# Patient Record
Sex: FEMALE | Race: WHITE | NOT HISPANIC OR LATINO | URBAN - METROPOLITAN AREA
[De-identification: names, ages, dates, MRNs, and addresses within clinical notes are randomized per-mention and may not be internally consistent; named-entity substitution may affect disease eponyms.]

---

## 2017-02-01 ENCOUNTER — OUTPATIENT (OUTPATIENT)
Dept: OUTPATIENT SERVICES | Facility: HOSPITAL | Age: 37
LOS: 1 days | End: 2017-02-01

## 2017-02-15 DIAGNOSIS — Z01.20 ENCOUNTER FOR DENTAL EXAMINATION AND CLEANING WITHOUT ABNORMAL FINDINGS: ICD-10-CM

## 2017-04-25 ENCOUNTER — EMERGENCY (EMERGENCY)
Facility: HOSPITAL | Age: 37
LOS: 1 days | End: 2017-04-25
Attending: EMERGENCY MEDICINE | Admitting: INTERNAL MEDICINE
Payer: MEDICAID

## 2017-04-25 VITALS
TEMPERATURE: 99 F | OXYGEN SATURATION: 97 % | WEIGHT: 199.96 LBS | RESPIRATION RATE: 18 BRPM | HEIGHT: 62 IN | DIASTOLIC BLOOD PRESSURE: 61 MMHG | HEART RATE: 96 BPM | SYSTOLIC BLOOD PRESSURE: 161 MMHG

## 2017-04-25 VITALS
DIASTOLIC BLOOD PRESSURE: 74 MMHG | SYSTOLIC BLOOD PRESSURE: 110 MMHG | TEMPERATURE: 98 F | HEART RATE: 88 BPM | RESPIRATION RATE: 15 BRPM | OXYGEN SATURATION: 98 %

## 2017-04-25 LAB
ALBUMIN SERPL ELPH-MCNC: 4 G/DL — SIGNIFICANT CHANGE UP (ref 3.3–5)
ALP SERPL-CCNC: 49 U/L — SIGNIFICANT CHANGE UP (ref 30–120)
ALT FLD-CCNC: 20 U/L DA — SIGNIFICANT CHANGE UP (ref 10–60)
ANION GAP SERPL CALC-SCNC: 8 MMOL/L — SIGNIFICANT CHANGE UP (ref 5–17)
APAP SERPL-MCNC: <1 — SIGNIFICANT CHANGE UP (ref 10–30)
AST SERPL-CCNC: 11 U/L — SIGNIFICANT CHANGE UP (ref 10–40)
BASOPHILS # BLD AUTO: 0.2 K/UL — SIGNIFICANT CHANGE UP (ref 0–0.2)
BASOPHILS NFR BLD AUTO: 2.1 % — HIGH (ref 0–2)
BILIRUB SERPL-MCNC: 0.3 MG/DL — SIGNIFICANT CHANGE UP (ref 0.2–1.2)
BUN SERPL-MCNC: 12 MG/DL — SIGNIFICANT CHANGE UP (ref 7–23)
CALCIUM SERPL-MCNC: 9.5 MG/DL — SIGNIFICANT CHANGE UP (ref 8.4–10.5)
CHLORIDE SERPL-SCNC: 102 MMOL/L — SIGNIFICANT CHANGE UP (ref 96–108)
CO2 SERPL-SCNC: 30 MMOL/L — SIGNIFICANT CHANGE UP (ref 22–31)
CREAT SERPL-MCNC: 0.77 MG/DL — SIGNIFICANT CHANGE UP (ref 0.5–1.3)
EOSINOPHIL # BLD AUTO: 0.1 K/UL — SIGNIFICANT CHANGE UP (ref 0–0.5)
EOSINOPHIL NFR BLD AUTO: 1.5 % — SIGNIFICANT CHANGE UP (ref 0–6)
GLUCOSE SERPL-MCNC: 125 MG/DL — HIGH (ref 70–99)
HCT VFR BLD CALC: 45 % — SIGNIFICANT CHANGE UP (ref 34.5–45)
HGB BLD-MCNC: 14.7 G/DL — SIGNIFICANT CHANGE UP (ref 11.5–15.5)
LYMPHOCYTES # BLD AUTO: 4.3 K/UL — HIGH (ref 1–3.3)
LYMPHOCYTES # BLD AUTO: 44.5 % — HIGH (ref 13–44)
MCHC RBC-ENTMCNC: 28.7 PG — SIGNIFICANT CHANGE UP (ref 27–34)
MCHC RBC-ENTMCNC: 32.6 GM/DL — SIGNIFICANT CHANGE UP (ref 32–36)
MCV RBC AUTO: 88.1 FL — SIGNIFICANT CHANGE UP (ref 80–100)
MONOCYTES # BLD AUTO: 0.6 K/UL — SIGNIFICANT CHANGE UP (ref 0–0.9)
MONOCYTES NFR BLD AUTO: 5.9 % — SIGNIFICANT CHANGE UP (ref 2–14)
NEUTROPHILS # BLD AUTO: 4.4 K/UL — SIGNIFICANT CHANGE UP (ref 1.8–7.4)
NEUTROPHILS NFR BLD AUTO: 46 % — SIGNIFICANT CHANGE UP (ref 43–77)
PLATELET # BLD AUTO: 243 K/UL — SIGNIFICANT CHANGE UP (ref 150–400)
POTASSIUM SERPL-MCNC: 4 MMOL/L — SIGNIFICANT CHANGE UP (ref 3.5–5.3)
POTASSIUM SERPL-SCNC: 4 MMOL/L — SIGNIFICANT CHANGE UP (ref 3.5–5.3)
PROT SERPL-MCNC: 8.1 G/DL — SIGNIFICANT CHANGE UP (ref 6–8.3)
RBC # BLD: 5.1 M/UL — SIGNIFICANT CHANGE UP (ref 3.8–5.2)
RBC # FLD: 13.1 % — SIGNIFICANT CHANGE UP (ref 10.3–14.5)
SALICYLATES SERPL-MCNC: 0.6 MG/DL — LOW (ref 2.8–20)
SODIUM SERPL-SCNC: 140 MMOL/L — SIGNIFICANT CHANGE UP (ref 135–145)
WBC # BLD: 9.6 K/UL — SIGNIFICANT CHANGE UP (ref 3.8–10.5)
WBC # FLD AUTO: 9.6 K/UL — SIGNIFICANT CHANGE UP (ref 3.8–10.5)

## 2017-04-25 PROCEDURE — 99285 EMERGENCY DEPT VISIT HI MDM: CPT | Mod: 25

## 2017-04-25 PROCEDURE — 80053 COMPREHEN METABOLIC PANEL: CPT

## 2017-04-25 PROCEDURE — 85027 COMPLETE CBC AUTOMATED: CPT

## 2017-04-25 PROCEDURE — 96372 THER/PROPH/DIAG INJ SC/IM: CPT

## 2017-04-25 PROCEDURE — 99284 EMERGENCY DEPT VISIT MOD MDM: CPT

## 2017-04-25 PROCEDURE — 80307 DRUG TEST PRSMV CHEM ANLYZR: CPT

## 2017-04-25 RX ORDER — HALOPERIDOL DECANOATE 100 MG/ML
5 INJECTION INTRAMUSCULAR ONCE
Qty: 0 | Refills: 0 | Status: COMPLETED | OUTPATIENT
Start: 2017-04-25 | End: 2017-04-25

## 2017-04-25 RX ADMIN — HALOPERIDOL DECANOATE 5 MILLIGRAM(S): 100 INJECTION INTRAMUSCULAR at 17:14

## 2017-04-25 RX ADMIN — Medication 2 MILLIGRAM(S): at 17:07

## 2017-04-25 NOTE — ED PROVIDER NOTE - MEDICAL DECISION MAKING DETAILS
Patient very agitated requiring constant observation and occasional physical restraint will get Phychiatric eval for further management

## 2017-04-25 NOTE — ED PROVIDER NOTE - CARE PLAN
Principal Discharge DX:	Agitation requiring sedation protocol  Secondary Diagnosis:	Bipolar 1 disorder

## 2017-04-25 NOTE — ED PROVIDER NOTE - OBJECTIVE STATEMENT
36 y/o F pt hx bipolar, ventricular cardiac disease hypothyroidism, seizures presents to ED with aide c/o agitation. Denies any pain. No further complaints at this time. 38 y/o F pt hx bipolar, ventricular cardiac disease hypothyroidism, seizures presents to ED with aide c/o agitation. Denies any pain. No further complaints at this time. Patient screaming very restless running allover er. Given Ativan 2 mg IM and Haldol 5mg which seem to help a bit.

## 2017-04-25 NOTE — ED PROVIDER NOTE - NS ED MD SCRIBE ATTENDING SCRIBE SECTIONS
PAST MEDICAL/SURGICAL/SOCIAL HISTORY/DISPOSITION/HISTORY OF PRESENT ILLNESS/HIV/PHYSICAL EXAM/REVIEW OF SYSTEMS/VITAL SIGNS( Pullset)

## 2017-04-25 NOTE — ED PROVIDER NOTE - PROGRESS NOTE DETAILS
Patient is lying calmly in bed. Aide states she seems fine. Patient would not be a good candidate for telepsych, and emergent psych consult not necessary at this time.     Patient is safe for D/C, and can follow-up as an out patient, or return if symptoms recur

## 2017-05-09 ENCOUNTER — OUTPATIENT (OUTPATIENT)
Dept: OUTPATIENT SERVICES | Facility: HOSPITAL | Age: 37
LOS: 1 days | End: 2017-05-09

## 2017-05-12 DIAGNOSIS — Z01.20 ENCOUNTER FOR DENTAL EXAMINATION AND CLEANING WITHOUT ABNORMAL FINDINGS: ICD-10-CM

## 2017-08-21 ENCOUNTER — EMERGENCY (EMERGENCY)
Facility: HOSPITAL | Age: 37
LOS: 1 days | Discharge: HOME | End: 2017-08-21
Attending: EMERGENCY MEDICINE | Admitting: EMERGENCY MEDICINE
Payer: MEDICAID

## 2017-08-21 VITALS
WEIGHT: 196.21 LBS | SYSTOLIC BLOOD PRESSURE: 136 MMHG | HEIGHT: 59 IN | OXYGEN SATURATION: 98 % | HEART RATE: 95 BPM | DIASTOLIC BLOOD PRESSURE: 92 MMHG | TEMPERATURE: 98 F | RESPIRATION RATE: 20 BRPM

## 2017-08-21 PROCEDURE — 99282 EMERGENCY DEPT VISIT SF MDM: CPT | Mod: 25

## 2017-08-21 PROCEDURE — 99284 EMERGENCY DEPT VISIT MOD MDM: CPT

## 2017-08-21 RX ADMIN — Medication 30 MILLILITER(S): at 19:18

## 2017-08-21 NOTE — ED PROVIDER NOTE - MEDICAL DECISION MAKING DETAILS
37 y.o. F presents from group facility for abd pain - in the ED, no signs of abdominal pain - given maalox when asked for prune juice - tolerated well - to be discharged home

## 2017-08-21 NOTE — ED PROVIDER NOTE - OBJECTIVE STATEMENT
38 y/o F w/ bipolar disorder presents to the ED from assisted living facility for abdominal pain today. Pt's aide states that group home wanted her evaluated and medically cleared after she walked into someone else's home, hit her head against something and complained of abdominal pain. 38 y/o F w/ bipolar disorder presents to the ED from group home with aide for abdominal pain today. [place] of group homes. Pt had a BM at home. Then left the home, walked into someone else's home - they know the patient - pt banged head and complained of abd pain, so they called 911. Pt's aide states that group home wanted her evaluated and medically cleared. Aide believes pt appears at baseline. No known fever. No vomiting. Pt wants to eat. Pt stating she doesn't want to go home, does not elaborate.

## 2017-08-21 NOTE — ED ADULT NURSE REASSESSMENT NOTE - NS ED NURSE REASSESS COMMENT FT1
pt alert , anxious, exaggerated, manipulative behavior,  crying states 'belly pain, doesn't want to go home'. pt seen and evaluated by MD. pt D/C back to group home.

## 2017-10-17 ENCOUNTER — OUTPATIENT (OUTPATIENT)
Dept: OUTPATIENT SERVICES | Facility: HOSPITAL | Age: 37
LOS: 1 days | End: 2017-10-17

## 2017-10-17 DIAGNOSIS — Z01.20 ENCOUNTER FOR DENTAL EXAMINATION AND CLEANING WITHOUT ABNORMAL FINDINGS: ICD-10-CM

## 2017-12-26 ENCOUNTER — OUTPATIENT (OUTPATIENT)
Dept: OUTPATIENT SERVICES | Facility: HOSPITAL | Age: 37
LOS: 1 days | End: 2017-12-26

## 2017-12-27 DIAGNOSIS — Z01.20 ENCOUNTER FOR DENTAL EXAMINATION AND CLEANING WITHOUT ABNORMAL FINDINGS: ICD-10-CM

## 2018-06-04 ENCOUNTER — EMERGENCY (EMERGENCY)
Facility: HOSPITAL | Age: 38
LOS: 1 days | Discharge: HOME | End: 2018-06-04
Attending: EMERGENCY MEDICINE | Admitting: EMERGENCY MEDICINE
Payer: MEDICAID

## 2018-06-04 VITALS
WEIGHT: 195.11 LBS | TEMPERATURE: 99 F | SYSTOLIC BLOOD PRESSURE: 137 MMHG | HEART RATE: 102 BPM | DIASTOLIC BLOOD PRESSURE: 86 MMHG | RESPIRATION RATE: 16 BRPM | HEIGHT: 63 IN | OXYGEN SATURATION: 95 %

## 2018-06-04 VITALS
TEMPERATURE: 99 F | SYSTOLIC BLOOD PRESSURE: 131 MMHG | HEART RATE: 98 BPM | DIASTOLIC BLOOD PRESSURE: 76 MMHG | OXYGEN SATURATION: 96 % | RESPIRATION RATE: 16 BRPM

## 2018-06-04 LAB
ALBUMIN SERPL ELPH-MCNC: 4.1 G/DL — SIGNIFICANT CHANGE UP (ref 3.3–5)
ALP SERPL-CCNC: 38 U/L — SIGNIFICANT CHANGE UP (ref 30–120)
ALT FLD-CCNC: 21 U/L DA — SIGNIFICANT CHANGE UP (ref 10–60)
ANION GAP SERPL CALC-SCNC: 12 MMOL/L — SIGNIFICANT CHANGE UP (ref 5–17)
APPEARANCE UR: CLEAR — SIGNIFICANT CHANGE UP
AST SERPL-CCNC: 11 U/L — SIGNIFICANT CHANGE UP (ref 10–40)
BASOPHILS # BLD AUTO: 0.2 K/UL — SIGNIFICANT CHANGE UP (ref 0–0.2)
BASOPHILS NFR BLD AUTO: 1.9 % — SIGNIFICANT CHANGE UP (ref 0–2)
BILIRUB SERPL-MCNC: 0.3 MG/DL — SIGNIFICANT CHANGE UP (ref 0.2–1.2)
BILIRUB UR-MCNC: NEGATIVE — SIGNIFICANT CHANGE UP
BUN SERPL-MCNC: 10 MG/DL — SIGNIFICANT CHANGE UP (ref 7–23)
CALCIUM SERPL-MCNC: 9.8 MG/DL — SIGNIFICANT CHANGE UP (ref 8.4–10.5)
CHLORIDE SERPL-SCNC: 96 MMOL/L — SIGNIFICANT CHANGE UP (ref 96–108)
CO2 SERPL-SCNC: 27 MMOL/L — SIGNIFICANT CHANGE UP (ref 22–31)
COLOR SPEC: YELLOW — SIGNIFICANT CHANGE UP
CREAT SERPL-MCNC: 0.84 MG/DL — SIGNIFICANT CHANGE UP (ref 0.5–1.3)
DIFF PNL FLD: ABNORMAL
EOSINOPHIL # BLD AUTO: 0.1 K/UL — SIGNIFICANT CHANGE UP (ref 0–0.5)
EOSINOPHIL NFR BLD AUTO: 0.9 % — SIGNIFICANT CHANGE UP (ref 0–6)
GLUCOSE SERPL-MCNC: 112 MG/DL — HIGH (ref 70–99)
GLUCOSE UR QL: NEGATIVE MG/DL — SIGNIFICANT CHANGE UP
HCG SERPL-ACNC: <1 MIU/ML — SIGNIFICANT CHANGE UP
HCT VFR BLD CALC: 44.6 % — SIGNIFICANT CHANGE UP (ref 34.5–45)
HGB BLD-MCNC: 14.5 G/DL — SIGNIFICANT CHANGE UP (ref 11.5–15.5)
KETONES UR-MCNC: NEGATIVE — SIGNIFICANT CHANGE UP
LEUKOCYTE ESTERASE UR-ACNC: ABNORMAL
LYMPHOCYTES # BLD AUTO: 2.9 K/UL — SIGNIFICANT CHANGE UP (ref 1–3.3)
LYMPHOCYTES # BLD AUTO: 33 % — SIGNIFICANT CHANGE UP (ref 13–44)
MCHC RBC-ENTMCNC: 28 PG — SIGNIFICANT CHANGE UP (ref 27–34)
MCHC RBC-ENTMCNC: 32.5 GM/DL — SIGNIFICANT CHANGE UP (ref 32–36)
MCV RBC AUTO: 86 FL — SIGNIFICANT CHANGE UP (ref 80–100)
MONOCYTES # BLD AUTO: 0.6 K/UL — SIGNIFICANT CHANGE UP (ref 0–0.9)
MONOCYTES NFR BLD AUTO: 7.3 % — SIGNIFICANT CHANGE UP (ref 2–14)
NEUTROPHILS # BLD AUTO: 4.9 K/UL — SIGNIFICANT CHANGE UP (ref 1.8–7.4)
NEUTROPHILS NFR BLD AUTO: 56.9 % — SIGNIFICANT CHANGE UP (ref 43–77)
NITRITE UR-MCNC: NEGATIVE — SIGNIFICANT CHANGE UP
PH UR: 8 — SIGNIFICANT CHANGE UP (ref 5–8)
PLATELET # BLD AUTO: 301 K/UL — SIGNIFICANT CHANGE UP (ref 150–400)
POTASSIUM SERPL-MCNC: 3.9 MMOL/L — SIGNIFICANT CHANGE UP (ref 3.5–5.3)
POTASSIUM SERPL-SCNC: 3.9 MMOL/L — SIGNIFICANT CHANGE UP (ref 3.5–5.3)
PROT SERPL-MCNC: 8.3 G/DL — SIGNIFICANT CHANGE UP (ref 6–8.3)
PROT UR-MCNC: NEGATIVE MG/DL — SIGNIFICANT CHANGE UP
RBC # BLD: 5.18 M/UL — SIGNIFICANT CHANGE UP (ref 3.8–5.2)
RBC # FLD: 12.5 % — SIGNIFICANT CHANGE UP (ref 10.3–14.5)
SODIUM SERPL-SCNC: 135 MMOL/L — SIGNIFICANT CHANGE UP (ref 135–145)
SP GR SPEC: 1.01 — SIGNIFICANT CHANGE UP (ref 1.01–1.02)
UROBILINOGEN FLD QL: NEGATIVE MG/DL — SIGNIFICANT CHANGE UP
VALPROATE SERPL-MCNC: 97 UG/ML — SIGNIFICANT CHANGE UP (ref 50–100)
WBC # BLD: 8.7 K/UL — SIGNIFICANT CHANGE UP (ref 3.8–10.5)
WBC # FLD AUTO: 8.7 K/UL — SIGNIFICANT CHANGE UP (ref 3.8–10.5)

## 2018-06-04 PROCEDURE — 84702 CHORIONIC GONADOTROPIN TEST: CPT

## 2018-06-04 PROCEDURE — 99284 EMERGENCY DEPT VISIT MOD MDM: CPT | Mod: 25

## 2018-06-04 PROCEDURE — 85027 COMPLETE CBC AUTOMATED: CPT

## 2018-06-04 PROCEDURE — 72110 X-RAY EXAM L-2 SPINE 4/>VWS: CPT | Mod: 26

## 2018-06-04 PROCEDURE — 70450 CT HEAD/BRAIN W/O DYE: CPT | Mod: 26

## 2018-06-04 PROCEDURE — 80053 COMPREHEN METABOLIC PANEL: CPT

## 2018-06-04 PROCEDURE — 80164 ASSAY DIPROPYLACETIC ACD TOT: CPT

## 2018-06-04 PROCEDURE — 81001 URINALYSIS AUTO W/SCOPE: CPT

## 2018-06-04 PROCEDURE — 36415 COLL VENOUS BLD VENIPUNCTURE: CPT

## 2018-06-04 PROCEDURE — 70450 CT HEAD/BRAIN W/O DYE: CPT

## 2018-06-04 PROCEDURE — 99284 EMERGENCY DEPT VISIT MOD MDM: CPT

## 2018-06-04 PROCEDURE — 72110 X-RAY EXAM L-2 SPINE 4/>VWS: CPT

## 2018-06-04 RX ORDER — AMANTADINE HCL 100 MG
100 CAPSULE ORAL
Qty: 0 | Refills: 0 | COMMUNITY

## 2018-06-04 RX ORDER — SODIUM CHLORIDE 9 MG/ML
3 INJECTION INTRAMUSCULAR; INTRAVENOUS; SUBCUTANEOUS ONCE
Qty: 0 | Refills: 0 | Status: COMPLETED | OUTPATIENT
Start: 2018-06-04 | End: 2018-06-04

## 2018-06-04 RX ADMIN — SODIUM CHLORIDE 3 MILLILITER(S): 9 INJECTION INTRAMUSCULAR; INTRAVENOUS; SUBCUTANEOUS at 18:14

## 2018-06-04 NOTE — ED PROVIDER NOTE - OBJECTIVE STATEMENT
pt is a 39yo female with pmhx of seizures, MR, bipolar BIB group home for seizure today. group home aide reports pt had a seizure at program today, unsure what happened, unwitnessed. pt not c/o pain at this time. last seizure 2y ago, pmd: avolese pt is a 39yo female with pmhx of seizures, MR, bipolar BIB group home [facility] for seizure today. group home aide reports pt had a seizure at program today, unsure what happened, unwitnessed. pt not c/o pain at this time. last seizure 2y ago, pmd: iggy

## 2018-06-04 NOTE — ED PROVIDER NOTE - PROGRESS NOTE DETAILS
labs and imaging reviewed. pt advised to follow up with pmd. All questions answered and concerns addressed. pt verbalized understanding and agreement with plan and dx. pt advised to follow up with PMD. pt advised to return to ed for worsenng symptoms including fever, cp, sob. will dc.

## 2018-06-04 NOTE — ED ADULT NURSE NOTE - OBJECTIVE STATEMENT
Pt presents accompanied by aides from [facility] who state Pt had a witnessed seizure lasting one minute while at school prior to arrival. Alert oriented x3. Normal mentation. No seizure activity noted

## 2018-06-04 NOTE — ED PROVIDER NOTE - ATTENDING CONTRIBUTION TO CARE
Pt is a 37 yo female who presents to the ED with cc of seizure.  PMHx of seizures, MR, bipolar, hypothyroidism.  Pt is unable to provide history secondary to developmental delay.  Aid reports that pt had witnessed seizure at group today but was not there and cannot provide additional history.  On exam pt awake, alert, laughing and walking around the ED.  NCAT, PERRL, heart RRR, lungs CTA, abd soft NT/ND.  Will check cbc, cmp, pregnancy, UA, urine culture, Depakote level, CT head, x-ray lumbar spine.  Agree with above

## 2018-11-20 ENCOUNTER — OUTPATIENT (OUTPATIENT)
Dept: OUTPATIENT SERVICES | Facility: HOSPITAL | Age: 38
LOS: 1 days | End: 2018-11-20

## 2018-11-20 PROBLEM — E03.9 HYPOTHYROIDISM, UNSPECIFIED: Chronic | Status: ACTIVE | Noted: 2017-04-25

## 2018-11-27 ENCOUNTER — APPOINTMENT (OUTPATIENT)
Dept: OBGYN | Facility: CLINIC | Age: 38
End: 2018-11-27

## 2018-11-27 DIAGNOSIS — Z01.20 ENCOUNTER FOR DENTAL EXAMINATION AND CLEANING WITHOUT ABNORMAL FINDINGS: ICD-10-CM

## 2019-05-15 ENCOUNTER — OUTPATIENT (OUTPATIENT)
Dept: OUTPATIENT SERVICES | Facility: HOSPITAL | Age: 39
LOS: 1 days | End: 2019-05-15
Payer: MEDICAID

## 2019-05-15 VITALS
HEART RATE: 108 BPM | TEMPERATURE: 98 F | OXYGEN SATURATION: 98 % | RESPIRATION RATE: 20 BRPM | WEIGHT: 197.98 LBS | HEIGHT: 61 IN | DIASTOLIC BLOOD PRESSURE: 76 MMHG | SYSTOLIC BLOOD PRESSURE: 134 MMHG

## 2019-05-15 DIAGNOSIS — K02.62 DENTAL CARIES ON SMOOTH SURFACE PENETRATING INTO DENTIN: ICD-10-CM

## 2019-05-15 DIAGNOSIS — K02.9 DENTAL CARIES, UNSPECIFIED: ICD-10-CM

## 2019-05-15 DIAGNOSIS — K05.6 PERIODONTAL DISEASE, UNSPECIFIED: ICD-10-CM

## 2019-05-15 DIAGNOSIS — Z98.890 OTHER SPECIFIED POSTPROCEDURAL STATES: Chronic | ICD-10-CM

## 2019-05-15 DIAGNOSIS — Z86.79 PERSONAL HISTORY OF OTHER DISEASES OF THE CIRCULATORY SYSTEM: ICD-10-CM

## 2019-05-15 DIAGNOSIS — G40.909 EPILEPSY, UNSPECIFIED, NOT INTRACTABLE, WITHOUT STATUS EPILEPTICUS: ICD-10-CM

## 2019-05-15 DIAGNOSIS — F31.9 BIPOLAR DISORDER, UNSPECIFIED: ICD-10-CM

## 2019-05-15 LAB
ANION GAP SERPL CALC-SCNC: 15 MMOL/L — SIGNIFICANT CHANGE UP (ref 5–17)
BUN SERPL-MCNC: 12 MG/DL — SIGNIFICANT CHANGE UP (ref 7–23)
CALCIUM SERPL-MCNC: 10.1 MG/DL — SIGNIFICANT CHANGE UP (ref 8.4–10.5)
CHLORIDE SERPL-SCNC: 98 MMOL/L — SIGNIFICANT CHANGE UP (ref 96–108)
CO2 SERPL-SCNC: 23 MMOL/L — SIGNIFICANT CHANGE UP (ref 22–31)
CREAT SERPL-MCNC: 0.68 MG/DL — SIGNIFICANT CHANGE UP (ref 0.5–1.3)
GLUCOSE SERPL-MCNC: 95 MG/DL — SIGNIFICANT CHANGE UP (ref 70–99)
HCT VFR BLD CALC: 38 % — SIGNIFICANT CHANGE UP (ref 34.5–45)
HGB BLD-MCNC: 12.3 G/DL — SIGNIFICANT CHANGE UP (ref 11.5–15.5)
MCHC RBC-ENTMCNC: 28.1 PG — SIGNIFICANT CHANGE UP (ref 27–34)
MCHC RBC-ENTMCNC: 32.4 GM/DL — SIGNIFICANT CHANGE UP (ref 32–36)
MCV RBC AUTO: 86.8 FL — SIGNIFICANT CHANGE UP (ref 80–100)
PLATELET # BLD AUTO: 339 K/UL — SIGNIFICANT CHANGE UP (ref 150–400)
POTASSIUM SERPL-MCNC: 4.1 MMOL/L — SIGNIFICANT CHANGE UP (ref 3.5–5.3)
POTASSIUM SERPL-SCNC: 4.1 MMOL/L — SIGNIFICANT CHANGE UP (ref 3.5–5.3)
RBC # BLD: 4.38 M/UL — SIGNIFICANT CHANGE UP (ref 3.8–5.2)
RBC # FLD: 16.4 % — HIGH (ref 10.3–14.5)
SODIUM SERPL-SCNC: 136 MMOL/L — SIGNIFICANT CHANGE UP (ref 135–145)
WBC # BLD: 8.63 K/UL — SIGNIFICANT CHANGE UP (ref 3.8–10.5)
WBC # FLD AUTO: 8.63 K/UL — SIGNIFICANT CHANGE UP (ref 3.8–10.5)

## 2019-05-15 PROCEDURE — 85027 COMPLETE CBC AUTOMATED: CPT

## 2019-05-15 PROCEDURE — 71045 X-RAY EXAM CHEST 1 VIEW: CPT

## 2019-05-15 PROCEDURE — 71045 X-RAY EXAM CHEST 1 VIEW: CPT | Mod: 26

## 2019-05-15 PROCEDURE — 80048 BASIC METABOLIC PNL TOTAL CA: CPT

## 2019-05-15 PROCEDURE — G0463: CPT

## 2019-05-15 RX ORDER — LIDOCAINE HCL 20 MG/ML
0.2 VIAL (ML) INJECTION ONCE
Refills: 0 | Status: DISCONTINUED | OUTPATIENT
Start: 2019-05-22 | End: 2019-06-06

## 2019-05-15 RX ORDER — SODIUM CHLORIDE 9 MG/ML
3 INJECTION INTRAMUSCULAR; INTRAVENOUS; SUBCUTANEOUS EVERY 8 HOURS
Refills: 0 | Status: DISCONTINUED | OUTPATIENT
Start: 2019-05-22 | End: 2019-06-06

## 2019-05-15 NOTE — H&P PST ADULT - NSICDXPROBLEM_GEN_ALL_CORE_FT
PROBLEM DIAGNOSES  Problem: H/O sinus tachycardia  Assessment and Plan: recent ekg on chart     Problem: Dental caries  Assessment and Plan: scheduled for comprehensive dental treatment  preop instruction discussed with group home staffs, written instruction given.  urine given, ucg on admit     Problem: Bipolar disorder  Assessment and Plan: continue medication deshawn-op    Problem: Seizure disorder  Assessment and Plan: maintain seizure precautions  continue anti-seizure medication

## 2019-05-15 NOTE — H&P PST ADULT - HISTORY OF PRESENT ILLNESS
39 year old mentally challenged female with h/o bipolar disorders, seizure disorder (last seizure 6/4/2018), occasional tachycardia, osteoporosis, hyperlipidemia, is scheduled for comprehensive dental treatment on 5/22/2019. Patient is scheduled for evaluation with her pcp on 5/16/2019.

## 2019-05-15 NOTE — H&P PST ADULT - NSANTHOSAYNRD_GEN_A_CORE
No. SHAHEED screening performed.  STOP BANG Legend: 0-2 = LOW Risk; 3-4 = INTERMEDIATE Risk; 5-8 = HIGH Risk

## 2019-05-21 ENCOUNTER — TRANSCRIPTION ENCOUNTER (OUTPATIENT)
Age: 39
End: 2019-05-21

## 2019-05-21 RX ORDER — CELECOXIB 200 MG/1
200 CAPSULE ORAL ONCE
Refills: 0 | Status: DISCONTINUED | OUTPATIENT
Start: 2019-05-22 | End: 2019-06-06

## 2019-05-21 RX ORDER — SODIUM CHLORIDE 9 MG/ML
1000 INJECTION, SOLUTION INTRAVENOUS
Refills: 0 | Status: DISCONTINUED | OUTPATIENT
Start: 2019-05-22 | End: 2019-06-06

## 2019-05-21 RX ORDER — ONDANSETRON 8 MG/1
4 TABLET, FILM COATED ORAL ONCE
Refills: 0 | Status: DISCONTINUED | OUTPATIENT
Start: 2019-05-22 | End: 2019-06-06

## 2019-05-21 NOTE — ASU DISCHARGE PLAN (ADULT/PEDIATRIC) - CALL YOUR DOCTOR IF YOU HAVE ANY OF THE FOLLOWING:
Bleeding that does not stop/Pain not relieved by Medications/Swelling that gets worse/Fever greater than (need to indicate Fahrenheit or Celsius)/Wound/Surgical Site with redness, or foul smelling discharge or pus

## 2019-05-21 NOTE — ASU DISCHARGE PLAN (ADULT/PEDIATRIC) - CARE PROVIDER_API CALL
Jess De Los Santos (DDS; MD)  Dental Medicine  [address] [place] [address]  Phone: [phone]  Fax: [phone]  Follow Up Time:

## 2019-05-21 NOTE — ASU DISCHARGE PLAN (ADULT/PEDIATRIC) - NURSING INSTRUCTIONS
Next dose of Tylenol will be on or after _____4583pm______ ,today/tonight, If needed for pain/cramps. Your first dose of Tylenol was given at ____1053am_______. Do not exceed more than 4000mg of Tylenol in one 24 hour setting.

## 2019-05-22 ENCOUNTER — OUTPATIENT (OUTPATIENT)
Dept: OUTPATIENT SERVICES | Facility: HOSPITAL | Age: 39
LOS: 1 days | End: 2019-05-22
Payer: MEDICAID

## 2019-05-22 VITALS
OXYGEN SATURATION: 100 % | SYSTOLIC BLOOD PRESSURE: 120 MMHG | DIASTOLIC BLOOD PRESSURE: 57 MMHG | HEART RATE: 109 BPM | RESPIRATION RATE: 16 BRPM

## 2019-05-22 VITALS
RESPIRATION RATE: 24 BRPM | HEIGHT: 61 IN | HEART RATE: 104 BPM | OXYGEN SATURATION: 100 % | WEIGHT: 197.98 LBS | TEMPERATURE: 98 F

## 2019-05-22 DIAGNOSIS — Z98.890 OTHER SPECIFIED POSTPROCEDURAL STATES: Chronic | ICD-10-CM

## 2019-05-22 DIAGNOSIS — K02.62 DENTAL CARIES ON SMOOTH SURFACE PENETRATING INTO DENTIN: ICD-10-CM

## 2019-05-22 DIAGNOSIS — K05.6 PERIODONTAL DISEASE, UNSPECIFIED: ICD-10-CM

## 2019-05-22 PROCEDURE — D4210: CPT

## 2019-05-22 PROCEDURE — D2392: CPT

## 2019-05-22 PROCEDURE — D2394: CPT

## 2019-05-22 PROCEDURE — D2332: CPT

## 2019-05-22 PROCEDURE — D2391: CPT

## 2019-05-22 PROCEDURE — D4341: CPT

## 2019-06-02 PROBLEM — Z86.79 PERSONAL HISTORY OF OTHER DISEASES OF THE CIRCULATORY SYSTEM: Chronic | Status: ACTIVE | Noted: 2019-05-15

## 2019-06-02 PROBLEM — Z87.39 PERSONAL HISTORY OF OTHER DISEASES OF THE MUSCULOSKELETAL SYSTEM AND CONNECTIVE TISSUE: Chronic | Status: ACTIVE | Noted: 2019-05-15

## 2019-06-02 PROBLEM — F31.9 BIPOLAR DISORDER, UNSPECIFIED: Chronic | Status: ACTIVE | Noted: 2019-05-15

## 2019-06-02 PROBLEM — E03.9 HYPOTHYROIDISM, UNSPECIFIED: Chronic | Status: ACTIVE | Noted: 2019-05-15

## 2019-06-02 PROBLEM — E78.5 HYPERLIPIDEMIA, UNSPECIFIED: Chronic | Status: ACTIVE | Noted: 2019-05-15

## 2019-06-05 RX ORDER — SODIUM CHLORIDE 9 MG/ML
3 INJECTION INTRAMUSCULAR; INTRAVENOUS; SUBCUTANEOUS EVERY 8 HOURS
Refills: 0 | Status: DISCONTINUED | OUTPATIENT
Start: 2019-06-07 | End: 2019-06-22

## 2019-06-05 RX ORDER — LIDOCAINE HCL 20 MG/ML
0.2 VIAL (ML) INJECTION ONCE
Refills: 0 | Status: DISCONTINUED | OUTPATIENT
Start: 2019-06-07 | End: 2019-06-22

## 2019-06-06 ENCOUNTER — TRANSCRIPTION ENCOUNTER (OUTPATIENT)
Age: 39
End: 2019-06-06

## 2019-06-07 ENCOUNTER — OUTPATIENT (OUTPATIENT)
Dept: OUTPATIENT SERVICES | Facility: HOSPITAL | Age: 39
LOS: 1 days | End: 2019-06-07
Payer: MEDICAID

## 2019-06-07 VITALS
SYSTOLIC BLOOD PRESSURE: 123 MMHG | OXYGEN SATURATION: 98 % | HEIGHT: 61 IN | TEMPERATURE: 98 F | DIASTOLIC BLOOD PRESSURE: 71 MMHG | WEIGHT: 197.98 LBS | HEART RATE: 114 BPM

## 2019-06-07 VITALS — HEART RATE: 94 BPM | OXYGEN SATURATION: 94 % | SYSTOLIC BLOOD PRESSURE: 124 MMHG | DIASTOLIC BLOOD PRESSURE: 60 MMHG

## 2019-06-07 DIAGNOSIS — Z98.890 OTHER SPECIFIED POSTPROCEDURAL STATES: Chronic | ICD-10-CM

## 2019-06-07 DIAGNOSIS — K05.6 PERIODONTAL DISEASE, UNSPECIFIED: ICD-10-CM

## 2019-06-07 DIAGNOSIS — K02.62 DENTAL CARIES ON SMOOTH SURFACE PENETRATING INTO DENTIN: ICD-10-CM

## 2019-06-07 PROCEDURE — D2391: CPT

## 2019-06-07 PROCEDURE — D2335: CPT

## 2019-06-07 PROCEDURE — D2393: CPT

## 2019-06-07 PROCEDURE — D1110: CPT

## 2019-06-07 PROCEDURE — D2394: CPT

## 2019-06-07 RX ORDER — SODIUM CHLORIDE 9 MG/ML
1000 INJECTION, SOLUTION INTRAVENOUS
Refills: 0 | Status: DISCONTINUED | OUTPATIENT
Start: 2019-06-07 | End: 2019-06-22

## 2019-06-07 RX ORDER — ONDANSETRON 8 MG/1
4 TABLET, FILM COATED ORAL ONCE
Refills: 0 | Status: DISCONTINUED | OUTPATIENT
Start: 2019-06-07 | End: 2019-06-22

## 2019-06-07 NOTE — ASU PATIENT PROFILE, ADULT - PMH
Bipolar disorder    Bipolar illness    H/O osteoporosis    H/O sinus tachycardia    History of seizure disorder  last seizure 6/4/2018  Hyperlipidemia    Hypothyroidism    Hypothyroidism    Mentally challenged    Seizure

## 2019-10-03 NOTE — ED ADULT TRIAGE NOTE - ESI TRIAGE ACUITY LEVEL, MLM
· Currently denies any migrainous symptoms  · Multiple admissions previously for intractable migraine 3

## 2020-01-15 ENCOUNTER — OUTPATIENT (OUTPATIENT)
Dept: OUTPATIENT SERVICES | Facility: HOSPITAL | Age: 40
LOS: 1 days | End: 2020-01-15
Payer: MEDICAID

## 2020-01-15 VITALS
RESPIRATION RATE: 15 BRPM | DIASTOLIC BLOOD PRESSURE: 68 MMHG | SYSTOLIC BLOOD PRESSURE: 101 MMHG | TEMPERATURE: 98 F | HEIGHT: 62 IN | OXYGEN SATURATION: 97 % | WEIGHT: 186.07 LBS | HEART RATE: 94 BPM

## 2020-01-15 DIAGNOSIS — Z86.79 PERSONAL HISTORY OF OTHER DISEASES OF THE CIRCULATORY SYSTEM: ICD-10-CM

## 2020-01-15 DIAGNOSIS — K02.62 DENTAL CARIES ON SMOOTH SURFACE PENETRATING INTO DENTIN: ICD-10-CM

## 2020-01-15 DIAGNOSIS — K02.9 DENTAL CARIES, UNSPECIFIED: ICD-10-CM

## 2020-01-15 DIAGNOSIS — Z01.818 ENCOUNTER FOR OTHER PREPROCEDURAL EXAMINATION: ICD-10-CM

## 2020-01-15 DIAGNOSIS — Z92.89 PERSONAL HISTORY OF OTHER MEDICAL TREATMENT: Chronic | ICD-10-CM

## 2020-01-15 DIAGNOSIS — K05.6 PERIODONTAL DISEASE, UNSPECIFIED: ICD-10-CM

## 2020-01-15 DIAGNOSIS — Z98.890 OTHER SPECIFIED POSTPROCEDURAL STATES: Chronic | ICD-10-CM

## 2020-01-15 DIAGNOSIS — Z86.69 PERSONAL HISTORY OF OTHER DISEASES OF THE NERVOUS SYSTEM AND SENSE ORGANS: ICD-10-CM

## 2020-01-15 LAB
ANION GAP SERPL CALC-SCNC: 15 MMOL/L — SIGNIFICANT CHANGE UP (ref 5–17)
BUN SERPL-MCNC: 15 MG/DL — SIGNIFICANT CHANGE UP (ref 7–23)
CALCIUM SERPL-MCNC: 10.3 MG/DL — SIGNIFICANT CHANGE UP (ref 8.4–10.5)
CHLORIDE SERPL-SCNC: 98 MMOL/L — SIGNIFICANT CHANGE UP (ref 96–108)
CO2 SERPL-SCNC: 26 MMOL/L — SIGNIFICANT CHANGE UP (ref 22–31)
CREAT SERPL-MCNC: 0.68 MG/DL — SIGNIFICANT CHANGE UP (ref 0.5–1.3)
GLUCOSE SERPL-MCNC: 111 MG/DL — HIGH (ref 70–99)
HBA1C BLD-MCNC: 5.7 % — HIGH (ref 4–5.6)
HCG SERPL-ACNC: <2 MIU/ML — SIGNIFICANT CHANGE UP
HCT VFR BLD CALC: 45.1 % — HIGH (ref 34.5–45)
HGB BLD-MCNC: 14.3 G/DL — SIGNIFICANT CHANGE UP (ref 11.5–15.5)
MCHC RBC-ENTMCNC: 27.7 PG — SIGNIFICANT CHANGE UP (ref 27–34)
MCHC RBC-ENTMCNC: 31.7 GM/DL — LOW (ref 32–36)
MCV RBC AUTO: 87.2 FL — SIGNIFICANT CHANGE UP (ref 80–100)
PLATELET # BLD AUTO: 273 K/UL — SIGNIFICANT CHANGE UP (ref 150–400)
POTASSIUM SERPL-MCNC: 4.3 MMOL/L — SIGNIFICANT CHANGE UP (ref 3.5–5.3)
POTASSIUM SERPL-SCNC: 4.3 MMOL/L — SIGNIFICANT CHANGE UP (ref 3.5–5.3)
RBC # BLD: 5.17 M/UL — SIGNIFICANT CHANGE UP (ref 3.8–5.2)
RBC # FLD: 15.7 % — HIGH (ref 10.3–14.5)
SODIUM SERPL-SCNC: 139 MMOL/L — SIGNIFICANT CHANGE UP (ref 135–145)
WBC # BLD: 9.69 K/UL — SIGNIFICANT CHANGE UP (ref 3.8–10.5)
WBC # FLD AUTO: 9.69 K/UL — SIGNIFICANT CHANGE UP (ref 3.8–10.5)

## 2020-01-15 PROCEDURE — G0463: CPT

## 2020-01-15 PROCEDURE — 84702 CHORIONIC GONADOTROPIN TEST: CPT

## 2020-01-15 PROCEDURE — 83036 HEMOGLOBIN GLYCOSYLATED A1C: CPT

## 2020-01-15 PROCEDURE — 85027 COMPLETE CBC AUTOMATED: CPT

## 2020-01-15 PROCEDURE — 80048 BASIC METABOLIC PNL TOTAL CA: CPT

## 2020-01-15 RX ORDER — AMANTADINE HCL 100 MG
1 CAPSULE ORAL
Qty: 0 | Refills: 0 | DISCHARGE

## 2020-01-15 RX ORDER — DOCOSANOL 100 MG/G
0 CREAM TOPICAL
Qty: 0 | Refills: 0 | DISCHARGE

## 2020-01-15 RX ORDER — DIVALPROEX SODIUM 500 MG/1
1 TABLET, DELAYED RELEASE ORAL
Qty: 0 | Refills: 0 | DISCHARGE

## 2020-01-15 RX ORDER — ALENDRONATE SODIUM 70 MG/1
1 TABLET ORAL
Qty: 0 | Refills: 0 | DISCHARGE

## 2020-01-15 RX ORDER — QUETIAPINE FUMARATE 200 MG/1
1 TABLET, FILM COATED ORAL
Qty: 0 | Refills: 0 | DISCHARGE

## 2020-01-15 RX ORDER — LIDOCAINE HCL 20 MG/ML
0.2 VIAL (ML) INJECTION ONCE
Refills: 0 | Status: DISCONTINUED | OUTPATIENT
Start: 2020-01-29 | End: 2020-02-17

## 2020-01-15 RX ORDER — LACTIC ACID 10 %
0 CREAM (GRAM) TOPICAL
Qty: 0 | Refills: 0 | DISCHARGE

## 2020-01-15 RX ORDER — LEVOTHYROXINE SODIUM 125 MCG
1 TABLET ORAL
Qty: 0 | Refills: 0 | DISCHARGE

## 2020-01-15 RX ORDER — ATORVASTATIN CALCIUM 80 MG/1
1 TABLET, FILM COATED ORAL
Qty: 0 | Refills: 0 | DISCHARGE

## 2020-01-15 RX ORDER — SODIUM CHLORIDE 9 MG/ML
3 INJECTION INTRAMUSCULAR; INTRAVENOUS; SUBCUTANEOUS EVERY 8 HOURS
Refills: 0 | Status: DISCONTINUED | OUTPATIENT
Start: 2020-01-29 | End: 2020-02-17

## 2020-01-15 RX ORDER — NORETHINDRONE AND ETHINYL ESTRADIOL 0.4-0.035
1 KIT ORAL
Qty: 0 | Refills: 0 | DISCHARGE

## 2020-01-15 RX ORDER — POLYETHYLENE GLYCOL 3350 17 G/17G
0 POWDER, FOR SOLUTION ORAL
Qty: 0 | Refills: 0 | DISCHARGE

## 2020-01-15 RX ORDER — ERGOCALCIFEROL 1.25 MG/1
0 CAPSULE ORAL
Qty: 0 | Refills: 0 | DISCHARGE

## 2020-01-15 NOTE — H&P PST ADULT - ASSESSMENT
dental caries  scheduled for comprehensive dental treatment  preop instruction discussed with group home staffs, written instruction given.  urine given, ucg on admit

## 2020-01-15 NOTE — H&P PST ADULT - NSICDXPROBLEM_GEN_ALL_CORE_FT
PROBLEM DIAGNOSES  Problem: H/O sinus tachycardia  Assessment and Plan: latest EKG/ECHO/Cardiac eval in chart    Problem: Dental caries  Assessment and Plan: Scheduled for comprehensive dental treatment  Pending Anes consent by mother ( Info in chart)  preop instruction discussed with group home staffs, written instruction given.  HCG sent with lab/UCG DOS       Problem: History of seizure disorder  Assessment and Plan: Instructed to continue meds &  take with sips of water in AM the day of surgery

## 2020-01-15 NOTE — H&P PST ADULT - HISTORY OF PRESENT ILLNESS
39 year old mentally challenged female with h/o bipolar disorders, seizure disorder( more than a year ago), occasional tachycardia, RBBB, endometrial thickening, osteoporosis, hyperlipidemia, is scheduled for comprehensive dental treatment on 01/29/2020.

## 2020-01-15 NOTE — H&P PST ADULT - NSICDXPASTMEDICALHX_GEN_ALL_CORE_FT
PAST MEDICAL HISTORY:  Bipolar disorder     Bipolar illness     H/O osteoporosis     H/O sinus tachycardia     History of seizure disorder last seizure 6/4/2018    Hyperlipidemia     Hypothyroidism     Hypothyroidism     Mentally challenged     Seizure

## 2020-01-22 ENCOUNTER — APPOINTMENT (OUTPATIENT)
Dept: DERMATOLOGY | Facility: CLINIC | Age: 40
End: 2020-01-22

## 2020-01-28 ENCOUNTER — TRANSCRIPTION ENCOUNTER (OUTPATIENT)
Age: 40
End: 2020-01-28

## 2020-01-28 NOTE — ASU DISCHARGE PLAN (ADULT/PEDIATRIC) - ASU DC SPECIAL INSTRUCTIONSFT
comprehensive dental tx under general anesthesia performed    tylenol prn pain    see dr gaffney in one week [phone]    return to program tomorrow

## 2020-01-28 NOTE — ASU DISCHARGE PLAN (ADULT/PEDIATRIC) - CALL YOUR DOCTOR IF YOU HAVE ANY OF THE FOLLOWING:
Fever greater than (need to indicate Fahrenheit or Celsius)/Bleeding that does not stop/Pain not relieved by Medications/Swelling that gets worse

## 2020-01-29 ENCOUNTER — OUTPATIENT (OUTPATIENT)
Dept: OUTPATIENT SERVICES | Facility: HOSPITAL | Age: 40
LOS: 1 days | End: 2020-01-29
Payer: MEDICAID

## 2020-01-29 VITALS
OXYGEN SATURATION: 97 % | HEART RATE: 94 BPM | WEIGHT: 186.07 LBS | DIASTOLIC BLOOD PRESSURE: 68 MMHG | TEMPERATURE: 98 F | SYSTOLIC BLOOD PRESSURE: 101 MMHG | RESPIRATION RATE: 15 BRPM | HEIGHT: 62 IN

## 2020-01-29 VITALS
DIASTOLIC BLOOD PRESSURE: 60 MMHG | HEART RATE: 97 BPM | RESPIRATION RATE: 18 BRPM | OXYGEN SATURATION: 99 % | SYSTOLIC BLOOD PRESSURE: 122 MMHG

## 2020-01-29 DIAGNOSIS — Z98.890 OTHER SPECIFIED POSTPROCEDURAL STATES: Chronic | ICD-10-CM

## 2020-01-29 DIAGNOSIS — Z01.818 ENCOUNTER FOR OTHER PREPROCEDURAL EXAMINATION: ICD-10-CM

## 2020-01-29 DIAGNOSIS — K05.6 PERIODONTAL DISEASE, UNSPECIFIED: ICD-10-CM

## 2020-01-29 DIAGNOSIS — Z92.89 PERSONAL HISTORY OF OTHER MEDICAL TREATMENT: Chronic | ICD-10-CM

## 2020-01-29 DIAGNOSIS — K02.62 DENTAL CARIES ON SMOOTH SURFACE PENETRATING INTO DENTIN: ICD-10-CM

## 2020-01-29 PROCEDURE — D2335: CPT

## 2020-01-29 PROCEDURE — D2394: CPT

## 2020-01-29 RX ORDER — ONDANSETRON 8 MG/1
4 TABLET, FILM COATED ORAL ONCE
Refills: 0 | Status: DISCONTINUED | OUTPATIENT
Start: 2020-01-29 | End: 2020-02-17

## 2020-01-29 RX ORDER — ACETAMINOPHEN 500 MG
1000 TABLET ORAL ONCE
Refills: 0 | Status: DISCONTINUED | OUTPATIENT
Start: 2020-01-29 | End: 2020-02-17

## 2020-01-29 RX ORDER — SODIUM CHLORIDE 9 MG/ML
1000 INJECTION, SOLUTION INTRAVENOUS
Refills: 0 | Status: DISCONTINUED | OUTPATIENT
Start: 2020-01-29 | End: 2020-02-17

## 2020-01-29 NOTE — ASU PREOP CHECKLIST - TEMPERATURE IN FAHRENHEIT (DEGREES F)
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 Gls since April, wears well. No photosen, +nyctalopia noted ~ 3-4 yrs ago.      HPI    Informant(s):  parents   Symptoms:           Do you have eye pain now?:  No

## 2020-02-22 ENCOUNTER — EMERGENCY (EMERGENCY)
Facility: HOSPITAL | Age: 40
LOS: 1 days | Discharge: HOME | End: 2020-02-22
Attending: EMERGENCY MEDICINE | Admitting: EMERGENCY MEDICINE
Payer: MEDICAID

## 2020-02-22 VITALS
DIASTOLIC BLOOD PRESSURE: 75 MMHG | OXYGEN SATURATION: 95 % | HEART RATE: 92 BPM | RESPIRATION RATE: 14 BRPM | TEMPERATURE: 98 F | SYSTOLIC BLOOD PRESSURE: 127 MMHG | WEIGHT: 199.96 LBS | HEIGHT: 62 IN

## 2020-02-22 VITALS
RESPIRATION RATE: 14 BRPM | TEMPERATURE: 98 F | SYSTOLIC BLOOD PRESSURE: 122 MMHG | HEART RATE: 89 BPM | DIASTOLIC BLOOD PRESSURE: 70 MMHG | OXYGEN SATURATION: 97 %

## 2020-02-22 DIAGNOSIS — Z92.89 PERSONAL HISTORY OF OTHER MEDICAL TREATMENT: Chronic | ICD-10-CM

## 2020-02-22 DIAGNOSIS — Z98.890 OTHER SPECIFIED POSTPROCEDURAL STATES: Chronic | ICD-10-CM

## 2020-02-22 LAB
ANION GAP SERPL CALC-SCNC: 13 MMOL/L — SIGNIFICANT CHANGE UP (ref 5–17)
BASOPHILS # BLD AUTO: 0.09 K/UL — SIGNIFICANT CHANGE UP (ref 0–0.2)
BASOPHILS NFR BLD AUTO: 1 % — SIGNIFICANT CHANGE UP (ref 0–2)
BUN SERPL-MCNC: 14 MG/DL — SIGNIFICANT CHANGE UP (ref 7–23)
CALCIUM SERPL-MCNC: 10.7 MG/DL — HIGH (ref 8.4–10.5)
CHLORIDE SERPL-SCNC: 99 MMOL/L — SIGNIFICANT CHANGE UP (ref 96–108)
CO2 SERPL-SCNC: 25 MMOL/L — SIGNIFICANT CHANGE UP (ref 22–31)
CREAT SERPL-MCNC: 0.88 MG/DL — SIGNIFICANT CHANGE UP (ref 0.5–1.3)
EOSINOPHIL # BLD AUTO: 0.33 K/UL — SIGNIFICANT CHANGE UP (ref 0–0.5)
EOSINOPHIL NFR BLD AUTO: 3.7 % — SIGNIFICANT CHANGE UP (ref 0–6)
GLUCOSE SERPL-MCNC: 95 MG/DL — SIGNIFICANT CHANGE UP (ref 70–99)
HCT VFR BLD CALC: 46.1 % — HIGH (ref 34.5–45)
HGB BLD-MCNC: 15.2 G/DL — SIGNIFICANT CHANGE UP (ref 11.5–15.5)
IMM GRANULOCYTES NFR BLD AUTO: 0.9 % — SIGNIFICANT CHANGE UP (ref 0–1.5)
LYMPHOCYTES # BLD AUTO: 3.51 K/UL — HIGH (ref 1–3.3)
LYMPHOCYTES # BLD AUTO: 39.2 % — SIGNIFICANT CHANGE UP (ref 13–44)
MCHC RBC-ENTMCNC: 28.4 PG — SIGNIFICANT CHANGE UP (ref 27–34)
MCHC RBC-ENTMCNC: 33 GM/DL — SIGNIFICANT CHANGE UP (ref 32–36)
MCV RBC AUTO: 86 FL — SIGNIFICANT CHANGE UP (ref 80–100)
MONOCYTES # BLD AUTO: 0.82 K/UL — SIGNIFICANT CHANGE UP (ref 0–0.9)
MONOCYTES NFR BLD AUTO: 9.2 % — SIGNIFICANT CHANGE UP (ref 2–14)
NEUTROPHILS # BLD AUTO: 4.13 K/UL — SIGNIFICANT CHANGE UP (ref 1.8–7.4)
NEUTROPHILS NFR BLD AUTO: 46 % — SIGNIFICANT CHANGE UP (ref 43–77)
NRBC # BLD: 0 /100 WBCS — SIGNIFICANT CHANGE UP (ref 0–0)
PLATELET # BLD AUTO: 264 K/UL — SIGNIFICANT CHANGE UP (ref 150–400)
POTASSIUM SERPL-MCNC: 4.3 MMOL/L — SIGNIFICANT CHANGE UP (ref 3.5–5.3)
POTASSIUM SERPL-SCNC: 4.3 MMOL/L — SIGNIFICANT CHANGE UP (ref 3.5–5.3)
RBC # BLD: 5.36 M/UL — HIGH (ref 3.8–5.2)
RBC # FLD: 14.6 % — HIGH (ref 10.3–14.5)
SODIUM SERPL-SCNC: 137 MMOL/L — SIGNIFICANT CHANGE UP (ref 135–145)
VALPROATE SERPL-MCNC: 76 UG/ML — SIGNIFICANT CHANGE UP (ref 50–100)
WBC # BLD: 8.96 K/UL — SIGNIFICANT CHANGE UP (ref 3.8–10.5)
WBC # FLD AUTO: 8.96 K/UL — SIGNIFICANT CHANGE UP (ref 3.8–10.5)

## 2020-02-22 PROCEDURE — 85027 COMPLETE CBC AUTOMATED: CPT

## 2020-02-22 PROCEDURE — 99283 EMERGENCY DEPT VISIT LOW MDM: CPT

## 2020-02-22 PROCEDURE — 36415 COLL VENOUS BLD VENIPUNCTURE: CPT

## 2020-02-22 PROCEDURE — 80164 ASSAY DIPROPYLACETIC ACD TOT: CPT

## 2020-02-22 PROCEDURE — 80048 BASIC METABOLIC PNL TOTAL CA: CPT

## 2020-02-22 NOTE — ED PROVIDER NOTE - PATIENT PORTAL LINK FT
Renal condition is newly identified.  Continue current treatment regimen.  Weight loss. push fluids, us kidney consider renal consult  Renal condition will be reassessed in 3 months.   You can access the FollowMyHealth Patient Portal offered by [facility] by registering at the following website: [email or web address]. By joining [employer]’s FollowMyHealth portal, you will also be able to view your health information using other applications (apps) compatible with our system.

## 2020-02-22 NOTE — ED ADULT NURSE NOTE - NSIMPLEMENTINTERV_GEN_ALL_ED
Implemented All Fall with Harm Risk Interventions:  [place] to call system. Call bell, personal items and telephone within reach. Instruct patient to call for assistance. Room bathroom lighting operational. Non-slip footwear when patient is off stretcher. Physically safe environment: no spills, clutter or unnecessary equipment. Stretcher in lowest position, wheels locked, appropriate side rails in place. Provide visual cue, wrist band, yellow gown, etc. Monitor gait and stability. Monitor for mental status changes and reorient to person, place, and time. Review medications for side effects contributing to fall risk. Reinforce activity limits and safety measures with patient and family. Provide visual clues: red socks.

## 2020-02-22 NOTE — ED PROVIDER NOTE - ENMT, MLM
+Tongue bite noted to right side of tongue. +Tongue bite noted to right side of tongue, no active bleeding

## 2020-02-22 NOTE — ED PROVIDER NOTE - CLINICAL SUMMARY MEDICAL DECISION MAKING FREE TEXT BOX
38 y/o female presenting with single episode of seizure today. Plan: Check Depakote level and reassess.

## 2020-02-22 NOTE — ED PROVIDER NOTE - NEUROLOGICAL, MLM
Alert and oriented, no focal deficits, no motor or sensory deficits. Alert and oriented, no focal deficits, no motor or sensory deficits. Pt with noted developmental delay at baseline

## 2020-02-22 NOTE — ED PROVIDER NOTE - OBJECTIVE STATEMENT
40 y/o female with a PMHx of Bipolar disorder, Bipolar illness, H/O osteoporosis, H/O sinus tachycardia, History of seizure disorder, Hyperlipidemia, Hypothyroidim, Mentally challenged, Seizure, presents to the ED c/o seizure today. Aide states that she was shaking in her bed for 40 seconds. Patient recalls biting her tongue. Patient consenting to blood work at this time. Aide at bedside. No other complaints at this time. 40 y/o female with a PMHx of Bipolar disorder, H/O osteoporosis, H/O sinus tachycardia, History of seizure disorder, Hyperlipidemia, Hypothyroidism, Mentally challenged, Seizure disorder, presents to the ED c/o seizure today. Aide states that she had witnessed tonic clonic seizure activity while lying in her bed for approx 40 seconds and which resolved without intervention. She did not fall from the bed during this and did not strike her head.  Pt is back to baseline per staff reports.  Denies HA, N/V, CP, SOB, abd pain.  She is ambulating without difficulty. Patient consenting to blood work at this time. Aide at bedside. No other complaints at this time.

## 2020-02-22 NOTE — ED PROVIDER NOTE - PROGRESS NOTE DETAILS
pt with no further seizure activity, Depakote WNL, advised to call the neurologist today for possible medication adjustment

## 2020-02-22 NOTE — ED PROVIDER NOTE - NSFOLLOWUPINSTRUCTIONS_ED_ALL_ED_FT
Return to the ED for any new or worsening symptoms  Take your medication as prescribed  Advance activity as tolerated  Call your neurologist today to discuss possible medication adjustment   Advance activity as tolerated

## 2020-02-24 NOTE — PRE-ANESTHESIA EVALUATION ADULT - NSANTHTOBACCOSD_GEN_ALL_CORE
[facility]  Anesthesia Pre-Operative Evaluation         Patient Name: Gurjit Cuevas  YOB: 2019  MRN: [id number]    SUBJECTIVE:     Pre-operative evaluation for Procedure(s) (LRB):  TONSILLECTOMY AND ADENOIDECTOMY (Bilateral)     02/23/2020    Gurjit Cuevas is a 21 m.o. male w/ a significant PMHx of recurrent tonsillitis with associated apneic episodes.    Patient now presents for the above procedure(s).      LDA: None documented.    Prev airway: None documented.    Drips: None documented.      There is no problem list on file for this patient.      Review of patient's allergies indicates:   Allergen Reactions    Sulfa (sulfonamide antibiotics) Hives       Current Inpatient Medications:      No current facility-administered medications on file prior to encounter.      Current Outpatient Medications on File Prior to Encounter   Medication Sig Dispense Refill    amoxicillin-clavulanate (AUGMENTIN) 600-42.9 mg/5 mL SusR Take by mouth.       diphenhydrAMINE (BENADRYL) 12.5 mg/5 mL elixir Take 6.25 mg by mouth 4 (four) times daily as needed for Allergies.      CHILDREN'S CETIRIZINE 1 mg/mL syrup          Past Surgical History:   Procedure Laterality Date    TYMPANOSTOMY TUBE PLACEMENT         Social History     Socioeconomic History    Marital status: Single     Spouse name: Not on file    Number of children: Not on file    Years of education: Not on file    Highest education level: Not on file   Occupational History    Not on file   Social Needs    Financial resource strain: Not on file    Food insecurity:     Worry: Not on file     Inability: Not on file    Transportation needs:     Medical: Not on file     Non-medical: Not on file   Tobacco Use    Smoking status: Never Smoker    Smokeless tobacco: Never Used   Substance and Sexual Activity    Alcohol use: Not on file    Drug use: Not on file    Sexual activity: Not on file   Lifestyle    Physical activity:     Days  per week: Not on file     Minutes per session: Not on file    Stress: Not on file   Relationships    Social connections:     Talks on phone: Not on file     Gets together: Not on file     Attends Yazidism service: Not on file     Active member of club or organization: Not on file     Attends meetings of clubs or organizations: Not on file     Relationship status: Not on file   Other Topics Concern    Not on file   Social History Narrative    Not on file       OBJECTIVE:     Vital Signs Range (Last 24H):         Significant Labs:  No results found for: WBC, HGB, HCT, PLT, CHOL, TRIG, HDL, LDLDIRECT, ALT, AST, NA, K, CL, CREATININE, BUN, CO2, TSH, PSA, INR, GLUF, HGBA1C, MICROALBUR    Diagnostic Studies: No relevant studies.    EKG:   No results found for this or any previous visit.    2D ECHO:  TTE:  No results found for this or any previous visit.    GRAHAM:  No results found for this or any previous visit.    ASSESSMENT/PLAN:     02/23/2020  Gurjit Cuevas is a 21 m.o., male.    Anesthesia Evaluation    I have reviewed the Patient Summary Reports.    I have reviewed the Nursing Notes.   I have reviewed the Medications.     Review of Systems  Anesthesia Hx:  No problems with previous Anesthesia  History of prior surgery of interest to airway management or planning: Denies Family Hx of Anesthesia complications.    Social:  Non-Smoker    Hematology/Oncology:  Hematology Normal   Oncology Normal     Cardiovascular:  Cardiovascular Normal     Pulmonary:  Pulmonary Normal    Renal/:  Renal/ Normal     Hepatic/GI:  Hepatic/GI Normal    Neurological:  Neurology Normal    Endocrine:  Endocrine Normal    Psych:  Psychiatric Normal           Physical Exam  General:  Well nourished    Airway/Jaw/Neck:  Airway Findings: Mouth Opening: Normal Tongue: Normal  General Airway Assessment: Pediatric  Jaw/Neck Findings:  Neck ROM: Normal ROM      Dental:  Dental Findings: In tact   Chest/Lungs:  Chest/Lungs Findings: Clear to  auscultation, Normal Respiratory Rate     Heart/Vascular:  Heart Findings: Rate: Normal  Rhythm: Regular Rhythm  Sounds: Normal        Mental Status:  Mental Status Findings:  Cooperative, Alert and Oriented         Anesthesia Plan  Type of Anesthesia, risks & benefits discussed:  Anesthesia Type:  general  Patient's Preference:   Intra-op Monitoring Plan: standard ASA monitors  Intra-op Monitoring Plan Comments:   Post Op Pain Control Plan: multimodal analgesia, IV/PO Opioids PRN and per primary service following discharge from PACU  Post Op Pain Control Plan Comments:   Induction:   IV and Inhalation  Beta Blocker:  Patient is not currently on a Beta-Blocker (No further documentation required).       Informed Consent: Patient representative understands risks and agrees with Anesthesia plan.  Questions answered. Anesthesia consent signed with patient representative.  ASA Score: 2     Day of Surgery Review of History & Physical:    H&P update referred to the surgeon.         Ready For Surgery From Anesthesia Perspective.        No

## 2020-09-10 ENCOUNTER — APPOINTMENT (OUTPATIENT)
Dept: DERMATOLOGY | Facility: HOSPITAL | Age: 40
End: 2020-09-10

## 2020-11-16 ENCOUNTER — OUTPATIENT (OUTPATIENT)
Dept: OUTPATIENT SERVICES | Facility: HOSPITAL | Age: 40
LOS: 1 days | End: 2020-11-16
Payer: MEDICARE

## 2020-11-16 VITALS
OXYGEN SATURATION: 97 % | TEMPERATURE: 97 F | WEIGHT: 184.09 LBS | RESPIRATION RATE: 16 BRPM | HEART RATE: 94 BPM | DIASTOLIC BLOOD PRESSURE: 62 MMHG | HEIGHT: 61.5 IN | SYSTOLIC BLOOD PRESSURE: 112 MMHG

## 2020-11-16 DIAGNOSIS — K02.62 DENTAL CARIES ON SMOOTH SURFACE PENETRATING INTO DENTIN: ICD-10-CM

## 2020-11-16 DIAGNOSIS — Z98.890 OTHER SPECIFIED POSTPROCEDURAL STATES: Chronic | ICD-10-CM

## 2020-11-16 DIAGNOSIS — K05.6 PERIODONTAL DISEASE, UNSPECIFIED: ICD-10-CM

## 2020-11-16 DIAGNOSIS — G40.909 EPILEPSY, UNSPECIFIED, NOT INTRACTABLE, WITHOUT STATUS EPILEPTICUS: ICD-10-CM

## 2020-11-16 DIAGNOSIS — K02.9 DENTAL CARIES, UNSPECIFIED: ICD-10-CM

## 2020-11-16 DIAGNOSIS — Z92.89 PERSONAL HISTORY OF OTHER MEDICAL TREATMENT: Chronic | ICD-10-CM

## 2020-11-16 DIAGNOSIS — Z01.818 ENCOUNTER FOR OTHER PREPROCEDURAL EXAMINATION: ICD-10-CM

## 2020-11-16 LAB
ANION GAP SERPL CALC-SCNC: 14 MMOL/L — SIGNIFICANT CHANGE UP (ref 5–17)
BUN SERPL-MCNC: 13 MG/DL — SIGNIFICANT CHANGE UP (ref 7–23)
CALCIUM SERPL-MCNC: 10.4 MG/DL — SIGNIFICANT CHANGE UP (ref 8.4–10.5)
CHLORIDE SERPL-SCNC: 98 MMOL/L — SIGNIFICANT CHANGE UP (ref 96–108)
CO2 SERPL-SCNC: 27 MMOL/L — SIGNIFICANT CHANGE UP (ref 22–31)
CREAT SERPL-MCNC: 0.69 MG/DL — SIGNIFICANT CHANGE UP (ref 0.5–1.3)
GLUCOSE SERPL-MCNC: 83 MG/DL — SIGNIFICANT CHANGE UP (ref 70–99)
HCG SERPL-ACNC: <2 MIU/ML — SIGNIFICANT CHANGE UP
HCT VFR BLD CALC: 46.6 % — HIGH (ref 34.5–45)
HGB BLD-MCNC: 15.2 G/DL — SIGNIFICANT CHANGE UP (ref 11.5–15.5)
MCHC RBC-ENTMCNC: 29 PG — SIGNIFICANT CHANGE UP (ref 27–34)
MCHC RBC-ENTMCNC: 32.6 GM/DL — SIGNIFICANT CHANGE UP (ref 32–36)
MCV RBC AUTO: 88.8 FL — SIGNIFICANT CHANGE UP (ref 80–100)
NRBC # BLD: 0 /100 WBCS — SIGNIFICANT CHANGE UP (ref 0–0)
PLATELET # BLD AUTO: 252 K/UL — SIGNIFICANT CHANGE UP (ref 150–400)
POTASSIUM SERPL-MCNC: 4.2 MMOL/L — SIGNIFICANT CHANGE UP (ref 3.5–5.3)
POTASSIUM SERPL-SCNC: 4.2 MMOL/L — SIGNIFICANT CHANGE UP (ref 3.5–5.3)
RBC # BLD: 5.25 M/UL — HIGH (ref 3.8–5.2)
RBC # FLD: 13.3 % — SIGNIFICANT CHANGE UP (ref 10.3–14.5)
SODIUM SERPL-SCNC: 139 MMOL/L — SIGNIFICANT CHANGE UP (ref 135–145)
WBC # BLD: 7.03 K/UL — SIGNIFICANT CHANGE UP (ref 3.8–10.5)
WBC # FLD AUTO: 7.03 K/UL — SIGNIFICANT CHANGE UP (ref 3.8–10.5)

## 2020-11-16 PROCEDURE — 84702 CHORIONIC GONADOTROPIN TEST: CPT

## 2020-11-16 PROCEDURE — 80048 BASIC METABOLIC PNL TOTAL CA: CPT

## 2020-11-16 PROCEDURE — 85027 COMPLETE CBC AUTOMATED: CPT

## 2020-11-16 PROCEDURE — G0463: CPT

## 2020-11-16 RX ORDER — NORGESTIMATE AND ETHINYL ESTRADIOL 7DAYSX3 LO
1 KIT ORAL
Qty: 0 | Refills: 0 | DISCHARGE

## 2020-11-16 RX ORDER — SODIUM CHLORIDE 9 MG/ML
3 INJECTION INTRAMUSCULAR; INTRAVENOUS; SUBCUTANEOUS EVERY 8 HOURS
Refills: 0 | Status: DISCONTINUED | OUTPATIENT
Start: 2020-11-23 | End: 2020-12-07

## 2020-11-16 RX ORDER — LIDOCAINE HCL 20 MG/ML
0.2 VIAL (ML) INJECTION ONCE
Refills: 0 | Status: DISCONTINUED | OUTPATIENT
Start: 2020-11-23 | End: 2020-12-07

## 2020-11-16 NOTE — H&P PST ADULT - NSICDXPROBLEM_GEN_ALL_CORE_FT
PROBLEM DIAGNOSES  Problem: Dental caries  Assessment and Plan: scheduled for comprehensive dental treatment  preop instruction discussed with group home staffs, written instruction given.  cbc, bmp & HCG sent    ucg on DOS  pending preop covid test     Problem: Seizure disorder  Assessment and Plan: Instructed to continue meds &  take with sips of water in AM the day of surgery

## 2020-11-16 NOTE — H&P PST ADULT - HISTORY OF PRESENT ILLNESS
40 year old mentally challenged female with h/o moderate intellectual disability, bipolar disorders, seizure disorder(08/2020), anxiety with occasional tachycardia, RBBB, PCOS, osteoporosis &  hyperlipidemia, presents for scheduled for comprehensive dental treatment on 11/23/2020.  ***Pending preop covid testing. 40 year old mentally challenged female with h/o moderate intellectual disability, bipolar disorder, seizure disorder (08/2020), anxiety with occasional tachycardia, RBBB, PCOS, osteoporosis &  hyperlipidemia, presents for scheduled for comprehensive dental treatment on 11/23/2020.  ***Pending preop covid testing.

## 2020-11-16 NOTE — H&P PST ADULT - NSICDXPASTMEDICALHX_GEN_ALL_CORE_FT
PAST MEDICAL HISTORY:  Bipolar disorder     Bipolar illness     H/O osteoporosis     H/O sinus tachycardia     History of seizure disorder last seizure 6/4/2018    Hyperlipidemia     Hypothyroidism     Hypothyroidism     Mentally challenged Moderate intellectual disability    Seizure last seizure 08/2020

## 2020-11-16 NOTE — H&P PST ADULT - NSICDXPASTSURGICALHX_GEN_ALL_CORE_FT
PAST SURGICAL HISTORY:  History of dental surgery 2019, 01/2020    History of hysteroscopy s/p hysteroscopy for thickened endometrium & removal of uterine polyp

## 2020-11-16 NOTE — H&P PST ADULT - ASSESSMENT
dental caries  scheduled for comprehensive dental treatment  preop instruction discussed with group home staffs, written instruction given.  cbc, bmp & HCG sent    ucg on DOS  pending preop covid test

## 2020-11-16 NOTE — H&P PST ADULT - OTHER CARE PROVIDERS
Cardiologist Dr Minnei Aguillon [phone] 04/2020, Neurologist Dr Laura Schoenberg [phone] Cardiologist, Dr Minnie Aguillon [phone] 04/2020, Neurologist Dr Laura Schoenberg [phone]

## 2020-11-18 ENCOUNTER — APPOINTMENT (OUTPATIENT)
Dept: DERMATOLOGY | Facility: CLINIC | Age: 40
End: 2020-11-18
Payer: MEDICARE

## 2020-11-18 DIAGNOSIS — L30.4 ERYTHEMA INTERTRIGO: ICD-10-CM

## 2020-11-18 DIAGNOSIS — D22.5 MELANOCYTIC NEVI OF TRUNK: ICD-10-CM

## 2020-11-18 PROCEDURE — 99203 OFFICE O/P NEW LOW 30 MIN: CPT

## 2020-11-18 RX ORDER — FLUTICASONE PROPIONATE 0.05 MG/G
0.01 OINTMENT TOPICAL
Qty: 1 | Refills: 1 | Status: ACTIVE | COMMUNITY
Start: 2020-11-18 | End: 1900-01-01

## 2020-11-18 NOTE — PHYSICAL EXAM
[FreeTextEntry3] : Skin examination performed of the face, neck, chest, hands, lower legs;\par The patient is well, alert and oriented, pleasant and cooperative.\par Eyelids, conjunctivae, oral mucosa, digits and nails all normal.  \par No cervical adenopathy.\par \par \par Multiple benign nevi were noted. \par no suspicious lesions on face, neck, trunk, UEs; LEs; \par mild erythema;  inframammary; \par \par erythema, mild tenderness R great toenail; \par

## 2020-11-18 NOTE — ASSESSMENT
[FreeTextEntry1] : intertrigo:  Therapeutic options and their risks and benefits; along with multiple diagnostic possibilities were discussed at length;\par \par fluticasone ointment qd x 1 week, repeat prn;  continue powder maintenance; \par \par mild ingrown nail;  t/c podiatry referral if persists; \par

## 2020-11-18 NOTE — HISTORY OF PRESENT ILLNESS
[de-identified] : Pt. c/o rash under breasts x few weeks;  some improvement with powder;  also;  problem with toenail

## 2020-11-19 DIAGNOSIS — Z01.818 ENCOUNTER FOR OTHER PREPROCEDURAL EXAMINATION: ICD-10-CM

## 2020-11-20 ENCOUNTER — APPOINTMENT (OUTPATIENT)
Dept: DISASTER EMERGENCY | Facility: CLINIC | Age: 40
End: 2020-11-20

## 2020-11-21 LAB — SARS-COV-2 N GENE NPH QL NAA+PROBE: NOT DETECTED

## 2020-11-22 ENCOUNTER — TRANSCRIPTION ENCOUNTER (OUTPATIENT)
Age: 40
End: 2020-11-22

## 2020-11-22 NOTE — ASU DISCHARGE PLAN (ADULT/PEDIATRIC) - ASU DC SPECIAL INSTRUCTIONSFT
comprehensive dental tx under general anesthesia     see DR De Los Santos in one week [phone]    tylenol prn pain    return to routine activities tomorrow

## 2020-11-22 NOTE — ASU DISCHARGE PLAN (ADULT/PEDIATRIC) - CALL YOUR DOCTOR IF YOU HAVE ANY OF THE FOLLOWING:
Bleeding that does not stop/Pain not relieved by Medications/Fever greater than (need to indicate Fahrenheit or Celsius)/Swelling that gets worse/Inability to tolerate liquids or foods/Increased irritability or sluggishness/Nausea and vomiting that does not stop

## 2020-11-22 NOTE — ASU DISCHARGE PLAN (ADULT/PEDIATRIC) - NURSING INSTRUCTIONS
******************************************************************************************  Next dose of TYLENOL may be taken at or after _____________ PM if needed. DO NOT take any additional products containing TYLENOL or ACETAMINOPHEN, such as VICODIN, PERCOCET, NORCO, EXCEDRIN, and any over-the-counter cold medications until this time. DO NOT CONSUME MORE THAN 0299-1979 MG OF TYLENOL (acetaminophen) in a 24-hour period.

## 2020-11-23 ENCOUNTER — OUTPATIENT (OUTPATIENT)
Dept: OUTPATIENT SERVICES | Facility: HOSPITAL | Age: 40
LOS: 1 days | End: 2020-11-23
Payer: MEDICARE

## 2020-11-23 VITALS
OXYGEN SATURATION: 96 % | SYSTOLIC BLOOD PRESSURE: 118 MMHG | HEART RATE: 92 BPM | RESPIRATION RATE: 18 BRPM | DIASTOLIC BLOOD PRESSURE: 76 MMHG | TEMPERATURE: 97 F

## 2020-11-23 VITALS
DIASTOLIC BLOOD PRESSURE: 84 MMHG | RESPIRATION RATE: 18 BRPM | SYSTOLIC BLOOD PRESSURE: 135 MMHG | HEART RATE: 115 BPM | TEMPERATURE: 97 F | HEIGHT: 61.5 IN | WEIGHT: 184.09 LBS

## 2020-11-23 DIAGNOSIS — Z98.890 OTHER SPECIFIED POSTPROCEDURAL STATES: Chronic | ICD-10-CM

## 2020-11-23 DIAGNOSIS — K02.62 DENTAL CARIES ON SMOOTH SURFACE PENETRATING INTO DENTIN: ICD-10-CM

## 2020-11-23 DIAGNOSIS — Z92.89 PERSONAL HISTORY OF OTHER MEDICAL TREATMENT: Chronic | ICD-10-CM

## 2020-11-23 DIAGNOSIS — K05.6 PERIODONTAL DISEASE, UNSPECIFIED: ICD-10-CM

## 2020-11-23 PROCEDURE — D2332: CPT

## 2020-11-23 PROCEDURE — 41820 EXCISION GUM EACH QUADRANT: CPT

## 2020-11-23 PROCEDURE — D4341: CPT

## 2020-11-23 PROCEDURE — D2335: CPT

## 2020-11-23 PROCEDURE — D2331: CPT

## 2020-11-23 PROCEDURE — D2394: CPT

## 2020-11-23 PROCEDURE — D2391: CPT

## 2020-11-23 RX ORDER — ONDANSETRON 8 MG/1
4 TABLET, FILM COATED ORAL ONCE
Refills: 0 | Status: DISCONTINUED | OUTPATIENT
Start: 2020-11-23 | End: 2020-12-07

## 2020-11-23 RX ORDER — SODIUM CHLORIDE 9 MG/ML
1000 INJECTION, SOLUTION INTRAVENOUS
Refills: 0 | Status: DISCONTINUED | OUTPATIENT
Start: 2020-11-23 | End: 2020-12-07

## 2020-11-23 NOTE — ASU PATIENT PROFILE, ADULT - PMH
Bipolar disorder    Bipolar illness    H/O osteoporosis    H/O sinus tachycardia    History of seizure disorder  last seizure 6/4/2018  Hyperlipidemia    Hypothyroidism    Hypothyroidism    Mentally challenged  Moderate intellectual disability  Seizure  last seizure 08/2020

## 2020-11-23 NOTE — ASU PATIENT PROFILE, ADULT - PSH
History of dental surgery  2019, 01/2020  History of hysteroscopy  s/p hysteroscopy for thickened endometrium & removal of uterine polyp

## 2021-04-16 NOTE — H&P PST ADULT - PAIN SCALE PREFERRED, PROFILE
Post-Care Instructions: I reviewed with the patient in detail post-care instructions. Patient is to wear sunprotection, and avoid picking at any of the treated lesions. Pt may apply Vaseline to crusted or scabbing areas. Number Of Freeze-Thaw Cycles: 3 freeze-thaw cycles Render Post-Care Instructions In Note?: yes Consent: The patient's consent was obtained including but not limited to risks of crusting, scabbing, blistering, scarring, darker or lighter pigmentary change, recurrence, incomplete removal and infection. Aperture Size (Optional): C Detail Level: Simple Duration Of Freeze Thaw-Cycle (Seconds): 3 Render Note In Bullet Format When Appropriate: No numerical 0-10

## 2021-05-12 ENCOUNTER — APPOINTMENT (OUTPATIENT)
Dept: OBGYN | Facility: CLINIC | Age: 41
End: 2021-05-12
Payer: MEDICARE

## 2021-05-12 ENCOUNTER — LABORATORY RESULT (OUTPATIENT)
Age: 41
End: 2021-05-12

## 2021-05-12 ENCOUNTER — EMERGENCY (EMERGENCY)
Facility: HOSPITAL | Age: 41
LOS: 1 days | Discharge: HOME | End: 2021-05-12
Attending: EMERGENCY MEDICINE | Admitting: EMERGENCY MEDICINE
Payer: MEDICARE

## 2021-05-12 VITALS
TEMPERATURE: 99 F | OXYGEN SATURATION: 95 % | RESPIRATION RATE: 32 BRPM | WEIGHT: 199.96 LBS | HEART RATE: 124 BPM | SYSTOLIC BLOOD PRESSURE: 116 MMHG | HEIGHT: 61.5 IN | DIASTOLIC BLOOD PRESSURE: 56 MMHG

## 2021-05-12 VITALS
TEMPERATURE: 98 F | RESPIRATION RATE: 20 BRPM | DIASTOLIC BLOOD PRESSURE: 64 MMHG | SYSTOLIC BLOOD PRESSURE: 120 MMHG | HEART RATE: 99 BPM | OXYGEN SATURATION: 96 %

## 2021-05-12 VITALS — SYSTOLIC BLOOD PRESSURE: 123 MMHG | DIASTOLIC BLOOD PRESSURE: 86 MMHG

## 2021-05-12 DIAGNOSIS — Z98.890 OTHER SPECIFIED POSTPROCEDURAL STATES: Chronic | ICD-10-CM

## 2021-05-12 DIAGNOSIS — E22.1 HYPERPROLACTINEMIA: ICD-10-CM

## 2021-05-12 DIAGNOSIS — Z92.89 PERSONAL HISTORY OF OTHER MEDICAL TREATMENT: Chronic | ICD-10-CM

## 2021-05-12 DIAGNOSIS — E03.9 HYPOTHYROIDISM, UNSPECIFIED: ICD-10-CM

## 2021-05-12 DIAGNOSIS — M81.0 AGE-RELATED OSTEOPOROSIS W/OUT CURRENT PATHOLOGICAL FRACTURE: ICD-10-CM

## 2021-05-12 LAB
ALBUMIN SERPL ELPH-MCNC: 4 G/DL — SIGNIFICANT CHANGE UP (ref 3.3–5)
ALP SERPL-CCNC: 32 U/L — SIGNIFICANT CHANGE UP (ref 30–120)
ALT FLD-CCNC: 28 U/L DA — SIGNIFICANT CHANGE UP (ref 10–60)
ANION GAP SERPL CALC-SCNC: 12 MMOL/L — SIGNIFICANT CHANGE UP (ref 5–17)
AST SERPL-CCNC: 12 U/L — SIGNIFICANT CHANGE UP (ref 10–40)
BASOPHILS # BLD AUTO: 0.07 K/UL — SIGNIFICANT CHANGE UP (ref 0–0.2)
BASOPHILS NFR BLD AUTO: 0.9 % — SIGNIFICANT CHANGE UP (ref 0–2)
BILIRUB SERPL-MCNC: 0.2 MG/DL — SIGNIFICANT CHANGE UP (ref 0.2–1.2)
BUN SERPL-MCNC: 14 MG/DL — SIGNIFICANT CHANGE UP (ref 7–23)
CALCIUM SERPL-MCNC: 10.2 MG/DL — SIGNIFICANT CHANGE UP (ref 8.4–10.5)
CHLORIDE SERPL-SCNC: 97 MMOL/L — SIGNIFICANT CHANGE UP (ref 96–108)
CO2 SERPL-SCNC: 27 MMOL/L — SIGNIFICANT CHANGE UP (ref 22–31)
CREAT SERPL-MCNC: 1.02 MG/DL — SIGNIFICANT CHANGE UP (ref 0.5–1.3)
EOSINOPHIL # BLD AUTO: 0.11 K/UL — SIGNIFICANT CHANGE UP (ref 0–0.5)
EOSINOPHIL NFR BLD AUTO: 1.4 % — SIGNIFICANT CHANGE UP (ref 0–6)
GLUCOSE SERPL-MCNC: 114 MG/DL — HIGH (ref 70–99)
HCT VFR BLD CALC: 43.4 % — SIGNIFICANT CHANGE UP (ref 34.5–45)
HGB BLD-MCNC: 14.9 G/DL — SIGNIFICANT CHANGE UP (ref 11.5–15.5)
IMM GRANULOCYTES NFR BLD AUTO: 1.5 % — SIGNIFICANT CHANGE UP (ref 0–1.5)
LYMPHOCYTES # BLD AUTO: 2.55 K/UL — SIGNIFICANT CHANGE UP (ref 1–3.3)
LYMPHOCYTES # BLD AUTO: 32.7 % — SIGNIFICANT CHANGE UP (ref 13–44)
MCHC RBC-ENTMCNC: 30.2 PG — SIGNIFICANT CHANGE UP (ref 27–34)
MCHC RBC-ENTMCNC: 34.3 GM/DL — SIGNIFICANT CHANGE UP (ref 32–36)
MCV RBC AUTO: 87.9 FL — SIGNIFICANT CHANGE UP (ref 80–100)
MONOCYTES # BLD AUTO: 0.73 K/UL — SIGNIFICANT CHANGE UP (ref 0–0.9)
MONOCYTES NFR BLD AUTO: 9.3 % — SIGNIFICANT CHANGE UP (ref 2–14)
NEUTROPHILS # BLD AUTO: 4.23 K/UL — SIGNIFICANT CHANGE UP (ref 1.8–7.4)
NEUTROPHILS NFR BLD AUTO: 54.2 % — SIGNIFICANT CHANGE UP (ref 43–77)
NRBC # BLD: 0 /100 WBCS — SIGNIFICANT CHANGE UP (ref 0–0)
PLATELET # BLD AUTO: 245 K/UL — SIGNIFICANT CHANGE UP (ref 150–400)
POTASSIUM SERPL-MCNC: 4.5 MMOL/L — SIGNIFICANT CHANGE UP (ref 3.5–5.3)
POTASSIUM SERPL-SCNC: 4.5 MMOL/L — SIGNIFICANT CHANGE UP (ref 3.5–5.3)
PROT SERPL-MCNC: 8.1 G/DL — SIGNIFICANT CHANGE UP (ref 6–8.3)
RBC # BLD: 4.94 M/UL — SIGNIFICANT CHANGE UP (ref 3.8–5.2)
RBC # FLD: 13.1 % — SIGNIFICANT CHANGE UP (ref 10.3–14.5)
SODIUM SERPL-SCNC: 136 MMOL/L — SIGNIFICANT CHANGE UP (ref 135–145)
VALPROATE SERPL-MCNC: 60 UG/ML — SIGNIFICANT CHANGE UP (ref 50–100)
WBC # BLD: 7.81 K/UL — SIGNIFICANT CHANGE UP (ref 3.8–10.5)
WBC # FLD AUTO: 7.81 K/UL — SIGNIFICANT CHANGE UP (ref 3.8–10.5)

## 2021-05-12 PROCEDURE — 99284 EMERGENCY DEPT VISIT MOD MDM: CPT | Mod: 25

## 2021-05-12 PROCEDURE — 36415 COLL VENOUS BLD VENIPUNCTURE: CPT

## 2021-05-12 PROCEDURE — 99203 OFFICE O/P NEW LOW 30 MIN: CPT

## 2021-05-12 PROCEDURE — 80053 COMPREHEN METABOLIC PANEL: CPT

## 2021-05-12 PROCEDURE — 85025 COMPLETE CBC W/AUTO DIFF WBC: CPT

## 2021-05-12 PROCEDURE — 70450 CT HEAD/BRAIN W/O DYE: CPT

## 2021-05-12 PROCEDURE — 80164 ASSAY DIPROPYLACETIC ACD TOT: CPT

## 2021-05-12 PROCEDURE — 99284 EMERGENCY DEPT VISIT MOD MDM: CPT

## 2021-05-12 PROCEDURE — 70450 CT HEAD/BRAIN W/O DYE: CPT | Mod: 26,MA

## 2021-05-12 RX ORDER — DIVALPROEX SODIUM 500 MG/1
0 TABLET, DELAYED RELEASE ORAL
Qty: 0 | Refills: 0 | DISCHARGE

## 2021-05-12 RX ORDER — PREGABALIN 225 MG/1
1 CAPSULE ORAL
Qty: 0 | Refills: 0 | DISCHARGE

## 2021-05-12 RX ORDER — METFORMIN HYDROCHLORIDE 500 MG/1
500 TABLET, COATED ORAL
Refills: 0 | Status: DISCONTINUED | COMMUNITY
End: 2021-05-12

## 2021-05-12 RX ORDER — DOCUSATE SODIUM 100 MG
1 CAPSULE ORAL
Qty: 0 | Refills: 0 | DISCHARGE

## 2021-05-12 RX ORDER — FERROUS SULFATE 325(65) MG
0 TABLET ORAL
Qty: 0 | Refills: 0 | DISCHARGE

## 2021-05-12 RX ORDER — SODIUM FLUORIDE 1.1 G/100G
1 GEL ORAL
Qty: 0 | Refills: 0 | DISCHARGE

## 2021-05-12 RX ORDER — ERGOCALCIFEROL 1.25 MG/1
0 CAPSULE ORAL
Qty: 0 | Refills: 0 | DISCHARGE

## 2021-05-12 RX ORDER — NYSTATIN 500MM UNIT
0 POWDER (EA) MISCELLANEOUS
Qty: 0 | Refills: 0 | DISCHARGE

## 2021-05-12 RX ORDER — POLYETHYLENE GLYCOL 3350 17 G/17G
0 POWDER, FOR SOLUTION ORAL
Qty: 0 | Refills: 0 | DISCHARGE

## 2021-05-12 RX ORDER — METFORMIN HYDROCHLORIDE 850 MG/1
1 TABLET ORAL
Qty: 0 | Refills: 0 | DISCHARGE

## 2021-05-12 RX ORDER — QUETIAPINE FUMARATE 200 MG/1
1 TABLET, FILM COATED ORAL
Qty: 0 | Refills: 0 | DISCHARGE

## 2021-05-12 NOTE — ED PROVIDER NOTE - CLINICAL SUMMARY MEDICAL DECISION MAKING FREE TEXT BOX
40 y/o seizure pt with seizure today. Now back to baseline. Plan; labs with Depakote level, CT brain, if negative, return to group home

## 2021-05-12 NOTE — ED PROVIDER NOTE - NSFOLLOWUPINSTRUCTIONS_ED_ALL_ED_FT
Seizure, Adult      A seizure is a sudden burst of abnormal electrical activity in the brain. Seizures usually last from 30 seconds to 2 minutes. They can cause many different symptoms.    Usually, seizures are not harmful unless they last a long time.      What are the causes?  Common causes of this condition include:  •Fever or infection.    •Conditions that affect the brain, such as:  •A brain or head injury.      •Bleeding in the brain.      •A tumor.      •Stroke.         •Low blood sugar.       •Conditions that are passed from parent to child (are inherited).    •Problems with substances, such as:  •Having a reaction to a drug or a medicine.      •Suddenly stopping the use of a substance (withdrawal).        •Disorders such as autism or cerebral palsy.      In some cases, the cause may not be known. A person who has repeated seizures over time without a clear cause has a condition called epilepsy.      What increases the risk?  You are more likely to get this condition if you have:  •A family history of epilepsy.       •Had a seizure in the past.      •A history of head injury, lack of oxygen at birth, or strokes.        What are the signs or symptoms?    There are many types of seizures. The symptoms vary depending on the type of seizure you have.    Symptoms during a seizure     •Shaking (convulsions).      •Stiffness in the body.      •Passing out, or losing consciousness.      •Head nodding.      •Staring.       •Not responding to sound or touch.      •Loss of bladder control and bowel control.       Symptoms before a seizure     •Fear.      •Worry (anxiety).      •Feeling like you may vomit.      •Feeling like the room is spinning (vertigo).      •Feeling like you saw or heard something before (déjà vu).      •Odd tastes or smells.      •Changes in how you see. You may see flashing lights or spots.      Symptoms after a seizure     •Confusion.       •Sleepiness.       •Headache.       •Weakness on one side of the body.        How is this treated?  Most seizures will stop on their own in under 5 minutes. In these cases, no treatment is needed. Seizures that last longer than 5 minutes will usually need treatment. Treatment can include:  •Medicines given through an IV tube.      •Avoiding things that are known to cause your seizures. These can include medicines that you take for another condition.      •Medicines to treat epilepsy.      •Surgery to stop the seizures. This may be needed if medicines do not help.        Follow these instructions at home:    Medicines     •Take over-the-counter and prescription medicines only as told by your doctor.      • Do not eat or drink anything that may keep your medicine from working, such as alcohol.      Activity     • Do not do any activities that would be dangerous if you had another seizure, like driving or swimming. Wait until your doctor says it is safe for you to do them.      •If you live in the [place] ask your local DMV when you can drive.      •Get plenty of rest. Lack of sleep can make seizures more likely to occur.        Teaching others   Teach friends and family what to do when you have a seizure. They should:  •Lay you on the ground.      •Protect your head and body.      •Loosen any tight clothing around your neck.      •Turn you on your side.      •Not hold you down.      •Not put anything into your mouth.    •Know whether or not you need emergency care. For example, they should get help right away if:  •You have a seizure that lasts longer than 5 minutes.      •You have several seizures that follow one another.        •Stay with you until you are better.      General instructions     •Contact your doctor each time you have a seizure.      •Avoid anything that gives you seizures.    •Keep a seizure diary. Write down:  •What you think caused each seizure.      •What you remember about each seizure.        •Keep all follow-up visits as told by your doctor. This is important.        Contact a doctor if:    •You have another seizure.      •You have seizures more often.      •There is any change in what happens during your seizures.      •You keep having seizures with treatment.      •You have symptoms of being sick or having an infection. This may make a seizure more likely to happen.        Get help right away if:  •You have a seizure that:  •Lasts longer than 5 minutes.      •Is different than seizures you had before.      •Makes it harder to breathe.      •Happens after you hurt your head.      •You have any of these symptoms after a seizure:  •Not being able to speak.      •Not being able to use a part of your body.      •Confusion.      •A bad headache.        •You have two or more seizures in a row.      •You do not wake up right after a seizure.      •You get hurt during a seizure.      These symptoms may be an emergency. Do not wait to see if the symptoms will go away. Get medical help right away. Call your local emergency services (911 in the [place] Do not drive yourself to the hospital.       Summary    •A seizure is a sudden burst of abnormal electrical activity in the brain. Seizures usually last from 30 seconds to 2 minutes. They are not harmful unless they last a long time.      •Causes of seizures include illnesses, medicines, conditions that are passed from parent to child, injury to your head, strokes, drug abuse, or growths or tumors.      • Do not eat or drink anything that may keep your medicine from working, such as alcohol.      •Teach friends and family what to do when you have a seizure.      •Contact your doctor each time you have a seizure.      This information is not intended to replace advice given to you by your health care provider. Make sure you discuss any questions you have with your health care provider.

## 2021-05-12 NOTE — ED PROVIDER NOTE - ENMT, MLM
Airway patent, Nasal mucosa clear. Mouth with normal mucosa. Throat has no vesicles, no oropharyngeal exudates and uvula is midline. Head atraumatic, neck is supple non tender.

## 2021-05-12 NOTE — ED ADULT NURSE NOTE - OBJECTIVE STATEMENT
Known HX of seizures, received patient post ictal from [facility], staff at her side. Marisol historian, knows she is in hospital, request bed pan, aware she had a seizure.  HX MR Known HX of seizures, received patient post ictal from alf, staff at her side. Poor historian, knows she is in hospital, request bed pan, aware she had a seizure.  No incontinence, dried blood noted on lips, possibly bit tongue, patient not cooperative RN unable to visualize tongue. HX MR

## 2021-05-12 NOTE — ED PROVIDER NOTE - OBJECTIVE STATEMENT
42 y/o female with PMHx of bipolar disorder, seizure disorder, mentally challenged BIBA from group home after witnessed seizure now back to baseline. Denies any sx. Pt states she feels well. Pt unable to give further history due to mental retardation.   PCP: Dr. Menezes

## 2021-05-12 NOTE — ED ADULT NURSE NOTE - NSIMPLEMENTINTERV_GEN_ALL_ED
Implemented All Fall with Harm Risk Interventions:  [place] to call system. Call bell, personal items and telephone within reach. Instruct patient to call for assistance. Room bathroom lighting operational. Non-slip footwear when patient is off stretcher. Physically safe environment: no spills, clutter or unnecessary equipment. Stretcher in lowest position, wheels locked, appropriate side rails in place. Provide visual cue, wrist band, yellow gown, etc. Monitor gait and stability. Monitor for mental status changes and reorient to person, place, and time. Review medications for side effects contributing to fall risk. Reinforce activity limits and safety measures with patient and family. Provide visual clues: red socks.

## 2021-05-12 NOTE — ED PROVIDER NOTE - CARE PLAN
Principal Discharge DX:	Seizure  Secondary Diagnosis:	Bipolar disorder  Secondary Diagnosis:	Mentally challenged

## 2021-05-12 NOTE — ED PROVIDER NOTE - UNABLE TO OBTAIN
Pt unable to give further hx due to mental retardation. Pt unable to give due to mental retardation. Dementia

## 2021-05-12 NOTE — ED PROVIDER NOTE - PATIENT PORTAL LINK FT
You can access the FollowMyHealth Patient Portal offered by [facility] by registering at the following website: [email or web address]. By joining [employer]’s FollowMyHealth portal, you will also be able to view your health information using other applications (apps) compatible with our system.

## 2021-05-14 ENCOUNTER — NON-APPOINTMENT (OUTPATIENT)
Age: 41
End: 2021-05-14

## 2021-05-14 ENCOUNTER — APPOINTMENT (OUTPATIENT)
Dept: CARDIOLOGY | Facility: CLINIC | Age: 41
End: 2021-05-14
Payer: MEDICARE

## 2021-05-14 VITALS
BODY MASS INDEX: 34.55 KG/M2 | OXYGEN SATURATION: 96 % | DIASTOLIC BLOOD PRESSURE: 80 MMHG | WEIGHT: 195 LBS | HEIGHT: 63 IN | SYSTOLIC BLOOD PRESSURE: 122 MMHG | HEART RATE: 113 BPM

## 2021-05-14 DIAGNOSIS — Z00.00 ENCOUNTER FOR GENERAL ADULT MEDICAL EXAMINATION W/OUT ABNORMAL FINDINGS: ICD-10-CM

## 2021-05-14 DIAGNOSIS — Z78.9 OTHER SPECIFIED HEALTH STATUS: ICD-10-CM

## 2021-05-14 DIAGNOSIS — G40.909 EPILEPSY, UNSPECIFIED, NOT INTRACTABLE, W/OUT STATUS EPILEPTICUS: ICD-10-CM

## 2021-05-14 PROCEDURE — 99204 OFFICE O/P NEW MOD 45 MIN: CPT | Mod: 25

## 2021-05-14 PROCEDURE — 93000 ELECTROCARDIOGRAM COMPLETE: CPT

## 2021-05-14 RX ORDER — ALENDRONATE SODIUM 70 MG/1
70 TABLET ORAL
Refills: 0 | Status: ACTIVE | COMMUNITY

## 2021-05-14 RX ORDER — AMANTADINE HYDROCHLORIDE 100 MG/1
100 CAPSULE ORAL DAILY
Refills: 0 | Status: ACTIVE | COMMUNITY

## 2021-05-14 NOTE — REVIEW OF SYSTEMS
[Confusion] : no confusion was observed [Under Stress] : not under stress [Easy Bleeding] : no tendency for easy bleeding [Swollen Glands] : no swollen glands [Negative] : Integumentary

## 2021-05-14 NOTE — HISTORY OF PRESENT ILLNESS
[FreeTextEntry1] : 41 year old female with intellectual disability , HLD  abnormal ekg tachycardia  obesity , brought in to establish cardiac care , patient denies any chest pain or shortness of breath on routine activity , denies any palpitation \par patient does have hx of tachycardia, for which she had holter in 2015  showed Average rate 90s , patient echo showed normal ventricular function , \par \par her blood work showed elevated TC , 345   normal TSH level ,  patient is on lipitor 10 mg \par \par Patient was recently in [facility] for recurrence seizure , her medication was changed by neurologist , \par \par

## 2021-05-14 NOTE — DISCUSSION/SUMMARY
[FreeTextEntry1] : 41 year old  hx of \par \par  1)tachycardia  icrbbb , possibly  anxiety ,  her holter showed AVR 90s ,   would obtain echo  to asses ventricular function \par \par \par 2) cardiac murmur , possibly flow murmur , follow up echo \par \par 3)mixed hyperlipidemia , not controlled  on current dose lipitor , discontinue 10 mg lipitor  , increase 40 mg po daily , follow up LFTS lipid profile 5 weeks after increased dose , \par \par \par 4) obesity : encourage calorie intake restriction ,  weight loss diet\par \par 5)  seizure disorder , continue medication \par \par \par follow up after 6 months \par \par

## 2021-05-14 NOTE — PHYSICAL EXAM
[Obese] : obese [Normal Conjunctiva] : normal conjunctiva [Normal Venous Pressure] : normal venous pressure [Normal S1, S2] : normal S1, S2 [Murmur] : murmur [Soft] : abdomen soft [Non Tender] : non-tender [Normal Bowel Sounds] : normal bowel sounds [Normal] : no rash, no skin lesions [de-identified] : 1/6 ESM  [de-identified] : tachycardic  [de-identified] : obese

## 2021-05-19 LAB
BASOPHILS # BLD AUTO: 0.09 K/UL
BASOPHILS NFR BLD AUTO: 1.1 %
EOSINOPHIL # BLD AUTO: 0.2 K/UL
EOSINOPHIL NFR BLD AUTO: 2.4 %
ESTRADIOL SERPL-MCNC: 39 PG/ML
FSH SERPL-MCNC: 4.4 IU/L
HCG SERPL-MCNC: <1 MIU/ML
HCT VFR BLD CALC: 46.6 %
HGB BLD-MCNC: 15.1 G/DL
IMM GRANULOCYTES NFR BLD AUTO: 1.1 %
LYMPHOCYTES # BLD AUTO: 3.54 K/UL
LYMPHOCYTES NFR BLD AUTO: 42.5 %
MAN DIFF?: NORMAL
MCHC RBC-ENTMCNC: 29.7 PG
MCHC RBC-ENTMCNC: 32.4 GM/DL
MCV RBC AUTO: 91.6 FL
MONOCYTES # BLD AUTO: 0.84 K/UL
MONOCYTES NFR BLD AUTO: 10.1 %
NEUTROPHILS # BLD AUTO: 3.57 K/UL
NEUTROPHILS NFR BLD AUTO: 42.8 %
PLATELET # BLD AUTO: 275 K/UL
PROLACTIN SERPL-MCNC: 10.8 NG/ML
RBC # BLD: 5.09 M/UL
RBC # FLD: 13.2 %
TSH SERPL-ACNC: 4.34 UIU/ML
WBC # FLD AUTO: 8.33 K/UL

## 2021-05-26 ENCOUNTER — APPOINTMENT (OUTPATIENT)
Dept: DERMATOLOGY | Facility: CLINIC | Age: 41
End: 2021-05-26
Payer: MEDICARE

## 2021-05-26 DIAGNOSIS — L91.8 OTHER HYPERTROPHIC DISORDERS OF THE SKIN: ICD-10-CM

## 2021-05-26 DIAGNOSIS — L30.1 DYSHIDROSIS [POMPHOLYX]: ICD-10-CM

## 2021-05-26 DIAGNOSIS — L21.9 SEBORRHEIC DERMATITIS, UNSPECIFIED: ICD-10-CM

## 2021-05-26 PROCEDURE — 99214 OFFICE O/P EST MOD 30 MIN: CPT

## 2021-05-26 RX ORDER — BETAMETHASONE DIPROPIONATE 0.5 MG/G
0.05 OINTMENT, AUGMENTED TOPICAL
Qty: 1 | Refills: 1 | Status: ACTIVE | COMMUNITY
Start: 2021-05-26 | End: 1900-01-01

## 2021-05-26 NOTE — ASSESSMENT
[FreeTextEntry1] : dishidrosis L sole;  betamethasone ointment x 2 weeks prn\par \par seb derm; diffuse over face;  Therapeutic options and their risks and benefits; along with multiple diagnostic possibilities were discussed at length; risks and benefits of further study were discussed;\par \par clenia cream qd; \par \par tag vs. verruca L lower eyelid;  pt would not permit anything other than visual exam;  no suspicious features;  would not recommend removal unless becomes symptomatic, would likely require sedation

## 2021-05-26 NOTE — PHYSICAL EXAM
[FreeTextEntry3] : L sole;  subQ papules and small pustules; \par \par Scaling patches located on face- brow, NLF, cheeks; perinasal \par \par Left lower eyelid;  pedunculated verrucous papule

## 2021-05-27 ENCOUNTER — OUTPATIENT (OUTPATIENT)
Dept: OUTPATIENT SERVICES | Facility: HOSPITAL | Age: 41
LOS: 1 days | End: 2021-05-27
Payer: MEDICARE

## 2021-05-27 VITALS
WEIGHT: 192.02 LBS | SYSTOLIC BLOOD PRESSURE: 113 MMHG | RESPIRATION RATE: 18 BRPM | HEIGHT: 62 IN | OXYGEN SATURATION: 97 % | HEART RATE: 112 BPM | DIASTOLIC BLOOD PRESSURE: 77 MMHG | TEMPERATURE: 97 F

## 2021-05-27 DIAGNOSIS — E03.9 HYPOTHYROIDISM, UNSPECIFIED: ICD-10-CM

## 2021-05-27 DIAGNOSIS — E28.2 POLYCYSTIC OVARIAN SYNDROME: ICD-10-CM

## 2021-05-27 DIAGNOSIS — Z98.890 OTHER SPECIFIED POSTPROCEDURAL STATES: Chronic | ICD-10-CM

## 2021-05-27 DIAGNOSIS — Z92.89 PERSONAL HISTORY OF OTHER MEDICAL TREATMENT: Chronic | ICD-10-CM

## 2021-05-27 DIAGNOSIS — K05.6 PERIODONTAL DISEASE, UNSPECIFIED: ICD-10-CM

## 2021-05-27 DIAGNOSIS — K02.62 DENTAL CARIES ON SMOOTH SURFACE PENETRATING INTO DENTIN: ICD-10-CM

## 2021-05-27 DIAGNOSIS — R56.9 UNSPECIFIED CONVULSIONS: ICD-10-CM

## 2021-05-27 DIAGNOSIS — K02.9 DENTAL CARIES, UNSPECIFIED: ICD-10-CM

## 2021-05-27 LAB
ANION GAP SERPL CALC-SCNC: 17 MMOL/L — SIGNIFICANT CHANGE UP (ref 5–17)
BUN SERPL-MCNC: 10 MG/DL — SIGNIFICANT CHANGE UP (ref 7–23)
CALCIUM SERPL-MCNC: 10.2 MG/DL — SIGNIFICANT CHANGE UP (ref 8.4–10.5)
CHLORIDE SERPL-SCNC: 101 MMOL/L — SIGNIFICANT CHANGE UP (ref 96–108)
CO2 SERPL-SCNC: 22 MMOL/L — SIGNIFICANT CHANGE UP (ref 22–31)
CREAT SERPL-MCNC: 0.71 MG/DL — SIGNIFICANT CHANGE UP (ref 0.5–1.3)
GLUCOSE SERPL-MCNC: 90 MG/DL — SIGNIFICANT CHANGE UP (ref 70–99)
HCT VFR BLD CALC: 37.6 % — SIGNIFICANT CHANGE UP (ref 34.5–45)
HGB BLD-MCNC: 12.7 G/DL — SIGNIFICANT CHANGE UP (ref 11.5–15.5)
MCHC RBC-ENTMCNC: 30.4 PG — SIGNIFICANT CHANGE UP (ref 27–34)
MCHC RBC-ENTMCNC: 33.8 GM/DL — SIGNIFICANT CHANGE UP (ref 32–36)
MCV RBC AUTO: 90 FL — SIGNIFICANT CHANGE UP (ref 80–100)
NRBC # BLD: 0 /100 WBCS — SIGNIFICANT CHANGE UP (ref 0–0)
PLATELET # BLD AUTO: 261 K/UL — SIGNIFICANT CHANGE UP (ref 150–400)
POTASSIUM SERPL-MCNC: 4.2 MMOL/L — SIGNIFICANT CHANGE UP (ref 3.5–5.3)
POTASSIUM SERPL-SCNC: 4.2 MMOL/L — SIGNIFICANT CHANGE UP (ref 3.5–5.3)
RBC # BLD: 4.18 M/UL — SIGNIFICANT CHANGE UP (ref 3.8–5.2)
RBC # FLD: 13 % — SIGNIFICANT CHANGE UP (ref 10.3–14.5)
SODIUM SERPL-SCNC: 140 MMOL/L — SIGNIFICANT CHANGE UP (ref 135–145)
WBC # BLD: 7.16 K/UL — SIGNIFICANT CHANGE UP (ref 3.8–10.5)
WBC # FLD AUTO: 7.16 K/UL — SIGNIFICANT CHANGE UP (ref 3.8–10.5)

## 2021-05-27 PROCEDURE — G0463: CPT

## 2021-05-27 PROCEDURE — 85027 COMPLETE CBC AUTOMATED: CPT

## 2021-05-27 PROCEDURE — 80048 BASIC METABOLIC PNL TOTAL CA: CPT

## 2021-05-27 RX ORDER — NYSTATIN CREAM 100000 [USP'U]/G
1 CREAM TOPICAL
Qty: 0 | Refills: 0 | DISCHARGE

## 2021-05-27 RX ORDER — SODIUM CHLORIDE 9 MG/ML
3 INJECTION INTRAMUSCULAR; INTRAVENOUS; SUBCUTANEOUS EVERY 8 HOURS
Refills: 0 | Status: DISCONTINUED | OUTPATIENT
Start: 2021-06-02 | End: 2021-06-17

## 2021-05-27 RX ORDER — DIVALPROEX SODIUM 500 MG/1
0 TABLET, DELAYED RELEASE ORAL
Qty: 0 | Refills: 0 | DISCHARGE

## 2021-05-27 RX ORDER — SODIUM FLUORIDE 1.1 G/100G
1 GEL ORAL
Qty: 0 | Refills: 0 | DISCHARGE

## 2021-05-27 RX ORDER — LIDOCAINE HCL 20 MG/ML
0.2 VIAL (ML) INJECTION ONCE
Refills: 0 | Status: DISCONTINUED | OUTPATIENT
Start: 2021-06-02 | End: 2021-06-17

## 2021-05-27 NOTE — H&P PST ADULT - HISTORY OF PRESENT ILLNESS
This is a 42year old mentally challenged female with h/o moderate intellectual disability, bipolar disorder, seizure disorder (08/2020), anxiety with occasional tachycardia, RBBB, PCOS, osteoporosis &  hyperlipidemia, presents for scheduled for comprehensive dental treatment on 11/23/2020.    Had seizure recently lasting 20 seconds (Shaking, eye rolling)    Legal guardian Mother Britt Su     **Covid test This is a 41 year old mentally challenged female with h/o moderate intellectual disability, bipolar disorder, seizure disorder,  anxiety with occasional tachycardia, RBBB, PCOS recently started on Metformin, osteoporosis &  hyperlipidemia. Pt is presents for scheduled for Comprehensive dental treatment 6/23/21 accompanied today with two group home aides. Of note, Was recently seen in ED on 5/12 for witnessed seizures at group home, had follow up with Neurologist on 5/14 in which Depakote 500mg BID was increased to TID.     Legal guardian Mother Britt Su , Fatuma RN @ group home     **Covid test on 6/20 @ [facility]. Denies any history of Covid infection This is a 41 year old mentally challenged female with h/o moderate intellectual disability, bipolar disorder, seizure disorder,  anxiety with occasional tachycardia, RBBB, PCOS recently started on Metformin, osteoporosis &  hyperlipidemia. Pt is presents for scheduled for Comprehensive dental treatment 6/23/21 accompanied today with two group home aides. Of note, Was recently seen in ED on 5/12 for witnessed seizures at group [place], Head CT- unremarkable had follow up with Neurologist on 5/14 in which Depakote 500mg BID was increased to TID. Aides reports no further seizure activity     Legal guardian Mother Britt Su , Fatuma RN @ group home     **Covid test on 6/20 @ [facility]. Denies any history of Covid infection

## 2021-05-27 NOTE — H&P PST ADULT - NSICDXPROBLEM_GEN_ALL_CORE_FT
PROBLEM DIAGNOSES  Problem: Seizure  Assessment and Plan: Pt /Group home aides instructed to take depakote/ quetiapine (anti- seizure meds with sip of water on DOS    Problem: PCOS (polycystic ovarian syndrome)  Assessment and Plan: Instructed to take last dose of Metformin on 6/22 (AM dose)     Problem: Hypothyroidism  Assessment and Plan: Instructed to take levothyroxine with sip of water on DOS    Problem: Dental caries  Assessment and Plan: Scheduled for comprehensive dental treatment on 6/23/21 with Dr. De Los Santos  Preop instructions given  Labs CBC BMP performed in PST  Covid test on 6/21 @ [facility]

## 2021-05-27 NOTE — H&P PST ADULT - TRANSFUSION HX COMMENT, PROFILE
2018- Vaginal bleeding required PRBC as per Group home aides 2018- Vaginal/ uterine  bleeding required PRBC as per Group home aides

## 2021-05-27 NOTE — H&P PST ADULT - PRIMARY CARE PROVIDER
Tia Menezes  [phone] preop visit done on 5/26/21 Tia VIELKA Menezes  preop visit done on 5/26/21

## 2021-05-28 ENCOUNTER — NON-APPOINTMENT (OUTPATIENT)
Age: 41
End: 2021-05-28

## 2021-05-29 NOTE — ED ADULT NURSE NOTE - CHIEF COMPLAINT
Medication Question or Clarification  Who is calling: Other: wife  What medication are you calling about? (include dose and sig) Some of the medications were changed in the hospital .  Wife is asking if Dr Juan agrees with the following:  Continue amlodipine 5 mg daily  Dose change of iron from 3 tab daily down to one tab daily  Dose change on magnesium from two tab daily to one tab daily.  Stop furosemide and potassium.   Patient is down to one tab of amlodipine so if needed please send in order asap to [facility].  Please advise  Who prescribed the medication?: hospitalist  What is your question/concern?: see above  [facility]  Okay to leave a detailed message?: Yes [phone]    Site CMT - Please call the pharmacy to obtain any additional needed information.   The patient is a 37y Female complaining of

## 2021-05-31 ENCOUNTER — OUTPATIENT (OUTPATIENT)
Dept: OUTPATIENT SERVICES | Facility: HOSPITAL | Age: 41
LOS: 1 days | End: 2021-05-31
Payer: MEDICARE

## 2021-05-31 DIAGNOSIS — Z92.89 PERSONAL HISTORY OF OTHER MEDICAL TREATMENT: Chronic | ICD-10-CM

## 2021-05-31 DIAGNOSIS — Z98.890 OTHER SPECIFIED POSTPROCEDURAL STATES: Chronic | ICD-10-CM

## 2021-05-31 DIAGNOSIS — Z11.52 ENCOUNTER FOR SCREENING FOR COVID-19: ICD-10-CM

## 2021-05-31 LAB — SARS-COV-2 RNA SPEC QL NAA+PROBE: SIGNIFICANT CHANGE UP

## 2021-05-31 PROCEDURE — C9803: CPT

## 2021-05-31 PROCEDURE — U0005: CPT

## 2021-05-31 PROCEDURE — U0003: CPT

## 2021-06-01 ENCOUNTER — APPOINTMENT (OUTPATIENT)
Dept: CARDIOLOGY | Facility: CLINIC | Age: 41
End: 2021-06-01
Payer: MEDICARE

## 2021-06-01 ENCOUNTER — TRANSCRIPTION ENCOUNTER (OUTPATIENT)
Age: 41
End: 2021-06-01

## 2021-06-01 ENCOUNTER — NON-APPOINTMENT (OUTPATIENT)
Age: 41
End: 2021-06-01

## 2021-06-01 VITALS
OXYGEN SATURATION: 99 % | BODY MASS INDEX: 34.55 KG/M2 | HEIGHT: 63 IN | SYSTOLIC BLOOD PRESSURE: 117 MMHG | WEIGHT: 195 LBS | DIASTOLIC BLOOD PRESSURE: 72 MMHG | HEART RATE: 107 BPM

## 2021-06-01 PROCEDURE — 93000 ELECTROCARDIOGRAM COMPLETE: CPT

## 2021-06-01 PROCEDURE — 99214 OFFICE O/P EST MOD 30 MIN: CPT

## 2021-06-01 RX ORDER — LEVOTHYROXINE SODIUM 0.05 MG/1
50 TABLET ORAL
Qty: 90 | Refills: 1 | Status: ACTIVE | COMMUNITY

## 2021-06-01 NOTE — PHYSICAL EXAM
[Normal S1, S2] : normal S1, S2 [Soft] : abdomen soft [Non Tender] : non-tender [Normal Gait] : normal gait [No Edema] : no edema [No Rash] : no rash [Normal] : moves all extremities, no focal deficits, normal speech [de-identified] : Mentally challenged

## 2021-06-01 NOTE — DISCUSSION/SUMMARY
[FreeTextEntry1] : Mild Tachycardia,  Inc RBBB , possibly  anxiety , Prior holter showed AVR 90s.  Echo report reviewed, conservative approach \par \par HLD: Lipitor previously uptitrated which she is tolerating Continue statin F/U labs ordered \par \par obesity : encourage calorie intake restriction ,  weight loss diet,  weight reduction\par \par Seizure disorder , continue medication and Neurology management \par \par No cardiac contraindications to proposed dental procedure;  Echo shows preserved LV FX,  no wall motion abnormalities, asymptomatic, VSS, EKG without acute change \par \par OV 6 months \par \par follow up after 6 months \par \par

## 2021-06-01 NOTE — ASU DISCHARGE PLAN (ADULT/PEDIATRIC) - ASU DC SPECIAL INSTRUCTIONSFT
comprehensive dental tx under [place]     tylenol prn pain    patient to return to program to program/ school on Friday     resume all medications

## 2021-06-01 NOTE — HISTORY OF PRESENT ILLNESS
[FreeTextEntry1] : 42 Y/O female  intellectual disability , HLD  abnormal EKG tachycardia,  obesity, seizure activity ( hospitalized last month ), here today in routine cardiac follow up accompanied VIA formal caregivers who offered no concerns.  Patient claims to be feeling well  No CP/SOB/Palpitations/Dizziness \par Is scheduled for dental procedure 6/2/21 ( Chipped tooth )\par \par Labs with PCP\par EKG reviewed no significant changes Mildly tachycardic \par Echo 2/2019 EF 60-65% trace valvular abnormalities, No segmental wall motion abnormalities\par

## 2021-06-02 ENCOUNTER — OUTPATIENT (OUTPATIENT)
Dept: OUTPATIENT SERVICES | Facility: HOSPITAL | Age: 41
LOS: 1 days | End: 2021-06-02
Payer: MEDICARE

## 2021-06-02 VITALS
TEMPERATURE: 98 F | DIASTOLIC BLOOD PRESSURE: 66 MMHG | RESPIRATION RATE: 16 BRPM | SYSTOLIC BLOOD PRESSURE: 124 MMHG | OXYGEN SATURATION: 96 % | HEART RATE: 99 BPM

## 2021-06-02 VITALS
SYSTOLIC BLOOD PRESSURE: 124 MMHG | WEIGHT: 192.02 LBS | DIASTOLIC BLOOD PRESSURE: 63 MMHG | HEIGHT: 62 IN | HEART RATE: 120 BPM | TEMPERATURE: 98 F | OXYGEN SATURATION: 99 % | RESPIRATION RATE: 14 BRPM

## 2021-06-02 DIAGNOSIS — Z98.890 OTHER SPECIFIED POSTPROCEDURAL STATES: Chronic | ICD-10-CM

## 2021-06-02 DIAGNOSIS — K05.6 PERIODONTAL DISEASE, UNSPECIFIED: ICD-10-CM

## 2021-06-02 DIAGNOSIS — K02.62 DENTAL CARIES ON SMOOTH SURFACE PENETRATING INTO DENTIN: ICD-10-CM

## 2021-06-02 DIAGNOSIS — Z92.89 PERSONAL HISTORY OF OTHER MEDICAL TREATMENT: Chronic | ICD-10-CM

## 2021-06-02 PROCEDURE — 41820 EXCISION GUM EACH QUADRANT: CPT

## 2021-06-02 PROCEDURE — D2394: CPT

## 2021-06-02 PROCEDURE — D2335: CPT

## 2021-06-02 PROCEDURE — D2391: CPT

## 2021-06-02 PROCEDURE — D2332: CPT

## 2021-06-02 PROCEDURE — D4341: CPT

## 2021-06-02 RX ORDER — DOCOSANOL 100 MG/G
1 CREAM TOPICAL
Qty: 0 | Refills: 0 | DISCHARGE

## 2021-06-02 RX ORDER — SODIUM SULFACETAMIDE 10% AND SULFUR 5% EMOLLIENT CREAM 100; 50 MG/G; MG/G
10-5 CREAM TOPICAL
Qty: 1 | Refills: 3 | Status: DISCONTINUED | COMMUNITY
Start: 2021-05-26 | End: 2021-06-01

## 2021-06-02 RX ORDER — SODIUM CHLORIDE 9 MG/ML
1000 INJECTION, SOLUTION INTRAVENOUS
Refills: 0 | Status: DISCONTINUED | OUTPATIENT
Start: 2021-06-02 | End: 2021-06-17

## 2021-06-09 ENCOUNTER — APPOINTMENT (OUTPATIENT)
Dept: OBGYN | Facility: CLINIC | Age: 41
End: 2021-06-09
Payer: MEDICARE

## 2021-06-09 PROCEDURE — 99212 OFFICE O/P EST SF 10 MIN: CPT | Mod: 95

## 2021-06-09 NOTE — HISTORY OF PRESENT ILLNESS
[Home] : at home, [unfilled] , at the time of the visit. [Medical Office: ([facility])___] : at the medical office located in  [Formal Caregiver] : formal caregiver

## 2021-06-10 LAB
ALBUMIN SERPL ELPH-MCNC: 4.6 G/DL
ALP BLD-CCNC: 48 U/L
ALT SERPL-CCNC: 10 U/L
ANION GAP SERPL CALC-SCNC: 14 MMOL/L
AST SERPL-CCNC: 14 U/L
BASOPHILS # BLD AUTO: 0.09 K/UL
BASOPHILS NFR BLD AUTO: 1 %
BILIRUB SERPL-MCNC: 0.2 MG/DL
BUN SERPL-MCNC: 15 MG/DL
CALCIUM SERPL-MCNC: 10.3 MG/DL
CHLORIDE SERPL-SCNC: 100 MMOL/L
CHOLEST SERPL-MCNC: 123 MG/DL
CO2 SERPL-SCNC: 25 MMOL/L
CREAT SERPL-MCNC: 0.73 MG/DL
EOSINOPHIL # BLD AUTO: 0.19 K/UL
EOSINOPHIL NFR BLD AUTO: 2.2 %
GLUCOSE SERPL-MCNC: 78 MG/DL
HCT VFR BLD CALC: 39.5 %
HDLC SERPL-MCNC: 34 MG/DL
HGB BLD-MCNC: 12.7 G/DL
IMM GRANULOCYTES NFR BLD AUTO: 3.6 %
LDLC SERPL CALC-MCNC: 36 MG/DL
LYMPHOCYTES # BLD AUTO: 4.04 K/UL
LYMPHOCYTES NFR BLD AUTO: 46.8 %
MAN DIFF?: NORMAL
MCHC RBC-ENTMCNC: 29.7 PG
MCHC RBC-ENTMCNC: 32.2 GM/DL
MCV RBC AUTO: 92.3 FL
MONOCYTES # BLD AUTO: 0.89 K/UL
MONOCYTES NFR BLD AUTO: 10.3 %
NEUTROPHILS # BLD AUTO: 3.12 K/UL
NEUTROPHILS NFR BLD AUTO: 36.1 %
NONHDLC SERPL-MCNC: 89 MG/DL
PLATELET # BLD AUTO: 299 K/UL
POTASSIUM SERPL-SCNC: 4.8 MMOL/L
PROT SERPL-MCNC: 7.1 G/DL
RBC # BLD: 4.28 M/UL
RBC # FLD: 13.5 %
SODIUM SERPL-SCNC: 139 MMOL/L
TRIGL SERPL-MCNC: 262 MG/DL
WBC # FLD AUTO: 8.64 K/UL

## 2021-06-18 ENCOUNTER — APPOINTMENT (OUTPATIENT)
Dept: CARDIOLOGY | Facility: CLINIC | Age: 41
End: 2021-06-18
Payer: MEDICARE

## 2021-06-18 PROCEDURE — 93306 TTE W/DOPPLER COMPLETE: CPT

## 2021-06-21 LAB
ALBUMIN SERPL ELPH-MCNC: 4.7 G/DL
ALP BLD-CCNC: 46 U/L
ALT SERPL-CCNC: 17 U/L
ANION GAP SERPL CALC-SCNC: 16 MMOL/L
AST SERPL-CCNC: 13 U/L
BILIRUB SERPL-MCNC: 0.3 MG/DL
BUN SERPL-MCNC: 13 MG/DL
CALCIUM SERPL-MCNC: 10.2 MG/DL
CHLORIDE SERPL-SCNC: 97 MMOL/L
CHOLEST SERPL-MCNC: 173 MG/DL
CO2 SERPL-SCNC: 25 MMOL/L
CREAT SERPL-MCNC: 0.71 MG/DL
GLUCOSE SERPL-MCNC: 67 MG/DL
HDLC SERPL-MCNC: 43 MG/DL
LDLC SERPL CALC-MCNC: NORMAL MG/DL
NONHDLC SERPL-MCNC: 130 MG/DL
POTASSIUM SERPL-SCNC: 4.4 MMOL/L
PROT SERPL-MCNC: 7.7 G/DL
SODIUM SERPL-SCNC: 139 MMOL/L
TRIGL SERPL-MCNC: 432 MG/DL

## 2021-06-24 ENCOUNTER — NON-APPOINTMENT (OUTPATIENT)
Age: 41
End: 2021-06-24

## 2021-09-30 ENCOUNTER — RX RENEWAL (OUTPATIENT)
Age: 41
End: 2021-09-30

## 2021-10-12 ENCOUNTER — EMERGENCY (EMERGENCY)
Facility: HOSPITAL | Age: 41
LOS: 1 days | Discharge: NURSING, REHAB OR LONG-TERM CARE | End: 2021-10-12
Attending: EMERGENCY MEDICINE | Admitting: EMERGENCY MEDICINE
Payer: MEDICARE

## 2021-10-12 VITALS
HEIGHT: 62 IN | DIASTOLIC BLOOD PRESSURE: 86 MMHG | TEMPERATURE: 98 F | SYSTOLIC BLOOD PRESSURE: 137 MMHG | WEIGHT: 166.01 LBS | HEART RATE: 86 BPM | RESPIRATION RATE: 17 BRPM | OXYGEN SATURATION: 98 %

## 2021-10-12 VITALS
DIASTOLIC BLOOD PRESSURE: 78 MMHG | RESPIRATION RATE: 17 BRPM | OXYGEN SATURATION: 98 % | HEART RATE: 95 BPM | TEMPERATURE: 98 F | SYSTOLIC BLOOD PRESSURE: 121 MMHG

## 2021-10-12 DIAGNOSIS — Z92.89 PERSONAL HISTORY OF OTHER MEDICAL TREATMENT: Chronic | ICD-10-CM

## 2021-10-12 DIAGNOSIS — Z98.890 OTHER SPECIFIED POSTPROCEDURAL STATES: Chronic | ICD-10-CM

## 2021-10-12 LAB
ALBUMIN SERPL ELPH-MCNC: 3.8 G/DL — SIGNIFICANT CHANGE UP (ref 3.3–5)
ALP SERPL-CCNC: 44 U/L — SIGNIFICANT CHANGE UP (ref 30–120)
ALT FLD-CCNC: 25 U/L DA — SIGNIFICANT CHANGE UP (ref 10–60)
ANION GAP SERPL CALC-SCNC: 9 MMOL/L — SIGNIFICANT CHANGE UP (ref 5–17)
APPEARANCE UR: CLEAR — SIGNIFICANT CHANGE UP
APTT BLD: 32.5 SEC — SIGNIFICANT CHANGE UP (ref 27.5–35.5)
AST SERPL-CCNC: 17 U/L — SIGNIFICANT CHANGE UP (ref 10–40)
BASOPHILS # BLD AUTO: 0.09 K/UL — SIGNIFICANT CHANGE UP (ref 0–0.2)
BASOPHILS NFR BLD AUTO: 0.7 % — SIGNIFICANT CHANGE UP (ref 0–2)
BILIRUB SERPL-MCNC: 0.2 MG/DL — SIGNIFICANT CHANGE UP (ref 0.2–1.2)
BILIRUB UR-MCNC: NEGATIVE — SIGNIFICANT CHANGE UP
BUN SERPL-MCNC: 16 MG/DL — SIGNIFICANT CHANGE UP (ref 7–23)
CALCIUM SERPL-MCNC: 11.2 MG/DL — HIGH (ref 8.4–10.5)
CHLORIDE SERPL-SCNC: 97 MMOL/L — SIGNIFICANT CHANGE UP (ref 96–108)
CO2 SERPL-SCNC: 29 MMOL/L — SIGNIFICANT CHANGE UP (ref 22–31)
COLOR SPEC: YELLOW — SIGNIFICANT CHANGE UP
CREAT SERPL-MCNC: 0.91 MG/DL — SIGNIFICANT CHANGE UP (ref 0.5–1.3)
DIFF PNL FLD: ABNORMAL
EOSINOPHIL # BLD AUTO: 0.09 K/UL — SIGNIFICANT CHANGE UP (ref 0–0.5)
EOSINOPHIL NFR BLD AUTO: 0.7 % — SIGNIFICANT CHANGE UP (ref 0–6)
GLUCOSE SERPL-MCNC: 94 MG/DL — SIGNIFICANT CHANGE UP (ref 70–99)
GLUCOSE UR QL: NEGATIVE MG/DL — SIGNIFICANT CHANGE UP
HCT VFR BLD CALC: 42.6 % — SIGNIFICANT CHANGE UP (ref 34.5–45)
HGB BLD-MCNC: 14.3 G/DL — SIGNIFICANT CHANGE UP (ref 11.5–15.5)
IMM GRANULOCYTES NFR BLD AUTO: 1.2 % — SIGNIFICANT CHANGE UP (ref 0–1.5)
INR BLD: 0.97 RATIO — SIGNIFICANT CHANGE UP (ref 0.88–1.16)
KETONES UR-MCNC: ABNORMAL
LEUKOCYTE ESTERASE UR-ACNC: ABNORMAL
LYMPHOCYTES # BLD AUTO: 35.7 % — SIGNIFICANT CHANGE UP (ref 13–44)
LYMPHOCYTES # BLD AUTO: 4.71 K/UL — HIGH (ref 1–3.3)
MCHC RBC-ENTMCNC: 28.6 PG — SIGNIFICANT CHANGE UP (ref 27–34)
MCHC RBC-ENTMCNC: 33.6 GM/DL — SIGNIFICANT CHANGE UP (ref 32–36)
MCV RBC AUTO: 85.2 FL — SIGNIFICANT CHANGE UP (ref 80–100)
MONOCYTES # BLD AUTO: 1.36 K/UL — HIGH (ref 0–0.9)
MONOCYTES NFR BLD AUTO: 10.3 % — SIGNIFICANT CHANGE UP (ref 2–14)
NEUTROPHILS # BLD AUTO: 6.78 K/UL — SIGNIFICANT CHANGE UP (ref 1.8–7.4)
NEUTROPHILS NFR BLD AUTO: 51.4 % — SIGNIFICANT CHANGE UP (ref 43–77)
NITRITE UR-MCNC: NEGATIVE — SIGNIFICANT CHANGE UP
NRBC # BLD: 0 /100 WBCS — SIGNIFICANT CHANGE UP (ref 0–0)
PH UR: 6 — SIGNIFICANT CHANGE UP (ref 5–8)
PLATELET # BLD AUTO: 305 K/UL — SIGNIFICANT CHANGE UP (ref 150–400)
POTASSIUM SERPL-MCNC: 4.2 MMOL/L — SIGNIFICANT CHANGE UP (ref 3.5–5.3)
POTASSIUM SERPL-SCNC: 4.2 MMOL/L — SIGNIFICANT CHANGE UP (ref 3.5–5.3)
PROT SERPL-MCNC: 7.7 G/DL — SIGNIFICANT CHANGE UP (ref 6–8.3)
PROT UR-MCNC: 15 MG/DL
PROTHROM AB SERPL-ACNC: 11.8 SEC — SIGNIFICANT CHANGE UP (ref 10.6–13.6)
RBC # BLD: 5 M/UL — SIGNIFICANT CHANGE UP (ref 3.8–5.2)
RBC # FLD: 14.7 % — HIGH (ref 10.3–14.5)
SARS-COV-2 RNA SPEC QL NAA+PROBE: SIGNIFICANT CHANGE UP
SODIUM SERPL-SCNC: 135 MMOL/L — SIGNIFICANT CHANGE UP (ref 135–145)
SP GR SPEC: 1.01 — SIGNIFICANT CHANGE UP (ref 1.01–1.02)
UROBILINOGEN FLD QL: NEGATIVE MG/DL — SIGNIFICANT CHANGE UP
VALPROATE SERPL-MCNC: 62 UG/ML — SIGNIFICANT CHANGE UP (ref 50–100)
WBC # BLD: 13.19 K/UL — HIGH (ref 3.8–10.5)
WBC # FLD AUTO: 13.19 K/UL — HIGH (ref 3.8–10.5)

## 2021-10-12 PROCEDURE — 85610 PROTHROMBIN TIME: CPT

## 2021-10-12 PROCEDURE — 36415 COLL VENOUS BLD VENIPUNCTURE: CPT

## 2021-10-12 PROCEDURE — 81001 URINALYSIS AUTO W/SCOPE: CPT

## 2021-10-12 PROCEDURE — 71045 X-RAY EXAM CHEST 1 VIEW: CPT

## 2021-10-12 PROCEDURE — 70450 CT HEAD/BRAIN W/O DYE: CPT

## 2021-10-12 PROCEDURE — 70450 CT HEAD/BRAIN W/O DYE: CPT | Mod: 26,MA

## 2021-10-12 PROCEDURE — 80053 COMPREHEN METABOLIC PANEL: CPT

## 2021-10-12 PROCEDURE — 96361 HYDRATE IV INFUSION ADD-ON: CPT

## 2021-10-12 PROCEDURE — 85025 COMPLETE CBC W/AUTO DIFF WBC: CPT

## 2021-10-12 PROCEDURE — 85730 THROMBOPLASTIN TIME PARTIAL: CPT

## 2021-10-12 PROCEDURE — 87635 SARS-COV-2 COVID-19 AMP PRB: CPT

## 2021-10-12 PROCEDURE — 99284 EMERGENCY DEPT VISIT MOD MDM: CPT | Mod: 25

## 2021-10-12 PROCEDURE — 96374 THER/PROPH/DIAG INJ IV PUSH: CPT

## 2021-10-12 PROCEDURE — 71045 X-RAY EXAM CHEST 1 VIEW: CPT | Mod: 26

## 2021-10-12 PROCEDURE — 80164 ASSAY DIPROPYLACETIC ACD TOT: CPT

## 2021-10-12 PROCEDURE — 99285 EMERGENCY DEPT VISIT HI MDM: CPT

## 2021-10-12 RX ORDER — ATORVASTATIN CALCIUM 80 MG/1
1 TABLET, FILM COATED ORAL
Qty: 0 | Refills: 0 | DISCHARGE

## 2021-10-12 RX ORDER — MEDROXYPROGESTERONE ACETATE 150 MG/ML
1 INJECTION, SUSPENSION, EXTENDED RELEASE INTRAMUSCULAR
Qty: 0 | Refills: 0 | DISCHARGE

## 2021-10-12 RX ORDER — SODIUM CHLORIDE 9 MG/ML
1000 INJECTION INTRAMUSCULAR; INTRAVENOUS; SUBCUTANEOUS ONCE
Refills: 0 | Status: COMPLETED | OUTPATIENT
Start: 2021-10-12 | End: 2021-10-12

## 2021-10-12 RX ORDER — DIVALPROEX SODIUM 500 MG/1
500 TABLET, DELAYED RELEASE ORAL ONCE
Refills: 0 | Status: COMPLETED | OUTPATIENT
Start: 2021-10-12 | End: 2021-10-12

## 2021-10-12 RX ORDER — DOCUSATE SODIUM 100 MG
2 CAPSULE ORAL
Qty: 0 | Refills: 0 | DISCHARGE

## 2021-10-12 RX ADMIN — SODIUM CHLORIDE 1000 MILLILITER(S): 9 INJECTION INTRAMUSCULAR; INTRAVENOUS; SUBCUTANEOUS at 17:42

## 2021-10-12 RX ADMIN — DIVALPROEX SODIUM 500 MILLIGRAM(S): 500 TABLET, DELAYED RELEASE ORAL at 17:36

## 2021-10-12 RX ADMIN — SODIUM CHLORIDE 1000 MILLILITER(S): 9 INJECTION INTRAMUSCULAR; INTRAVENOUS; SUBCUTANEOUS at 16:08

## 2021-10-12 RX ADMIN — Medication 1 MILLIGRAM(S): at 16:01

## 2021-10-12 NOTE — ED PROVIDER NOTE - OBJECTIVE STATEMENT
40 y/o female with PMHx of bipolar disorder, seizure disorder, and MR brought in by ambulance from group [place] for evaluation of change in mental status and possible seizure like activity. Per group home staff, pt had a dental procedure done yesterday, was given sedation, and since then has not been acting like her normal self. Unknown if pt ate last night and she has not eaten today. Pt is nonverbal at present which is not typical for her. Unable to give further history due to mental retardation.

## 2021-10-12 NOTE — ED PROVIDER NOTE - PROGRESS NOTE DETAILS
Alec NOVA for attending Dr. Acevedo: Pt had a seizure generalized tonic clonic; stopped after 1 minute. Ativan 1 mg IV push given. Neurology consult, Dr. Robertson, pending. Back to baseline. Labs and CT neg. Seen by Marcelo and cleared for DC home. Recommends Depakote 500 mg now and continue TID.

## 2021-10-12 NOTE — CONSULT NOTE ADULT - ASSESSMENT
Seen for seizure.  Pt has h/o seizures and is on Depakote 500 mg TID  Non focal exam.  CT Head - No acute pathology.  Seizure is breakthrough seizure.  Depakote 500 mg POX1 extra dose.  Depakote level is therapeutic at 62 ()  Continue Depakote 500 mg TID  DC pt back to group home.  Fall/seizure precautions at all times.  D/w ED physician, Dr. Acevedo.  D/w group home attendant at bedside. Questions answered.  F/up with her neurologist.

## 2021-10-12 NOTE — ED ADULT NURSE NOTE - NSIMPLEMENTINTERV_GEN_ALL_ED
Implemented All Fall with Harm Risk Interventions:  [place] to call system. Call bell, personal items and telephone within reach. Instruct patient to call for assistance. Room bathroom lighting operational. Non-slip footwear when patient is off stretcher. Physically safe environment: no spills, clutter or unnecessary equipment. Stretcher in lowest position, wheels locked, appropriate side rails in place. Provide visual cue, wrist band, yellow gown, etc. Monitor gait and stability. Monitor for mental status changes and reorient to person, place, and time. Review medications for side effects contributing to fall risk. Reinforce activity limits and safety measures with patient and family. Provide visual clues: red socks.

## 2021-10-12 NOTE — ED ADULT NURSE NOTE - CHIEF COMPLAINT QUOTE
As per EMS crew " She had a dental procedure done yesterday , she was given ketamine and since then she is not acting the same " Pt BIBA from a group home ambulatory [facility] MR Seizure  for evaluation of change in mental status

## 2021-10-12 NOTE — ED ADULT NURSE NOTE - OBJECTIVE STATEMENT
pt is confused, nonverbal, not maintaining an eye contact, presented to the ER for AMS, aids at the bedside, resp even and unlabored, nad noted, will continue to monitor

## 2021-10-12 NOTE — ED PROVIDER NOTE - CLINICAL SUMMARY MEDICAL DECISION MAKING FREE TEXT BOX
MR, seizure pt with AMS and muscle twitching today after dental procedure yesterday. ? seizure activity vs anaesthesia rxn. Plan: labs, CT brain, IV fluids, may give trial of Ativan to see if muscle twitching stops, & neuro consult.

## 2021-10-12 NOTE — CONSULT NOTE ADULT - SUBJECTIVE AND OBJECTIVE BOX
Patient is a 41y old  Female who presents with a chief complaint of Seizure.    HPI: 42 y/o female with PMHx of bipolar disorder, seizure disorder, and MR brought in by ambulance from group home for evaluation of change in mental status and possible seizure like activity. Per group home staff, pt had a dental procedure done yesterday, was given sedation, and since then has not been acting like her normal self. Unknown if pt ate last night and she has not eaten today. Pt is nonverbal at present which is not typical for her. Unable to give further history due to mental retardation.  Pt had one GTC seizure in ED. Ativan given.  Pt now is back to her base line.  Group home attendant is at bedside.  Pt is on Depakote 500 mg TID. Depakote level was 62 ()    PAST MEDICAL & SURGICAL HISTORY:    Bipolar illness    Seizure  last seizure 08/2020    Hypothyroidism    Mentally challenged  Moderate intellectual disability    Bipolar disorder    Hypothyroidism    Hyperlipidemia    H/O osteoporosis    History of seizure disorder  last seizure 6/4/2018    H/O sinus tachycardia    History of hysteroscopy  s/p hysteroscopy for thickened endometrium &amp; removal of uterine polyp    History of dental surgery  2019, 01/2020        MEDICATIONS  (STANDING):  diVALproex  milliGRAM(s) Oral Once    Home MEDICATIONS  - Depakote 500 mg TID, Other meds reviewed.     Allergies    Benadryl (Hives)  Benadryl (Unknown)  penicillin (Rash)    Intolerances        SOCIAL HISTORY:    No h/o Smoking.   No h/o alcohol use.  Ex Smoker. Quite in past.  Pt smokes.  Pt does drink socially.  Pt drinks alcohol heavily.    FAMILY HISTORY:      REVIEW OF SYSTEMS: UTO, H/o MR      PHYSICAL EXAM:  Vital Signs Last 24 Hrs  T(F): 98.5 (10-12-21 @ 14:33)  HR: 86 (10-12-21 @ 14:33)  BP: 137/86 (10-12-21 @ 14:33)  RR: 17 (10-12-21 @ 14:33)    GENERAL: NAD, well-groomed, well-developed  HEAD:  Atraumatic, Normocephalic  EYES: EOMI, PERRLA, conjunctiva and sclera clear  NECK: Supple, No JVD, thyroid non-palpable    On Neurological Examination:    Mental Status - Pt is alert, awake, poor cognition. Follows commands    Speech -  Normal. Pt has no aphasia.    Cranial Nerves - Pupils 3 mm equal and reactive to light,  Pt has nO facial asymmetry. Tongue - is in midline.    Motor Exam - 4 plus/5 all over, No drift. No shaking or tremors.    Sensory Exam - Pin prick, temperature, joint position and vibration are intact on either side.     Gait - Pt is able to stand up with holding my hands and is able to walk for few feet     Deep tendon Reflexes - 2 plus all over.    Coordination - Fine finger movements are normal on both sides. Finger to nose is also normal on both sides.       Romberg - Negative.    Neck Supple -  Yes.    LABS:                        14.3   13.19 )-----------( 305      ( 12 Oct 2021 16:02 )             42.6     10-12    135  |  97  |  16  ----------------------------<  94  4.2   |  29  |  0.91    Ca    11.2<H>      12 Oct 2021 16:02    TPro  7.7  /  Alb  3.8  /  TBili  0.2  /  DBili  x   /  AST  17  /  ALT  25  /  AlkPhos  44  10-12    PT/INR - ( 12 Oct 2021 16:02 )   PT: 11.8 sec;   INR: 0.97 ratio         PTT - ( 12 Oct 2021 16:02 )  PTT:32.5 sec  Urinalysis Basic - ( 12 Oct 2021 17:15 )    Color: x / Appearance: x / SG: x / pH: x  Gluc: x / Ketone: x  / Bili: x / Urobili: x   Blood: x / Protein: x / Nitrite: x   Leuk Esterase: x / RBC: 0-2 /HPF / WBC 0-2   Sq Epi: x / Non Sq Epi: Occasional / Bacteria: x    RADIOLOGY & ADDITIONAL STUDIES:    < from: CT Head No Cont (10.12.21 @ 16:51) >    IMPRESSION:  Limited study secondary to prominent streak and motion artifact. No acute intracranial hemorrhage or acute territorial infarction.    < end of copied text >

## 2021-10-12 NOTE — ED ADULT TRIAGE NOTE - CHIEF COMPLAINT QUOTE
As per EMS crew " She had a dental procedure done yesterday , she was given ketamine and since then she is not acting the same " Pt BIBA from a group home ambulatory [facility] MR Seizure  for evaluation of change in mental status

## 2021-10-12 NOTE — ED PROVIDER NOTE - PATIENT PORTAL LINK FT
You can access the FollowMyHealth Patient Portal offered by [facility] by registering at the following website: [email or web address]. By joining [employer]’s FollowMyHealth portal, you will also be able to view your health information using other applications (apps) compatible with our system.

## 2021-11-19 ENCOUNTER — APPOINTMENT (OUTPATIENT)
Dept: DERMATOLOGY | Facility: CLINIC | Age: 41
End: 2021-11-19

## 2021-12-01 ENCOUNTER — APPOINTMENT (OUTPATIENT)
Dept: DERMATOLOGY | Facility: CLINIC | Age: 41
End: 2021-12-01
Payer: MEDICARE

## 2021-12-01 DIAGNOSIS — L98.491 NON-PRESSURE CHRONIC ULCER OF SKIN OF OTHER SITES LIMITED TO BREAKDOWN OF SKIN: ICD-10-CM

## 2021-12-01 PROCEDURE — 99214 OFFICE O/P EST MOD 30 MIN: CPT

## 2021-12-01 RX ORDER — MUPIROCIN 20 MG/G
2 OINTMENT TOPICAL
Qty: 1 | Refills: 1 | Status: ACTIVE | COMMUNITY
Start: 2021-12-01 | End: 1900-01-01

## 2021-12-01 NOTE — ASSESSMENT
[FreeTextEntry1] : excoriated ulceration; mid abdomen; \par \par Therapeutic options and their risks and benefits; along with multiple diagnostic possibilities were discussed at length; risks and benefits of further study were discussed;\par \par dressed with mupirocin, island dressing;  continue at home;  until healed;  approx 2 weeks

## 2021-12-01 NOTE — PHYSICAL EXAM
[FreeTextEntry3] : mid abdomen; \par 8 mm excorated wound; \par + erythema, no signs infection; \par

## 2021-12-01 NOTE — HISTORY OF PRESENT ILLNESS
[de-identified] : The patient has been fit in for urgent appointment.\par c/o wound on abdomen;  picked at it; \par

## 2021-12-07 RX ORDER — CLINDAMYCIN PHOSPHATE 10 MG/ML
1 LOTION TOPICAL
Qty: 1 | Refills: 5 | Status: ACTIVE | COMMUNITY
Start: 2021-06-01 | End: 1900-01-01

## 2021-12-10 ENCOUNTER — EMERGENCY (EMERGENCY)
Facility: HOSPITAL | Age: 41
LOS: 1 days | End: 2021-12-10
Attending: EMERGENCY MEDICINE | Admitting: EMERGENCY MEDICINE
Payer: MEDICARE

## 2021-12-10 VITALS
RESPIRATION RATE: 19 BRPM | TEMPERATURE: 98 F | OXYGEN SATURATION: 98 % | DIASTOLIC BLOOD PRESSURE: 82 MMHG | HEART RATE: 98 BPM | SYSTOLIC BLOOD PRESSURE: 122 MMHG

## 2021-12-10 VITALS
WEIGHT: 195.11 LBS | DIASTOLIC BLOOD PRESSURE: 84 MMHG | TEMPERATURE: 98 F | OXYGEN SATURATION: 96 % | HEIGHT: 62 IN | HEART RATE: 100 BPM | RESPIRATION RATE: 20 BRPM | SYSTOLIC BLOOD PRESSURE: 120 MMHG

## 2021-12-10 DIAGNOSIS — Z98.890 OTHER SPECIFIED POSTPROCEDURAL STATES: Chronic | ICD-10-CM

## 2021-12-10 DIAGNOSIS — Z92.89 PERSONAL HISTORY OF OTHER MEDICAL TREATMENT: Chronic | ICD-10-CM

## 2021-12-10 LAB
ALBUMIN SERPL ELPH-MCNC: 3.8 G/DL — SIGNIFICANT CHANGE UP (ref 3.3–5)
ALP SERPL-CCNC: 46 U/L — SIGNIFICANT CHANGE UP (ref 30–120)
ALT FLD-CCNC: 17 U/L DA — SIGNIFICANT CHANGE UP (ref 10–60)
ANION GAP SERPL CALC-SCNC: 8 MMOL/L — SIGNIFICANT CHANGE UP (ref 5–17)
AST SERPL-CCNC: 7 U/L — LOW (ref 10–40)
BASOPHILS # BLD AUTO: 0.11 K/UL — SIGNIFICANT CHANGE UP (ref 0–0.2)
BASOPHILS NFR BLD AUTO: 1.6 % — SIGNIFICANT CHANGE UP (ref 0–2)
BILIRUB SERPL-MCNC: 0.2 MG/DL — SIGNIFICANT CHANGE UP (ref 0.2–1.2)
BUN SERPL-MCNC: 13 MG/DL — SIGNIFICANT CHANGE UP (ref 7–23)
CALCIUM SERPL-MCNC: 9.6 MG/DL — SIGNIFICANT CHANGE UP (ref 8.4–10.5)
CHLORIDE SERPL-SCNC: 101 MMOL/L — SIGNIFICANT CHANGE UP (ref 96–108)
CO2 SERPL-SCNC: 28 MMOL/L — SIGNIFICANT CHANGE UP (ref 22–31)
CREAT SERPL-MCNC: 0.64 MG/DL — SIGNIFICANT CHANGE UP (ref 0.5–1.3)
EOSINOPHIL # BLD AUTO: 0.32 K/UL — SIGNIFICANT CHANGE UP (ref 0–0.5)
EOSINOPHIL NFR BLD AUTO: 4.6 % — SIGNIFICANT CHANGE UP (ref 0–6)
GLUCOSE SERPL-MCNC: 96 MG/DL — SIGNIFICANT CHANGE UP (ref 70–99)
HCT VFR BLD CALC: 43.6 % — SIGNIFICANT CHANGE UP (ref 34.5–45)
HGB BLD-MCNC: 13.9 G/DL — SIGNIFICANT CHANGE UP (ref 11.5–15.5)
IMM GRANULOCYTES NFR BLD AUTO: 1 % — SIGNIFICANT CHANGE UP (ref 0–1.5)
LIDOCAIN IGE QN: 100 U/L — SIGNIFICANT CHANGE UP (ref 73–393)
LYMPHOCYTES # BLD AUTO: 2.93 K/UL — SIGNIFICANT CHANGE UP (ref 1–3.3)
LYMPHOCYTES # BLD AUTO: 42 % — SIGNIFICANT CHANGE UP (ref 13–44)
MCHC RBC-ENTMCNC: 28.4 PG — SIGNIFICANT CHANGE UP (ref 27–34)
MCHC RBC-ENTMCNC: 31.9 GM/DL — LOW (ref 32–36)
MCV RBC AUTO: 89.2 FL — SIGNIFICANT CHANGE UP (ref 80–100)
MONOCYTES # BLD AUTO: 0.54 K/UL — SIGNIFICANT CHANGE UP (ref 0–0.9)
MONOCYTES NFR BLD AUTO: 7.7 % — SIGNIFICANT CHANGE UP (ref 2–14)
NEUTROPHILS # BLD AUTO: 3 K/UL — SIGNIFICANT CHANGE UP (ref 1.8–7.4)
NEUTROPHILS NFR BLD AUTO: 43.1 % — SIGNIFICANT CHANGE UP (ref 43–77)
NRBC # BLD: 0 /100 WBCS — SIGNIFICANT CHANGE UP (ref 0–0)
PLATELET # BLD AUTO: 294 K/UL — SIGNIFICANT CHANGE UP (ref 150–400)
POTASSIUM SERPL-MCNC: 4.1 MMOL/L — SIGNIFICANT CHANGE UP (ref 3.5–5.3)
POTASSIUM SERPL-SCNC: 4.1 MMOL/L — SIGNIFICANT CHANGE UP (ref 3.5–5.3)
PROT SERPL-MCNC: 7.8 G/DL — SIGNIFICANT CHANGE UP (ref 6–8.3)
RBC # BLD: 4.89 M/UL — SIGNIFICANT CHANGE UP (ref 3.8–5.2)
RBC # FLD: 13.9 % — SIGNIFICANT CHANGE UP (ref 10.3–14.5)
SODIUM SERPL-SCNC: 137 MMOL/L — SIGNIFICANT CHANGE UP (ref 135–145)
VALPROATE SERPL-MCNC: 86 UG/ML — SIGNIFICANT CHANGE UP (ref 50–100)
WBC # BLD: 6.97 K/UL — SIGNIFICANT CHANGE UP (ref 3.8–10.5)
WBC # FLD AUTO: 6.97 K/UL — SIGNIFICANT CHANGE UP (ref 3.8–10.5)

## 2021-12-10 PROCEDURE — 70450 CT HEAD/BRAIN W/O DYE: CPT | Mod: MA

## 2021-12-10 PROCEDURE — 99284 EMERGENCY DEPT VISIT MOD MDM: CPT

## 2021-12-10 PROCEDURE — 99284 EMERGENCY DEPT VISIT MOD MDM: CPT | Mod: 25

## 2021-12-10 PROCEDURE — 83690 ASSAY OF LIPASE: CPT

## 2021-12-10 PROCEDURE — 36415 COLL VENOUS BLD VENIPUNCTURE: CPT

## 2021-12-10 PROCEDURE — 85025 COMPLETE CBC W/AUTO DIFF WBC: CPT

## 2021-12-10 PROCEDURE — 80164 ASSAY DIPROPYLACETIC ACD TOT: CPT

## 2021-12-10 PROCEDURE — 96374 THER/PROPH/DIAG INJ IV PUSH: CPT

## 2021-12-10 PROCEDURE — 70450 CT HEAD/BRAIN W/O DYE: CPT | Mod: 26,MA

## 2021-12-10 PROCEDURE — 80053 COMPREHEN METABOLIC PANEL: CPT

## 2021-12-10 RX ORDER — VALPROIC ACID (AS SODIUM SALT) 250 MG/5ML
500 SOLUTION, ORAL ORAL ONCE
Refills: 0 | Status: COMPLETED | OUTPATIENT
Start: 2021-12-10 | End: 2021-12-10

## 2021-12-10 RX ADMIN — Medication 500 MILLIGRAM(S): at 14:56

## 2021-12-10 RX ADMIN — Medication 27.5 MILLIGRAM(S): at 14:56

## 2021-12-10 NOTE — ED PROVIDER NOTE - CLINICAL SUMMARY MEDICAL DECISION MAKING FREE TEXT BOX
pt presenting with reported seizure known seizure disorder. Will obtain screening labs, and CT head and there was concern for possible prolonged post ictal episode and staff at present did not witnessed the event so history is limited

## 2021-12-10 NOTE — ED PROVIDER NOTE - OBJECTIVE STATEMENT
40 y/o female with PMHx of bipolar disorder, seizure disorder, and MR presents to ED c/o seizure. As per staff pt was at her program when she became confused, was not responding, and soiled herself which are her typical signs of seizure. As per staff pt last seizure was in November. Pt is currently nonverbal, unable to provide further history.

## 2021-12-10 NOTE — ED PROVIDER NOTE - CONSTITUTIONAL, MLM
Well appearing, awake, alert, pt with developmental delay but at baseline and in no apparent distress. normal...

## 2021-12-10 NOTE — ED PROVIDER NOTE - NSFOLLOWUPINSTRUCTIONS_ED_ALL_ED_FT
Return to the ED for any new or worsening symptoms  Take your medication as prescribed  Follow up with your neurologist call tomorrow to discuss possible medication adjustments  Advance activity as tolerated    Seizure, Adult      A seizure is a sudden burst of abnormal electrical and chemical activity in the brain. Seizures usually last from 30 seconds to 2 minutes.       What are the causes?  Common causes of this condition include:  •Fever or infection.    •Problems that affect the brain. These may include:  •A brain or head injury.      •Bleeding in the brain.      •A brain tumor.        •Low levels of blood sugar or salt.      •Kidney problems or liver problems.      •Conditions that are passed from parent to child (are inherited).    •Problems with a substance, such as:  •Having a reaction to a drug or a medicine.      •Stopping the use of a substance all of a sudden (withdrawal).        •A stroke.      •Disorders that affect how you develop.      Sometimes, the cause may not be known.       What increases the risk?    •Having someone in your family who has epilepsy. In this condition, seizures happen again and again over time. They have no clear cause.    •Having had a tonic–clonic seizure before. This type of seizure causes you to:  •Tighten the muscles of the whole body.      •Lose consciousness.        •Having had a head injury or strokes before.      •Having had a lack of oxygen at birth.        What are the signs or symptoms?    There are many types of seizures. The symptoms vary depending on the type of seizure you have.    Symptoms during a seizure     •Shaking that you cannot control (convulsions) with fast, jerky movements of muscles.      •Stiffness of the body.      •Breathing problems.      •Feeling mixed up (confused).      •Staring or not responding to sound or touch.      •Head nodding.      •Eyes that blink, flutter, or move fast.      •Drooling, grunting, or making clicking sounds with your mouth      •Losing control of when you pee or poop.      Symptoms before a seizure     •Feeling afraid, nervous, or worried.      •Feeling like you may vomit.    •Feeling like:  •You are moving when you are not.      •Things around you are moving when they are not.        •Feeling like you saw or heard something before (déjà vu).      •Odd tastes or smells.      •Changes in how you see. You may see flashing lights or spots.      Symptoms after a seizure     •Feeling confused.      •Feeling sleepy.      •Headache.       •Sore muscles.        How is this treated?  If your seizure stops on its own, you will not need treatment. If your seizure lasts longer than 5 minutes, you will normally need treatment. Treatment may include:  •Medicines given through an IV tube.      •Avoiding things, such as medicines, that are known to cause your seizures.      •Medicines to prevent seizures.      •A device to prevent or control seizures.      •Surgery.      •A diet low in carbohydrates and high in fat (ketogenic diet).        Follow these instructions at home:    Medicines     •Take over-the-counter and prescription medicines only as told by your doctor.      •Avoid foods or drinks that may keep your medicine from working, such as alcohol.      Activity     •Follow instructions about driving, swimming, or doing things that would be dangerous if you had another seizure. Wait until your doctor says it is safe for you to do these things.      •If you live in the [place] ask your local department of [employer] when you can drive.      •Get a lot of rest.        Teaching others    •Teach friends and family what to do when you have a seizure. They should:  •Help you get down to the ground.      •Protect your head and body.      •Loosen any clothing around your neck.      •Turn you on your side.      •Know whether or not you need emergency care.      •Stay with you until you are better.      •Also, tell them what not to do if you have a seizure. Tell them:  •They should not hold you down.      •They should not put anything in your mouth.        General instructions     •Avoid anything that gives you seizures.    •Keep a seizure diary. Write down:  •What you remember about each seizure.      •What you think caused each seizure.        •Keep all follow-up visits.        Contact a doctor if:    •You have another seizure or seizures. Call the doctor each time you have a seizure.      •The pattern of your seizures changes.      •You keep having seizures with treatment.      •You have symptoms of being sick or having an infection.      •You are not able to take your medicine.        Get help right away if:  •You have any of these problems:  •A seizure that lasts longer than 5 minutes.      •Many seizures in a row and you do not feel better between seizures.      •A seizure that makes it harder to breathe.      •A seizure and you can no longer speak or use part of your body.        •You do not wake up right after a seizure.      •You get hurt during a seizure.      •You feel confused or have pain right after a seizure.    These symptoms may be an emergency. Get help right away. Call your local emergency services (911 in the [place]   • Do not wait to see if the symptoms will go away.       • Do not drive yourself to the hospital.         Summary    •A seizure is a sudden burst of abnormal electrical and chemical activity in the brain. Seizures normally last from 30 seconds to 2 minutes.      •Causes of seizures include illness, injury to the head, low levels of blood sugar or salt, and certain conditions.      •Most seizures will stop on their own in less than 5 minutes. Seizures that last longer than 5 minutes are a medical emergency and need treatment right away.      •Many medicines are used to treat seizures. Take over-the-counter and prescription medicines only as told by your doctor.      This information is not intended to replace advice given to you by your health care provider. Make sure you discuss any questions you have with your health care provider.

## 2021-12-10 NOTE — ED ADULT TRIAGE NOTE - CHIEF COMPLAINT QUOTE
acting lethargic per staff. staff states usually signifies seizure. does not have tonic clonic just stares into space

## 2021-12-10 NOTE — ED ADULT NURSE REASSESSMENT NOTE - NS ED NURSE REASSESS COMMENT FT1
pt placed on continuous cardiac monitor, logging demographic information on the tele monitor, monitor tech called and informed.

## 2021-12-10 NOTE — ED PROVIDER NOTE - PATIENT PORTAL LINK FT
You can access the FollowMyHealth Patient Portal offered by [facility] by registering at the following website: [email or web address]. By joining [facility]’s FollowMyHealth portal, you will also be able to view your health information using other applications (apps) compatible with our system.

## 2021-12-11 NOTE — ED ADULT NURSE NOTE - OBJECTIVE STATEMENT
pt refused to speak, presented to the ER for concerning behavioral changes pt refused to speak, presented to the ER for concerning behavioral changes, concerned for seizures. pt appears quiet, calm, not speaking when asked questions, pt appears in nad, aid at the bedside, iv placed, tests performed, resp even and unlabored, will continue to monitor

## 2021-12-30 NOTE — ED ADULT NURSE NOTE - HISTORY OF COVID-19 VACCINATION
1/6/22 - Daughter reporting pt is having urinary frequency, will start Keflex for presumed UTI.  Clem
No

## 2022-01-13 ENCOUNTER — APPOINTMENT (OUTPATIENT)
Dept: CARDIOLOGY | Facility: CLINIC | Age: 42
End: 2022-01-13
Payer: MEDICARE

## 2022-01-13 ENCOUNTER — NON-APPOINTMENT (OUTPATIENT)
Age: 42
End: 2022-01-13

## 2022-01-13 VITALS
WEIGHT: 178 LBS | DIASTOLIC BLOOD PRESSURE: 60 MMHG | BODY MASS INDEX: 31.54 KG/M2 | OXYGEN SATURATION: 96 % | SYSTOLIC BLOOD PRESSURE: 110 MMHG | HEART RATE: 123 BPM | HEIGHT: 63 IN

## 2022-01-13 PROCEDURE — 93000 ELECTROCARDIOGRAM COMPLETE: CPT

## 2022-01-13 PROCEDURE — 99214 OFFICE O/P EST MOD 30 MIN: CPT

## 2022-01-13 NOTE — HISTORY OF PRESENT ILLNESS
[FreeTextEntry1] : 42 Y/O female  intellectual disability , HLD  abnormal EKG tachycardia,  obesity, seizure activity    here today in routine cardiac follow up accompanied VIA formal caregivers who offered no concerns.  Patient claims to be feeling well  No CP/SOB/Palpitations/Dizziness \par \par Patient blood work showed controlled cholesterol elevated TG low HDL , done in 9/15/21   \par \par \par EKG reviewed no significant changes Mildly tachycardic  EF 60-65% trace valvular abnormalities, No segmental wall motion abnormalities\par

## 2022-01-13 NOTE — REASON FOR VISIT
[Hypertension] : hypertension [Other: ____] : [unfilled] [Arrhythmia/ECG Abnorrmalities] : arrhythmia/ECG abnormalities

## 2022-01-13 NOTE — DISCUSSION/SUMMARY
[FreeTextEntry1] : Mild Tachycardia,  Inc RBBB , possibly  anxiety , Prior holter showed AVR 90s.  \par \par HLD: Lipitor previously uptitrated which she is tolerating  improved lipids ,  HDl 47  LDL 47  \par \par obesity : encourage calorie intake restriction ,  weight loss diet,  weight reduction\par \par Seizure disorder , continue medication and Neurology management \par \par \par \par OV 6 months \par \par follow up after 6 months \par \par

## 2022-01-13 NOTE — CARDIOLOGY SUMMARY
[de-identified] : 1/13/22 sinus tachycardiac ICRBBB  [de-identified] :  6/18/21  EF 60-65% normal ventricular function

## 2022-01-13 NOTE — PHYSICAL EXAM
[Normal S1, S2] : normal S1, S2 [Soft] : abdomen soft [Non Tender] : non-tender [Normal Gait] : normal gait [No Edema] : no edema [No Rash] : no rash [Normal] : moves all extremities, no focal deficits, normal speech [Obese] : obese [Normal Conjunctiva] : normal conjunctiva [Normal Venous Pressure] : normal venous pressure [No Murmur] : no murmur [No Rub] : no rub [No Gallop] : no gallop [de-identified] : Mentally challenged

## 2022-02-01 ENCOUNTER — NON-APPOINTMENT (OUTPATIENT)
Age: 42
End: 2022-02-01

## 2022-04-22 ENCOUNTER — RX RENEWAL (OUTPATIENT)
Age: 42
End: 2022-04-22

## 2022-06-22 NOTE — ED PROVIDER NOTE - NS ED SCRIBE STATEMENT
Contacted by [facility] 
Attending

## 2022-07-14 ENCOUNTER — APPOINTMENT (OUTPATIENT)
Dept: CARDIOLOGY | Facility: CLINIC | Age: 42
End: 2022-07-14

## 2022-07-14 ENCOUNTER — NON-APPOINTMENT (OUTPATIENT)
Age: 42
End: 2022-07-14

## 2022-07-14 VITALS
HEIGHT: 63 IN | WEIGHT: 190 LBS | DIASTOLIC BLOOD PRESSURE: 62 MMHG | SYSTOLIC BLOOD PRESSURE: 102 MMHG | HEART RATE: 105 BPM | OXYGEN SATURATION: 96 % | BODY MASS INDEX: 33.66 KG/M2

## 2022-07-14 VITALS — SYSTOLIC BLOOD PRESSURE: 102 MMHG | DIASTOLIC BLOOD PRESSURE: 62 MMHG

## 2022-07-14 PROCEDURE — 99214 OFFICE O/P EST MOD 30 MIN: CPT

## 2022-07-14 PROCEDURE — 93000 ELECTROCARDIOGRAM COMPLETE: CPT

## 2022-07-14 RX ORDER — DOCUSATE SODIUM 100 MG/1
100 CAPSULE, LIQUID FILLED ORAL
Qty: 60 | Refills: 0 | Status: ACTIVE | COMMUNITY
Start: 2022-01-20

## 2022-07-14 RX ORDER — NYSTATIN 100000 1/G
100000 POWDER TOPICAL
Qty: 60 | Refills: 0 | Status: ACTIVE | COMMUNITY
Start: 2022-04-29

## 2022-07-14 RX ORDER — QUETIAPINE 50 MG/1
50 TABLET, FILM COATED ORAL
Refills: 0 | Status: DISCONTINUED | COMMUNITY
End: 2022-07-14

## 2022-07-14 RX ORDER — QUETIAPINE FUMARATE 200 MG/1
200 TABLET ORAL
Qty: 60 | Refills: 0 | Status: ACTIVE | COMMUNITY
Start: 2022-04-20

## 2022-07-14 RX ORDER — MULTIVITAMIN/IRON/FOLIC ACID 18MG-0.4MG
600-400 TABLET ORAL
Qty: 60 | Refills: 0 | Status: ACTIVE | COMMUNITY
Start: 2022-01-20

## 2022-07-14 RX ORDER — SODIUM FLUORIDE 6 MG/ML
1.1 PASTE DENTAL
Qty: 100 | Refills: 0 | Status: ACTIVE | COMMUNITY
Start: 2020-04-07

## 2022-07-14 RX ORDER — METFORMIN ER 500 MG 500 MG/1
500 TABLET ORAL
Qty: 120 | Refills: 0 | Status: ACTIVE | COMMUNITY
Start: 2022-07-01

## 2022-07-14 RX ORDER — CLOTRIMAZOLE AND BETAMETHASONE DIPROPIONATE 10; .5 MG/G; MG/G
1-0.05 CREAM TOPICAL
Qty: 45 | Refills: 0 | Status: ACTIVE | COMMUNITY
Start: 2022-05-11

## 2022-07-14 RX ORDER — POLYETHYLENE GLYCOL 3350 17 G/17G
17 POWDER, FOR SOLUTION ORAL
Qty: 1 | Refills: 0 | Status: ACTIVE | COMMUNITY
Start: 2022-07-12

## 2022-07-14 RX ORDER — SODIUM CHLORIDE 0.65 %
0.65 AEROSOL, SPRAY (ML) NASAL
Qty: 45 | Refills: 0 | Status: ACTIVE | COMMUNITY
Start: 2021-12-28

## 2022-07-14 RX ORDER — LORATADINE 5 MG/5 ML
SOLUTION, ORAL ORAL
Qty: 113 | Refills: 0 | Status: ACTIVE | COMMUNITY
Start: 2021-05-26

## 2022-07-14 RX ORDER — LORATADINE 10 MG/1
10 TABLET ORAL
Qty: 7 | Refills: 0 | Status: ACTIVE | COMMUNITY
Start: 2022-02-25

## 2022-07-14 NOTE — PHYSICAL EXAM
[Obese] : obese [Normal Conjunctiva] : normal conjunctiva [Normal Venous Pressure] : normal venous pressure [Normal S1, S2] : normal S1, S2 [No Rub] : no rub [No Gallop] : no gallop [Soft] : abdomen soft [Non Tender] : non-tender [Normal Gait] : normal gait [No Edema] : no edema [No Rash] : no rash [Normal] : moves all extremities, no focal deficits, normal speech [Murmur] : murmur [de-identified] : 1/6/SM  [de-identified] : Mentally challenged

## 2022-07-14 NOTE — DISCUSSION/SUMMARY
[FreeTextEntry1] : Mild Tachycardia,  Inc RBBB , possibly  anxiety , Prior holter showed AVR 90s.  \par \par Cardiac murmur : flow murmur , no significant valvular disease on echo \par \par HLD: Lipitor previously uptitrated which she is tolerating  improved lipids ,  HDl 47  LDL 47    will obtain repeat blood work \par \par obesity : encourage calorie intake restriction ,  weight loss diet,  weight reduction\par \par Seizure disorder , continue medication and Neurology management \par \par \par \par OV 6 months \par \par follow up after 6 months \par \par

## 2022-07-14 NOTE — CARDIOLOGY SUMMARY
[de-identified] : 7/14/22  sinus tachycardiac ICRBBB  [de-identified] :  6/18/21  EF 60-65% normal ventricular function

## 2022-07-14 NOTE — HISTORY OF PRESENT ILLNESS
[FreeTextEntry1] : 41 Y/O female  intellectual disability , HLD  abnormal EKG tachycardia,  obesity, seizure activity    here today in routine cardiac follow up accompanied VIA formal caregivers who offered no concerns.  Patient claims to be feeling well  No CP/SOB/Palpitations/Dizziness \par \par Patient blood work showed controlled cholesterol elevated TG low HDL , done in 9/15/21   \par \par \par EKG reviewed no significant changes Mildly tachycardic  EF 60-65% trace valvular abnormalities, No segmental wall motion abnormalities\par

## 2022-07-21 ENCOUNTER — APPOINTMENT (OUTPATIENT)
Dept: OBGYN | Facility: CLINIC | Age: 42
End: 2022-07-21

## 2022-07-21 VITALS
SYSTOLIC BLOOD PRESSURE: 116 MMHG | HEIGHT: 63 IN | BODY MASS INDEX: 33.31 KG/M2 | WEIGHT: 188 LBS | DIASTOLIC BLOOD PRESSURE: 70 MMHG

## 2022-07-21 DIAGNOSIS — Z01.419 ENCOUNTER FOR GYNECOLOGICAL EXAMINATION (GENERAL) (ROUTINE) W/OUT ABNORMAL FINDINGS: ICD-10-CM

## 2022-07-21 DIAGNOSIS — N92.6 IRREGULAR MENSTRUATION, UNSPECIFIED: ICD-10-CM

## 2022-07-21 PROCEDURE — G0101: CPT

## 2022-07-21 PROCEDURE — 99396 PREV VISIT EST AGE 40-64: CPT | Mod: GY

## 2022-07-21 NOTE — PHYSICAL EXAM
[Chaperone Present] : A chaperone was present in the examining room during all aspects of the physical examination [Appropriately responsive] : appropriately responsive [No Lymphadenopathy] : no lymphadenopathy [Soft] : soft [Non-tender] : non-tender [FreeTextEntry6] : LIMITED BREAST EXAM-  PT ALLOWED EXAM OF LEFT BREAST;  NO PALPABLE MASSES;  PT PUSHED BY HANDS AWAY AND CLOSED HER GOWN FOR EXAM OF RIGHT BREAST

## 2022-08-19 RX ORDER — MEDROXYPROGESTERONE ACETATE 10 MG/1
10 TABLET ORAL DAILY
Qty: 5 | Refills: 5 | Status: ACTIVE | COMMUNITY
Start: 2021-05-12 | End: 1900-01-01

## 2022-09-26 DIAGNOSIS — N63.10 UNSPECIFIED LUMP IN THE RIGHT BREAST, UNSPECIFIED QUADRANT: ICD-10-CM

## 2022-09-30 ENCOUNTER — NON-APPOINTMENT (OUTPATIENT)
Age: 42
End: 2022-09-30

## 2022-10-14 NOTE — ED ADULT NURSE NOTE - NS ED NURSE DISCH DISPOSITION
Infectious Disease progress Note        Reason: Right below-knee amputation stump osteomyelitis    Current abx Prior abx   Zosyn, vancomycin since 10/12/2022      Lines:       Assessment :  69-year-old man with past medical history significant for type 2 diabetes, hypertension, MRSA right foot infection status post right below-knee amputation March 2018 presented to [facility] on 10/11/2022 with increasing right BKA stump pain. Hospitalization August 2022 for infected right BKA stump  wound infection  Wound cx 8/26- MSSA, enterococcus, acinetobacter      Now with few day history of worsening right BKA stump pain, x-ray right BKA stump concerning for acute/chronic osteomyelitis    Patient presents with a highly complex clinical picture. Presentation consistent with  chronic osteomyelitis right below-knee amputation stump   x-ray findings concerning for acute/chronic osteomyelitis. Wound cultures 10/12-gram-negative rods, Staph aureus      MRI findings reviewed. D/w dr. Ruddy Hernandez. Findings suggestive of right BKA stump osteomyelitis- likely chronic. Ideally patient will require surgical intervention/stump revision for cure. This was discussed with the patient by Dr. Cydney Brown and myself. Patient does not wish to have higher level of amputation at this time. Acute on chronic kidney disease-? Prerenal ? NSAIDs use    Improved right stump pain on today's exam     Recommendations:     Continue Zosyn, vancomycin   Follow-up wound cultures, modify antibiotics accordingly  Follow-up vascular surgery recommendations  Management of acute kidney injury per primary team  Will give IV antibiotics till 10/17. Will subsequently need prolonged oral antibiotics for 8 to 12 weeks. Risk of failure of prolonged antibiotics and need for surgical intervention/higher level of amputation discussed with patient. D/w dr. Ruddy Hernandez.  Recommends conservative measures, prolonged antibiotics keeping in mind patient wishes & good response to antibiotics. Above plan was discussed in details with patient,dr. Chary Marshall Please call me if any further questions or concerns. Will continue to participate in the care of this patient.   HPI:    Improved pain in right BKA stump  Past Medical History:   Diagnosis Date    Cellulitis     rt. foot    Diabetes ([facility] [address]     Hypercholesterolemia     Hypertension     Osteomyelitis ([facility] [address]     rt toe    Other ill-defined conditions(799.89)        Past Surgical History:   Procedure Laterality Date    HX BELOW KNEE AMPUTATION  03/2018    HX ORTHOPAEDIC      rt.toe       Current Discharge Medication List        CONTINUE these medications which have NOT CHANGED    Details   glucose blood VI test strips (blood glucose test) strip Check fasting sugars three times a day before breakfast, lunch & dinner for relion meter  Qty: 60 Strip, Refills: 0    Associated Diagnoses: Uncontrolled type 2 diabetes mellitus without complication, with long-term current use of insulin      lancets misc Check fasting sugars three times a day before breakfast, lunch & dinner for relion meter  Qty: 100 Each, Refills: 0    Associated Diagnoses: Uncontrolled type 2 diabetes mellitus without complication, with long-term current use of insulin      Blood-Glucose Meter (RELION ALL-IN-ONE METER) monitoring kit Check fasting sugars three times a day before breakfast, lunch & dinner  Qty: 1 Kit, Refills: 0    Associated Diagnoses: Uncontrolled type 2 diabetes mellitus without complication, with long-term current use of insulin             Current Facility-Administered Medications   Medication Dose Route Frequency    piperacillin-tazobactam (ZOSYN) 3.375 g in 0.9% sodium chloride (MBP/ADV) 100 mL MBP  3.375 g IntraVENous Q8H    vancomycin (VANCOCIN) 1,000 mg in 0.9% sodium chloride 250 mL (VIAL-MATE)  1,000 mg IntraVENous Q36H    sodium chloride (NS) flush 5-40 mL  5-40 mL IntraVENous Q8H    sodium chloride (NS) flush 5-40 mL  5-40 mL IntraVENous PRN    acetaminophen (TYLENOL) tablet 650 mg  650 mg Oral Q6H PRN    Or    acetaminophen (TYLENOL) suppository 650 mg  650 mg Rectal Q6H PRN    polyethylene glycol (MIRALAX) packet 17 g  17 g Oral DAILY PRN    ondansetron (ZOFRAN) injection 4 mg  4 mg IntraVENous Q6H PRN    insulin lispro (HUMALOG) injection   SubCUTAneous AC&HS    glucose chewable tablet 16 g  4 Tablet Oral PRN    glucagon (GLUCAGEN) injection 1 mg  1 mg IntraMUSCular PRN    dextrose 10% infusion 0-250 mL  0-250 mL IntraVENous PRN    enoxaparin (LOVENOX) injection 30 mg  30 mg SubCUTAneous Q24H    morphine injection 2-4 mg  2-4 mg IntraVENous Q3H PRN    naloxone (NARCAN) injection 0.4 mg  0.4 mg IntraVENous EVERY 2 MINUTES AS NEEDED       Allergies: Patient has no known allergies.     Family History   Problem Relation Age of Onset    Diabetes Mother     Diabetes Father     No Known Problems Sister     No Known Problems Brother     No Known Problems Sister      Social History     Socioeconomic History    Marital status: SINGLE     Spouse name: Not on file    Number of children: Not on file    Years of education: Not on file    Highest education level: Not on file   Occupational History    Not on file   Tobacco Use    Smoking status: Heavy Smoker     Packs/day: 0.50     Types: Cigarettes    Smokeless tobacco: Never    Tobacco comments:     pt. counseled to not smoke   Substance and Sexual Activity    Alcohol use: No    Drug use: Yes     Types: Marijuana     Comment: 9/5/17    Sexual activity: Not on file   Other Topics Concern    Not on file   Social History Narrative    Not on file     Social Determinants of Health     Financial Resource Strain: Not on file   Food Insecurity: Not on file   Transportation Needs: Not on file   Physical Activity: Not on file   Stress: Not on file   Social Connections: Not on file   Intimate Partner Violence: Not on file   Housing Stability: Not on file     Social History     Tobacco Use   Smoking Status Heavy Smoker Packs/day: 0.50    Types: Cigarettes   Smokeless Tobacco Never   Tobacco Comments    pt. counseled to not smoke        Temp (24hrs), Av.7 °F (36.5 °C), Min:97 °F (36.1 °C), Max:98.2 °F (36.8 °C)    Visit Vitals  BP (!) 156/72 (BP 1 Location: Right arm, BP Patient Position: At rest)   Pulse (!) 53   Temp 97.8 °F (36.6 °C)   Resp 18   Ht 5' 2\" (1.575 m)   Wt 79.4 kg (175 lb)   SpO2 96%   BMI 32.01 kg/m²       ROS: 12 point ROS obtained in details. Pertinent positives as mentioned in HPI,   otherwise negative    Physical Exam:    Vitals and nursing note reviewed. Constitutional:       Appearance: He is well-developed and normal weight. He is not ill-appearing, toxic-appearing or diaphoretic. HENT:      Head: Normocephalic and atraumatic. Neck:      Supple  Cardiovascular:      Rate and Rhythm: Normal rate and regular rhythm. Heart sounds: Normal heart sounds. No friction rub. No gallop. Pulmonary:      Effort: Pulmonary effort is normal. No respiratory distress. Abdominal:      General: There is no distension. Palpations: Abdomen is soft. There is no mass. Tenderness: There is no abdominal tenderness. There is no guarding or rebound. Musculoskeletal:          Right BKA stump with dry skin. Linear ulceration perpendicular to each other with serous drainage noted on the stump. No bright red erythema/warmth/induration/significant tenderness overlying the right BKA stump. No edema left leg. Improved tenderness right BKA stump  Skin:     General: Skin is warm and dry. Coloration: Skin is not pale. Neurological:      Mental Status: He is alert and oriented to person, place, and time. Psychiatric:         Speech: Speech normal.         Behavior: Behavior normal.         Thought Content:  Thought content normal.         Judgment: Judgment normal.     Labs: Results:   Chemistry Recent Labs     10/12/22  0346 10/11/22  0910   GLU 74 118*    141   K 4.1 3.7   * 110 CO2 22 21   BUN 55* 55*   CREA 3.81* 4.24*   CA 8.1* 8.7   AGAP 7 10   BUCR 14 13   AP  --  104   TP  --  6.9   ALB  --  2.4*   GLOB  --  4.5*   AGRAT  --  0.5*        CBC w/Diff Recent Labs     10/14/22  0328 10/12/22  0346 10/11/22  0910   WBC 5.6 5.4 11.1   RBC 3.67* 3.46* 3.63*   HGB 10.7* 9.9* 10.7*   HCT 33.1* 31.1* 32.8*    237 290   GRANS 59  --  74*   LYMPH 29  --  19*   EOS 4  --  1        Microbiology Recent Labs     10/12/22  1102 10/11/22  1220 10/11/22  1205   CULT MODERATE GRAM NEGATIVE RODS*  MODERATE PROBABLE STAPHYLOCOCCUS AUREUS* NO GROWTH 2 DAYS NO GROWTH 2 DAYS            RADIOLOGY:    All available imaging studies/reports in [facility] care for this admission were reviewed        Disclaimer: Sections of this note are dictated utilizing voice recognition software, which may have resulted in some phonetic based errors in grammar and contents. Even though attempts were made to correct all the mistakes, some may have been missed, and remained in the body of the document. If questions arise, please contact our department.     Dr. Yvonne Cordova, Infectious Disease Specialist  [phone]  October 14, 2022  1:37 PM Discharged

## 2022-11-16 NOTE — ASU PREOP CHECKLIST - AS TEMP SITE
Reason for the Visit: Squamous cell carcinoma the anus    Staging: Cancer Staging  Anal cancer (CMS/HCC)  Staging form: Anus, AJCC 8th Edition  - Clinical stage from 10/4/2022: Stage IIIA (cT1, cN1, cM0) - Signed by Radha Doll MD on 10/4/2022     Treatment History:  1.  10/24/2022 starting chemoradiation with Mitomycin-C/5-FU here for week 4.     History Of Present Illness  Luis is a 61 year old male presents for follow up today. He continues to feel ok. Intermittent diarrhea and constipation, which resolve without intervention.  He denies fevers, mucositis, emesis, hand foot syndrome, edema, or pain. Mild fatigue, occasional nausea. Using compazine as noted.  Appetite and weight are stable.  Rectal discomfort improved with topicals. Denies any bleeding.     Performance status: ECOG 0    Oncologic history prior presentation:   1/21/2021 CT of the abdomen pelvis with and without contrast 1.2 cm filling defect in the right wall of the urinary bladder.  Diffuse perinephric induration as well as asymmetric soft tissue thickening of the left renal pelvis.  2/15/2021 underwent cystoscopy bilateral retrograde pyelogram and TURBT and single dose intravesical chemotherapy instillation the bilateral retrograde pyelogram did not show any filling defects in the left collecting system.  Pathology of the bladder mass showed high-grade noninvasive papillary urothelial carcinoma.  He did not receive BCG therapy  Following with colorectal surgery for anal condyloma with recent development of rectal bleeding  8/19/2022 exam under anesthesia revealed a 3 x 2 cm anal ulcer pathology revealed invasive squamous cell carcinoma grade 2 arising in extensive high-grade squamous intraepithelial lesion there was evidence of lymphovascular space invasion and the deep margin was positive.  The peripheral resection margins because of marked cautery artifact cannot be determined. pT1  9/28/2022 CT of the chest abdomen pelvis thickening of  the anal canal, enlarged right inguinal lymph node, stable associated fullness of the right renal pelvis that was seen in 2021 and multiple stable pulmonary nodules.    Review of Systems    10 point ROS obtained and negative other that mentioned the HPI.    Past Medical History  Past Medical History:   Diagnosis Date   • AAA (abdominal aortic aneurysm)    • Anal warts    • Atherosclerosis of native arteries of right leg with ulceration of other part of foot (CMS/HCC) 2019   • Bladder cancer (CMS/[facility])    • Diabetes mellitus (CMS/[facility])    • Essential (primary) hypertension    • High cholesterol    • HIV (human immunodeficiency virus infection) (CMS/Former[facility])    • Ischemic ulcer (CMS/[facility]ly [facility])    • Nicotine dependence 2018        Surgical History  Past Surgical History:   Procedure Laterality Date   • Abdominal aortic angiogram     • Nose surgery          Social History:  Social History     Tobacco Use   • Smoking status: Former Smoker     Packs/day: 1.50     Years: 45.00     Pack years: 67.50     Quit date: 2018     Years since quittin.3   • Smokeless tobacco: Never Used   Vaping Use   • Vaping Use: never used   • Substances: THC   Substance Use Topics   • Alcohol use: No   • Drug use: Yes     Types: Marijuana, Cocaine, Methamphetamines     Comment: Hold 24 hrs before procedure   Lives with a roommate.  Has no children.  Semiretired musician.    Family History:    Family History   Problem Relation Age of Onset   • Heart disease Other    • Patient is unaware of any medical problems Mother    • Heart disease Father    No first or second-degree relatives with any history of malignancy     Allergies:  ALLERGIES:  Patient has no known allergies.    Medications:  (Not in a hospital admission)    Current Outpatient Medications   Medication Sig Dispense Refill   • amLODIPine (NORVASC) 5 MG tablet Take 5 mg by mouth.     • prochlorperazine (COMPAZINE) 10 MG tablet Take 1 tablet by mouth every 6 hours as needed  for Nausea or Vomiting. 30 tablet 5   • ondansetron (ZOFRAN ODT) 8 MG disintegrating tablet Place 1 tablet onto the tongue every 8 hours as needed for Nausea. 20 tablet 5   • metFORMIN (GLUCOPHAGE) 500 MG tablet Take 2 tablets by mouth in the morning and 2 tablets in the evening. Take with meals. 360 tablet 3   • lisinopril (ZESTRIL) 40 MG tablet Take 1 tablet by mouth daily. 90 tablet 1   • metoPROLOL succinate (TOPROL-XL) 50 MG 24 hr tablet TAKE 1 TABLET BY MOUTH DAILY 90 tablet 1   • rosuvastatin (CRESTOR) 40 MG tablet TAKE 1 TABLET BY MOUTH DAILY 90 tablet 1   • bictegravir-emtricitab-tenofov (Biktarvy) -25 MG per tablet Take 1 tablet by mouth daily. 90 tablet 1   • hydroCHLOROthiazide (HYDRODIURIL) 12.5 MG tablet      • Semaglutide (Rybelsus) 14 MG Tab Take 14 mg by mouth daily. 30 tablet 11   • clopidogrel (Plavix) 75 MG tablet Take 1 tablet by mouth daily. 90 tablet 2   • Farxiga 10 MG tablet TAKE 1 TABLET BY MOUTH DAILY 90 tablet 3   • acyclovir (ZOVIRAX) 5 % ointment APPLY EXTERNALLY TO THE AFFECTED AREA EVERY 3 HOURS AS NEEDED 15 g 1   • Valacyclovir HCl 1000 MG Tab TAKE 2 TABLET BY MOUTH EVERY 12 HOURS FOR 2 DOSES IN CASE OF NEW ATTACK 30 tablet 5   • Continuous Blood Gluc Sensor (FreeStyle Yari 14 Day Sensor) Misc CHANGE SENSOR EVERY 14 DAYS 3 each 11   • RIYA CONTOUR NEXT TEST test strip Test blood sugar 3 times daily as directed. Diagnosis: Type 2 DM. Meter: Contour Next 100 strip 3   • Microlet Lancets Misc Testing glucose 3 times daily 100 each 3   • latanoprost (XALATAN) 0.005 % ophthalmic solution INT 1 GTT IN OU QHS     • aspirin 81 MG EC tablet      • Omega-3 Fatty Acids (FISH OIL PO) USE AS DIRECTED.     • Cholecalciferol (VITAMIN D3) 5000 units Tab        No current facility-administered medications for this visit.         Physical Exam    General:  No acute distress. Appearing his stated age.   Skin:  Skin color, texture, and turgor normal.  No rash or skin lesions.   Head:   Normocephalic, without obvious abnormality.  Eyes:  Conjunctiva clear, No icterus, EOM's intact both eyes.   Nose:  Nares normal. No drainage or sinus tenderness.  Throat:  Lips, mucosa, and tongue normal; teeth and gums normal. Normal posterior oropharynx.  No oral lesions. Dry  Neck: Supple, symmetrical.  No thyromegaly.  No enlarged cervical, supraclavicular or infraclavicular lymphadenopathy.  Lungs:   Clear bilaterally.  No rhonchi, wheezing or rales.  Symmetrical breath sounds.   Heart:  Regular rate and rhythm. S1 and S2 normal. No murmur, rub or gallop. No peripheral edema  Abdomen:  Soft, non-tender, bowel sounds normal.  No masses. No hepatomegaly.  No splenomegaly.  Extremities:   Normal, No cyanosis or edema.  No petechiae or ecchymosis, No signs of a DVT  Lymph Nodes:  Cervical, supraclavicular or axillary nodes normal.    Psych: mood and affect are appropriate.       Last Recorded Vitals:  Visit Vitals  /62 (BP Location: LUE - Left upper extremity, Patient Position: Sitting, Cuff Size: Regular)   Pulse 74   Temp 98.1 °F (36.7 °C) (Oral)   Resp 18   Wt 84.3 kg (185 lb 13.6 oz)   SpO2 98%   BMI 25.21 kg/m²       Relevant Results:  Labs:  Office Visit on 11/16/2022   Component Date Value Ref Range Status   • Sodium 11/16/2022 144  135 - 145 mmol/L Final   • Potassium 11/16/2022 4.0  3.4 - 5.1 mmol/L Final   • Chloride 11/16/2022 107  97 - 110 mmol/L Final   • Carbon Dioxide 11/16/2022 25  21 - 32 mmol/L Final   • Anion Gap 11/16/2022 16  7 - 19 mmol/L Final   • Glucose 11/16/2022 172 (A) 70 - 99 mg/dL Final   • BUN 11/16/2022 16  6 - 20 mg/dL Final   • Creatinine 11/16/2022 0.97  0.67 - 1.17 mg/dL Final   • Glomerular Filtration Rate 11/16/2022 88  >=60 Final    eGFR results = or >60 mL/min/1.73m2 = Normal kidney function. Estimated GFR calculated using the CKD-EPI-R (2021) equation that does not include race in the creatinine calculation.   • BUN/ Creatinine Ratio 11/16/2022 16  7 - 25 Final   •  Calcium 11/16/2022 9.1  8.4 - 10.2 mg/dL Final   • Bilirubin, Total 11/16/2022 0.8  0.2 - 1.0 mg/dL Final   • GOT/AST 11/16/2022 17  <=37 Units/L Final   • GPT/ALT 11/16/2022 26  <64 Units/L Final   • Alkaline Phosphatase 11/16/2022 95  45 - 117 Units/L Final   • Albumin 11/16/2022 3.4 (A) 3.6 - 5.1 g/dL Final   • Protein, Total 11/16/2022 6.8  6.4 - 8.2 g/dL Final   • Globulin 11/16/2022 3.4  2.0 - 4.0 g/dL Final   • A/G Ratio 11/16/2022 1.0  1.0 - 2.4 Final   • WBC 11/16/2022 4.0 (A) 4.2 - 11.0 K/mcL Final   • RBC 11/16/2022 4.11 (A) 4.50 - 5.90 mil/mcL Final   • HGB 11/16/2022 13.5  13.0 - 17.0 g/dL Final   • HCT 11/16/2022 37.5 (A) 39.0 - 51.0 % Final   • MCV 11/16/2022 91.2  78.0 - 100.0 fl Final   • MCH 11/16/2022 32.8  26.0 - 34.0 pg Final   • MCHC 11/16/2022 36.0  32.0 - 36.5 g/dL Final   • RDW-CV 11/16/2022 14.6  11.0 - 15.0 % Final   • RDW-SD 11/16/2022 43.3  39.0 - 50.0 fL Final   • PLT 11/16/2022 310  140 - 450 K/mcL Final   • NRBC 11/16/2022 0  <=0 /100 WBC Final   • Neutrophil, Percent 11/16/2022 68  % Final   • Lymphocytes, Percent 11/16/2022 15  % Final   • Mono, Percent 11/16/2022 13  % Final   • Eosinophils, Percent 11/16/2022 2  % Final   • Basophils, Percent 11/16/2022 1  % Final   • Immature Granulocytes 11/16/2022 1  % Final   • Absolute Neutrophils 11/16/2022 2.7  1.8 - 7.7 K/mcL Final   • Absolute Lymphocytes 11/16/2022 0.6 (A) 1.0 - 4.0 K/mcL Final   • Absolute Monocytes 11/16/2022 0.5  0.3 - 0.9 K/mcL Final   • Absolute Eosinophils  11/16/2022 0.1  0.0 - 0.5 K/mcL Final   • Absolute Basophils 11/16/2022 0.1  0.0 - 0.3 K/mcL Final   • Absolute Immmature Granulocytes 11/16/2022 0.0  0.0 - 0.2 K/mcL Final   Hospital Outpatient Visit on 11/16/2022   Component Date Value Ref Range Status   • Treatment Site 11/16/2022 Anus + Nodes   Final   • Course Number 11/16/2022 1   Final   • Prescribed Fractional Dose 11/16/2022 180  cGray Final   • Prescribed Total Dose 11/16/2022 5,400  cGray Final   •  Actual Fractions Delivered 11/16/2022 18   Final   • Prescription Pattern Comment 11/16/2022 4500 cGy to lymph nodes and anus, SIB 5400 to gross disease   Final   • Actual Session Delivered Dose 11/16/2022 180  cGray Final   • Actual Total Dose 11/16/2022 3,240  cGray Final   • Prescribed Technique 11/16/2022 VMAT   Final   • Elapsed Days 11/16/2022 23   Final   • Start Date 11/16/2022 10/24/2022   Final   • Last Date 11/16/2022 11/16/2022   Final   • Prescribed Number of Fractions 11/16/2022 30   Final       Imaging:     PET CT FDG SKULL BASE TO MID-THIGH INITIAL    Electronically signed by: Agustín Wood on 09/30/22 1540 Status: Completed   Ordering user: Agustín Wood 09/30/22 1540 Ordering provider: Dima Betancur MD   Authorized by: Dima Betancur MD Ordering mode: No Cosign Required   Cosigning events   Electronically cosigned by Dima Betancur MD 10/12/22 1646 for Ordering   Frequency:  10/12/22 1638 - 1  occurrence Quantity: 1   Indications of use: Anal carcinoma, initial workup   Diagnoses   Anal cancer (CMS/HCC) [C21.0]     Questionnaire    Question Answer   How should test results be released to the patient's Office Ma[employer] portal? Automatic Release   Record Decision Support information? Yes   Decision Support Vendor [facility] [employer] [facility] ()   Decision Support Adherence No Criteria Available ([facility])   Decision Support Session ID [id number]   Decision Support Score Not Validated   Results  PET CT FDG SKULL BASE TO MID-THIGH INITIAL (Accession [id number]) (Order [id number])      End Exam    Date Time   Oct 12, 2022  4:48 PM     Reason For Exam    Anal carcinoma, initial workup     Associated Diagnoses    Anal cancer (CMS/HCC)         IR Timeline    IR Event Log     PACS Images     Show images for PET CT FDG SKULL BASE TO MID-THIGH INITIAL    Result Information    Date and Time: Exam End: 10/12/2022 16:48 Status: Final result Provider Status: Open   Priority: ASAP            Study  Result    Narrative & Impression   EXAM: PET CT FDG SKULL BASE TO MID THIGH INITIAL, Date: 10/12/2022     Clinical History: Anal carcinoma.  History of bladder cancer.     CLINICAL INDICATION: FDG-PET/CT is obtained to assess extent of disease  prior to initial management.     COMPARISON: CT chest abdomen pelvis 09/28/2022     TECHNIQUE:  Radiopharmaceutical: F-18 fluorodeoxyglucose  Administered activity: 10.6 mCi  Route of administration: Intravenous via the right antecubital  Localization time: 60 minutes  Serum blood glucose: 109 mg/dl  Scan range: Skull base to mid thigh  CT scanning: Noncontrast CT.     FINDINGS:      Head and Neck:      8 mm hypermetabolic focus within the right parotid gland with SUV max 6.8.   Physiologic uptake within the brain parenchyma and salivary glands.     Chest:      No suspicious metabolic lesions within the chest.  Stable 3 mm nodule  within the right upper lobe (series 3, image 84).  Stable 4 mm pulmonary  nodule within the right upper lobe (series 3, image 93).  Stable 2 mm  pulmonary nodule within the right lower lobe (series 3, image 99).  These  nodules are below the resolution of PET imaging.     Coronary artery calcifications.  Atherosclerotic calcification of the  normal caliber thoracic aorta.     Abdomen and Pelvis:      Remonstration of circumferential thickening of the anal canal with SUV max  4.5, consistent with patient's history of anal carcinoma.  Hypermetabolic  enlarged right inguinal lymph nodes measuring up to 2.3 cm in short axis  with SUV max 9.1.  Physiologic uptake within the liver, spleen,  gastrointestinal, and urinary tracts.  Diffuse radiotracer uptake within  the colon, likely related to metformin use.       Few 5 mm and smaller bilateral nonobstructing renal calculi.   Redemonstration of asymmetric fullness of the left renal collecting system,  better characterized on prior CT with contrast.  Colonic diverticulosis.   Stable aortic and right iliac  stent grafts.     Musculoskeletal:      No suspicious or metabolic osseous lesions.  Degenerative changes of the  lumbar spine.     IMPRESSION:     1.   Redemonstration of circumferential thickening of the anal canal with  hypermetabolic activity, consistent with patient's biopsy-proven anal  squamous cell carcinoma.  2.   Enlarged hypermetabolic right inguinal lymph nodes, concerning for  metastatic lymphadenopathy.  3.   Hypermetabolic focus within the right parotid gland.  Differential  considerations would include both benign and malignant parotid lesions or  lymphadenopathy.  ENT consultation should be considered.  4.   Redemonstration of asymmetric fullness of the left renal collecting  system.  Findings again concerning for possible urothelial cell carcinoma  given patient's history of papillary urothelial cell carcinoma in the  bladder.  5.   Multiple stable pulmonary nodules.  These nodules are below the  resolution of PET imaging.  Attention on follow-up recommended.     Dictated by: SHASTA HERNANDEZ MD  Dictated on: 10/13/2022 7:34 AM      I, KIRSTIE FIELD M.D., have reviewed the images and report and concur  with these findings interpreted by SHASTA HERNANDEZ MD.     Electronically Signed by: KIRSTIE FIELD M.D.   Signed on: 10/13/2022 8:32 AM          Assessment/ Plan:    -[id number] squamous cell carcinoma of the anus..  Started concurrent chemoradiation with Mitomycin-C and 5-FU on 10/24/22.  He tolerated treatment well. He will follow up with Dr. Doll on Monday for evaluation prior to cycle 2.     Neutropenic. Resolved. ANC 2700  today.     -Thrombocytopenia. Resolved. Will continue to monitor. Plt 310k today.     -Chemotherapy induced nausea, mild. Improved with oral antiemetics.     -Diarrhea. Has imodium for prn use. Will monitor.     -Mucositis. Resolved. Using Oralgel rinses with resolution.     -Goals of care.  Curative    -Fertility.  Not interested in fertility preservation    -Pain.   Presently denies any pain    -Psychosocial.  Does not appear to be in any emotional distress    -Vascular access.  Single-lumen PowerPort placed for chemotherapy delivery by interventional radiology on 10/14/2022    Code Status    Code Status: Prior      Nury Hammer PA-C     forehead

## 2022-11-30 ENCOUNTER — OUTPATIENT (OUTPATIENT)
Dept: OUTPATIENT SERVICES | Facility: HOSPITAL | Age: 42
LOS: 1 days | Discharge: HOME | End: 2022-11-30

## 2022-11-30 DIAGNOSIS — D64.9 ANEMIA, UNSPECIFIED: ICD-10-CM

## 2022-11-30 DIAGNOSIS — Z92.89 PERSONAL HISTORY OF OTHER MEDICAL TREATMENT: Chronic | ICD-10-CM

## 2022-11-30 DIAGNOSIS — Z98.890 OTHER SPECIFIED POSTPROCEDURAL STATES: Chronic | ICD-10-CM

## 2022-12-02 ENCOUNTER — APPOINTMENT (OUTPATIENT)
Dept: HEMATOLOGY ONCOLOGY | Facility: CLINIC | Age: 42
End: 2022-12-02

## 2022-12-27 ENCOUNTER — APPOINTMENT (OUTPATIENT)
Dept: HEMATOLOGY ONCOLOGY | Facility: CLINIC | Age: 42
End: 2022-12-27
Payer: MEDICARE

## 2022-12-27 ENCOUNTER — APPOINTMENT (OUTPATIENT)
Dept: HEMATOLOGY ONCOLOGY | Facility: CLINIC | Age: 42
End: 2022-12-27

## 2022-12-27 VITALS
HEART RATE: 118 BPM | BODY MASS INDEX: 35.07 KG/M2 | RESPIRATION RATE: 18 BRPM | WEIGHT: 197.98 LBS | SYSTOLIC BLOOD PRESSURE: 108 MMHG | DIASTOLIC BLOOD PRESSURE: 75 MMHG | TEMPERATURE: 98.2 F

## 2022-12-27 PROCEDURE — 99213 OFFICE O/P EST LOW 20 MIN: CPT

## 2022-12-28 NOTE — PHYSICAL EXAM
[Normal] : normal appearance, no rash, nodules, vesicles, ulcers, erythema [de-identified] : well nourishes; agitated but easily redirects [de-identified] : limited exam [de-identified] : grossly intact; [de-identified] : intellectual disability, easily agitated

## 2022-12-28 NOTE — ASSESSMENT
[FreeTextEntry1] : 41 yo woman with intellectual disability, noted to have B12 and iron deficiency dating back to 2014 thought to be due to heavy menstrual bleeding at that time; responded well to oral supplementation\par \par - patient not cooperative to wait for lab today; will place orders and have her complete labs at outside [facility] lab facility\par - will check CBC/iron studies/B12/folate\par - for now to continue oral supplements of iron sulfate BID\par - paperwork for group home completed\par - f/u in 6 months or sooner pending labs

## 2022-12-28 NOTE — HISTORY OF PRESENT ILLNESS
[de-identified] : Patient with long standing mental disability, resident of group home, followed since 2014 for multifactorial anemia including iron deficiency and B12 deficiency. She has been on oral supplements with Iron sulfate. She was also previously on oral B12 supplements which have been stopped. \par \par Per aide she was hospitalized after a seizure 2 weeks ago. Her depakote has been held as well as metformin which was started to help control her heavy menstrual periods.\par \par She now presents to transfer care from [facility] hematology office to  [facility])  [de-identified] : 12/27/22\par All of the patient's prior records including radiology, pathology and prior notes reviewed; Past Medical History, Past Surgical History, Family History and Social history reviewed and updated in the patient's chart.

## 2022-12-28 NOTE — REVIEW OF SYSTEMS
[Negative] : Allergic/Immunologic [FreeTextEntry2] : u [de-identified] : intellectual disability; had seizure in mid December  [de-identified] : tangential

## 2022-12-28 NOTE — REASON FOR VISIT
[Follow-Up Visit] : a follow-up visit for [Blood Count Assessment] : blood count assessment [Other: _____] : [unfilled] [FreeTextEntry2] : known iron and b12 deficiency

## 2023-01-01 NOTE — ASU PATIENT PROFILE, ADULT - IS PATIENT PREGNANT?
----- Message from Miriam Davis on behalf of Maddison Davis sent at 2023  5:00 AM CDT -----  Regarding: Eye Crust/Drainage  Contact: [phone]  Maddison’s eye has improved! We have been using the antibiotic ointment since Tuesday night. Do we need to keep using it for the full 7 days even if it has improved? Let me know!   no

## 2023-01-11 NOTE — REVIEW OF SYSTEMS
05/14/20    Internal medicine progress note.    Subjective   On TM  Follows some commands  Confused      Ros limited.  Abdominal pain .        Vitals 95/59   80   22  97.2     O/E  Trach is present    NECK: Supple. Lymph nodes are not felt in the neck region.       LUNGS:  Lungs show coarse breath sounds bilaterally with moist crackles  CARDIOVASCULAR: S1, S2 with displaced PMI.  ABDOMEN: Soft, bowel sounds positive, tube present.  Right upper abdominal wound with green discharge.  CENTRAL NERVOUS SYSTEM: limited.   Severe atrophy , seen all 4 extremities   Power is 0-1/5    Recent Labs   Lab 05/13/20  1244 05/12/20  0718 05/11/20  1320   WBC 8.0 10.2 7.3   HGB 7.0* 8.2* 9.7*   HCT 22.5* 26.3* 31.0*    345 333     Recent Labs   Lab 05/14/20  0449 05/12/20  0718 05/11/20  1320   SODIUM 135 134* 131*   POTASSIUM 4.1 4.5 4.6   CHLORIDE 103 101 98   CO2 27 27 28   BUN 21* 28* 27*   CREATININE 0.30* 0.29* 0.22*   GLUCOSE 100* 123* 117*   CALCIUM 9.0 8.9 9.8     Impression:   J96.01  Acute respiratory failure with hypoxia  J96.10 Chronic respiratory failure, unspecified whether with hypoxia or hypercapnia  R78.81 Bacteremia  I67.9 Cerebrovascular disease, unspecified Acute medial left basal ganglia ischemic stroke, likely small-vessel disease  D64.9 Anemia, unspecified  R13.10    Dysphagia, unspecified  J18.9 Pneumonia, unspecified organism  R13.10    Dysphagia, unspecified  I26.99 Other pulmonary embolism without acute cor pulmonale     Plan:  Drop in H/H   Follow cbc   On zosyn and diflucan as per ID   TPN.  Continue Passy Phylicia valve trials  Status post open gastrostomy tube placement 04/27/2020 and 5/11/20  S/p  Exploratory laparotomy, removal of old GJ tube. Placement of new GJ tube.  Debridement of abdominal wall including skin, subcutaneous tissue, muscle fascia and muscle measuring 25 x 8 x 4 cm  Motor strenght is a bit better.  oxycodone for pain control.  Supplement K  Vent support/ TM   Hx of GI bleed on  protonix   Sliding scale insulin.  Start PT/OT       Wilver Drew MD     [Negative] : Heme/Lymph

## 2023-01-12 ENCOUNTER — APPOINTMENT (OUTPATIENT)
Dept: CARDIOLOGY | Facility: CLINIC | Age: 43
End: 2023-01-12
Payer: MEDICARE

## 2023-01-12 ENCOUNTER — NON-APPOINTMENT (OUTPATIENT)
Age: 43
End: 2023-01-12

## 2023-01-12 VITALS
WEIGHT: 190 LBS | HEART RATE: 108 BPM | HEIGHT: 63 IN | DIASTOLIC BLOOD PRESSURE: 76 MMHG | BODY MASS INDEX: 33.66 KG/M2 | SYSTOLIC BLOOD PRESSURE: 126 MMHG | OXYGEN SATURATION: 97 %

## 2023-01-12 PROCEDURE — 99214 OFFICE O/P EST MOD 30 MIN: CPT

## 2023-01-12 PROCEDURE — 93000 ELECTROCARDIOGRAM COMPLETE: CPT

## 2023-01-12 NOTE — HISTORY OF PRESENT ILLNESS
[FreeTextEntry1] : 41 Y/O female  intellectual disability , HLD  abnormal EKG tachycardia,  obesity, seizure activity    here today in routine cardiac follow up accompanied VIA formal caregivers who offered no concerns.  Patient claims to be feeling well  No CP/SOB/Palpitations/Dizziness \par \par Patient blood work showed controlled cholesterol elevated TG low HDL , done in 9/15/21    no recent lipid profile results provided , \par \par \par EKG reviewed no significant changes Mildly tachycardic as she is anxious , \par \par  EF 60-65% trace valvular abnormalities, No segmental wall motion abnormalities\par

## 2023-01-12 NOTE — CARDIOLOGY SUMMARY
[de-identified] : 1/12/23   sinus tachycardiac ICRBBB  [de-identified] :  6/18/21  EF 60-65% normal ventricular function

## 2023-01-12 NOTE — DISCUSSION/SUMMARY
[FreeTextEntry1] : Mild Tachycardia,  Inc RBBB , possibly  anxiety , Prior holter showed AVR 90s.  \par \par Cardiac murmur : flow murmur ,  echo on next visit , \par \par HLD: Lipitor previously uptitrated which she is tolerating  improved lipids ,  HDl 47  LDL 47    will obtain repeat blood work \par \par obesity : encourage calorie intake restriction ,  weight loss diet,  weight reduction\par \par Seizure disorder , continue medication and Neurology management \par \par \par \par OV 6 months \par \par follow up after 6 months \par \par

## 2023-01-12 NOTE — PHYSICAL EXAM
[Obese] : obese [Normal Conjunctiva] : normal conjunctiva [Normal Venous Pressure] : normal venous pressure [Normal S1, S2] : normal S1, S2 [No Rub] : no rub [No Gallop] : no gallop [Murmur] : murmur [Soft] : abdomen soft [Non Tender] : non-tender [Normal Gait] : normal gait [No Edema] : no edema [No Rash] : no rash [Normal] : moves all extremities, no focal deficits, normal speech [de-identified] : 1/6/SM  [de-identified] : Mentally challenged

## 2023-01-24 ENCOUNTER — LABORATORY RESULT (OUTPATIENT)
Age: 43
End: 2023-01-24

## 2023-02-02 LAB
ALBUMIN SERPL ELPH-MCNC: 4.8 G/DL
ALP BLD-CCNC: 55 U/L
ALT SERPL-CCNC: 14 U/L
ANION GAP SERPL CALC-SCNC: 12 MMOL/L
AST SERPL-CCNC: 9 U/L
BASOPHILS # BLD AUTO: 0.12 K/UL
BASOPHILS NFR BLD AUTO: 1.5 %
BILIRUB SERPL-MCNC: 0.2 MG/DL
BUN SERPL-MCNC: 11 MG/DL
CALCIUM SERPL-MCNC: 10.9 MG/DL
CHLORIDE SERPL-SCNC: 97 MMOL/L
CHOLEST SERPL-MCNC: 179 MG/DL
CO2 SERPL-SCNC: 29 MMOL/L
CREAT SERPL-MCNC: 0.76 MG/DL
EGFR: 100 ML/MIN/1.73M2
EOSINOPHIL # BLD AUTO: 0.33 K/UL
EOSINOPHIL NFR BLD AUTO: 4.2 %
GLUCOSE SERPL-MCNC: 96 MG/DL
HCT VFR BLD CALC: 41.7 %
HDLC SERPL-MCNC: 44 MG/DL
HGB BLD-MCNC: 13.7 G/DL
IMM GRANULOCYTES NFR BLD AUTO: 0.5 %
LDLC SERPL CALC-MCNC: 67 MG/DL
LYMPHOCYTES # BLD AUTO: 3.71 K/UL
LYMPHOCYTES NFR BLD AUTO: 46.7 %
MAN DIFF?: NORMAL
MCHC RBC-ENTMCNC: 28.4 PG
MCHC RBC-ENTMCNC: 32.9 GM/DL
MCV RBC AUTO: 86.5 FL
MONOCYTES # BLD AUTO: 0.68 K/UL
MONOCYTES NFR BLD AUTO: 8.6 %
NEUTROPHILS # BLD AUTO: 3.07 K/UL
NEUTROPHILS NFR BLD AUTO: 38.5 %
NONHDLC SERPL-MCNC: 135 MG/DL
PLATELET # BLD AUTO: 328 K/UL
POTASSIUM SERPL-SCNC: 4 MMOL/L
PROT SERPL-MCNC: 7.9 G/DL
RBC # BLD: 4.82 M/UL
RBC # FLD: 14.2 %
SODIUM SERPL-SCNC: 138 MMOL/L
TRIGL SERPL-MCNC: 339 MG/DL
TSH SERPL-ACNC: 1.6 UIU/ML
WBC # FLD AUTO: 7.95 K/UL

## 2023-02-18 ENCOUNTER — EMERGENCY (EMERGENCY)
Facility: HOSPITAL | Age: 43
LOS: 1 days | End: 2023-02-18
Attending: STUDENT IN AN ORGANIZED HEALTH CARE EDUCATION/TRAINING PROGRAM | Admitting: STUDENT IN AN ORGANIZED HEALTH CARE EDUCATION/TRAINING PROGRAM
Payer: MEDICARE

## 2023-02-18 ENCOUNTER — INPATIENT (INPATIENT)
Facility: HOSPITAL | Age: 43
LOS: 22 days | Discharge: NURSING, REHAB OR LONG-TERM CARE | DRG: 100 | End: 2023-03-13
Attending: PSYCHIATRY & NEUROLOGY | Admitting: PSYCHIATRY & NEUROLOGY
Payer: MEDICARE

## 2023-02-18 VITALS
DIASTOLIC BLOOD PRESSURE: 72 MMHG | SYSTOLIC BLOOD PRESSURE: 121 MMHG | OXYGEN SATURATION: 97 % | WEIGHT: 188.05 LBS | TEMPERATURE: 99 F | HEIGHT: 62 IN | HEART RATE: 112 BPM | RESPIRATION RATE: 16 BRPM

## 2023-02-18 VITALS
DIASTOLIC BLOOD PRESSURE: 63 MMHG | SYSTOLIC BLOOD PRESSURE: 136 MMHG | RESPIRATION RATE: 17 BRPM | HEART RATE: 96 BPM | OXYGEN SATURATION: 98 %

## 2023-02-18 VITALS
TEMPERATURE: 98 F | RESPIRATION RATE: 16 BRPM | OXYGEN SATURATION: 100 % | DIASTOLIC BLOOD PRESSURE: 76 MMHG | HEART RATE: 94 BPM | SYSTOLIC BLOOD PRESSURE: 114 MMHG

## 2023-02-18 DIAGNOSIS — Z92.89 PERSONAL HISTORY OF OTHER MEDICAL TREATMENT: Chronic | ICD-10-CM

## 2023-02-18 DIAGNOSIS — Z98.890 OTHER SPECIFIED POSTPROCEDURAL STATES: Chronic | ICD-10-CM

## 2023-02-18 DIAGNOSIS — R56.9 UNSPECIFIED CONVULSIONS: ICD-10-CM

## 2023-02-18 LAB
ALBUMIN SERPL ELPH-MCNC: 3.9 G/DL — SIGNIFICANT CHANGE UP (ref 3.3–5)
ALBUMIN SERPL ELPH-MCNC: 4.3 G/DL — SIGNIFICANT CHANGE UP (ref 3.3–5)
ALP SERPL-CCNC: 43 U/L — SIGNIFICANT CHANGE UP (ref 40–120)
ALP SERPL-CCNC: 50 U/L — SIGNIFICANT CHANGE UP (ref 30–120)
ALT FLD-CCNC: 14 U/L — SIGNIFICANT CHANGE UP (ref 10–45)
ALT FLD-CCNC: 17 U/L DA — SIGNIFICANT CHANGE UP (ref 10–60)
ANION GAP SERPL CALC-SCNC: 15 MMOL/L — SIGNIFICANT CHANGE UP (ref 5–17)
ANION GAP SERPL CALC-SCNC: 17 MMOL/L — SIGNIFICANT CHANGE UP (ref 5–17)
APPEARANCE UR: CLEAR — SIGNIFICANT CHANGE UP
AST SERPL-CCNC: 11 U/L — SIGNIFICANT CHANGE UP (ref 10–40)
AST SERPL-CCNC: 11 U/L — SIGNIFICANT CHANGE UP (ref 10–40)
BACTERIA # UR AUTO: NEGATIVE — SIGNIFICANT CHANGE UP
BASOPHILS # BLD AUTO: 0.08 K/UL — SIGNIFICANT CHANGE UP (ref 0–0.2)
BASOPHILS # BLD AUTO: 0.09 K/UL — SIGNIFICANT CHANGE UP (ref 0–0.2)
BASOPHILS NFR BLD AUTO: 0.9 % — SIGNIFICANT CHANGE UP (ref 0–2)
BASOPHILS NFR BLD AUTO: 1.1 % — SIGNIFICANT CHANGE UP (ref 0–2)
BILIRUB SERPL-MCNC: 0.2 MG/DL — SIGNIFICANT CHANGE UP (ref 0.2–1.2)
BILIRUB SERPL-MCNC: 0.3 MG/DL — SIGNIFICANT CHANGE UP (ref 0.2–1.2)
BILIRUB UR-MCNC: NEGATIVE — SIGNIFICANT CHANGE UP
BUN SERPL-MCNC: 12 MG/DL — SIGNIFICANT CHANGE UP (ref 7–23)
BUN SERPL-MCNC: 9 MG/DL — SIGNIFICANT CHANGE UP (ref 7–23)
CALCIUM SERPL-MCNC: 9.4 MG/DL — SIGNIFICANT CHANGE UP (ref 8.4–10.5)
CALCIUM SERPL-MCNC: 9.8 MG/DL — SIGNIFICANT CHANGE UP (ref 8.4–10.5)
CHLORIDE SERPL-SCNC: 101 MMOL/L — SIGNIFICANT CHANGE UP (ref 96–108)
CHLORIDE SERPL-SCNC: 99 MMOL/L — SIGNIFICANT CHANGE UP (ref 96–108)
CK SERPL-CCNC: 113 U/L — SIGNIFICANT CHANGE UP (ref 25–170)
CO2 SERPL-SCNC: 22 MMOL/L — SIGNIFICANT CHANGE UP (ref 22–31)
CO2 SERPL-SCNC: 22 MMOL/L — SIGNIFICANT CHANGE UP (ref 22–31)
COLOR SPEC: SIGNIFICANT CHANGE UP
CREAT SERPL-MCNC: 0.6 MG/DL — SIGNIFICANT CHANGE UP (ref 0.5–1.3)
CREAT SERPL-MCNC: 0.89 MG/DL — SIGNIFICANT CHANGE UP (ref 0.5–1.3)
DIFF PNL FLD: NEGATIVE — SIGNIFICANT CHANGE UP
EGFR: 114 ML/MIN/1.73M2 — SIGNIFICANT CHANGE UP
EGFR: 82 ML/MIN/1.73M2 — SIGNIFICANT CHANGE UP
EOSINOPHIL # BLD AUTO: 0.05 K/UL — SIGNIFICANT CHANGE UP (ref 0–0.5)
EOSINOPHIL # BLD AUTO: 0.11 K/UL — SIGNIFICANT CHANGE UP (ref 0–0.5)
EOSINOPHIL NFR BLD AUTO: 0.5 % — SIGNIFICANT CHANGE UP (ref 0–6)
EOSINOPHIL NFR BLD AUTO: 1.5 % — SIGNIFICANT CHANGE UP (ref 0–6)
EPI CELLS # UR: 3 /HPF — SIGNIFICANT CHANGE UP
GLUCOSE SERPL-MCNC: 127 MG/DL — HIGH (ref 70–99)
GLUCOSE SERPL-MCNC: 85 MG/DL — SIGNIFICANT CHANGE UP (ref 70–99)
GLUCOSE UR QL: NEGATIVE — SIGNIFICANT CHANGE UP
HCT VFR BLD CALC: 39.3 % — SIGNIFICANT CHANGE UP (ref 34.5–45)
HCT VFR BLD CALC: 42.9 % — SIGNIFICANT CHANGE UP (ref 34.5–45)
HGB BLD-MCNC: 13.1 G/DL — SIGNIFICANT CHANGE UP (ref 11.5–15.5)
HGB BLD-MCNC: 14.1 G/DL — SIGNIFICANT CHANGE UP (ref 11.5–15.5)
HYALINE CASTS # UR AUTO: 1 /LPF — SIGNIFICANT CHANGE UP (ref 0–2)
IMM GRANULOCYTES NFR BLD AUTO: 0.4 % — SIGNIFICANT CHANGE UP (ref 0–0.9)
IMM GRANULOCYTES NFR BLD AUTO: 0.8 % — SIGNIFICANT CHANGE UP (ref 0–0.9)
KETONES UR-MCNC: NEGATIVE — SIGNIFICANT CHANGE UP
LACTATE SERPL-SCNC: 2.2 MMOL/L — HIGH (ref 0.5–2)
LEUKOCYTE ESTERASE UR-ACNC: NEGATIVE — SIGNIFICANT CHANGE UP
LYMPHOCYTES # BLD AUTO: 2.15 K/UL — SIGNIFICANT CHANGE UP (ref 1–3.3)
LYMPHOCYTES # BLD AUTO: 28.6 % — SIGNIFICANT CHANGE UP (ref 13–44)
LYMPHOCYTES # BLD AUTO: 3.19 K/UL — SIGNIFICANT CHANGE UP (ref 1–3.3)
LYMPHOCYTES # BLD AUTO: 30.4 % — SIGNIFICANT CHANGE UP (ref 13–44)
MCHC RBC-ENTMCNC: 28.5 PG — SIGNIFICANT CHANGE UP (ref 27–34)
MCHC RBC-ENTMCNC: 29 PG — SIGNIFICANT CHANGE UP (ref 27–34)
MCHC RBC-ENTMCNC: 32.9 GM/DL — SIGNIFICANT CHANGE UP (ref 32–36)
MCHC RBC-ENTMCNC: 33.3 GM/DL — SIGNIFICANT CHANGE UP (ref 32–36)
MCV RBC AUTO: 86.8 FL — SIGNIFICANT CHANGE UP (ref 80–100)
MCV RBC AUTO: 86.9 FL — SIGNIFICANT CHANGE UP (ref 80–100)
MONOCYTES # BLD AUTO: 0.58 K/UL — SIGNIFICANT CHANGE UP (ref 0–0.9)
MONOCYTES # BLD AUTO: 0.76 K/UL — SIGNIFICANT CHANGE UP (ref 0–0.9)
MONOCYTES NFR BLD AUTO: 7.3 % — SIGNIFICANT CHANGE UP (ref 2–14)
MONOCYTES NFR BLD AUTO: 7.7 % — SIGNIFICANT CHANGE UP (ref 2–14)
NEUTROPHILS # BLD AUTO: 4.54 K/UL — SIGNIFICANT CHANGE UP (ref 1.8–7.4)
NEUTROPHILS # BLD AUTO: 6.35 K/UL — SIGNIFICANT CHANGE UP (ref 1.8–7.4)
NEUTROPHILS NFR BLD AUTO: 60.3 % — SIGNIFICANT CHANGE UP (ref 43–77)
NEUTROPHILS NFR BLD AUTO: 60.5 % — SIGNIFICANT CHANGE UP (ref 43–77)
NITRITE UR-MCNC: NEGATIVE — SIGNIFICANT CHANGE UP
NRBC # BLD: 0 /100 WBCS — SIGNIFICANT CHANGE UP (ref 0–0)
NRBC # BLD: 0 /100 WBCS — SIGNIFICANT CHANGE UP (ref 0–0)
PH UR: 6.5 — SIGNIFICANT CHANGE UP (ref 5–8)
PLATELET # BLD AUTO: 265 K/UL — SIGNIFICANT CHANGE UP (ref 150–400)
PLATELET # BLD AUTO: 285 K/UL — SIGNIFICANT CHANGE UP (ref 150–400)
POTASSIUM SERPL-MCNC: 3.6 MMOL/L — SIGNIFICANT CHANGE UP (ref 3.5–5.3)
POTASSIUM SERPL-MCNC: 4.1 MMOL/L — SIGNIFICANT CHANGE UP (ref 3.5–5.3)
POTASSIUM SERPL-SCNC: 3.6 MMOL/L — SIGNIFICANT CHANGE UP (ref 3.5–5.3)
POTASSIUM SERPL-SCNC: 4.1 MMOL/L — SIGNIFICANT CHANGE UP (ref 3.5–5.3)
PROT SERPL-MCNC: 7.2 G/DL — SIGNIFICANT CHANGE UP (ref 6–8.3)
PROT SERPL-MCNC: 7.9 G/DL — SIGNIFICANT CHANGE UP (ref 6–8.3)
PROT UR-MCNC: NEGATIVE — SIGNIFICANT CHANGE UP
RBC # BLD: 4.52 M/UL — SIGNIFICANT CHANGE UP (ref 3.8–5.2)
RBC # BLD: 4.94 M/UL — SIGNIFICANT CHANGE UP (ref 3.8–5.2)
RBC # FLD: 14 % — SIGNIFICANT CHANGE UP (ref 10.3–14.5)
RBC # FLD: 14.1 % — SIGNIFICANT CHANGE UP (ref 10.3–14.5)
RBC CASTS # UR COMP ASSIST: 4 /HPF — SIGNIFICANT CHANGE UP (ref 0–4)
SARS-COV-2 RNA SPEC QL NAA+PROBE: SIGNIFICANT CHANGE UP
SODIUM SERPL-SCNC: 138 MMOL/L — SIGNIFICANT CHANGE UP (ref 135–145)
SODIUM SERPL-SCNC: 138 MMOL/L — SIGNIFICANT CHANGE UP (ref 135–145)
SP GR SPEC: 1.02 — SIGNIFICANT CHANGE UP (ref 1.01–1.02)
UROBILINOGEN FLD QL: NEGATIVE — SIGNIFICANT CHANGE UP
VALPROATE SERPL-MCNC: 43 UG/ML — LOW (ref 50–100)
VALPROATE SERPL-MCNC: 58 UG/ML — SIGNIFICANT CHANGE UP (ref 50–100)
WBC # BLD: 10.48 K/UL — SIGNIFICANT CHANGE UP (ref 3.8–10.5)
WBC # BLD: 7.52 K/UL — SIGNIFICANT CHANGE UP (ref 3.8–10.5)
WBC # FLD AUTO: 10.48 K/UL — SIGNIFICANT CHANGE UP (ref 3.8–10.5)
WBC # FLD AUTO: 7.52 K/UL — SIGNIFICANT CHANGE UP (ref 3.8–10.5)
WBC UR QL: 2 /HPF — SIGNIFICANT CHANGE UP (ref 0–5)

## 2023-02-18 PROCEDURE — 70450 CT HEAD/BRAIN W/O DYE: CPT | Mod: 26,MA

## 2023-02-18 PROCEDURE — 95720 EEG PHY/QHP EA INCR W/VEEG: CPT

## 2023-02-18 PROCEDURE — 99285 EMERGENCY DEPT VISIT HI MDM: CPT

## 2023-02-18 PROCEDURE — 99223 1ST HOSP IP/OBS HIGH 75: CPT | Mod: GC

## 2023-02-18 RX ORDER — VALPROIC ACID (AS SODIUM SALT) 250 MG/5ML
500 SOLUTION, ORAL ORAL EVERY 8 HOURS
Refills: 0 | Status: DISCONTINUED | OUTPATIENT
Start: 2023-02-18 | End: 2023-02-18

## 2023-02-18 RX ORDER — METFORMIN HYDROCHLORIDE 850 MG/1
1 TABLET ORAL
Qty: 0 | Refills: 0 | DISCHARGE

## 2023-02-18 RX ORDER — FLUTICASONE PROPIONATE 0.5 MG/G
1 CREAM TOPICAL
Qty: 0 | Refills: 0 | DISCHARGE

## 2023-02-18 RX ORDER — LEVETIRACETAM 250 MG/1
1 TABLET, FILM COATED ORAL
Qty: 0 | Refills: 0 | DISCHARGE

## 2023-02-18 RX ORDER — LEVOTHYROXINE SODIUM 125 MCG
50 TABLET ORAL DAILY
Refills: 0 | Status: DISCONTINUED | OUTPATIENT
Start: 2023-02-18 | End: 2023-03-13

## 2023-02-18 RX ORDER — DOCUSATE SODIUM 100 MG
1 CAPSULE ORAL
Qty: 0 | Refills: 0 | DISCHARGE

## 2023-02-18 RX ORDER — ENOXAPARIN SODIUM 100 MG/ML
40 INJECTION SUBCUTANEOUS EVERY 24 HOURS
Refills: 0 | Status: DISCONTINUED | OUTPATIENT
Start: 2023-02-18 | End: 2023-03-13

## 2023-02-18 RX ORDER — SODIUM CHLORIDE 0.65 %
0 AEROSOL, SPRAY (ML) NASAL
Qty: 0 | Refills: 0 | DISCHARGE

## 2023-02-18 RX ORDER — FERROUS SULFATE 325(65) MG
325 TABLET ORAL
Refills: 0 | Status: DISCONTINUED | OUTPATIENT
Start: 2023-02-18 | End: 2023-03-13

## 2023-02-18 RX ORDER — POLYETHYLENE GLYCOL 3350 17 G/17G
17 POWDER, FOR SOLUTION ORAL DAILY
Refills: 0 | Status: DISCONTINUED | OUTPATIENT
Start: 2023-02-18 | End: 2023-02-18

## 2023-02-18 RX ORDER — QUETIAPINE FUMARATE 200 MG/1
50 TABLET, FILM COATED ORAL ONCE
Refills: 0 | Status: DISCONTINUED | OUTPATIENT
Start: 2023-02-18 | End: 2023-02-18

## 2023-02-18 RX ORDER — HALOPERIDOL DECANOATE 100 MG/ML
2 INJECTION INTRAMUSCULAR ONCE
Refills: 0 | Status: COMPLETED | OUTPATIENT
Start: 2023-02-18 | End: 2023-02-18

## 2023-02-18 RX ORDER — DIVALPROEX SODIUM 500 MG/1
1 TABLET, DELAYED RELEASE ORAL
Qty: 0 | Refills: 0 | DISCHARGE

## 2023-02-18 RX ORDER — DIVALPROEX SODIUM 500 MG/1
0 TABLET, DELAYED RELEASE ORAL
Qty: 0 | Refills: 0 | DISCHARGE

## 2023-02-18 RX ORDER — DIVALPROEX SODIUM 500 MG/1
500 TABLET, DELAYED RELEASE ORAL THREE TIMES A DAY
Refills: 0 | Status: DISCONTINUED | OUTPATIENT
Start: 2023-02-18 | End: 2023-02-19

## 2023-02-18 RX ORDER — CLOTRIMAZOLE AND BETAMETHASONE DIPROPIONATE 10; .5 MG/G; MG/G
0 CREAM TOPICAL
Qty: 0 | Refills: 0 | DISCHARGE

## 2023-02-18 RX ORDER — VALPROIC ACID (AS SODIUM SALT) 250 MG/5ML
500 SOLUTION, ORAL ORAL ONCE
Refills: 0 | Status: COMPLETED | OUTPATIENT
Start: 2023-02-18 | End: 2023-02-18

## 2023-02-18 RX ORDER — LEVETIRACETAM 250 MG/1
1000 TABLET, FILM COATED ORAL EVERY 12 HOURS
Refills: 0 | Status: DISCONTINUED | OUTPATIENT
Start: 2023-02-18 | End: 2023-02-18

## 2023-02-18 RX ORDER — LEVETIRACETAM 250 MG/1
1000 TABLET, FILM COATED ORAL
Refills: 0 | Status: DISCONTINUED | OUTPATIENT
Start: 2023-02-19 | End: 2023-02-19

## 2023-02-18 RX ORDER — QUETIAPINE FUMARATE 200 MG/1
100 TABLET, FILM COATED ORAL ONCE
Refills: 0 | Status: COMPLETED | OUTPATIENT
Start: 2023-02-18 | End: 2023-02-18

## 2023-02-18 RX ORDER — SODIUM CHLORIDE 9 MG/ML
1000 INJECTION INTRAMUSCULAR; INTRAVENOUS; SUBCUTANEOUS ONCE
Refills: 0 | Status: COMPLETED | OUTPATIENT
Start: 2023-02-18 | End: 2023-02-18

## 2023-02-18 RX ADMIN — LEVETIRACETAM 400 MILLIGRAM(S): 250 TABLET, FILM COATED ORAL at 17:43

## 2023-02-18 RX ADMIN — HALOPERIDOL DECANOATE 2 MILLIGRAM(S): 100 INJECTION INTRAMUSCULAR at 22:55

## 2023-02-18 RX ADMIN — Medication 55 MILLIGRAM(S): at 05:29

## 2023-02-18 RX ADMIN — Medication 55 MILLIGRAM(S): at 13:42

## 2023-02-18 RX ADMIN — DIVALPROEX SODIUM 500 MILLIGRAM(S): 500 TABLET, DELAYED RELEASE ORAL at 21:23

## 2023-02-18 RX ADMIN — Medication 1 MILLIGRAM(S): at 08:02

## 2023-02-18 RX ADMIN — QUETIAPINE FUMARATE 100 MILLIGRAM(S): 200 TABLET, FILM COATED ORAL at 23:36

## 2023-02-18 RX ADMIN — SODIUM CHLORIDE 1000 MILLILITER(S): 9 INJECTION INTRAMUSCULAR; INTRAVENOUS; SUBCUTANEOUS at 08:06

## 2023-02-18 RX ADMIN — Medication 500 MILLIGRAM(S): at 08:05

## 2023-02-18 RX ADMIN — SODIUM CHLORIDE 1000 MILLILITER(S): 9 INJECTION INTRAMUSCULAR; INTRAVENOUS; SUBCUTANEOUS at 05:29

## 2023-02-18 RX ADMIN — Medication 325 MILLIGRAM(S): at 17:43

## 2023-02-18 NOTE — ED PROVIDER NOTE - CARE PROVIDER_API CALL
Fay Ventura)  Family Medicine  [address], [address]  [place] [id number]  Phone: [phone]  Fax: [phone]  Follow Up Time:

## 2023-02-18 NOTE — H&P ADULT - NSHPLABSRESULTS_GEN_ALL_CORE
LABS:     CBC                         13.1   10.48 )-----------( 285      ( 18 Feb 2023 13:47 )             39.3     Chem 02-18    138  |  101  |  9   ----------------------------<  85  4.1   |  22  |  0.60    Ca    9.4      18 Feb 2023 13:47  Phos  3.8     02-18  Mg     1.9     02-18    TPro  7.2  /  Alb  4.3  /  TBili  0.3  /  DBili  x   /  AST  11  /  ALT  14  /  AlkPhos  43  02-18        RADIOLOGY:     CT Head No Cont (02.18.23 @ 05:47)     COMPARISON: 12/10/2021.    FINDINGS: There is no obvious acute intracranial hemorrhage, large   cortical infarct, mass effect or midline shift. Cortical sulci and   ventricles are stable. Partially empty sella.    There is no depressed skull fracture. Hyperostosis frontalis interna.   There is minimal sinus mucosal thickening. The tympanomastoid region is   unremarkable. Prominent adenoids.    IMPRESSION:    No obvious acute intracranial hemorrhage, large cortical infarct or mass   effect. If clinically indicated, short-term follow-up or MRI may be   obtained for further evaluation.

## 2023-02-18 NOTE — ED PROVIDER NOTE - NS ED ATTENDING STATEMENT MOD
The patient did not return repeated social work calls offering assistance accessing OP treatment, and the urgent referral case is closed. Attending Only

## 2023-02-18 NOTE — PATIENT PROFILE ADULT - FUNCTIONAL ASSESSMENT - BASIC MOBILITY 6.
3-calculated by average/Not able to assess (calculate score using [facility] averaging method)

## 2023-02-18 NOTE — PATIENT PROFILE ADULT - FUNCTIONAL ASSESSMENT - BASIC MOBILITY SCORE.
21
no loss of consciousness, no gait abnormality, no headache, no sensory deficits, and no weakness.

## 2023-02-18 NOTE — ED ADULT NURSE REASSESSMENT NOTE - NS ED NURSE REASSESS COMMENT FT1
pt experienced seizure (witnessed by writer and ED staff) while waiting for transport. MD at bedside, placed patient back on cardiac monitor, supplemental O2 provided, IVL re-inserted, ativan 1mg IVP given as ordered. pt's seizure lasted about 1 minute and culminated in a post ictal state, oral secretions noted, suctioned as needed. post seizure pt appears stable, Hr noted sinus tachy. will continue to monitor.

## 2023-02-18 NOTE — H&P ADULT - ASSESSMENT
42 year-old woman with a PMH of HTN, HLD, bipolar d/o, intellectual disability, hypothyroidism, seizure disorder (on Keppra and VPA) with recurrent breakthrough seizures, transferred to the EMU on 2/18 for further monitoring and management. Exam reported to be baseline at this time. CTH from OSH unrevealing. VPA level from OSH subtherapeutic at 43, but noted to be 52.26 when corrected for albumin of 3.9. CBC/CMP wnl.     Impression: Breakthrough seizures in the setting of borderline VPA level possibly due to medication nonadherence vs inadequate dosing vs provoked by underlying infectious or toxic-metabolic etiologies.     Plan:   [] Video EEG  [] CBC, CMP, Mg, Phos, CK, lactate  [] VPA and Keppra levels  [] UA  [] Continue home Keppra 1000mg BID and VPA 500mg TID (first doses as IV, then switch to PO)  [] Seizure rescue: Ativan 2mg     #Misc:   [] Telemetry monitoring; Neurochecks/VS per unit protocol  [] Seizure, fall and aspiration precautions  [] Please note: if patient has a convulsion, please document accurately the time of onset, any derangement of vital signs, length of episode, and duration of postictal period.   --> Please describe what occurred and  specifically what the patient was doing, paying attention to progression of limb involvement (upper/lower; R/L) if any, eye opening vs closure, head turn, gaze deviation, shaking of extremities, tongue bite, urinary/bowel incontinence.  [] Given concern for seizure, advise patient to not drive, operate heavy machinery, avoid heights, pools, bathtubs, locked doors until cleared by further follow up outpatient by neurology.   [] Diet: Regular (Kosher)  [] DVT ppx      Discussed with Neurology attending, Dr. Andrews.  Ms Su is a 42 year-old woman with a PMH of HTN, HLD, bipolar d/o, hypothyroidism, seizure disorder (on Keppra and VPA), developmentally delayed, who presented to OSH on 2/18 with recurrent breakthrough seizures, transferred to the EMU on 2/18 for further monitoring and management. Exam reported to be baseline at this time. CTH from OSH unrevealing. VPA level from OSH subtherapeutic at 43, but noted to be 52.26 when corrected for albumin of 3.9. CBC/CMP wnl.     Impression: Breakthrough seizures in the setting of borderline VPA level possibly due to medication nonadherence vs inadequate dosing vs provoked by underlying infectious or toxic-metabolic etiologies.     Plan:   [] Video EEG  [] CBC, CMP, Mg, Phos, CK, lactate  [] VPA and Keppra levels  [] UA  [] Continue home Keppra 1000mg BID and VPA 500mg TID (first doses as IV, then switch to PO)  [] Seizure rescue: Ativan 2mg     #Misc:   [] Telemetry monitoring; Neurochecks/VS per unit protocol  [] Seizure, fall and aspiration precautions  [] Please note: if patient has a convulsion, please document accurately the time of onset, any derangement of vital signs, length of episode, and duration of postictal period.   --> Please describe what occurred and  specifically what the patient was doing, paying attention to progression of limb involvement (upper/lower; R/L) if any, eye opening vs closure, head turn, gaze deviation, shaking of extremities, tongue bite, urinary/bowel incontinence.  [] Given concern for seizure, advise patient to not drive, operate heavy machinery, avoid heights, pools, bathtubs, locked doors until cleared by further follow up outpatient by neurology.   [] Diet: Regular (Kosher)  [] DVT ppx      Discussed with Neurology attending, Dr. Andrews.

## 2023-02-18 NOTE — ED PROVIDER NOTE - PATIENT PORTAL LINK FT
You can access the FollowMyHealth Patient Portal offered by [facility] by registering at the following website: [email or web address]. By joining [facility]’s FollowMyHealth portal, you will also be able to view your health information using other applications (apps) compatible with our system.

## 2023-02-18 NOTE — ED PROVIDER NOTE - OBJECTIVE STATEMENT
42 year old female with a history of seizure disorder, HLD, HTN, bipolar d/o, MR, hypothyroid presents with witnessed seizure.  Patient BIBA, resides at a group home.  According to aide at bedside, patient had 2 "petite" seizures yesterday afternoon.  PTA, she had a witnessed tonic clonic seizure that lasted 2 minutes.  Patient was sitting in a recliner when the seizure occurred, she urinated on herself but did not fall or hit her head.  Aide is not sure when patient's last seizure was prior to yesterday.  She take Depakote 250mg TID and Keppra 750mg BID.  Patient nonverbal and unable to provide further history.  Currently post-ictal.

## 2023-02-18 NOTE — H&P ADULT - ATTENDING COMMENTS
Patient developmentally disabled adult who reportedly had 2 focal seizures yesterday, then GTC at 4a, brought to [facility] ED, found to have valproate level 43.  Received IV dose and was to be discharged home, but had 2nd GTC and transfer requested to [facility].    On exam, patient alert, verbal and cooperative. EOM full, face symmetrical. Motor symmetrical.  Ambulation not tested.     Plan  check VPA and LEV levels  vEEG to start  Rescue medication: lorazepam 2mg IV prn after any seizure > 5min, 2 or more seizure in 1 hour, 3 or more seizures in 24h, or after any tonic-clonic seizure. IV antiseizure medication - levetiracetam 1500 IV once.

## 2023-02-18 NOTE — ED PROVIDER NOTE - NSFOLLOWUPINSTRUCTIONS_ED_ALL_ED_FT
Please take your medication as prescribed and follow up with your neurologist.  Return to the ER for persistent seizures, vomiting, headache, fever, blurry vision, lethargy, or any other concerns.       Seizure, Adult      A seizure is a sudden burst of abnormal electrical and chemical activity in the brain. Seizures usually last from 30 seconds to 2 minutes.       What are the causes?    Common causes of this condition include:  •Fever or infection.    •Problems that affect the brain. These may include:  •A brain or head injury.      •Bleeding in the brain.      •A brain tumor.        •Low levels of blood sugar or salt.      •Kidney problems or liver problems.      •Conditions that are passed from parent to child (are inherited).    •Problems with a substance, such as:  •Having a reaction to a drug or a medicine.      •Stopping the use of a substance all of a sudden (withdrawal).        •A stroke.      •Disorders that affect how you develop.      Sometimes, the cause may not be known.       What increases the risk?    •Having someone in your family who has epilepsy. In this condition, seizures happen again and again over time. They have no clear cause.    •Having had a tonic–clonic seizure before. This type of seizure causes you to:  •Tighten the muscles of the whole body.      •Lose consciousness.        •Having had a head injury or strokes before.      •Having had a lack of oxygen at birth.        What are the signs or symptoms?    There are many types of seizures. The symptoms vary depending on the type of seizure you have.    Symptoms during a seizure     •Shaking that you cannot control (convulsions) with fast, jerky movements of muscles.      •Stiffness of the body.      •Breathing problems.      •Feeling mixed up (confused).      •Staring or not responding to sound or touch.      •Head nodding.      •Eyes that blink, flutter, or move fast.      •Drooling, grunting, or making clicking sounds with your mouth      •Losing control of when you pee or poop.      Symptoms before a seizure     •Feeling afraid, nervous, or worried.      •Feeling like you may vomit.    •Feeling like:  •You are moving when you are not.      •Things around you are moving when they are not.        •Feeling like you saw or heard something before (déjà vu).      •Odd tastes or smells.      •Changes in how you see. You may see flashing lights or spots.      Symptoms after a seizure     •Feeling confused.      •Feeling sleepy.      •Headache.       •Sore muscles.        How is this treated?    If your seizure stops on its own, you will not need treatment. If your seizure lasts longer than 5 minutes, you will normally need treatment. Treatment may include:  •Medicines given through an IV tube.      •Avoiding things, such as medicines, that are known to cause your seizures.      •Medicines to prevent seizures.      •A device to prevent or control seizures.      •Surgery.      •A diet low in carbohydrates and high in fat (ketogenic diet).        Follow these instructions at home:    Medicines     •Take over-the-counter and prescription medicines only as told by your doctor.      •Avoid foods or drinks that may keep your medicine from working, such as alcohol.      Activity     •Follow instructions about driving, swimming, or doing things that would be dangerous if you had another seizure. Wait until your doctor says it is safe for you to do these things.      •If you live in the [place] ask your local department of [employer] when you can drive.      •Get a lot of rest.        Teaching others    •Teach friends and family what to do when you have a seizure. They should:  •Help you get down to the ground.      •Protect your head and body.      •Loosen any clothing around your neck.      •Turn you on your side.      •Know whether or not you need emergency care.      •Stay with you until you are better.      •Also, tell them what not to do if you have a seizure. Tell them:  •They should not hold you down.      •They should not put anything in your mouth.        General instructions     •Avoid anything that gives you seizures.    •Keep a seizure diary. Write down:  •What you remember about each seizure.      •What you think caused each seizure.        •Keep all follow-up visits.        Contact a doctor if:    •You have another seizure or seizures. Call the doctor each time you have a seizure.      •The pattern of your seizures changes.      •You keep having seizures with treatment.      •You have symptoms of being sick or having an infection.      •You are not able to take your medicine.        Get help right away if:  •You have any of these problems:  •A seizure that lasts longer than 5 minutes.      •Many seizures in a row and you do not feel better between seizures.      •A seizure that makes it harder to breathe.      •A seizure and you can no longer speak or use part of your body.        •You do not wake up right after a seizure.      •You get hurt during a seizure.      •You feel confused or have pain right after a seizure.      These symptoms may be an emergency. Get help right away. Call your local emergency services (911 in the [place]   • Do not wait to see if the symptoms will go away.       • Do not drive yourself to the hospital.         Summary    •A seizure is a sudden burst of abnormal electrical and chemical activity in the brain. Seizures normally last from 30 seconds to 2 minutes.      •Causes of seizures include illness, injury to the head, low levels of blood sugar or salt, and certain conditions.      •Most seizures will stop on their own in less than 5 minutes. Seizures that last longer than 5 minutes are a medical emergency and need treatment right away.      •Many medicines are used to treat seizures. Take over-the-counter and prescription medicines only as told by your doctor.      This information is not intended to replace advice given to you by your health care provider. Make sure you discuss any questions you have with your health care provider.

## 2023-02-18 NOTE — ED PROVIDER NOTE - CLINICAL SUMMARY MEDICAL DECISION MAKING FREE TEXT BOX
42 year old female with a history of seizure d/o, bipolar d/o, MR, HLD presents with witnessed seizure at group home.  Currently post-ictal. Check labs, levels of Depakote and Keppra, CT head, IV anti-epileptics

## 2023-02-18 NOTE — ED PROVIDER NOTE - NEUROLOGICAL, MLM
Alert and oriented x 0, no focal deficits, no motor or sensory deficits.  Patient unable to follow commands

## 2023-02-18 NOTE — ED PROVIDER NOTE - PROGRESS NOTE DETAILS
Results of work up d/w aide at bedside and copies of all reports given.  Aware of sub-therapeutic Depakote level.  Aide will call for transport back to group home. Pt had another generalized seizure while in ED. Lasted about 1 minute. Now post ictal. Given Ativan 1 mg. I spoke with neuro Marcelo who recommended transfer for EEG monitoring and further eval and treatment. Patient accepted for transfer to [facility] epilepsy monitoring unit Dr. Andrews accepting

## 2023-02-18 NOTE — H&P ADULT - HISTORY OF PRESENT ILLNESS
42 year-old woman with a PMH of HTN, HLD, bipolar d/o, intellectual disability, hypothyroidism, seizure disorder (on Keppra and VPA) who initially presented to OSH on 2/18 for breakthrough seizures, transferred to the EMU for further evaluation and management. Per chart review and discussion with aide at bedside, patient had 2 seizures at her group home, including an episode of generalized tonic clonic shaking while seated in a recliner, lasting 2 minutes, associated with urinary incontinence. There was no fall or injury. Her aide is under of when her last seizure prior to these events may have occurred. She was taken to OSH where she was reported to be postictal and received IV VPA 500mg x1 and later determined to be stable for discharge back to her group home. While awaiting transport, she had a 3rd witnessed event lasting less than 1 minute, for which she was administered ativan 1mg IVP, and decision made for transport to [facility] for continuous EEG.    42 year-old woman with a PMH of HTN, HLD, bipolar d/o, hypothyroidism, seizure disorder (on Keppra and VPA), developmentally delayed, who initially presented to OSH on 2/18 for breakthrough seizures, transferred to the EMU for further evaluation and management. Per chart review and discussion with aide at bedside, patient had 2 seizures at her group home, including an episode of generalized "tonic-clonic" shaking while seated in a recliner, lasting 2 minutes, associated with urinary incontinence. She was taken to OSH where she was reported to be postictal and received IV VPA 500mg x1 and later determined to be stable for discharge back to her group home. While awaiting transport, she had a 3rd witnessed event lasting less than 1 minute, for which she was administered ativan 1mg IVP, and decision made for transport to [facility] for continuous EEG. There was no fall or injury associated with any of these events. Her aide is unsure of when her last seizure prior to these events may have occurred.

## 2023-02-18 NOTE — H&P ADULT - NSHPPHYSICALEXAM_GEN_ALL_CORE
General:  Constitutional: appears stated age, cheerful, NAD.  Head: Normocephalic & atraumatic.  Respiratory: Breathing comfortably on room air.    Neurological (>12):  MS: Eyes open, alert, oriented to person, Follows some simple commands.    Speech/Language: Clear, grossly fluent but perseverates, repeatedly stating her name, asking provider's name and stating she had a "big seizure".     CNs: EOMI, no nystagmus, no diplopia. V1-3 intact to LT, well developed masseter muscles b/l. No gross facial asymmetry b/l, full eye closure strength b/l. Hearing grossly intact  to conversation. Gag reflex deferred. Head turning & shoulder shrug intact b/l.     Motor: Muscle bulk normal. Moving all extremities at least against gravity.     Sensation: Intact to LT throughout.     Coordination: Unable to assess due to mental status.     Gait: Not assessed.

## 2023-02-19 ENCOUNTER — TRANSCRIPTION ENCOUNTER (OUTPATIENT)
Age: 43
End: 2023-02-19

## 2023-02-19 PROCEDURE — 99232 SBSQ HOSP IP/OBS MODERATE 35: CPT | Mod: GC

## 2023-02-19 PROCEDURE — 95720 EEG PHY/QHP EA INCR W/VEEG: CPT

## 2023-02-19 RX ORDER — QUETIAPINE FUMARATE 200 MG/1
200 TABLET, FILM COATED ORAL
Refills: 0 | Status: DISCONTINUED | OUTPATIENT
Start: 2023-02-19 | End: 2023-02-19

## 2023-02-19 RX ORDER — LEVETIRACETAM 250 MG/1
1500 TABLET, FILM COATED ORAL
Refills: 0 | Status: DISCONTINUED | OUTPATIENT
Start: 2023-02-19 | End: 2023-02-20

## 2023-02-19 RX ORDER — DIVALPROEX SODIUM 500 MG/1
750 TABLET, DELAYED RELEASE ORAL ONCE
Refills: 0 | Status: COMPLETED | OUTPATIENT
Start: 2023-02-19 | End: 2023-02-19

## 2023-02-19 RX ORDER — DIVALPROEX SODIUM 500 MG/1
500 TABLET, DELAYED RELEASE ORAL
Refills: 0 | Status: DISCONTINUED | OUTPATIENT
Start: 2023-02-19 | End: 2023-02-21

## 2023-02-19 RX ORDER — QUETIAPINE FUMARATE 200 MG/1
150 TABLET, FILM COATED ORAL
Refills: 0 | Status: DISCONTINUED | OUTPATIENT
Start: 2023-02-20 | End: 2023-02-22

## 2023-02-19 RX ORDER — QUETIAPINE FUMARATE 200 MG/1
150 TABLET, FILM COATED ORAL AT BEDTIME
Refills: 0 | Status: DISCONTINUED | OUTPATIENT
Start: 2023-02-19 | End: 2023-02-20

## 2023-02-19 RX ORDER — LEVETIRACETAM 250 MG/1
1 TABLET, FILM COATED ORAL
Qty: 0 | Refills: 0 | DISCHARGE

## 2023-02-19 RX ORDER — QUETIAPINE FUMARATE 200 MG/1
1 TABLET, FILM COATED ORAL
Qty: 0 | Refills: 0 | DISCHARGE

## 2023-02-19 RX ORDER — ACETAMINOPHEN 500 MG
650 TABLET ORAL EVERY 6 HOURS
Refills: 0 | Status: DISCONTINUED | OUTPATIENT
Start: 2023-02-19 | End: 2023-03-13

## 2023-02-19 RX ORDER — DIVALPROEX SODIUM 500 MG/1
500 TABLET, DELAYED RELEASE ORAL
Refills: 0 | Status: DISCONTINUED | OUTPATIENT
Start: 2023-02-19 | End: 2023-02-19

## 2023-02-19 RX ORDER — DIVALPROEX SODIUM 500 MG/1
1 TABLET, DELAYED RELEASE ORAL
Qty: 0 | Refills: 0 | DISCHARGE

## 2023-02-19 RX ORDER — DIVALPROEX SODIUM 500 MG/1
750 TABLET, DELAYED RELEASE ORAL
Refills: 0 | Status: DISCONTINUED | OUTPATIENT
Start: 2023-02-19 | End: 2023-02-19

## 2023-02-19 RX ADMIN — Medication 650 MILLIGRAM(S): at 05:48

## 2023-02-19 RX ADMIN — Medication 325 MILLIGRAM(S): at 05:18

## 2023-02-19 RX ADMIN — Medication 50 MICROGRAM(S): at 05:19

## 2023-02-19 RX ADMIN — QUETIAPINE FUMARATE 150 MILLIGRAM(S): 200 TABLET, FILM COATED ORAL at 20:29

## 2023-02-19 RX ADMIN — DIVALPROEX SODIUM 750 MILLIGRAM(S): 500 TABLET, DELAYED RELEASE ORAL at 12:51

## 2023-02-19 RX ADMIN — DIVALPROEX SODIUM 500 MILLIGRAM(S): 500 TABLET, DELAYED RELEASE ORAL at 05:18

## 2023-02-19 RX ADMIN — Medication 650 MILLIGRAM(S): at 05:18

## 2023-02-19 RX ADMIN — DIVALPROEX SODIUM 500 MILLIGRAM(S): 500 TABLET, DELAYED RELEASE ORAL at 17:06

## 2023-02-19 RX ADMIN — LEVETIRACETAM 1500 MILLIGRAM(S): 250 TABLET, FILM COATED ORAL at 17:06

## 2023-02-19 RX ADMIN — Medication 325 MILLIGRAM(S): at 17:07

## 2023-02-19 RX ADMIN — LEVETIRACETAM 1000 MILLIGRAM(S): 250 TABLET, FILM COATED ORAL at 05:19

## 2023-02-19 NOTE — DISCHARGE NOTE PROVIDER - NSDCCAREPROVSEEN_GEN_ALL_CORE_FT
Alexandr, Kalpana Durant, Heber Andrews, Bj Bhakta, Angel Gamez, Hi Brar, Angella Candelaria, Tammy Cardenas, Liliam Flores, Yehuda Rebolledo, Tatyana Quijano, Peggy Bowers, Stephen Fishman, Benjamin

## 2023-02-19 NOTE — DISCHARGE NOTE PROVIDER - CARE PROVIDER_API CALL
Liliam Cardenas)  EEGEpilepsy; Neurology  [address], [address]  [place] [address]  Phone: [phone]  Fax: [phone]  Follow Up Time: Routine

## 2023-02-19 NOTE — PROGRESS NOTE ADULT - ATTENDING COMMENTS
Patient developmentally disabled adult who reportedly had 2 focal seizures yesterday, then GTC at 4a, brought to [facility] ED, found to have valproate level 43.  Received IV dose and was to be discharged home, but had 2nd GTC and transfer requested to [facility].    On exam, patient alert, verbal and cooperative. EOM full, face symmetrical. Motor symmetrical.  Ambulation not tested.     Plan  call placed to mother - did not connect, will try again Sunday.   vEEG ongoing - no epileptiform abnormalities.

## 2023-02-19 NOTE — DISCHARGE NOTE PROVIDER - NSDCCPCAREPLAN_GEN_ALL_CORE_FT
PRINCIPAL DISCHARGE DIAGNOSIS  Diagnosis: Seizure  Assessment and Plan of Treatment: Follow up with your Primary Care Physician within the next 1-2 weeks  Follow up with your Neurologist for routine visit  Continue medications as reconciled  Contact your Neurologist, Primary Care Provider or report to the Emergency Room if you experience new or worsening symptoms       PRINCIPAL DISCHARGE DIAGNOSIS  Diagnosis: Seizure  Assessment and Plan of Treatment: Follow up with your Primary Care Physician within the next 1-2 weeks  Continue medications as reconciled  Contact your Neurologist, Primary Care Provider or report to the Emergency Room if you experience new or worsening symptoms  Epilepsy clinic in 1 month with Dr. Cardenas or Dr. Andrews  Elective EMU admission in 4-6 weeks  Follow up with PMD regarding osteoporosis screening (was on alendronate previously while on valproic acid; alendronate stopped this admission as valproic acid was discontinued)  Outpatient physical therapy          PRINCIPAL DISCHARGE DIAGNOSIS  Diagnosis: Seizure  Assessment and Plan of Treatment: Follow up with your Primary Care Physician within the next 1-2 weeks  Continue medications as reconciled  Contact your Neurologist, Primary Care Provider or report to the Emergency Room if you experience new or worsening symptoms  Epilepsy clinic in 1 month with Dr. Cardenas  Follow up with PMD regarding osteoporosis screening (was on alendronate previously while on valproic acid; alendronate stopped this admission as valproic acid was discontinued)  Outpatient physical therapy          PRINCIPAL DISCHARGE DIAGNOSIS  Diagnosis: Seizure  Assessment and Plan of Treatment: Follow up with your Primary Care Physician within the next 1-2 weeks  Continue medications as reconciled  Contact your Neurologist, Primary Care Provider or report to the Emergency Room if you experience new or worsening symptoms  Epilepsy clinic in 1 month with Dr. Cardenas  Follow up with PMD regarding osteoporosis screening (was on alendronate previously while on valproic acid; alendronate stopped this admission as valproic acid was discontinued)

## 2023-02-19 NOTE — DISCHARGE NOTE PROVIDER - NSDCFUSCHEDAPPT_GEN_ALL_CORE_FT
[facility]  NEUROLOGY [address] [place] [address]  Scheduled Appointment: 05/05/2023

## 2023-02-19 NOTE — PROGRESS NOTE ADULT - SUBJECTIVE AND OBJECTIVE BOX
Neurology - Consult Note    -  Spectra: [phone] ([facility]), [phone] ([facility])  -    Interval events:  Overnight: transitioned IV to PO meds; placed on 1 to 1 for being spontaneous (&can sometimes wonder to other rooms) & trying to get out of bed. haldol for aggression & seroquel 100. Per med rec pt takes seroquel 200mg bid (consider restarting) please review med rec, most meds werent restarted     2/19: Restarted seroquel 150 mg BID, Increased depakote to 500 mg BID from 250 mg TID    Pt has no acute complaints. Family had some questions about medication changes.       Review of Systems:    CONSTITUTIONAL: No fevers or chills  EYES AND ENT: No visual changes or no throat pain   NECK: No pain or stiffness  RESPIRATORY: No hemoptysis or shortness of breath  NEUROLOGICAL: +As stated in HPI above  SKIN: No itching, burning, rashes, or lesions   All other review of systems is negative unless indicated above.    Allergies:  Benadryl (Hives)  Benadryl (Unknown)  penicillin (Rash)      PMHx/PSHx/Family Hx: As above, otherwise see below   Bipolar illness    Seizure    Hypothyroidism    Mentally challenged    Bipolar disorder    Hypothyroidism    Hyperlipidemia    H/O osteoporosis    History of seizure disorder    H/O sinus tachycardia        Social Hx:  No current use of tobacco, alcohol, or illicit drugs    Medications:  MEDICATIONS  (STANDING):  bismuth subsalicylate Liquid 5 milliLiter(s) Oral once  divalproex  milliGRAM(s) Oral two times a day  enoxaparin Injectable 40 milliGRAM(s) SubCutaneous every 24 hours  ferrous    sulfate 325 milliGRAM(s) Oral two times a day  levETIRAcetam 1500 milliGRAM(s) Oral two times a day  levothyroxine 50 MICROGram(s) Oral daily  QUEtiapine 150 milliGRAM(s) Oral at bedtime  QUEtiapine 200 milliGRAM(s) Oral two times a day    MEDICATIONS  (PRN):  acetaminophen     Tablet .. 650 milliGRAM(s) Oral every 6 hours PRN Moderate Pain (4 - 6), Severe Pain (7 - 10)  LORazepam   Injectable 2 milliGRAM(s) IV Push once PRN Seizure Activity      Vitals:  T(C): 36.5 (02-19-23 @ 13:56), Max: 36.8 (02-18-23 @ 17:00)  HR: 88 (02-19-23 @ 13:56) (84 - 92)  BP: 114/75 (02-19-23 @ 13:56) (106/72 - 123/80)  RR: 18 (02-19-23 @ 13:56) (18 - 18)  SpO2: 99% (02-19-23 @ 13:56) (96% - 99%)    Physical Examination:   Constitutional: appears stated age, cheerful, NAD.  Head: Normocephalic & atraumatic.  Respiratory: Breathing comfortably on room air.    Neurological (>12):  MS: Eyes open, alert, oriented to person, Follows some simple commands.    Speech/Language: Clear, grossly fluent but perseverates, repeatedly stating her name, asking provider's name and stating she had a "big seizure".     CNs: EOMI, no nystagmus, no diplopia. V1-3 intact to LT, well developed masseter muscles b/l. No gross facial asymmetry b/l, full eye closure strength b/l. Hearing grossly intact  to conversation. Gag reflex deferred. Head turning & shoulder shrug intact b/l.     Motor: Muscle bulk normal. Moving all extremities at least against gravity.     Sensation: Intact to LT throughout.     Coordination: Unable to assess due to mental status.     Gait: Not assessed.    Labs:                        13.1   10.48 )-----------( 285      ( 18 Feb 2023 13:47 )             39.3     02-18    138  |  101  |  9   ----------------------------<  85  4.1   |  22  |  0.60    Ca    9.4      18 Feb 2023 13:47  Phos  3.8     02-18  Mg     1.9     02-18    TPro  7.2  /  Alb  4.3  /  TBili  0.3  /  DBili  x   /  AST  11  /  ALT  14  /  AlkPhos  43  02-18    CAPILLARY BLOOD GLUCOSE        LIVER FUNCTIONS - ( 18 Feb 2023 13:47 )  Alb: 4.3 g/dL / Pro: 7.2 g/dL / ALK PHOS: 43 U/L / ALT: 14 U/L / AST: 11 U/L / GGT: x               CSF:                  Radiology:  CT Head No Cont:  (18 Feb 2023 05:47)    No obvious acute intracranial hemorrhage, large cortical infarct or mass   effect. If clinically indicated, short-term follow-up or MRI may be   obtained for further evaluation.    EEG 2/19:    EEG Summary:    Normal EEG in the awake, drowsy and asleep states.  Excess beta    Impression/Clinical Correlate:    This is a normal VEEG. No epileptiform pattern or seizures were recorded. The presence of excessive beta activity is most often seen in setting of sedative medication use.       Can Bowers MD  Fellow, [facility] [place]

## 2023-02-19 NOTE — EEG REPORT - NS EEG TEXT BOX
EPILEPSY MONITORING UNIT REPORT   [facility]: [address], [place] [address], Ph#: [phone] [facility]: [address], [place] [address], Ph#: [phone] Office: [address] [address] Ph#: [phone]  [facility]: [address] [place] [address], Phone [phone] [place]  Office: [address] [place] [address], Phone [phone]  Patient Name: Evelia Su   Age: 42 year, : 1980 MRN #: MR # [id number] [place]:  B Referring Physician: OS transfer – [facility]  Study Started: 2023 16:25 Study Ended: hh:mm xx/xx/xxxx              Study Information:  EEG Recording Technique: The patient underwent continuous Video-EEG monitoring, using Telemetry System hardware on the XLTek Digital System. EEG and video data were stored on a computer hard drive with important events saved in digital archive files. The material was reviewed by a physician (electroencephalographer / epileptologist) on a daily basis. Salty and seizure detection algorithms were utilized and reviewed. An EEG Technician attended to the patient, and was available throughout daytime work hours.  The epilepsy center neurologist was available in person or on call 24-hours per day.  EEG Placement and Labeling of Electrodes: The EEG was performed utilizing 20 channel referential EEG connections (coronal over temporal over parasagittal montage) using all standard 10-20 electrode placements with EKG, with additional electrodes placed in the inferior temporal region using the modified 10-10 montage electrode placements for elective admissions, or if deemed necessary. Recording was at a sampling rate of 256 samples per second per channel. Time synchronized digital video recording was done simultaneously with EEG recording. A low light infrared camera was used for low light recording.   History:  42 year-old woman with a PMH of HTN, HLD, bipolar d/o, hypothyroidism, seizure disorder (on Keppra and VPA), developmentally delayed, who initially presented to OS on  for breakthrough seizures, transferred to the EMU for further evaluation and management. Per chart review and discussion with aide at bedside, patient had 2 seizures at her group home, including an episode of generalized "tonic-clonic" shaking while seated in a recliner, lasting 2 minutes, associated with urinary incontinence. She was taken to OSH where she was reported to be postictal and received IV VPA 500mg x1 and later determined to be stable for discharge back to her group home. While awaiting transport, she had a 3rd witnessed event lasting less than 1 minute, for which she was administered ativan 1mg IVP, and decision made for transport to [facility] for continuous EEG. There was no fall or injury associated with any of these events. Her aide is unsure of when her last seizure prior to these events may have occurred.  Home Antiepileptic Medication and Device  Levetiracetam 1000 q12 Divalproex 500 q12  Interpretation:  Day 1 – 	Start: 2023  16:25   	End: 2023  08:00 	Duration: 14 hr  52 min  Daily EEG Visual Analysis  FINDINGS:  The background was continuous, symmetric, spontaneously variable and reactive. During wakefulness, only fragments of 8 Hz posterior rhythm could be discerned.  No Breach rhythms.   Background Slowing: No generalized background slowing was present.  Focal Slowing:  None were present.  Sleep Background: Drowsiness was characterized by fragmentation, attenuation, and slowing of the background activity.   Sleep was characterized by the presence of vertex waves, symmetric sleep spindles and K-complexes.  Other Non-Epileptiform Findings: Diffuse excess beta activity.  Activation Procedures:  Hyperventilation was not performed.   Photic stimulation was not performed.  Interictal Epileptiform Activity:  None were present.  Events: No events or seizures recorded.  Artifacts: Intermittent myogenic and movement artifacts were noted.  ECG: The heart rate on single channel ECG was predominantly between xx BPM.  AEDs:   EEG Summary:  Normal EEG in the awake, drowsy and asleep states. Excess beta  Impression/Clinical Correlate:  This is a normal VEEG. No epileptiform pattern or seizures were recorded. The presence of excessive beta activity is most often seen in setting of sedative medication use.    Can Bowers MD Fellow, [facility]    Bj Andrews MD, PhD Director, Epilepsy Division, [facility] ------------------------------------ EEG Reading Room: [phone] On Call Service After Hours: [phone]

## 2023-02-19 NOTE — DISCHARGE NOTE PROVIDER - NSDCMRMEDTOKEN_GEN_ALL_CORE_FT
alendronate 70 mg oral tablet: 1 tab(s) orally once a week  amantadine 100 mg oral capsule: orally once a day  Ativan 2 mg oral tablet: 1 hour prior to apointsments  atorvastatin 40 mg oral tablet: 1 tab(s) orally once a day  Calcium 600+D 600 mg-200 intl units oral tablet: 1 tab(s) orally 2 times a day  clindamycin phosphate 1 % external lotion: apply to face qd 8PM  divalproex sodium 250 mg oral tablet, extended release: 250 mEq/kg orally 3 times a day  docusate sodium 100 mg oral capsule: 2 cap(s) orally once a day  ferrous sulfate 324 mg (65 mg elemental iron) oral tablet: orally 2 times a day  Keppra: 1500 milligram(s) orally 2 times a day  levothyroxine 50 mcg (0.05 mg) oral tablet: 1 tab(s) orally once a day,   LORazepam 1 mg oral tablet: orally 2 times a day  metFORMIN 500 mg oral tablet: 2 tab(s) orally 2 times a day (before meals)  midazolam 5 mg/inh nasal spray: 1 puff(s) nasal once, As Needed for seizures &gt; 3 minutes.  MiraLax oral powder for reconstitution: 17 gram(s) orally once a day  One Tab Daily oral tablet: 1 tab(s) orally once a day  PreviDent 5000 polus toothpaste: 2 times a day  Saline Nasal Mist: 2 spray(s) in each nostril every 2 hours, As Needed  SEROquel: 150 milligram(s) orally once a day (at bedtime)  Vitamin D3 400 intl units (10 mcg) oral tablet: 2 tab(s) orally once a day   alendronate 70 mg oral tablet: 1 tab(s) orally once a week  amantadine 100 mg oral capsule: orally once a day  clindamycin phosphate 1 % external lotion: apply to face qd 8PM  docusate sodium 100 mg oral capsule: 2 cap(s) orally once a day  ferrous sulfate 324 mg (65 mg elemental iron) oral tablet: 1  orally once a day  lacosamide 150 mg oral tablet: 1 tab(s) orally 2 times a day MDD:300mg  levothyroxine 50 mcg (0.05 mg) oral tablet: 1 tab(s) orally once a day,   midazolam 5 mg/inh nasal spray: 1 puff(s) nasal once, As Needed for seizures &gt; 3 minutes. MDD:5 mg (1 spray) as a single dose in 1 nostril; may repeat same dose in 10 minutes in alternate nostril based on response and tolerability (do not repeat if the patient is having trouble breathing or excessive sedation). Maximum dose: 10 mg (2 sprays) per  MiraLax oral powder for reconstitution: 17 gram(s) orally once a day  PreviDent 5000 polus toothpaste: 2 times a day  Saline Nasal Mist: 2 spray(s) in each nostril every 2 hours, As Needed  SEROquel: 150 milligram(s) orally 2 times a day  Vitamin D3 400 intl units (10 mcg) oral tablet: 2 tab(s) orally once a day  zonisamide 100 mg oral capsule: 2 cap(s) orally 2 times a day   alendronate 70 mg oral tablet: 1 tab(s) orally once a week  amantadine 100 mg oral capsule: orally once a day  clindamycin phosphate 1 % external lotion: apply to face qd 8PM  docusate sodium 100 mg oral capsule: 2 cap(s) orally once a day  ferrous sulfate 324 mg (65 mg elemental iron) oral tablet: 1  orally once a day  lacosamide 150 mg oral tablet: 1 tab(s) orally 2 times a day MDD:300mg  levothyroxine 50 mcg (0.05 mg) oral tablet: 1 tab(s) orally once a day,   midazolam 5 mg/inh nasal spray: 1 puff(s) nasal once, As Needed for seizures &gt; 3 minutes. MDD:5 mg (1 spray) as a single dose in 1 nostril; may repeat same dose in 10 minutes in alternate nostril based on response and tolerability (do not repeat if the patient is having trouble breathing or excessive sedation). Maximum dose: 10 mg (2 sprays) per  MiraLax oral powder for reconstitution: 17 gram(s) orally once a day  PreviDent 5000 polus toothpaste: 2 times a day  QUEtiapine 100 mg oral tablet: 1 tab(s) orally 2 times a day   Saline Nasal Mist: 2 spray(s) in each nostril every 2 hours, As Needed  Vitamin D3 400 intl units (10 mcg) oral tablet: 2 tab(s) orally once a day  zonisamide 100 mg oral capsule: 2 cap(s) orally 2 times a day   alendronate 70 mg oral tablet: 1 tab(s) orally once a week  clindamycin phosphate 1 % external lotion: apply to face qd 8PM  docusate sodium 100 mg oral capsule: 2 cap(s) orally once a day  ferrous sulfate 324 mg (65 mg elemental iron) oral tablet: 1  orally once a day  lacosamide 150 mg oral tablet: 1 tab(s) orally 2 times a day MDD:300mg  levothyroxine 50 mcg (0.05 mg) oral tablet: 1 tab(s) orally once a day,   midazolam 5 mg/inh nasal spray: 1 puff(s) nasal once, As Needed for seizures &gt; 3 minutes. MDD:5 mg (1 spray) as a single dose in 1 nostril; may repeat same dose in 10 minutes in alternate nostril based on response and tolerability (do not repeat if the patient is having trouble breathing or excessive sedation). Maximum dose: 10 mg (2 sprays) per  MiraLax oral powder for reconstitution: 17 gram(s) orally once a day  PreviDent 5000 polus toothpaste: 2 times a day  QUEtiapine 100 mg oral tablet: 1 tab(s) orally 2 times a day   Saline Nasal Mist: 2 spray(s) in each nostril every 2 hours, As Needed  Vitamin D3 400 intl units (10 mcg) oral tablet: 2 tab(s) orally once a day  zonisamide 100 mg oral capsule: 2 cap(s) orally 2 times a day   clindamycin phosphate 1 % external lotion: apply to face qd 8PM  docusate sodium 100 mg oral capsule: 2 cap(s) orally once a day  ferrous sulfate 324 mg (65 mg elemental iron) oral tablet: 1  orally once a day  lacosamide 150 mg oral tablet: 1 tab(s) orally 2 times a day MDD:300mg  levothyroxine 50 mcg (0.05 mg) oral tablet: 1 tab(s) orally once a day,   midazolam 5 mg/inh nasal spray: 1 puff(s) nasal once, As Needed for seizures &gt; 3 minutes. MDD:5 mg (1 spray) as a single dose in 1 nostril; may repeat same dose in 10 minutes in alternate nostril based on response and tolerability (do not repeat if the patient is having trouble breathing or excessive sedation). Maximum dose: 10 mg (2 sprays) per  MiraLax oral powder for reconstitution: 17 gram(s) orally once a day  PreviDent 5000 polus toothpaste: 2 times a day  QUEtiapine 100 mg oral tablet: 1 tab(s) orally 2 times a day   Saline Nasal Mist: 2 spray(s) in each nostril every 2 hours, As Needed  Vitamin D3 400 intl units (10 mcg) oral tablet: 2 tab(s) orally once a day  zonisamide 100 mg oral capsule: 2 cap(s) orally 2 times a day   clindamycin phosphate 1 % external lotion: apply to face qd 8PM  docusate sodium 100 mg oral capsule: 2 cap(s) orally once a day  ferrous sulfate 324 mg (65 mg elemental iron) oral tablet: 1 tab(s) orally once a day   lacosamide 100 mg oral tablet: 1 tab(s) orally 2 times a day MDD:200  levothyroxine 50 mcg (0.05 mg) oral tablet: 1 tab(s) orally once a day,   midazolam 5 mg/inh nasal spray: 1 puff(s) nasal once, As Needed for seizures &gt; 3 minutes. MDD:5 mg (1 spray) as a single dose in 1 nostril; may repeat same dose in 10 minutes in alternate nostril based on response and tolerability (do not repeat if the patient is having trouble breathing or excessive sedation). Maximum dose: 10 mg (2 sprays) per  MiraLax oral powder for reconstitution: 17 gram(s) orally once a day  PreviDent 5000 polus toothpaste: 2 times a day  QUEtiapine 100 mg oral tablet: 1 tab(s) orally 2 times a day   QUEtiapine 50 mg oral tablet: 1 tab(s) orally 1 to 2 times a day, As Needed  -for agitation   Saline Nasal Mist: 2 spray(s) in each nostril every 2 hours, As Needed  Vitamin D3 400 intl units (10 mcg) oral tablet: 2 tab(s) orally once a day  zonisamide 100 mg oral capsule: 2 cap(s) orally 2 times a day   amantadine 100 mg oral capsule: 1 cap(s) orally once a day  clindamycin phosphate 1 % external lotion: apply to face qd 8PM  docusate sodium 100 mg oral capsule: 2 cap(s) orally once a day  ferrous sulfate 324 mg (65 mg elemental iron) oral tablet: 1 tab(s) orally once a day   lacosamide 100 mg oral tablet: 1 tab(s) orally 2 times a day MDD:200  lacosamide 150 mg oral tablet: 1 tab(s) orally 2 times a day  levothyroxine 50 mcg (0.05 mg) oral tablet: 1 tab(s) orally once a day,   midazolam 5 mg/inh nasal spray: 1 puff(s) nasal once, As Needed for seizures &gt; 3 minutes. MDD:5 mg (1 spray) as a single dose in 1 nostril; may repeat same dose in 10 minutes in alternate nostril based on response and tolerability (do not repeat if the patient is having trouble breathing or excessive sedation). Maximum dose: 10 mg (2 sprays) per  MiraLax oral powder for reconstitution: 17 gram(s) orally once a day  PreviDent 5000 polus toothpaste: 2 times a day  QUEtiapine 100 mg oral tablet: 1 tab(s) orally 2 times a day   QUEtiapine 50 mg oral tablet: 1 tab(s) orally 1 to 2 times a day, As Needed  -for agitation   Saline Nasal Mist: 2 spray(s) in each nostril every 2 hours, As Needed  Vitamin D3 400 intl units (10 mcg) oral tablet: 2 tab(s) orally once a day  zonisamide 100 mg oral capsule: 2 cap(s) orally 2 times a day   amantadine 100 mg oral capsule: 1 cap(s) orally once a day  clindamycin phosphate 1 % external lotion: apply to face qd 8PM  docusate sodium 100 mg oral capsule: 2 cap(s) orally once a day  ferrous sulfate 324 mg (65 mg elemental iron) oral tablet: 1 tab(s) orally once a day   lacosamide 150 mg oral tablet: 1 tab(s) orally 2 times a day  levothyroxine 50 mcg (0.05 mg) oral tablet: 1 tab(s) orally once a day,   midazolam 5 mg/inh nasal spray: 1 puff(s) nasal once, As Needed for seizures &gt; 3 minutes. MDD:5 mg (1 spray) as a single dose in 1 nostril; may repeat same dose in 10 minutes in alternate nostril based on response and tolerability (do not repeat if the patient is having trouble breathing or excessive sedation). Maximum dose: 10 mg (2 sprays) per  MiraLax oral powder for reconstitution: 17 gram(s) orally once a day  PreviDent 5000 polus toothpaste: 2 times a day  QUEtiapine 100 mg oral tablet: 1 tab(s) orally 2 times a day MDD:200mg  QUEtiapine 50 mg oral tablet: 1 tab(s) orally 1 to 2 times a day, As Needed  -for agitation MDD:300mg  Saline Nasal Mist: 2 spray(s) in each nostril every 2 hours, As Needed  Vitamin D3 400 intl units (10 mcg) oral tablet: 2 tab(s) orally once a day  zonisamide 100 mg oral capsule: 2 cap(s) orally 2 times a day MDD:200mg   amantadine 100 mg oral capsule: 1 cap(s) orally once a day  clindamycin phosphate 1 % external lotion: apply to face qd 8PM  docusate sodium 100 mg oral capsule: 2 cap(s) orally once a day  ferrous sulfate 324 mg (65 mg elemental iron) oral tablet: 1 tab(s) orally once a day   lacosamide 150 mg oral tablet: 1 tab(s) orally 2 times a day  lacosamide 150 mg oral tablet: 1 tab(s) orally 2 times a day MDD:300mg  levothyroxine 50 mcg (0.05 mg) oral tablet: 1 tab(s) orally once a day,   midazolam 5 mg/inh nasal spray: 1 puff(s) nasal once, As Needed for seizures &gt; 3 minutes. MDD:5 mg (1 spray) as a single dose in 1 nostril; may repeat same dose in 10 minutes in alternate nostril based on response and tolerability (do not repeat if the patient is having trouble breathing or excessive sedation). Maximum dose: 10 mg (2 sprays) per  MiraLax oral powder for reconstitution: 17 gram(s) orally once a day  PreviDent 5000 polus toothpaste: 2 times a day  QUEtiapine 100 mg oral tablet: 1 tab(s) orally 2 times a day MDD:200mg  QUEtiapine 50 mg oral tablet: 1 tab(s) orally 1 to 2 times a day, As Needed  -for agitation MDD:300mg  Saline Nasal Mist: 2 spray(s) in each nostril every 2 hours, As Needed  Vitamin D3 400 intl units (10 mcg) oral tablet: 2 tab(s) orally once a day  zonisamide 100 mg oral capsule: 2 cap(s) orally 2 times a day MDD:200mg   amantadine 100 mg oral capsule: 1 cap(s) orally once a day  clindamycin phosphate 1 % external lotion: apply to face qd 8PM  docusate sodium 100 mg oral capsule: 2 cap(s) orally once a day  ferrous sulfate 324 mg (65 mg elemental iron) oral tablet: 1 tab(s) orally once a day   lacosamide 150 mg oral tablet: 1 tab(s) orally 2 times a day  levothyroxine 50 mcg (0.05 mg) oral tablet: 1 tab(s) orally once a day,   midazolam 5 mg/inh nasal spray: 1 puff(s) nasal once, As Needed for seizures &gt; 3 minutes. MDD:5 mg (1 spray) as a single dose in 1 nostril; may repeat same dose in 10 minutes in alternate nostril based on response and tolerability (do not repeat if the patient is having trouble breathing or excessive sedation). Maximum dose: 10 mg (2 sprays) per  MiraLax oral powder for reconstitution: 17 gram(s) orally once a day  PreviDent 5000 polus toothpaste: 2 times a day  QUEtiapine 150 mg oral tablet: 1 tab(s) orally 2 times a day  QUEtiapine 50 mg oral tablet: 1 tab(s) orally 1 to 2 times a day, As Needed  -for agitation MDD:300mg  Saline Nasal Mist: 2 spray(s) in each nostril every 2 hours, As Needed  Vitamin D3 400 intl units (10 mcg) oral tablet: 2 tab(s) orally once a day  zonisamide 100 mg oral capsule: 2 cap(s) orally 2 times a day MDD:200mg

## 2023-02-19 NOTE — PROGRESS NOTE ADULT - ASSESSMENT
Ms Su is a 42 year-old woman with a PMH of HTN, HLD, bipolar d/o, hypothyroidism, seizure disorder (on Keppra and VPA), developmentally delayed, who presented to OSH on 2/18 with recurrent breakthrough seizures, transferred to the EMU on 2/18 for further monitoring and management. Exam reported to be baseline at this time. CTH from OSH unrevealing. VPA level from OSH subtherapeutic at 43, but noted to be 52.26 when corrected for albumin of 3.9. CBC/CMP wnl.     Impression: Breakthrough seizures in the setting of borderline VPA level possibly due to medication nonadherence vs inadequate dosing vs provoked by underlying infectious or toxic-metabolic etiologies.     Plan:   [] Video EEG  [] CBC, CMP, Mg, Phos, CK, lactate  [] VPA and Keppra levels  [] UA  [x] Continued home Keppra 1500mg BID and VPA 250mg TID (first doses as IV, then switch to PO)  []Depakote increased to 500 mg BID today and contining home keppra 1500 mg BID  []Restarted home seroquel 150 mg BID  [] Seizure rescue: Ativan 2mg     #Misc:   [] Telemetry monitoring; Neurochecks/VS per unit protocol  [] Seizure, fall and aspiration precautions  [] Please note: if patient has a convulsion, please document accurately the time of onset, any derangement of vital signs, length of episode, and duration of postictal period.   --> Please describe what occurred and  specifically what the patient was doing, paying attention to progression of limb involvement (upper/lower; R/L) if any, eye opening vs closure, head turn, gaze deviation, shaking of extremities, tongue bite, urinary/bowel incontinence.  [] Given concern for seizure, advise patient to not drive, operate heavy machinery, avoid heights, pools, bathtubs, locked doors until cleared by further follow up outpatient by neurology.   [] Diet: Regular (Kosher)  [] DVT ppx      Case seen and discussed with Neurology attending, Dr. Andrews.

## 2023-02-19 NOTE — DISCHARGE NOTE PROVIDER - HOSPITAL COURSE
42 year-old woman with a PMH of HTN, HLD, bipolar d/o, hypothyroidism, seizure disorder (on Keppra and VPA), developmentally delayed, who initially presented to OSH on 2/18 for breakthrough seizures, transferred to the EMU for further evaluation and management. Per chart review and discussion with aide at bedside, patient had 2 seizures at her group home, including an episode of generalized "tonic-clonic" shaking while seated in a recliner, lasting 2 minutes, associated with urinary incontinence. She was taken to OSH where she was reported to be postictal and received IV VPA 500mg x1 and later determined to be stable for discharge back to her group home. While awaiting transport, she had a 3rd witnessed event lasting less than 1 minute, for which she was administered ativan 1mg IVP, and decision made for transport to [facility] for continuous EEG. There was no fall or injury associated with any of these events. Her aide is unsure of when her last seizure prior to these events may have occurred.    Pt was admitted to the EMU and monitored on EEG. Pt was continued on home keppra 1500 mg BID and home depakote was increased to 500 mg BID and pt was continued on home seroquel. PT was agitated in the hospital and placed on a 1:1. ...............................Pt was medically optimized for discharge from the EMU.      EEG 2/19/23:  Normal EEG in the awake, drowsy and asleep states.  Excess beta    Impression/Clinical Correlate:    This is a normal VEEG. No epileptiform pattern or seizures were recorded. The presence of excessive beta activity is most often seen in setting of sedative medication use.        HPI: 42 year-old woman with a PMH of HTN, HLD, bipolar d/o, hypothyroidism, seizure disorder (on Keppra and VPA), developmentally delayed, who initially presented to OSH on 2/18 for breakthrough seizures, transferred to the EMU for further evaluation and management. Per chart review and discussion with aide at bedside, patient had 2 seizures at her group home, including an episode of generalized "tonic-clonic" shaking while seated in a recliner, lasting 2 minutes, associated with urinary incontinence. She was taken to OSH where she was reported to be postictal and received IV VPA 500mg x1 and later determined to be stable for discharge back to her group home. While awaiting transport, she had a 3rd witnessed event lasting less than 1 minute, for which she was administered ativan 1mg IVP, and decision made for transport to [facility] for continuous EEG. There was no fall or injury associated with any of these events. Her aide is unsure of when her last seizure prior to these events may have occurred. Pt was admitted to the EMU and monitored on EEG.      Objective data:      EEG REPORTS    2/19  EEG Summary:    Normal EEG in the awake, drowsy and asleep states.  Excess beta    2/20-2/23  EEG Summary:    Normal EEG in the awake, drowsy and asleep states.  Excess beta    Impression/Clinical Correlate:    This is a normal VEEG. No epileptiform pattern or seizures were recorded. The presence of excessive beta activity is most often seen in setting of sedative medication use.       2/24  EEG Summary:    Normal EEG in the awake, drowsy and asleep states.  Excess beta    Impression/Clinical Correlate:    This is a normal VEEG. No epileptiform pattern or seizures were recorded. The presence of excessive beta activity never evolves and has no clear clinical correlation, is most often seen in setting of sedative medication use. Recommend repeat EEG once off sedative medications.     2/25  AEDs:   Zonisamide 100mg BID    EEG Summary:    Normal EEG in the awake, drowsy and asleep states.  Polyspikes, focal, left posterior temporal-central region  Excess beta    Impression/Clinical Correlate:    This is an abnormal VEEG. There is evidence of potentially epileptogenic cortical irritability in the left posterior temporal-cental region. No seizures were recorded. The presence of excessive beta activity never evolves and has no clear clinical correlation, is most often seen in setting of sedative medication use but has also been described in rare cases of intellectual impairment.     Radiology:  MR Brain-Seizure, Epilepsy w/wo IV Cont (02.25.23 @ 11:07)  FINDINGS:  No acute infarction or acute intracranial hemorrhage.  The hippocampi appear unremarkable. There is no evidence for congenital   malformation/developmental anomaly.  Nonspecific subcentimeter T2/FLAIR hyperintense white matter foci are   noted within the left posterior temporal lobe (19-28).  No hydrocephalus. No extra-axial fluid collections. The skull base flow   voids are present.    The visualized intraorbital contents are normal. The imaged portions of   the paranasal sinuses are clear. The mastoid air cells are clear. The   visualized osseous structures, soft tissues and partially visualized   parotid glands appear normal.    IMPRESSION:  No acute infarct or hemorrhage.  Nonspecific subcentimeter T2/FLAIR hyperintense white matter foci in the   posterior left temporal lobe.    --  Impression:   Breakthrough seizures in the setting of borderline VPA level possibly due to medication nonadherence vs inadequate dosing.    Plan:  Zonisamide 200 mg BID  Lacosamide 100 mg BID  Quetiapine 150 mg BID  Nayzilam rescue  Follow-up outpatient Epilepsy 42 year-old woman with a PMH of HTN, HLD, bipolar d/o, hypothyroidism, seizure disorder (on Keppra and VPA), developmentally delayed, who initially presented to OSH on 2/18 for breakthrough seizures, transferred to the EMU for further evaluation and management. Per chart review and discussion with aide at bedside, patient had 2 seizures at her group home, including an episode of generalized "tonic-clonic" shaking while seated in a recliner, lasting 2 minutes, associated with urinary incontinence. She was taken to OSH where she was reported to be postictal and received IV VPA 500mg x1 and later determined to be stable for discharge back to her group home. While awaiting transport, she had a 3rd witnessed event lasting less than 1 minute, for which she was administered ativan 1mg IVP, and decision made for transport to [facility] for continuous EEG. There was no fall or injury associated with any of these events. Her aide is unsure of when her last seizure prior to these events may have occurred. Pt was admitted to the EMU and monitored on EEG.      Objective data:      EEG REPORTS    2/19  EEG Summary:    Normal EEG in the awake, drowsy and asleep states.  Excess beta    2/20-2/23  EEG Summary:    Normal EEG in the awake, drowsy and asleep states.  Excess beta    Impression/Clinical Correlate:    This is a normal VEEG. No epileptiform pattern or seizures were recorded. The presence of excessive beta activity is most often seen in setting of sedative medication use.       2/24  EEG Summary:    Normal EEG in the awake, drowsy and asleep states.  Excess beta    Impression/Clinical Correlate:    This is a normal VEEG. No epileptiform pattern or seizures were recorded. The presence of excessive beta activity never evolves and has no clear clinical correlation, is most often seen in setting of sedative medication use. Recommend repeat EEG once off sedative medications.     2/25  AEDs:   Zonisamide 100mg BID    EEG Summary:    Normal EEG in the awake, drowsy and asleep states.  Polyspikes, focal, left posterior temporal-central region  Excess beta    Impression/Clinical Correlate:    This is an abnormal VEEG. There is evidence of potentially epileptogenic cortical irritability in the left posterior temporal-cental region. No seizures were recorded. The presence of excessive beta activity never evolves and has no clear clinical correlation, is most often seen in setting of sedative medication use but has also been described in rare cases of intellectual impairment.     Radiology:  MR Brain-Seizure, Epilepsy w/wo IV Cont (02.25.23 @ 11:07)  FINDINGS:  No acute infarction or acute intracranial hemorrhage.  The hippocampi appear unremarkable. There is no evidence for congenital   malformation/developmental anomaly.  Nonspecific subcentimeter T2/FLAIR hyperintense white matter foci are   noted within the left posterior temporal lobe (19-28).  No hydrocephalus. No extra-axial fluid collections. The skull base flow   voids are present.    The visualized intraorbital contents are normal. The imaged portions of   the paranasal sinuses are clear. The mastoid air cells are clear. The   visualized osseous structures, soft tissues and partially visualized   parotid glands appear normal.    IMPRESSION:  No acute infarct or hemorrhage.  Nonspecific subcentimeter T2/FLAIR hyperintense white matter foci in the   posterior left temporal lobe.    Impression:   Breakthrough seizures in the setting of borderline VPA level possibly due to medication nonadherence vs inadequate dosing.    Plan:  Zonisamide 200 mg BID  Lacosamide 100 mg BID  Quetiapine 150 mg BID  Nayzilam rescue  Follow-up outpatient Epilepsy 42 year-old woman with a PMH of HTN, HLD, bipolar d/o, hypothyroidism, seizure disorder (on Keppra and VPA), developmentally delayed, who initially presented to OSH on 2/18 for breakthrough seizures, transferred to the EMU for further evaluation and management. Per chart review and discussion with aide at bedside, patient had 2 seizures at her group home, including an episode of generalized "tonic-clonic" shaking while seated in a recliner, lasting 2 minutes, associated with urinary incontinence. She was taken to OSH where she was reported to be postictal and received IV VPA 500mg x1 and later determined to be stable for discharge back to her group home. While awaiting transport, she had a 3rd witnessed event lasting less than 1 minute, for which she was administered ativan 1mg IVP, and decision made for transport to [facility] for continuous EEG. There was no fall or injury associated with any of these events. Her aide is unsure of when her last seizure prior to these events may have occurred. Pt was admitted to the EMU and monitored on EEG.      Objective data:      EEG REPORTS    2/19  EEG Summary:    Normal EEG in the awake, drowsy and asleep states.  Excess beta    2/20-2/23  EEG Summary:    Normal EEG in the awake, drowsy and asleep states.  Excess beta    Impression/Clinical Correlate:    This is a normal VEEG. No epileptiform pattern or seizures were recorded. The presence of excessive beta activity is most often seen in setting of sedative medication use.       2/24  EEG Summary:    Normal EEG in the awake, drowsy and asleep states.  Excess beta    Impression/Clinical Correlate:    This is a normal VEEG. No epileptiform pattern or seizures were recorded. The presence of excessive beta activity never evolves and has no clear clinical correlation, is most often seen in setting of sedative medication use. Recommend repeat EEG once off sedative medications.     2/25  AEDs:   Zonisamide 100mg BID    EEG Summary:    Normal EEG in the awake, drowsy and asleep states.  Polyspikes, focal, left posterior temporal-central region  Excess beta    Impression/Clinical Correlate:    This is an abnormal VEEG. There is evidence of potentially epileptogenic cortical irritability in the left posterior temporal-cental region. No seizures were recorded. The presence of excessive beta activity never evolves and has no clear clinical correlation, is most often seen in setting of sedative medication use but has also been described in rare cases of intellectual impairment.     Radiology:  MR Brain-Seizure, Epilepsy w/wo IV Cont (02.25.23 @ 11:07)  FINDINGS:  No acute infarction or acute intracranial hemorrhage.  The hippocampi appear unremarkable. There is no evidence for congenital   malformation/developmental anomaly.  Nonspecific subcentimeter T2/FLAIR hyperintense white matter foci are   noted within the left posterior temporal lobe (19-28).  No hydrocephalus. No extra-axial fluid collections. The skull base flow   voids are present.    The visualized intraorbital contents are normal. The imaged portions of   the paranasal sinuses are clear. The mastoid air cells are clear. The   visualized osseous structures, soft tissues and partially visualized   parotid glands appear normal.    IMPRESSION:  No acute infarct or hemorrhage.  Nonspecific subcentimeter T2/FLAIR hyperintense white matter foci in the   posterior left temporal lobe.    Impression:   Breakthrough seizures in the setting of borderline VPA level possibly due to medication nonadherence vs inadequate dosing.    Plan:  Zonisamide 200 mg BID  Lacosamide 100 mg BID  Quetiapine 100 mg BID  Nayzilam rescue  Follow-up outpatient Epilepsy 42 year-old woman with a PMH of HTN, HLD, bipolar d/o, hypothyroidism, seizure disorder (on Keppra and VPA), developmentally delayed, who initially presented to OSH on 2/18 for breakthrough seizures, transferred to the EMU for further evaluation and management. Per chart review and discussion with aide at bedside, patient had 2 seizures at her group home, including an episode of generalized "tonic-clonic" shaking while seated in a recliner, lasting 2 minutes, associated with urinary incontinence. She was taken to OSH where she was reported to be postictal and received IV VPA 500mg x1 and later determined to be stable for discharge back to her group home. While awaiting transport, she had a 3rd witnessed event lasting less than 1 minute, for which she was administered ativan 1mg IVP, and decision made for transport to [facility] for continuous EEG. There was no fall or injury associated with any of these events. Her aide is unsure of when her last seizure prior to these events may have occurred. Pt was admitted to the EMU and monitored on EEG.      Objective data:      EEG REPORTS    2/19  EEG Summary:    Normal EEG in the awake, drowsy and asleep states.  Excess beta    2/20-2/23  EEG Summary:    Normal EEG in the awake, drowsy and asleep states.  Excess beta    Impression/Clinical Correlate:    This is a normal VEEG. No epileptiform pattern or seizures were recorded. The presence of excessive beta activity is most often seen in setting of sedative medication use.       2/24  EEG Summary:    Normal EEG in the awake, drowsy and asleep states.  Excess beta    Impression/Clinical Correlate:    This is a normal VEEG. No epileptiform pattern or seizures were recorded. The presence of excessive beta activity never evolves and has no clear clinical correlation, is most often seen in setting of sedative medication use. Recommend repeat EEG once off sedative medications.     2/25  AEDs:   Zonisamide 100mg BID    EEG Summary:    Normal EEG in the awake, drowsy and asleep states.  Polyspikes, focal, left posterior temporal-central region  Excess beta    Impression/Clinical Correlate:    This is an abnormal VEEG. There is evidence of potentially epileptogenic cortical irritability in the left posterior temporal-cental region. No seizures were recorded. The presence of excessive beta activity never evolves and has no clear clinical correlation, is most often seen in setting of sedative medication use but has also been described in rare cases of intellectual impairment.       Radiology:  MR Brain-Seizure, Epilepsy w/wo IV Cont (02.25.23 @ 11:07)  FINDINGS:  No acute infarction or acute intracranial hemorrhage.  The hippocampi appear unremarkable. There is no evidence for congenital   malformation/developmental anomaly.  Nonspecific subcentimeter T2/FLAIR hyperintense white matter foci are   noted within the left posterior temporal lobe (19-28).  No hydrocephalus. No extra-axial fluid collections. The skull base flow   voids are present.    The visualized intraorbital contents are normal. The imaged portions of   the paranasal sinuses are clear. The mastoid air cells are clear. The   visualized osseous structures, soft tissues and partially visualized   parotid glands appear normal.    IMPRESSION:  No acute infarct or hemorrhage.  Nonspecific subcentimeter T2/FLAIR hyperintense white matter foci in the   posterior left temporal lobe.    Impression:   Breakthrough seizures in the setting of borderline VPA level possibly due to medication nonadherence vs inadequate dosing.    Plan:  Zonisamide 200 mg BID  Lacosamide 100 mg BID  Quetiapine 100 mg BID  Nayzilam rescue  Follow-up outpatient Epilepsy 42 year-old woman with a PMH of HTN, HLD, bipolar d/o, hypothyroidism, seizure disorder (on Keppra and VPA), developmentally delayed, who initially presented to OSH on 2/18 for breakthrough seizures, transferred to the EMU for further evaluation and management. Per chart review and discussion with aide at bedside, patient had 2 seizures at her group home, including an episode of generalized "tonic-clonic" shaking while seated in a recliner, lasting 2 minutes, associated with urinary incontinence. She was taken to OSH where she was reported to be postictal and received IV VPA 500mg x1 and later determined to be stable for discharge back to her group home. While awaiting transport, she had a 3rd witnessed event lasting less than 1 minute, for which she was administered ativan 1mg IVP, and decision made for transport to [facility] for continuous EEG. There was no fall or injury associated with any of these events. Her aide is unsure of when her last seizure prior to these events may have occurred. Pt was admitted to the EMU and monitored on EEG.      Objective data:  EEG REPORTS    2/19  EEG Summary:    Normal EEG in the awake, drowsy and asleep states.  Excess beta    2/20-2/23  EEG Summary:    Normal EEG in the awake, drowsy and asleep states.  Excess beta    Impression/Clinical Correlate:    This is a normal VEEG. No epileptiform pattern or seizures were recorded. The presence of excessive beta activity is most often seen in setting of sedative medication use.     2/24  EEG Summary:    Normal EEG in the awake, drowsy and asleep states.  Excess beta    Impression/Clinical Correlate:    This is a normal VEEG. No epileptiform pattern or seizures were recorded. The presence of excessive beta activity never evolves and has no clear clinical correlation, is most often seen in setting of sedative medication use. Recommend repeat EEG once off sedative medications.   2/25  AEDs:   Zonisamide 100mg BID    EEG Summary:    Normal EEG in the awake, drowsy and asleep states.  Polyspikes, focal, left posterior temporal-central region  Excess beta    Impression/Clinical Correlate:    This is an abnormal VEEG. There is evidence of potentially epileptogenic cortical irritability in the left posterior temporal-cental region. No seizures were recorded. The presence of excessive beta activity never evolves and has no clear clinical correlation, is most often seen in setting of sedative medication use but has also been described in rare cases of intellectual impairment.     3/2/23:   EEG Summary:    Normal EEG in the awake, drowsy and asleep states.    Background slowing, generalized, mild  Excess beta    Impression/Clinical Correlate:    This is an abnormal VEEG.  Mild diffuse/multifocal cerebral dysfunction, not specific in etiology. No seizures were recorded. The presence of excessive beta activity never evolves and has no clear clinical correlation, is most often seen in setting of sedative medication use but has also been described in rare cases of intellectual impairment.       Radiology:  MR Brain-Seizure, Epilepsy w/wo IV Cont (02.25.23 @ 11:07)  IMPRESSION:  No acute infarct or hemorrhage.  Nonspecific subcentimeter T2/FLAIR hyperintense white matter foci in the   posterior left temporal lobe.    Impression:   Breakthrough seizures in the setting of borderline VPA level possibly due to medication nonadherence vs inadequate dosing.    Plan:  Zonisamide 200 mg BID  Lacosamide 150 mg BID  Quetiapine 150 mg BID    Follow-up outpatient Epilepsy    3/10: Case discussed with Dr. Bhakta. Patient stable for discharge. 43yo woman with HTN, HLD, bipolar d/o, hypothyroidism, seizure disorder (on Keppra and VPA), developmentally delayed, who initially presented to OSH on 2/18 for breakthrough seizures, transferred to the EMU for further evaluation and management. Per chart review and discussion with aide at bedside, patient had 2 seizures at her group home, including an episode of generalized "tonic-clonic" shaking while seated in a recliner, lasting 2 minutes, associated with urinary incontinence. She was taken to OSH where she was reported to be postictal and received IV VPA 500mg x1 and later determined to be stable for discharge back to her group home. While awaiting transport, she had a 3rd witnessed event lasting less than 1 minute, for which she was administered ativan 1mg IVP, and decision made for transport to [facility] for continuous EEG. There was no fall or injury associated with any of these events. Her aide is unsure of when her last seizure prior to these events may have occurred. Pt was admitted to the EMU and monitored on EEG.  The patient was incidentally found to be COVID positive on 2/28/23 when preparing for discharge. She was placed on isolation and remained asymptomatic.     Objective data:  EEG REPORTS    2/19  EEG Summary:    Normal EEG in the awake, drowsy and asleep states.  Excess beta    2/20-2/23  EEG Summary:    Normal EEG in the awake, drowsy and asleep states.  Excess beta    Impression/Clinical Correlate:    This is a normal VEEG. No epileptiform pattern or seizures were recorded. The presence of excessive beta activity is most often seen in setting of sedative medication use.     2/24  EEG Summary:    Normal EEG in the awake, drowsy and asleep states.  Excess beta    Impression/Clinical Correlate:    This is a normal VEEG. No epileptiform pattern or seizures were recorded. The presence of excessive beta activity never evolves and has no clear clinical correlation, is most often seen in setting of sedative medication use. Recommend repeat EEG once off sedative medications.   2/25  AEDs:   Zonisamide 100mg BID    EEG Summary:    Normal EEG in the awake, drowsy and asleep states.  Polyspikes, focal, left posterior temporal-central region  Excess beta    Impression/Clinical Correlate:    This is an abnormal VEEG. There is evidence of potentially epileptogenic cortical irritability in the left posterior temporal-cental region. No seizures were recorded. The presence of excessive beta activity never evolves and has no clear clinical correlation, is most often seen in setting of sedative medication use but has also been described in rare cases of intellectual impairment.     3/2/23:   EEG Summary:    Normal EEG in the awake, drowsy and asleep states.    Background slowing, generalized, mild  Excess beta    Impression/Clinical Correlate:    This is an abnormal VEEG.  Mild diffuse/multifocal cerebral dysfunction, not specific in etiology. No seizures were recorded. The presence of excessive beta activity never evolves and has no clear clinical correlation, is most often seen in setting of sedative medication use but has also been described in rare cases of intellectual impairment.       Radiology:  MR Brain-Seizure, Epilepsy w/wo IV Cont (02.25.23 @ 11:07)  IMPRESSION:  No acute infarct or hemorrhage.  Nonspecific subcentimeter T2/FLAIR hyperintense white matter foci in the   posterior left temporal lobe.    Impression:   Breakthrough seizures in the setting of borderline VPA level possibly due to medication nonadherence vs inadequate dosing.    Plan:  Continue following medications -  Zonisamide 200mg BID  Lacosamide 150mg BID  Quetiapine 200mg BID    The patient was cleared for discharge by the Neurology attending on 3/13/23. She will follow-up with Epilepsy as an outpatient. 43yo woman with HTN, HLD, bipolar d/o, hypothyroidism, seizure disorder (on Keppra and VPA), developmentally delayed, who initially presented to OSH on 2/18 for breakthrough seizures, transferred to the EMU for further evaluation and management. Per chart review and discussion with aide at bedside, patient had 2 seizures at her group home, including an episode of generalized "tonic-clonic" shaking while seated in a recliner, lasting 2 minutes, associated with urinary incontinence. She was taken to OSH where she was reported to be postictal and received IV VPA 500mg x1 and later determined to be stable for discharge back to her group home. While awaiting transport, she had a 3rd witnessed event lasting less than 1 minute, for which she was administered ativan 1mg IVP, and decision made for transport to [facility] for continuous EEG. There was no fall or injury associated with any of these events. Her aide is unsure of when her last seizure prior to these events may have occurred. Pt was admitted to the EMU and monitored on EEG.  The patient was incidentally found to be COVID positive on 2/28/23 when preparing for discharge. She was placed on isolation and remained asymptomatic.     Objective data:  EEG REPORTS    2/19  EEG Summary:    Normal EEG in the awake, drowsy and asleep states.  Excess beta    2/20-2/23  EEG Summary:    Normal EEG in the awake, drowsy and asleep states.  Excess beta    Impression/Clinical Correlate:    This is a normal VEEG. No epileptiform pattern or seizures were recorded. The presence of excessive beta activity is most often seen in setting of sedative medication use.     2/24  EEG Summary:    Normal EEG in the awake, drowsy and asleep states.  Excess beta    Impression/Clinical Correlate:    This is a normal VEEG. No epileptiform pattern or seizures were recorded. The presence of excessive beta activity never evolves and has no clear clinical correlation, is most often seen in setting of sedative medication use. Recommend repeat EEG once off sedative medications.   2/25  AEDs:   Zonisamide 100mg BID    EEG Summary:    Normal EEG in the awake, drowsy and asleep states.  Polyspikes, focal, left posterior temporal-central region  Excess beta    Impression/Clinical Correlate:    This is an abnormal VEEG. There is evidence of potentially epileptogenic cortical irritability in the left posterior temporal-cental region. No seizures were recorded. The presence of excessive beta activity never evolves and has no clear clinical correlation, is most often seen in setting of sedative medication use but has also been described in rare cases of intellectual impairment.     3/2/23:   EEG Summary:    Normal EEG in the awake, drowsy and asleep states.    Background slowing, generalized, mild  Excess beta    Impression/Clinical Correlate:    This is an abnormal VEEG.  Mild diffuse/multifocal cerebral dysfunction, not specific in etiology. No seizures were recorded. The presence of excessive beta activity never evolves and has no clear clinical correlation, is most often seen in setting of sedative medication use but has also been described in rare cases of intellectual impairment.       Radiology:  MR Brain-Seizure, Epilepsy w/wo IV Cont (02.25.23 @ 11:07)  IMPRESSION:  No acute infarct or hemorrhage.  Nonspecific subcentimeter T2/FLAIR hyperintense white matter foci in the   posterior left temporal lobe.    Impression:   Breakthrough seizures in the setting of borderline VPA level possibly due to medication nonadherence vs inadequate dosing.    Plan:  Continue following medications -  Zonisamide 200mg BID  Lacosamide 150mg BID  Quetiapine 100mg BID with 50mg BID PRN    The patient was cleared for discharge by the Neurology attending on 3/13/23. She will follow-up with Epilepsy as an outpatient. 41yo woman with HTN, HLD, bipolar d/o, hypothyroidism, seizure disorder (on Keppra and VPA), developmentally delayed, who initially presented to OSH on 2/18 for breakthrough seizures, transferred to the EMU for further evaluation and management. Per chart review and discussion with aide at bedside, patient had 2 seizures at her group home, including an episode of generalized "tonic-clonic" shaking while seated in a recliner, lasting 2 minutes, associated with urinary incontinence. She was taken to OSH where she was reported to be postictal and received IV VPA 500mg x1 and later determined to be stable for discharge back to her group home. While awaiting transport, she had a 3rd witnessed event lasting less than 1 minute, for which she was administered ativan 1mg IVP, and decision made for transport to [facility] for continuous EEG. There was no fall or injury associated with any of these events. Her aide is unsure of when her last seizure prior to these events may have occurred. Pt was admitted to the EMU and monitored on EEG.  The patient was incidentally found to be COVID positive on 2/28/23 when preparing for discharge. She was placed on isolation and remained asymptomatic.     Objective data:  EEG REPORTS    2/19  EEG Summary:    Normal EEG in the awake, drowsy and asleep states.  Excess beta    2/20-2/23  EEG Summary:    Normal EEG in the awake, drowsy and asleep states.  Excess beta    Impression/Clinical Correlate:    This is a normal VEEG. No epileptiform pattern or seizures were recorded. The presence of excessive beta activity is most often seen in setting of sedative medication use.     2/24  EEG Summary:    Normal EEG in the awake, drowsy and asleep states.  Excess beta    Impression/Clinical Correlate:    This is a normal VEEG. No epileptiform pattern or seizures were recorded. The presence of excessive beta activity never evolves and has no clear clinical correlation, is most often seen in setting of sedative medication use. Recommend repeat EEG once off sedative medications.   2/25  AEDs:   Zonisamide 100mg BID    EEG Summary:    Normal EEG in the awake, drowsy and asleep states.  Polyspikes, focal, left posterior temporal-central region  Excess beta    Impression/Clinical Correlate:    This is an abnormal VEEG. There is evidence of potentially epileptogenic cortical irritability in the left posterior temporal-cental region. No seizures were recorded. The presence of excessive beta activity never evolves and has no clear clinical correlation, is most often seen in setting of sedative medication use but has also been described in rare cases of intellectual impairment.     3/2/23:   EEG Summary:    Normal EEG in the awake, drowsy and asleep states.    Background slowing, generalized, mild  Excess beta    Impression/Clinical Correlate:    This is an abnormal VEEG.  Mild diffuse/multifocal cerebral dysfunction, not specific in etiology. No seizures were recorded. The presence of excessive beta activity never evolves and has no clear clinical correlation, is most often seen in setting of sedative medication use but has also been described in rare cases of intellectual impairment.       Radiology:  MR Brain-Seizure, Epilepsy w/wo IV Cont (02.25.23 @ 11:07)  IMPRESSION:  No acute infarct or hemorrhage.  Nonspecific subcentimeter T2/FLAIR hyperintense white matter foci in the   posterior left temporal lobe.    Impression:   Breakthrough seizures in the setting of borderline VPA level possibly due to medication nonadherence vs inadequate dosing.    Plan:  Continue following medications -  Zonisamide 200mg BID  Lacosamide 150mg BID  Quetiapine 150mg BID with 50mg BID PRN    The patient was cleared for discharge by the Neurology attending on 3/13/23. She will follow-up with Epilepsy as an outpatient.

## 2023-02-20 LAB
ANION GAP SERPL CALC-SCNC: 15 MMOL/L — SIGNIFICANT CHANGE UP (ref 5–17)
BUN SERPL-MCNC: 9 MG/DL — SIGNIFICANT CHANGE UP (ref 7–23)
CALCIUM SERPL-MCNC: 9.5 MG/DL — SIGNIFICANT CHANGE UP (ref 8.4–10.5)
CHLORIDE SERPL-SCNC: 100 MMOL/L — SIGNIFICANT CHANGE UP (ref 96–108)
CO2 SERPL-SCNC: 23 MMOL/L — SIGNIFICANT CHANGE UP (ref 22–31)
CREAT SERPL-MCNC: 0.58 MG/DL — SIGNIFICANT CHANGE UP (ref 0.5–1.3)
EGFR: 115 ML/MIN/1.73M2 — SIGNIFICANT CHANGE UP
GLUCOSE SERPL-MCNC: 122 MG/DL — HIGH (ref 70–99)
HCT VFR BLD CALC: 42.5 % — SIGNIFICANT CHANGE UP (ref 34.5–45)
HGB BLD-MCNC: 14.1 G/DL — SIGNIFICANT CHANGE UP (ref 11.5–15.5)
MCHC RBC-ENTMCNC: 28.3 PG — SIGNIFICANT CHANGE UP (ref 27–34)
MCHC RBC-ENTMCNC: 33.2 GM/DL — SIGNIFICANT CHANGE UP (ref 32–36)
MCV RBC AUTO: 85.3 FL — SIGNIFICANT CHANGE UP (ref 80–100)
NRBC # BLD: 0 /100 WBCS — SIGNIFICANT CHANGE UP (ref 0–0)
PLATELET # BLD AUTO: 260 K/UL — SIGNIFICANT CHANGE UP (ref 150–400)
POTASSIUM SERPL-MCNC: 3.7 MMOL/L — SIGNIFICANT CHANGE UP (ref 3.5–5.3)
POTASSIUM SERPL-SCNC: 3.7 MMOL/L — SIGNIFICANT CHANGE UP (ref 3.5–5.3)
RBC # BLD: 4.98 M/UL — SIGNIFICANT CHANGE UP (ref 3.8–5.2)
RBC # FLD: 14.3 % — SIGNIFICANT CHANGE UP (ref 10.3–14.5)
SODIUM SERPL-SCNC: 138 MMOL/L — SIGNIFICANT CHANGE UP (ref 135–145)
WBC # BLD: 6.3 K/UL — SIGNIFICANT CHANGE UP (ref 3.8–10.5)
WBC # FLD AUTO: 6.3 K/UL — SIGNIFICANT CHANGE UP (ref 3.8–10.5)

## 2023-02-20 PROCEDURE — 95720 EEG PHY/QHP EA INCR W/VEEG: CPT

## 2023-02-20 PROCEDURE — 99232 SBSQ HOSP IP/OBS MODERATE 35: CPT | Mod: GC

## 2023-02-20 RX ORDER — QUETIAPINE FUMARATE 200 MG/1
150 TABLET, FILM COATED ORAL
Qty: 0 | Refills: 0 | DISCHARGE

## 2023-02-20 RX ORDER — METFORMIN HYDROCHLORIDE 850 MG/1
2 TABLET ORAL
Qty: 0 | Refills: 0 | DISCHARGE

## 2023-02-20 RX ORDER — AMANTADINE HCL 100 MG
100 CAPSULE ORAL DAILY
Refills: 0 | Status: DISCONTINUED | OUTPATIENT
Start: 2023-02-20 | End: 2023-02-22

## 2023-02-20 RX ORDER — ATORVASTATIN CALCIUM 80 MG/1
1 TABLET, FILM COATED ORAL
Qty: 0 | Refills: 0 | DISCHARGE

## 2023-02-20 RX ORDER — POLYETHYLENE GLYCOL 3350 17 G/17G
17 POWDER, FOR SOLUTION ORAL DAILY
Refills: 0 | Status: DISCONTINUED | OUTPATIENT
Start: 2023-02-20 | End: 2023-03-13

## 2023-02-20 RX ORDER — FERROUS SULFATE 325(65) MG
0 TABLET ORAL
Qty: 0 | Refills: 0 | DISCHARGE

## 2023-02-20 RX ADMIN — QUETIAPINE FUMARATE 150 MILLIGRAM(S): 200 TABLET, FILM COATED ORAL at 20:03

## 2023-02-20 RX ADMIN — Medication 325 MILLIGRAM(S): at 17:28

## 2023-02-20 RX ADMIN — Medication 0.5 MILLIGRAM(S): at 16:01

## 2023-02-20 RX ADMIN — Medication 650 MILLIGRAM(S): at 04:04

## 2023-02-20 RX ADMIN — Medication 650 MILLIGRAM(S): at 04:34

## 2023-02-20 RX ADMIN — Medication 50 MICROGRAM(S): at 05:27

## 2023-02-20 RX ADMIN — LEVETIRACETAM 1500 MILLIGRAM(S): 250 TABLET, FILM COATED ORAL at 05:27

## 2023-02-20 RX ADMIN — DIVALPROEX SODIUM 500 MILLIGRAM(S): 500 TABLET, DELAYED RELEASE ORAL at 05:27

## 2023-02-20 RX ADMIN — ENOXAPARIN SODIUM 40 MILLIGRAM(S): 100 INJECTION SUBCUTANEOUS at 05:27

## 2023-02-20 RX ADMIN — QUETIAPINE FUMARATE 150 MILLIGRAM(S): 200 TABLET, FILM COATED ORAL at 08:22

## 2023-02-20 RX ADMIN — DIVALPROEX SODIUM 500 MILLIGRAM(S): 500 TABLET, DELAYED RELEASE ORAL at 17:28

## 2023-02-20 RX ADMIN — Medication 325 MILLIGRAM(S): at 05:27

## 2023-02-20 NOTE — PROGRESS NOTE ADULT - ASSESSMENT
Ms Su is a 42 year-old woman with a PMH of HTN, HLD, bipolar d/o, hypothyroidism, seizure disorder (on Keppra and VPA), developmentally delayed, who presented to OSH on 2/18 with recurrent breakthrough seizures, transferred to the EMU on 2/18 for further monitoring and management. Exam reported to be baseline at this time. CTH from OSH unrevealing. VPA level from OSH subtherapeutic at 43, but noted to be 52.26 when corrected for albumin of 3.9. CBC/CMP wnl. Per history from family, patient had hx of meningitis age 18 mo, possible inception of seizures age 2 years (potentially earlier) with eyes rolling back and becoming less participatory. Per  Gian and family/ mother, patient may be having (once clarified) predominantly evening time events described as becoming quiet, less engaged, not talking, periodic eye blinking. No delmy tonic/ clonic activity noted.       Impression: Breakthrough seizures in the setting of borderline VPA level possibly due to medication nonadherence vs inadequate dosing. Initial goal to characterize events if epileptogenic.    Plan:   [x] Video EEG, continue and plan for extended monitoring  [x] CBC, CMP, Mg, Phos, CK, lactate  [ ] VPA and Keppra levels  [x] UA neg for infxn  [x] Discontinue home keppra, continue VPA 500mg BID    [ ] Restarted home seroquel 150 mg BID, ativan family state is 0.5mg daily.   [ ] Seizure rescue: Ativan 2mg     #Misc:   [] Telemetry monitoring; Neurochecks/VS per unit protocol  [] Seizure, fall and aspiration precautions   [] Given concern for seizure, advise patient to not drive (for 1 yr per [facility] guidelines), operate heavy machinery, avoid heights, pools, bathtubs, locked doors.  [] Diet: Regular (Kosher)  [] DVT ppx lovenox    Patient was seen on rounds with neuro attending. Recommendations above in agreement with neuro attending at time of note documentation and resident update. Delay in patient note entry due to patient care. Recommendations finalized/addended once signed by attending.

## 2023-02-20 NOTE — PROGRESS NOTE ADULT - ATTENDING COMMENTS
Patient developmentally disabled adult who reportedly had 2 focal seizures yesterday, then GTC at 4a, brought to [facility] ED, found to have valproate level 43.  Received IV dose and was to be discharged home, but had 2nd GTC and transfer requested to [facility].    Overnight EEG s/f excessive beta. No epileptiform abnormalities noted.   I discussed history with mother and sister today.  Patient has h/o meningitis in infancy.  First seizure occurred at 1yo - described at "petit mal" occuring frequently every day.  Interval history in childhood not clear - but patient was not having convulsive seizures.  It seems likely that non-convulsive seizures have not been recognized by group home or family. Mother describes frequent event of listlessness and confusion that occur frequently, and may represent postictal state.  Mother also has phone video of patient appearing lethargic, eyes closed. No automatisms. Could be ictal or post-ictal.  These events have also been occurring frequently. Mother notes that she gets patient for 4 days every month, and every visit patient will have periods of unresponsiveness and confusion, often occurring at night with patient getting up confused and agitated.  The next day paient has a seizure, similar to event leading to admission. The regularity of events occurring on monthly visits, suggests that these events frequently - every 2-4 days. She has been in group home for > 30 years. Patient has    Plan  vEEG ongoing - no epileptiform abnormalities.  DC levetiracetam.  Goal of admission is to capture target event of unreponsive period to better characterize etiology and optimize ASM selection.

## 2023-02-20 NOTE — PROGRESS NOTE ADULT - SUBJECTIVE AND OBJECTIVE BOX
Neurology Progress Note    SUBJECTIVE/OBJECTIVE/INTERVAL EVENTS: Patient seen and examined at bedside w/ neuro attending Dr Andrews, family / mother and later the  Gian. More history was clarified. Patient has had likely more frequent episodes particularly later in the day of becoming quiet, less engaged, not talking, periodic eye blinking. No delmy tonic/ clonic activity noted. Initially was suspected to be monthly but the manager Gian states this occurs more frequently. Was not recognized to be seizure like by several members and at this time is unclear if has epileptic etiology. Family state patient had meningitis at age 18 mo, seizures started potentially at age 2 or earlier. Noted eyes rolling back, becoming less responsive.     Spent considerable time reviewing patient's home medications with family. The family had several lists that appeared to not be congruent with each other including dosing/ frequency. Appears medications are potentially titrated at the group home where patient spends large part of her time and is unclear which dosing is most accurate as family, given concerns of side effects, are changing dosing/ frequency when she spends few days with them.     State patient has allergies to benadryl after which she performs odd stereotyped behavior, pencillin unknown effect.       Otherwise patient on 1:1, trying to get out of bed, being spontaneous. Seroquel was previously changed to 150mg BID.     MEDICATIONS  (STANDING):  amantadine 100 milliGRAM(s) Oral daily  bismuth subsalicylate Liquid 5 milliLiter(s) Oral once  divalproex  milliGRAM(s) Oral two times a day  enoxaparin Injectable 40 milliGRAM(s) SubCutaneous every 24 hours  ferrous    sulfate 325 milliGRAM(s) Oral two times a day  levothyroxine 50 MICROGram(s) Oral daily  LORazepam     Tablet 0.5 milliGRAM(s) Oral daily  polyethylene glycol 3350 17 Gram(s) Oral daily  QUEtiapine 150 milliGRAM(s) Oral two times a day    MEDICATIONS  (PRN):  acetaminophen     Tablet .. 650 milliGRAM(s) Oral every 6 hours PRN Moderate Pain (4 - 6), Severe Pain (7 - 10)  LORazepam   Injectable 2 milliGRAM(s) IV Push once PRN Seizure Activity    VITALS & EXAMINATION:  Vital Signs Last 24 Hrs  T(C): 37.1 (20 Feb 2023 11:01), Max: 37.1 (20 Feb 2023 11:01)  T(F): 98.7 (20 Feb 2023 11:01), Max: 98.7 (20 Feb 2023 11:01)  HR: 100 (20 Feb 2023 11:01) (82 - 100)  BP: 119/79 (20 Feb 2023 11:01) (106/65 - 134/84)  BP(mean): --  RR: 18 (20 Feb 2023 11:01) (17 - 18)  SpO2: 98% (20 Feb 2023 11:01) (95% - 99%)    Parameters below as of 20 Feb 2023 11:01  Patient On (Oxygen Delivery Method): room air     Physical Examination:   Constitutional: appears stated age, cheerful, NAD.  Head: Normocephalic & atraumatic.  Respiratory: Breathing comfortably on room air.    Neurological (>12):  MS: Eyes open, alert, oriented to person, Follows some simple commands.  Speech/Language: Clear, grossly fluent but perseverates, repeatedly stating her name, asking provider's name.  CNs: EOMI, no nystagmus, no diplopia. V1-3 intact to LT, well developed masseter muscles b/l. No gross facial asymmetry b/l, full eye closure strength b/l. Hearing grossly intact  to conversation. Gag reflex deferred. Head turning & shoulder shrug intact b/l.   Motor: Muscle bulk normal. Moving all extremities at least against gravity.   Sensation: Intact to LT throughout.   Coordination: Unable to assess due to participation.  Gait: Not assessed.    LABORATORY:  CBC                       14.1   6.30  )-----------( 260      ( 20 Feb 2023 09:47 )             42.5     Chem 02-20    138  |  100  |  9   ----------------------------<  122<H>  3.7   |  23  |  0.58    Ca    9.5      20 Feb 2023 09:47      LFTs   Coagulopathy   Lipid Panel   A1c   Cardiac enzymes     U/A   CSF  Immunological  Other    STUDIES & IMAGING: (EEG, CT, MR, U/S, TTE/MARISELA):      CT Head No Cont:  (18 Feb 2023 05:47)    No obvious acute intracranial hemorrhage, large cortical infarct or mass   effect. If clinically indicated, short-term follow-up or MRI may be   obtained for further evaluation.    EEG 2/19:    EEG Summary:    Normal EEG in the awake, drowsy and asleep states.  Excess beta    Impression/Clinical Correlate:    This is a normal VEEG. No epileptiform pattern or seizures were recorded. The presence of excessive beta activity is most often seen in setting of sedative medication use.       Can Bowers MD  Fellow, [facility]    EEG 2/20 report:  Normal EEG in the awake, drowsy and asleep states.  Excess beta

## 2023-02-20 NOTE — EEG REPORT - NS EEG TEXT BOX
Day 2 – 	Start: 2/18/2023  08:00   	End: 2/19/2023  08:00 	Duration: 24 hr  00 min  Daily EEG Visual Analysis  FINDINGS:  The background was continuous, symmetric, spontaneously variable and reactive. During wakefulness, only fragments of 8 Hz posterior rhythm could be discerned.  No Breach rhythms.   Background Slowing: No generalized background slowing was present.  Focal Slowing:  None were present.  Sleep Background: Drowsiness was characterized by fragmentation, attenuation, and slowing of the background activity.   Sleep was characterized by the presence of vertex waves, symmetric sleep spindles and K-complexes.  Other Non-Epileptiform Findings: Diffuse excess beta activity.  Activation Procedures:  Hyperventilation was not performed.   Photic stimulation was not performed.  Interictal Epileptiform Activity:  None were present.  Events: No events or seizures recorded.  Artifacts: Intermittent myogenic and movement artifacts were noted.  ECG: The heart rate on single channel ECG was predominantly between xx BPM.  AEDs:   EEG Summary:  Normal EEG in the awake, drowsy and asleep states. Excess beta  Impression/Clinical Correlate:  This is a normal VEEG. No epileptiform pattern or seizures were recorded. The presence of excessive beta activity is most often seen in setting of sedative medication use.    Can Bowers MD Fellow, [facility]    Bj Andrews MD, PhD Director, Epilepsy Division, [facility] ------------------------------------ EEG Reading Room: [phone] On Call Service After Hours: [phone]

## 2023-02-21 DIAGNOSIS — F39 UNSPECIFIED MOOD [AFFECTIVE] DISORDER: ICD-10-CM

## 2023-02-21 LAB
ANION GAP SERPL CALC-SCNC: 14 MMOL/L — SIGNIFICANT CHANGE UP (ref 5–17)
BUN SERPL-MCNC: 13 MG/DL — SIGNIFICANT CHANGE UP (ref 7–23)
CALCIUM SERPL-MCNC: 9.9 MG/DL — SIGNIFICANT CHANGE UP (ref 8.4–10.5)
CHLORIDE SERPL-SCNC: 101 MMOL/L — SIGNIFICANT CHANGE UP (ref 96–108)
CO2 SERPL-SCNC: 24 MMOL/L — SIGNIFICANT CHANGE UP (ref 22–31)
CREAT SERPL-MCNC: 0.7 MG/DL — SIGNIFICANT CHANGE UP (ref 0.5–1.3)
EGFR: 110 ML/MIN/1.73M2 — SIGNIFICANT CHANGE UP
GLUCOSE SERPL-MCNC: 100 MG/DL — HIGH (ref 70–99)
HCT VFR BLD CALC: 43.9 % — SIGNIFICANT CHANGE UP (ref 34.5–45)
HGB BLD-MCNC: 14.2 G/DL — SIGNIFICANT CHANGE UP (ref 11.5–15.5)
LEVETIRACETAM SERPL-MCNC: 23.2 UG/ML — SIGNIFICANT CHANGE UP (ref 10–40)
LEVETIRACETAM SERPL-MCNC: 50.2 UG/ML — HIGH (ref 10–40)
MCHC RBC-ENTMCNC: 28.3 PG — SIGNIFICANT CHANGE UP (ref 27–34)
MCHC RBC-ENTMCNC: 32.3 GM/DL — SIGNIFICANT CHANGE UP (ref 32–36)
MCV RBC AUTO: 87.5 FL — SIGNIFICANT CHANGE UP (ref 80–100)
NRBC # BLD: 0 /100 WBCS — SIGNIFICANT CHANGE UP (ref 0–0)
PLATELET # BLD AUTO: 269 K/UL — SIGNIFICANT CHANGE UP (ref 150–400)
POTASSIUM SERPL-MCNC: 4.7 MMOL/L — SIGNIFICANT CHANGE UP (ref 3.5–5.3)
POTASSIUM SERPL-SCNC: 4.7 MMOL/L — SIGNIFICANT CHANGE UP (ref 3.5–5.3)
RBC # BLD: 5.02 M/UL — SIGNIFICANT CHANGE UP (ref 3.8–5.2)
RBC # FLD: 14.3 % — SIGNIFICANT CHANGE UP (ref 10.3–14.5)
SODIUM SERPL-SCNC: 139 MMOL/L — SIGNIFICANT CHANGE UP (ref 135–145)
WBC # BLD: 7.19 K/UL — SIGNIFICANT CHANGE UP (ref 3.8–10.5)
WBC # FLD AUTO: 7.19 K/UL — SIGNIFICANT CHANGE UP (ref 3.8–10.5)

## 2023-02-21 PROCEDURE — 99222 1ST HOSP IP/OBS MODERATE 55: CPT

## 2023-02-21 PROCEDURE — 95720 EEG PHY/QHP EA INCR W/VEEG: CPT

## 2023-02-21 RX ORDER — DIVALPROEX SODIUM 500 MG/1
250 TABLET, DELAYED RELEASE ORAL
Refills: 0 | Status: DISCONTINUED | OUTPATIENT
Start: 2023-02-21 | End: 2023-02-22

## 2023-02-21 RX ORDER — DIVALPROEX SODIUM 500 MG/1
375 TABLET, DELAYED RELEASE ORAL
Refills: 0 | Status: DISCONTINUED | OUTPATIENT
Start: 2023-02-21 | End: 2023-02-21

## 2023-02-21 RX ADMIN — Medication 325 MILLIGRAM(S): at 05:24

## 2023-02-21 RX ADMIN — Medication 325 MILLIGRAM(S): at 18:33

## 2023-02-21 RX ADMIN — Medication 0.5 MILLIGRAM(S): at 11:25

## 2023-02-21 RX ADMIN — QUETIAPINE FUMARATE 150 MILLIGRAM(S): 200 TABLET, FILM COATED ORAL at 07:18

## 2023-02-21 RX ADMIN — DIVALPROEX SODIUM 250 MILLIGRAM(S): 500 TABLET, DELAYED RELEASE ORAL at 18:33

## 2023-02-21 RX ADMIN — QUETIAPINE FUMARATE 150 MILLIGRAM(S): 200 TABLET, FILM COATED ORAL at 18:34

## 2023-02-21 RX ADMIN — Medication 100 MILLIGRAM(S): at 11:24

## 2023-02-21 RX ADMIN — ENOXAPARIN SODIUM 40 MILLIGRAM(S): 100 INJECTION SUBCUTANEOUS at 05:24

## 2023-02-21 RX ADMIN — Medication 50 MICROGRAM(S): at 05:24

## 2023-02-21 RX ADMIN — DIVALPROEX SODIUM 500 MILLIGRAM(S): 500 TABLET, DELAYED RELEASE ORAL at 05:24

## 2023-02-21 NOTE — BH CONSULTATION LIAISON ASSESSMENT NOTE - RISK ASSESSMENT
Risk factors:  Protective factors: Risk factors: PPHx, current symptoms, previous episodes of agitation  Protective factors: no hx SI/SA, family support, residential stability Risk factors: PPHx, current symptoms, previous episodes of agitation  Protective factors: no hx SI/SA, family support elevated risk 2/2 acute/chronic cognitive impairments, no e/o suicidality or homicidality at this time, does not meet criteria for psychiatric admission

## 2023-02-21 NOTE — BH CONSULTATION LIAISON ASSESSMENT NOTE - VIOLENCE PROTECTIVE FACTORS:
Residential stability/Relationship stability/Sobriety Relationship stability/Sobriety Residential stability/Sobriety/Engagement in treatment

## 2023-02-21 NOTE — EEG REPORT - NS EEG TEXT BOX
Day 3 – 	Start: 2/20/2023  08:00    	End: 2/21/2023  08:00  	Duration: 24 hr  00 min    Daily EEG Visual Analysis    FINDINGS:  The background was continuous, symmetric, spontaneously variable and reactive. During wakefulness, only fragments of 9 Hz posterior rhythm could be discerned.  No Breach rhythms. Excess diffuse beta noted.     Background Slowing:  No generalized background slowing was present.    Focal Slowing:   None were present.    Sleep Background:  Drowsiness was characterized by fragmentation, attenuation, and slowing of the background activity.    Sleep was characterized by the presence of vertex waves, symmetric sleep spindles and K-complexes.    Other Non-Epileptiform Findings:  Diffuse excess beta activity.    Activation Procedures:   Hyperventilation was not performed.    Photic stimulation was not performed.    Interictal Epileptiform Activity:   None were present.    Events:  No events or seizures recorded.    Artifacts:  Intermittent myogenic and movement artifacts were noted.    ECG:  The heart rate on single channel ECG was predominantly between  BPM.    AEDs:     EEG Summary:    Normal EEG in the awake, drowsy and asleep states.  Excess beta    Impression/Clinical Correlate:    This is a normal VEEG. No epileptiform pattern or seizures were recorded. The presence of excessive beta activity is most often seen in setting of sedative medication use.       This is a prelim report only, pending review with attending prior to finalization.     Stephen Bowers MD     Fellow, [facility]     ------------------------------------     EEG Reading Room: [phone]     On Call Service After Hours: [phone]  Day 3 – 	Start: 2/20/2023  08:00    	End: 2/21/2023  08:00  	Duration: 24 hr  00 min    Daily EEG Visual Analysis    FINDINGS:  The background was continuous, symmetric, spontaneously variable and reactive. During wakefulness, only fragments of 9 Hz posterior rhythm could be discerned.  No Breach rhythms. Excess diffuse beta noted.     Background Slowing:  No generalized background slowing was present.    Focal Slowing:   None were present.    Sleep Background:  Drowsiness was characterized by fragmentation, attenuation, and slowing of the background activity.    Sleep was characterized by the presence of vertex waves, symmetric sleep spindles and K-complexes.    Other Non-Epileptiform Findings:  Diffuse excess beta activity.    Activation Procedures:   Hyperventilation was not performed.    Photic stimulation was not performed.    Interictal Epileptiform Activity:   None were present.    Events:  No events or seizures recorded.    Artifacts:  Intermittent myogenic and movement artifacts were noted.    ECG:  The heart rate on single channel ECG was predominantly between  BPM.    AEDs:     EEG Summary:    Normal EEG in the awake, drowsy and asleep states.  Excess beta    Impression/Clinical Correlate:    This is a normal VEEG. No epileptiform pattern or seizures were recorded. The presence of excessive beta activity is most often seen in setting of sedative medication use.         Stephen Bowers MD     Fellow, [facility]     Angel Bhakta MD  Neurology Attending Physician      ------------------------------------     EEG Reading Room: [phone]     On Call Service After Hours: [phone]

## 2023-02-21 NOTE — BH CONSULTATION LIAISON ASSESSMENT NOTE - NSBHREFERLPTEAMNAME_PSY_A_CORE_FT
Yes CT lumbar shows T12 compression fracture with 5 mm retropulsion. Discussed results with Dr Eid (ortho). Dr Eid recommends admission with observation and will come to do a consult. Patient will be admitted to medicine. Dr Calloway made aware about plan. On re-exam, no neuro deficits. Ambulatory without assistance. The scribe's documentation has been prepared under my direction and personally reviewed by me in its entirety. I confirm that the note above actually reflects all work, treatment, procedures, and medical decision-making performed by me - PETER Richey SHELLEY Joe

## 2023-02-21 NOTE — BH CONSULTATION LIAISON ASSESSMENT NOTE - NSBHCHARTREVIEWVS_PSY_A_CORE FT
Vital Signs Last 24 Hrs  T(C): 37.1 (21 Feb 2023 08:20), Max: 37.1 (21 Feb 2023 08:20)  T(F): 98.7 (21 Feb 2023 08:20), Max: 98.7 (21 Feb 2023 08:20)  HR: 97 (21 Feb 2023 08:20) (78 - 99)  BP: 137/83 (21 Feb 2023 08:20) (117/72 - 137/83)  BP(mean): --  RR: 18 (21 Feb 2023 08:20) (18 - 18)  SpO2: 98% (21 Feb 2023 08:20) (93% - 98%)    Parameters below as of 21 Feb 2023 08:20  Patient On (Oxygen Delivery Method): room air

## 2023-02-21 NOTE — BH CONSULTATION LIAISON ASSESSMENT NOTE - HPI (INCLUDE ILLNESS QUALITY, SEVERITY, DURATION, TIMING, CONTEXT, MODIFYING FACTORS, ASSOCIATED SIGNS AND SYMPTOMS)
43yo single F, domiciled in a group home [facility]" and able to see her mother 2x/month for 4 days each, disabled and unemployed, PPHx bipolar disorder, psych medications include Seroquel 150mg BID, Ativan 1mg QD, and Depakote 500mg BID and 250mg QD, no past psychiatric hospitalizations, psychiatrist Dr. Heredia seen every 2-3 months, last visit 2-3 weeks ago, no hx SI/SA, no current substance use, PMHx significant for HTN, HLD, hypothyroidism, seizure disorder (on Keppra and VPA), developmentally delayed, admitted for breakthrough seizures, psych consulted for bipolar and agitation medication management.     Patient interviewed at bedside under 1:1 and accompanied by her mother, Lizbeth, and her sister. Patient mother states patient is generally a happy individual who is gentle and nurturing. Mother states patient is treated outpatient with Seroquel 150mg BID and Ativan 1mg QD for behavioral hyperactivity. Mother requested psychiatrist to lower Seroquel and Ativan dosages because she believes these medications may be triggering her seizures. Mother states Seroquel was lowered from 200 BID to 150mg BID 3 weeks ago by OP psychiatrist and would like to lower Seroquel to 100mg BID if possible. Mother states Ativan 1mg QD was lowered to 0.5mg QD by primary team. Per nurse Sheryl, patient mother previously lowered patient's Depakote dosage and frequency prior to patient's onset of breakthrough seizures. Sheryl mentions patient had an episode of agitation and chased after a nurse on during this admission on 2/18/23. Patient denies feeling depressed, worried, visual or auditory hallucinations. Per mother, no history of grandiosity, flight of ideas, HI, no weapons at group home or mother's home.  43yo single F, domiciled in a group home [facility]" and able to see her mother 2x/month for 4 days each, disabled and unemployed, PPHx bipolar disorder, psych medications include Seroquel 150mg BID, Ativan 1mg QD, and Depakote 500mg BID and 250mg QD, no past psychiatric hospitalizations, psychiatrist Dr. Heredia seen every 2-3 months, last visit 2-3 weeks ago, no hx SI/SA, no current substance use, PMHx significant for HTN, HLD, hypothyroidism, seizure disorder (on Keppra and VPA), developmentally delayed, admitted for breakthrough seizures, psych consulted for bipolar and agitation medication management.     Patient interviewed at bedside under 1:1 and accompanied by her mother, Lizbeth, and her sister. Patient mother states patient is generally a happy individual who is gentle and nurturing. Mother states patient is treated outpatient with Seroquel 150mg BID and Ativan 1mg QD for behavioral hyperactivity. Mother requested psychiatrist to lower Seroquel and Ativan dosages because she noticed recent increased lethargy and decreased responsiveness after medication doses in the past 6 months. Mother states Seroquel was lowered from 200 BID to 150mg BID 3 weeks ago by OP psychiatrist and would like to lower Seroquel to 100mg BID if possible. Mother states Ativan 1mg QD was lowered to 0.5mg QD by primary team. Patient denies feeling depressed, worried, difficulty with sleep or appetite, visual or auditory hallucinations. Per mother, no history of grandiosity, flight of ideas, SI, SA, HI, no weapons at group home or mother's home. Patient mother and sister unable to provide answer regarding patient safety in the group home. States patient recently moved facilities due to renovations and now lives together with other group home members in a big room and has her bed and bathroom in a public hallway. Patient recently came home asking "am I in trouble?" and when asked if she feels safe in her group home, patient replies, "I don't want to talk about it."    Per nurse Sheryl, patient mother previously lowered patient's Depakote dosage and frequency prior to patient's onset of breakthrough seizures. Sheryl mentions patient had an episode of agitation and chased after a nurse on during this admission on 2/18/23.  42F single, noncaregiver, domiciled in group home, disabled, with PPHx intellectual disability and mood disorder maintained on Seroquel/Ativan per OP psychiatrist, no prior SA or psychiatric admissions, sees psychiatrist Dr. Heredia every 2-3 months, last visit 2-3 weeks ago, no h/o substance use, with PMH significant for HTN, HLD, bipolar d/o, hypothyroidism, seizure disorder (on Keppra and VPA), developmentally delayed, who initially presented to OSH on 2/18 for breakthrough seizures, transferred to the EMU for further evaluation and management, psychiatry consulted for agitation and medication management.     Patient interviewed at bedside accompanied by her mother, Lizbeth, and her sister. Patient limited historian, seen sitting watching "Elbert" on Youtube, engages in a childlike and friendly manner.  Pt denies feeling depressed, worried, or having difficulty with sleep or appetite.  Denies visual or auditory hallucinations.  Presently calm, no acute behavioral issues noted.    Collateral obtained from mother and sister at bedside: Per mother, pt has no history of grandiosity, flight of ideas, SI, SA, or HI.  Denies weapons at group home or mother's home.  States patient recently moved facilities due to renovations and now lives together with other group home members in a big room and has her bed and bathroom in a public hallway. Patient's mother states patient is generally a happy individual who is gentle and nurturing. Mother states patient is treated as outpatient with Seroquel 150mg BID and Ativan 1mg QD for behavioral "hyperactivity," but expresses concern that pt has appeared obtunded after her medications or possibly experiencing seizures.  States she expressed these concerns to OP psychiatrist who lowered the pt's doses. States these issues have been present over the past 6 months. Mother states Seroquel was lowered from 200 BID to 150mg BID 3 weeks ago by OP.

## 2023-02-21 NOTE — BH CONSULTATION LIAISON ASSESSMENT NOTE - NSBHCHARTREVIEWLAB_PSY_A_CORE FT
14.2   7.19  )-----------( 269      ( 21 Feb 2023 09:35 )             43.9     02-21    139  |  101  |  13  ----------------------------<  100<H>  4.7   |  24  |  0.70    Ca    9.9      21 Feb 2023 09:35

## 2023-02-21 NOTE — BH CONSULTATION LIAISON ASSESSMENT NOTE - SUMMARY
43yo single F, domiciled in a group home [facility]" and able to see her mother 2x/month for 4 days each, disabled and unemployed, PPHx bipolar disorder, psych medications include Seroquel 150mg BID, Ativan 1mg QD, and Depakote 500mg BID and 250mg QD, no past psychiatric hospitalizations, psychiatrist Dr. Heredia seen every 2-3 months, last visit 2-3 weeks ago, no hx SI/SA, no current substance use, PMHx significant for HTN, HLD, hypothyroidism, seizure disorder (on Keppra and VPA), developmentally delayed, admitted for breakthrough seizures, psych consulted for bipolar and agitation medication management.     Patient interviewed at bedside under 1:1 and accompanied by her mother, Lizbeth, and her sister. Patient mother states patient is generally a happy individual who is gentle and nurturing. Mother states patient is treated outpatient with Seroquel 150mg BID and Ativan 1mg QD for behavioral hyperactivity. Mother requested psychiatrist to lower Seroquel and Ativan dosages because she believes these medications may be triggering her seizures. Mother states Seroquel was lowered from 200 BID to 150mg BID 3 weeks ago by OP psychiatrist and would like to lower Seroquel to 100mg BID if possible. Mother states Ativan 1mg QD was lowered to 0.5mg QD by primary team. Per nurse Sheryl, patient mother previously lowered patient's Depakote dosage and frequency prior to patient's onset of breakthrough seizures. Sheryl mentions patient had an episode of agitation and chased after a nurse on during this admission on 2/18/23. Patient denies feeling depressed, worried, visual or auditory hallucinations. Per mother, no history of grandiosity, flight of ideas, HI, no weapons at group home or mother's home.     Plan  - 41yo single F, domiciled in a group home [facility]" and able to see her mother 2x/month for 4 days each, disabled and unemployed, PPHx bipolar disorder, psych medications include Seroquel 150mg BID, Ativan 1mg QD, and Depakote 500mg BID and 250mg QD, no past psychiatric hospitalizations, psychiatrist Dr. Heredia seen every 2-3 months, last visit 2-3 weeks ago, no hx SI/SA, no current substance use, PMHx significant for HTN, HLD, hypothyroidism, seizure disorder (on Keppra and VPA), developmentally delayed, admitted for breakthrough seizures, psych consulted for bipolar and agitation medication management.     Patient interviewed at bedside under 1:1 and accompanied by her mother, Lizbeth, and her sister. Patient mother states patient is generally a happy individual who is gentle and nurturing. Mother states patient is treated outpatient with Seroquel 150mg BID and Ativan 1mg QD for behavioral hyperactivity. Mother requested psychiatrist to lower Seroquel and Ativan dosages because she noticed recent increased lethargy and decreased responsiveness after medication doses in the past 6 months. Mother states Seroquel was lowered from 200 BID to 150mg BID 3 weeks ago by OP psychiatrist and would like to lower Seroquel to 100mg BID if possible. Mother states Ativan 1mg QD was lowered to 0.5mg QD by primary team. Patient denies feeling depressed, worried, difficulty with sleep or appetite, visual or auditory hallucinations. Per mother, no history of grandiosity, flight of ideas, SI, SA, HI, no weapons at group home or mother's home. Patient mother and sister unable to provide answer regarding patient safety in the group home. States patient recently moved facilities due to renovations and now lives together with other group home members in a big room and has her bed and bathroom in a public hallway. Patient recently came home asking "am I in trouble?" and when asked if she feels safe in her group home, patient replies, "I don't want to talk about it."    Per nurse Sheryl, patient mother previously lowered patient's Depakote dosage and frequency prior to patient's onset of breakthrough seizures. Sheryl mentions patient had an episode of agitation and chased after a nurse on during this admission on 2/18/23.     Plan  -Maintain current medication dosage of Seroquel 150mg PO BID and Ativan 0.5mg PO QD and monitor patient mental status  -Recommend 1 dose of Ativan 0.5mg PO PRN for agitation  -Recommend follow up with outpatient psychiatrist who specializes in care for developmentally disabled patients 42F single, noncaregiver, domiciled in group home, disabled, with PPHx intellectual disability and mood disorder maintained on Seroquel/Ativan per OP psychiatrist, no prior SA or psychiatric admissions, sees psychiatrist Dr. Heredia every 2-3 months, last visit 2-3 weeks ago, no h/o substance use, with PMH significant for HTN, HLD, bipolar d/o, hypothyroidism, seizure disorder (on Keppra and VPA), developmentally delayed, who initially presented to OSH on 2/18 for breakthrough seizures, transferred to the EMU for further evaluation and management, psychiatry consulted for agitation and medication management.

## 2023-02-21 NOTE — BH CONSULTATION LIAISON ASSESSMENT NOTE - NSBHATTESTCOMMENTATTENDFT_PSY_A_CORE
42F single, disabled, noncaregiver, living in group home for persons with developmental disabilities, with PPHx intellectual disability and bipolar disorder, no prior SA or psych admissions, follows with OP psychiatrist at her facility, no prior SA or psych admissions, no active substance abuse, with PMH significant for HTN, HLD, hypothyroidism, seizure disorder (on Keppra and VPA), initially presented to OSH on 2/18 for breakthrough seizures, transferred to the EMU for further evaluation and management, psychiatry consulted for med management.  On interview pt is calm and cooperative, albeit limited historian, disorganized.  Denies depression or anxiety, states she enjoys watching "Elbert."  Family at bedside providing collateral; states pt has appeared obtunded after being given her medications over the past 6 mos; expressing concerns that pt has been overmedicated and taking the same meds for >20 years, requesting adjustment.  States OP psychiatrist reduced Seroquel by 100mg/day as well as Ativan, deny pt has been agitated although has chronically poor frustration tolerance at baseline in s/o ID.  Mother reports pt is now off of Keppra with reduced VPA, and does not seem obtunded after Seroquel.  Dx: ID, mood d/o.  Recs: C/w current dose Seroquel 150mg PO BID and Ativan 0.5mg PO daily.  PRN: May use Ativan PRN as above, but family would like to be notified if being given.  Agree with trainee's assessment and plan as above.

## 2023-02-21 NOTE — PROGRESS NOTE ADULT - ASSESSMENT
Ms Su is a 42 year-old woman with a PMH of HTN, HLD, bipolar d/o, hypothyroidism, seizure disorder (on Keppra and VPA), developmentally delayed, who presented to OSH on 2/18 with recurrent breakthrough seizures, transferred to the EMU on 2/18 for further monitoring and management. Exam reported to be baseline at this time. CTH from OSH unrevealing. VPA level from OSH subtherapeutic at 43, but noted to be 52.26 when corrected for albumin of 3.9. CBC/CMP wnl. Per history from family, patient had hx of meningitis age 18 mo, possible inception of seizures age 2 years (potentially earlier) with eyes rolling back and becoming less participatory. Per  Gian and family/ mother, patient may be having (once clarified) predominantly evening time events described as becoming quiet, less engaged, not talking, periodic eye blinking. No delmy tonic/ clonic activity noted.       Impression: Breakthrough seizures in the setting of borderline VPA level possibly due to medication nonadherence vs inadequate dosing. Initial goal to characterize events if epileptogenic.    Plan:   [x] Video EEG, continue and plan for extended monitoring  [x] CBC, CMP, Mg, Phos, CK, lactate  [ ] VPA and Keppra levels  [x] UA neg for infxn  [x] Discontinue home keppra, continue VPA 500mg BID    [ ] Restarted home seroquel 150 mg BID, ativan family state is 0.5mg daily.   [ ] Seizure rescue: Ativan 2mg     #Misc:   [] Telemetry monitoring; Neurochecks/VS per unit protocol  [] Seizure, fall and aspiration precautions   [] Given concern for seizure, advise patient to not drive (for 1 yr per [facility] guidelines), operate heavy machinery, avoid heights, pools, bathtubs, locked doors.  [] Diet: Regular (Kosher)  [] DVT ppx lovenox    Patient was seen on rounds with neuro attending. Recommendations above in agreement with neuro attending at time of note documentation and resident update. Delay in patient note entry due to patient care. Recommendations finalized/addended once signed by attending.  Ms Su is a 42 year-old woman with a PMH of HTN, HLD, bipolar d/o, hypothyroidism, seizure disorder (on Keppra and VPA), developmentally delayed, who presented to OSH on 2/18 with recurrent breakthrough seizures, transferred to the EMU on 2/18 for further monitoring and management. Exam reported to be baseline at this time. CTH from OSH unrevealing. VPA level from OSH subtherapeutic at 43, but noted to be 52.26 when corrected for albumin of 3.9. CBC/CMP wnl. Per history from family, patient had hx of meningitis age 18 mo, possible inception of seizures age 2 years (potentially earlier) with eyes rolling back and becoming less participatory. Per  Gian and family/ mother, patient may be having (once clarified) predominantly evening time events described as becoming quiet, less engaged, not talking, periodic eye blinking. No delmy tonic/ clonic activity noted.       Impression: Breakthrough seizures in the setting of borderline VPA level possibly due to medication nonadherence vs inadequate dosing. Initial goal to characterize events if epileptogenic.    Plan:   [x] Video EEG, continue and plan for extended monitoring  [x] CBC, CMP, Mg, Phos, CK, lactate  [x] VPA and Keppra levels  [x] UA neg for infxn  [x] Discontinue home keppra,  [] decrease VPA to 250mg BID. F/u VPA level in AM   [ ] C/w home seroquel 150 mg BID and ativan 0.5mg daily  [] psych consult for agitation   [ ] Seizure rescue: Ativan 2mg     #Misc:   [] Telemetry monitoring; Neurochecks/VS per unit protocol  [] Seizure, fall and aspiration precautions   [] Given concern for seizure, advise patient to not drive (for 1 yr per [facility] guidelines), operate heavy machinery, avoid heights, pools, bathtubs, locked doors.  [] Diet: Regular (Kosher)  [] DVT ppx lovenox    Patient was seen on rounds with neuro attending. Recommendations above in agreement with neuro attending at time of note documentation and resident update. Delay in patient note entry due to patient care. Recommendations finalized/addended once signed by attending.

## 2023-02-21 NOTE — BH CONSULTATION LIAISON ASSESSMENT NOTE - NSSUICPROTFACT_PSY_ALL_CORE
Supportive social network of family or friends Supportive social network of family or friends/Positive therapeutic relationships/Yazidi beliefs

## 2023-02-21 NOTE — BH CONSULTATION LIAISON ASSESSMENT NOTE - OTHER
Group home at [facility] Lives in a group home, able to see mother 2x/month for 4 days each Past medical history of developmental delay Lives in a group home, able to stay with mother 2x/month for 4 days each [facility] [address] [facility] ,group [place]

## 2023-02-21 NOTE — PROGRESS NOTE ADULT - SUBJECTIVE AND OBJECTIVE BOX
THE PATIENT WAS SEEN AND EXAMINED BY ME WITH THE HOUSESTAFF DURING MORNING ROUNDS.   HPI:  42 year-old woman with a PMH of HTN, HLD, bipolar d/o, hypothyroidism, seizure disorder (on Keppra and VPA), developmentally delayed, who initially presented to OSH on 2/18 for breakthrough seizures, transferred to the EMU for further evaluation and management. Per chart review and discussion with aide at bedside, patient had 2 seizures at her group home, including an episode of generalized "tonic-clonic" shaking while seated in a recliner, lasting 2 minutes, associated with urinary incontinence. She was taken to OSH where she was reported to be postictal and received IV VPA 500mg x1 and later determined to be stable for discharge back to her group home. While awaiting transport, she had a 3rd witnessed event lasting less than 1 minute, for which she was administered ativan 1mg IVP, and decision made for transport to [facility] for continuous EEG. There was no fall or injury associated with any of these events. Her aide is unsure of when her last seizure prior to these events may have occurred.     ROS: Otherwise negative.     SUBJECTIVE: No events overnight.  No new neurologic complaints.      acetaminophen     Tablet .. 650 milliGRAM(s) Oral every 6 hours PRN  amantadine 100 milliGRAM(s) Oral daily  bismuth subsalicylate Liquid 5 milliLiter(s) Oral once  divalproex  milliGRAM(s) Oral two times a day  enoxaparin Injectable 40 milliGRAM(s) SubCutaneous every 24 hours  ferrous    sulfate 325 milliGRAM(s) Oral two times a day  levothyroxine 50 MICROGram(s) Oral daily  LORazepam     Tablet 0.5 milliGRAM(s) Oral daily  LORazepam   Injectable 2 milliGRAM(s) IV Push once PRN  polyethylene glycol 3350 17 Gram(s) Oral daily  QUEtiapine 150 milliGRAM(s) Oral two times a day    Physical Examination:   Constitutional: appears stated age, cheerful, NAD.  Head: Normocephalic & atraumatic.  Respiratory: Breathing comfortably on room air.    Neurological (>12):  MS: Eyes open, alert, oriented to person, Follows some simple commands.  Speech/Language: Clear, grossly fluent but perseverates, repeatedly stating her name, asking provider's name.  CNs: EOMI, no nystagmus, no diplopia. V1-3 intact to LT, well developed masseter muscles b/l. No gross facial asymmetry b/l, full eye closure strength b/l. Hearing grossly intact  to conversation. Gag reflex deferred. Head turning & shoulder shrug intact b/l.   Motor: Muscle bulk normal. Moving all extremities at least against gravity.   Sensation: Intact to LT throughout.   Coordination: Unable to assess due to participation.  Gait: Not assessed.      LABS:                        14.1   6.30  )-----------( 260      ( 20 Feb 2023 09:47 )             42.5    02-20    138  |  100  |  9   ----------------------------<  122<H>  3.7   |  23  |  0.58    Ca    9.5      20 Feb 2023 09:47         COVID-19 PCR: NotDetec (18 Feb 2023 08:06)      IMAGING/ EEG:     CT Head No Cont (02.18.23  No obvious acute intracranial hemorrhage, large cortical infarct or mass   effect. If clinically indicated, short-term follow-up or MRI may be   obtained for further evaluation.    EEG report 2/19/2023  EEG Summary:  Normal EEG in the awake, drowsy and asleep states.  Excess beta  Impression/Clinical Correlate:  This is a normal VEEG. No epileptiform pattern or seizures were recorded. The presence of excessive beta activity is most often seen in setting of sedative medication use.     EEG report 2/20/2023:  EEG Summary:  Normal EEG in the awake, drowsy and asleep states.  2.	Excess beta  Impression/Clinical Correlate:  This is a normal VEEG. No epileptiform pattern or seizures were recorded. The presence of excessive beta activity is most often seen in setting of sedative medication use.      THE PATIENT WAS SEEN AND EXAMINED BY ME WITH THE HOUSESTAFF DURING MORNING ROUNDS.   HPI:  42 year-old woman with a PMH of HTN, HLD, bipolar d/o, hypothyroidism, seizure disorder (on Keppra and VPA), developmentally delayed, who initially presented to OSH on 2/18 for breakthrough seizures, transferred to the EMU for further evaluation and management. Per chart review and discussion with aide at bedside, patient had 2 seizures at her group home, including an episode of generalized "tonic-clonic" shaking while seated in a recliner, lasting 2 minutes, associated with urinary incontinence. She was taken to OSH where she was reported to be postictal and received IV VPA 500mg x1 and later determined to be stable for discharge back to her group home. While awaiting transport, she had a 3rd witnessed event lasting less than 1 minute, for which she was administered ativan 1mg IVP, and decision made for transport to [facility] for continuous EEG. There was no fall or injury associated with any of these events. Her aide is unsure of when her last seizure prior to these events may have occurred.     SUBJECTIVE: No events overnight.  No new neurologic complaints. ROS: Otherwise negative.     acetaminophen     Tablet .. 650 milliGRAM(s) Oral every 6 hours PRN  amantadine 100 milliGRAM(s) Oral daily  bismuth subsalicylate Liquid 5 milliLiter(s) Oral once  divalproex  milliGRAM(s) Oral two times a day  enoxaparin Injectable 40 milliGRAM(s) SubCutaneous every 24 hours  ferrous    sulfate 325 milliGRAM(s) Oral two times a day  levothyroxine 50 MICROGram(s) Oral daily  LORazepam     Tablet 0.5 milliGRAM(s) Oral daily  LORazepam   Injectable 2 milliGRAM(s) IV Push once PRN  polyethylene glycol 3350 17 Gram(s) Oral daily  QUEtiapine 150 milliGRAM(s) Oral two times a day    Physical Examination:   Constitutional: appears stated age, cheerful, NAD.  Head: Normocephalic & atraumatic.  Respiratory: Breathing comfortably on room air.    Neurological (>12):  MS: Eyes open, alert, oriented to person, Follows some simple commands.  Speech/Language: Clear, grossly fluent but perseverates, repeatedly stating her name, asking provider's name.  CNs: EOMI, no nystagmus, no diplopia. V1-3 intact to LT, well developed masseter muscles b/l. No gross facial asymmetry b/l, full eye closure strength b/l. Hearing grossly intact  to conversation.  Motor: Muscle bulk normal. Moving all extremities at least against gravity.   Sensation: Intact to LT throughout.   Coordination: Unable to assess due to participation.  Gait: Not assessed.      LABS:                        14.1   6.30  )-----------( 260      ( 20 Feb 2023 09:47 )             42.5    02-20    138  |  100  |  9   ----------------------------<  122<H>  3.7   |  23  |  0.58    Ca    9.5      20 Feb 2023 09:47    COVID-19 PCR: NotDetec (18 Feb 2023 08:06)      IMAGING/ EEG:    CT Head No Cont (02.18.23  No obvious acute intracranial hemorrhage, large cortical infarct or mass   effect. If clinically indicated, short-term follow-up or MRI may be   obtained for further evaluation.    EEG report 2/19/2023  EEG Summary:  Normal EEG in the awake, drowsy and asleep states.  Excess beta  Impression/Clinical Correlate:  This is a normal VEEG. No epileptiform pattern or seizures were recorded. The presence of excessive beta activity is most often seen in setting of sedative medication use.     EEG report 2/20/2023:  EEG Summary:  Normal EEG in the awake, drowsy and asleep states.  2.	Excess beta  Impression/Clinical Correlate:  This is a normal VEEG. No epileptiform pattern or seizures were recorded. The presence of excessive beta activity is most often seen in setting of sedative medication use.     EEG report 2/21/2023  Normal EEG in the awake, drowsy and asleep states.  Excess beta    Impression/Clinical Correlate:  This is a normal VEEG. No epileptiform pattern or seizures were recorded. The presence of excessive beta activity is most often seen in setting of sedative medication use.

## 2023-02-21 NOTE — BH CONSULTATION LIAISON ASSESSMENT NOTE - CURRENT MEDICATION
MEDICATIONS  (STANDING):  amantadine 100 milliGRAM(s) Oral daily  bismuth subsalicylate Liquid 5 milliLiter(s) Oral once  divalproex  milliGRAM(s) Oral two times a day  enoxaparin Injectable 40 milliGRAM(s) SubCutaneous every 24 hours  ferrous    sulfate 325 milliGRAM(s) Oral two times a day  levothyroxine 50 MICROGram(s) Oral daily  LORazepam     Tablet 0.5 milliGRAM(s) Oral daily  polyethylene glycol 3350 17 Gram(s) Oral daily  QUEtiapine 150 milliGRAM(s) Oral two times a day    MEDICATIONS  (PRN):  acetaminophen     Tablet .. 650 milliGRAM(s) Oral every 6 hours PRN Moderate Pain (4 - 6), Severe Pain (7 - 10)  LORazepam   Injectable 2 milliGRAM(s) IV Push once PRN Seizure Activity

## 2023-02-21 NOTE — BH CONSULTATION LIAISON ASSESSMENT NOTE - DESCRIPTION
41yo single F, domiciled in [facility] group home [facility]" and able to see her mother 2x/month for 4 days each, disabled and unemployed single, disabled, noncaregiver, unemployed, living in group home

## 2023-02-21 NOTE — BH CONSULTATION LIAISON ASSESSMENT NOTE - NSBHPSYCHHX_PSY_A_CORE
INTERVENTIONAL RADIOLOGY BRIEF-OPERATIVE NOTE    Procedure: CT guided right thoracentesis and drain placement under IV conscious sedation.     Pre-Op Diagnosis: Malignant vs infectious pleural effusion.     Post-Op Diagnosis: Path pending.     Attending: Nydia  Resident: Mannie    Anesthesia (type):  [ ] General Anesthesia  [x ] Sedation  [ ] Spinal Anesthesia  [ x] Local/Regional    Contrast: None    Estimated Blood Loss: Minimal, < 5 cc    Condition:   [ ] Critical  [ ] Serious  [ ] Fair   [ c] Good    Findings/Follow up Plan of Care: Chest tube to suction. May consider removal after drainage <10cc/day x 2.     Specimens Removed: 220 serous, straw colored fluid.    Implants: 14 [place] pig-tail catheter     Complications: None    Disposition:  - Return to inpatient floor.       Please call Interventional Radiology [phone] with any questions, concerns, or issues. INTERVENTIONAL RADIOLOGY BRIEF-OPERATIVE NOTE    Procedure: CT guided right thoracentesis and drain placement under IV conscious sedation.     Pre-Op Diagnosis: Malignant vs infectious pleural effusion.     Post-Op Diagnosis: Path pending.     Attending: Nydia  Resident: Mannie    Anesthesia (type):  [ ] General Anesthesia  [x ] Sedation  [ ] Spinal Anesthesia  [ x] Local/Regional    Contrast: None    Estimated Blood Loss: Minimal, < 5 cc    Condition:   [ ] Critical  [ ] Serious  [ x] Fair   [ ] Good    Findings/Follow up Plan of Care: Chest tube to suction. May consider removal after drainage <10cc/day x 2.     Specimens Removed: 220 serous, straw colored fluid.    Implants: 14 [place] pig-tail catheter     Complications: None    Disposition:  - Return to inpatient floor.       Please call Interventional Radiology [phone] with any questions, concerns, or issues. yes...

## 2023-02-21 NOTE — BH CONSULTATION LIAISON ASSESSMENT NOTE - NSBHCONSULTRECOMMENDOTHER_PSY_A_CORE FT
C/w Seroquel 150mg PO BID     C/w Ativan 0.5mg PO daily    PRN: Ativan 0.5mg-1mg PO/IV q6h PRN agitation, family would like to be notified before PRNs are given    OP psych f/u with own psychiatrist after dischage

## 2023-02-22 LAB
ANION GAP SERPL CALC-SCNC: 17 MMOL/L — SIGNIFICANT CHANGE UP (ref 5–17)
BUN SERPL-MCNC: 13 MG/DL — SIGNIFICANT CHANGE UP (ref 7–23)
CALCIUM SERPL-MCNC: 10 MG/DL — SIGNIFICANT CHANGE UP (ref 8.4–10.5)
CHLORIDE SERPL-SCNC: 99 MMOL/L — SIGNIFICANT CHANGE UP (ref 96–108)
CO2 SERPL-SCNC: 20 MMOL/L — LOW (ref 22–31)
CREAT SERPL-MCNC: 0.65 MG/DL — SIGNIFICANT CHANGE UP (ref 0.5–1.3)
EGFR: 112 ML/MIN/1.73M2 — SIGNIFICANT CHANGE UP
GLUCOSE SERPL-MCNC: 105 MG/DL — HIGH (ref 70–99)
HCT VFR BLD CALC: 43.7 % — SIGNIFICANT CHANGE UP (ref 34.5–45)
HGB BLD-MCNC: 14.4 G/DL — SIGNIFICANT CHANGE UP (ref 11.5–15.5)
MCHC RBC-ENTMCNC: 28.3 PG — SIGNIFICANT CHANGE UP (ref 27–34)
MCHC RBC-ENTMCNC: 33 GM/DL — SIGNIFICANT CHANGE UP (ref 32–36)
MCV RBC AUTO: 85.9 FL — SIGNIFICANT CHANGE UP (ref 80–100)
NRBC # BLD: 0 /100 WBCS — SIGNIFICANT CHANGE UP (ref 0–0)
PLATELET # BLD AUTO: 276 K/UL — SIGNIFICANT CHANGE UP (ref 150–400)
POTASSIUM SERPL-MCNC: 4.4 MMOL/L — SIGNIFICANT CHANGE UP (ref 3.5–5.3)
POTASSIUM SERPL-SCNC: 4.4 MMOL/L — SIGNIFICANT CHANGE UP (ref 3.5–5.3)
RBC # BLD: 5.09 M/UL — SIGNIFICANT CHANGE UP (ref 3.8–5.2)
RBC # FLD: 14 % — SIGNIFICANT CHANGE UP (ref 10.3–14.5)
SODIUM SERPL-SCNC: 136 MMOL/L — SIGNIFICANT CHANGE UP (ref 135–145)
T3 SERPL-MCNC: 101 NG/DL — SIGNIFICANT CHANGE UP (ref 80–200)
T4 AB SER-ACNC: 8.6 UG/DL — SIGNIFICANT CHANGE UP (ref 4.6–12)
TSH SERPL-MCNC: 2.9 UIU/ML — SIGNIFICANT CHANGE UP (ref 0.27–4.2)
VALPROATE SERPL-MCNC: 43 UG/ML — LOW (ref 50–100)
WBC # BLD: 7.61 K/UL — SIGNIFICANT CHANGE UP (ref 3.8–10.5)
WBC # FLD AUTO: 7.61 K/UL — SIGNIFICANT CHANGE UP (ref 3.8–10.5)

## 2023-02-22 PROCEDURE — 95720 EEG PHY/QHP EA INCR W/VEEG: CPT

## 2023-02-22 RX ORDER — LACOSAMIDE 50 MG/1
1 TABLET ORAL
Qty: 60 | Refills: 0
Start: 2023-02-22 | End: 2023-03-23

## 2023-02-22 RX ORDER — QUETIAPINE FUMARATE 200 MG/1
100 TABLET, FILM COATED ORAL
Refills: 0 | Status: DISCONTINUED | OUTPATIENT
Start: 2023-02-22 | End: 2023-02-28

## 2023-02-22 RX ADMIN — QUETIAPINE FUMARATE 100 MILLIGRAM(S): 200 TABLET, FILM COATED ORAL at 17:53

## 2023-02-22 RX ADMIN — Medication 325 MILLIGRAM(S): at 17:53

## 2023-02-22 RX ADMIN — ENOXAPARIN SODIUM 40 MILLIGRAM(S): 100 INJECTION SUBCUTANEOUS at 06:02

## 2023-02-22 RX ADMIN — DIVALPROEX SODIUM 250 MILLIGRAM(S): 500 TABLET, DELAYED RELEASE ORAL at 06:02

## 2023-02-22 RX ADMIN — Medication 325 MILLIGRAM(S): at 06:02

## 2023-02-22 RX ADMIN — Medication 0.25 MILLIGRAM(S): at 11:51

## 2023-02-22 RX ADMIN — QUETIAPINE FUMARATE 150 MILLIGRAM(S): 200 TABLET, FILM COATED ORAL at 08:30

## 2023-02-22 RX ADMIN — Medication 100 MILLIGRAM(S): at 11:56

## 2023-02-22 RX ADMIN — POLYETHYLENE GLYCOL 3350 17 GRAM(S): 17 POWDER, FOR SOLUTION ORAL at 11:51

## 2023-02-22 RX ADMIN — Medication 50 MICROGRAM(S): at 06:02

## 2023-02-22 NOTE — EEG REPORT - NS EEG TEXT BOX
Day 4 – 	Start: 2/21/2023  08:00    	End: 2/22/2023  08:00  	Duration: 24 hr  00 min    Daily EEG Visual Analysis    FINDINGS:  The background was continuous, symmetric, spontaneously variable and reactive. During wakefulness, only fragments of 9 Hz posterior rhythm could be discerned.  No Breach rhythms. Excess diffuse beta noted.     Background Slowing:  No generalized background slowing was present.    Focal Slowing:   None were present.    Sleep Background:  Drowsiness was characterized by fragmentation, attenuation, and slowing of the background activity.    Sleep was characterized by the presence of vertex waves, symmetric sleep spindles and K-complexes.    Other Non-Epileptiform Findings:  Diffuse excess beta activity.    Activation Procedures:   Hyperventilation was not performed.    Photic stimulation was not performed.    Interictal Epileptiform Activity:   None were present.    Events:  No events or seizures recorded.    Artifacts:  Intermittent myogenic and movement artifacts were noted.    ECG:  The heart rate on single channel ECG was predominantly between  BPM.    AEDs:     EEG Summary:    Normal EEG in the awake, drowsy and asleep states.  Excess beta    Impression/Clinical Correlate:    This is a normal VEEG. No epileptiform pattern or seizures were recorded. The presence of excessive beta activity is most often seen in setting of sedative medication use.     This is a prelim report only, pending review with attending prior to finalization.     Stephen Bowers MD     Fellow, [facility]     ------------------------------------     EEG Reading Room: [phone]     On Call Service After Hours: [phone]  Day 4 – 	Start: 2/21/2023  08:00    	End: 2/22/2023  08:00  	Duration: 24 hr  00 min    Daily EEG Visual Analysis    FINDINGS:  The background was continuous, symmetric, spontaneously variable and reactive. During wakefulness, only fragments of 9 Hz posterior rhythm could be discerned.  No Breach rhythms. Excess diffuse beta noted.     Background Slowing:  No generalized background slowing was present.    Focal Slowing:   None were present.    Sleep Background:  Drowsiness was characterized by fragmentation, attenuation, and slowing of the background activity.    Sleep was characterized by the presence of vertex waves, symmetric sleep spindles and K-complexes.    Other Non-Epileptiform Findings:  Diffuse excess beta activity.    Activation Procedures:   Hyperventilation was not performed.    Photic stimulation was not performed.    Interictal Epileptiform Activity:   None were present.    Events:  No events or seizures recorded.    Artifacts:  Intermittent myogenic and movement artifacts were noted.    ECG:  The heart rate on single channel ECG was predominantly between  BPM.    AEDs:     EEG Summary:    Normal EEG in the awake, drowsy and asleep states.  Excess beta    Impression/Clinical Correlate:    This is a normal VEEG. No epileptiform pattern or seizures were recorded. The presence of excessive beta activity is most often seen in setting of sedative medication use.       Stephen Bowers MD     Fellow, [facility]     ------------------------------------     EEG Reading Room: [phone]     On Call Service After Hours: [phone]     Angel Bhakta MD  Neurology Attending Physician

## 2023-02-22 NOTE — PROGRESS NOTE ADULT - ASSESSMENT
42 year-old woman with a PMH of HTN, HLD, bipolar d/o, hypothyroidism, seizure disorder (on Keppra and VPA), developmentally delayed, who presented to OSH on 2/18 with recurrent breakthrough seizures, transferred to the EMU on 2/18 for further monitoring and management. Exam reported to be baseline at this time. CTH from OSH unrevealing. VPA level from OSH subtherapeutic at 43, but noted to be 52.26 when corrected for albumin of 3.9. CBC/CMP wnl. Per history from family, patient had hx of meningitis age 18 mo, possible inception of seizures age 2 years (potentially earlier) with eyes rolling back and becoming less participatory. Per  Gian and family/ mother, patient may be having (once clarified) predominantly evening time events described as becoming quiet, less engaged, not talking, periodic eye blinking. No delmy tonic/ clonic activity noted.       Impression: Breakthrough seizures in the setting of borderline VPA level possibly due to medication nonadherence vs inadequate dosing. Initial goal to characterize events if epileptogenic.    Plan:   [x] c/w Video EEG  [x] CBC, CMP, Mg, Phos, CK, lactate  [x] VPA- 43 and Keppra- 23.2 level  [x] UA neg for infxn  [x] Discontinue home keppra,  [] decrease VPA to 250mg BID 2/21  [ ] C/w home seroquel 150 mg BID and ativan 0.5mg daily  [] psych consult for agitation - recc continue with current regimen   [ ] Seizure rescue: Ativan 2mg     #Misc:   [] Telemetry monitoring; Neurochecks/VS per unit protocol  [] Seizure, fall and aspiration precautions   [] Given concern for seizure, advise patient to not drive (for 1 yr per [facility] guidelines), operate heavy machinery, avoid heights, pools, bathtubs, locked doors.  [] Diet: Regular (Kosher)  [] DVT ppx lovenox    Patient was seen on rounds with neuro attending. Recommendations above in agreement with neuro attending at time of note documentation and resident update. Delay in patient note entry due to patient care. Recommendations finalized/addended once signed by attending.      42 year-old woman with a PMH of HTN, HLD, bipolar d/o, hypothyroidism, seizure disorder (on Keppra and VPA), developmentally delayed, who presented to OSH on 2/18 with recurrent breakthrough seizures, transferred to the EMU on 2/18 for further monitoring and management. Exam reported to be baseline at this time. CTH from OSH unrevealing. VPA level from OSH subtherapeutic at 43, but noted to be 52.26 when corrected for albumin of 3.9. CBC/CMP wnl. Per history from family, patient had hx of meningitis age 18 mo, possible inception of seizures age 2 years (potentially earlier) with eyes rolling back and becoming less participatory. Per  Gian and family/ mother, patient may be having (once clarified) predominantly evening time events described as becoming quiet, less engaged, not talking, periodic eye blinking. No delmy tonic/ clonic activity noted.       Impression: Breakthrough seizures in the setting of borderline VPA level possibly due to medication nonadherence vs inadequate dosing. Initial goal to characterize events if epileptogenic.    Plan:   [x] c/w Video EEG  [x] CBC, CMP, Mg, Phos, CK, lactate  [x] VPA- 43 and Keppra- 23.2 level  [x] UA neg for infxn  [x] Discontinue home keppra  [] discontinue depakote 2/22  [] decrease ativan to .25 QD on 2/22  [ ] C/w home seroquel 150 mg BID   [x] psych consult for agitation - recc continue with current regimen   [ ] Seizure rescue: Ativan 1mg IVP for seizure activity > 3 mins  [] Telemetry monitoring; Neurochecks/VS per unit protocol  [] Seizure, fall and aspiration precautions   [] Given concern for seizure, advise patient to not drive (for 1 yr per [facility] guidelines), operate heavy machinery, avoid heights, pools, bathtubs, locked doors.  [] Diet: Regular (Kosher)  [] DVT ppx lovenox    Patient was seen on rounds with neuro attending. Recommendations above in agreement with neuro attending at time of note documentation. Recommendations finalized/ addended once signed by attending.      42 year-old woman with a PMH of HTN, HLD, bipolar d/o, hypothyroidism, seizure disorder (on Keppra and VPA), developmentally delayed, who presented to OSH on 2/18 with recurrent breakthrough seizures, transferred to the EMU on 2/18 for further monitoring and management. Exam reported to be baseline at this time. CTH from OSH unrevealing. VPA level from OSH subtherapeutic at 43, but noted to be 52.26 when corrected for albumin of 3.9. CBC/CMP wnl. Per history from family, patient had hx of meningitis age 18 mo, possible inception of seizures age 2 years (potentially earlier) with eyes rolling back and becoming less participatory. Per  Gian and family/ mother, patient may be having (once clarified) predominantly evening time events described as becoming quiet, less engaged, not talking, periodic eye blinking. No delmy tonic/ clonic activity noted.       Impression: Breakthrough seizures in the setting of borderline VPA level possibly due to medication nonadherence vs inadequate dosing. Initial goal to characterize events if epileptogenic.    Plan:   [x] c/w Video EEG  [x] CBC, CMP, Mg, Phos, CK, lactate  [x] VPA- 43 and Keppra- 23.2 level  [x] UA neg for infxn  [x] Discontinue home keppra  [] discontinue depakote 2/22  [] decrease ativan to .25 QD on 2/22  [ ] dec seroquel to 100mg bid 2/22  [] d/c amantadine 2/22   [x] psych consult for agitation - recc continue with current regimen   [ ] Seizure rescue: Ativan 1mg IVP for seizure activity > 3 mins  [] Telemetry monitoring; Neurochecks/VS per unit protocol  [] Seizure, fall and aspiration precautions   [] Given concern for seizure, advise patient to not drive (for 1 yr per [facility] guidelines), operate heavy machinery, avoid heights, pools, bathtubs, locked doors.  [] Diet: Regular (Kosher)  [] DVT ppx lovenox    Patient was seen on rounds with neuro attending. Recommendations above in agreement with neuro attending at time of note documentation. Recommendations finalized/ addended once signed by attending.

## 2023-02-22 NOTE — PROGRESS NOTE ADULT - SUBJECTIVE AND OBJECTIVE BOX
THE PATIENT WAS SEEN AND EXAMINED BY ME WITH THE HOUSESTAFF DURING MORNING ROUNDS.   HPI:  42 year-old woman with a PMH of HTN, HLD, bipolar d/o, hypothyroidism, seizure disorder (on Keppra and VPA), developmentally delayed, who initially presented to OSH on 2/18 for breakthrough seizures, transferred to the EMU for further evaluation and management. Per chart review and discussion with aide at bedside, patient had 2 seizures at her group home, including an episode of generalized "tonic-clonic" shaking while seated in a recliner, lasting 2 minutes, associated with urinary incontinence. She was taken to OSH where she was reported to be postictal and received IV VPA 500mg x1 and later determined to be stable for discharge back to her group home. While awaiting transport, she had a 3rd witnessed event lasting less than 1 minute, for which she was administered ativan 1mg IVP, and decision made for transport to [facility] for continuous EEG. There was no fall or injury associated with any of these events. Her aide is unsure of when her last seizure prior to these events may have occurred.     ROS: Otherwise negative.     SUBJECTIVE: No events overnight.  No new neurologic complaints.      acetaminophen     Tablet .. 650 milliGRAM(s) Oral every 6 hours PRN  amantadine 100 milliGRAM(s) Oral daily  bismuth subsalicylate Liquid 5 milliLiter(s) Oral once  divalproex  milliGRAM(s) Oral two times a day  enoxaparin Injectable 40 milliGRAM(s) SubCutaneous every 24 hours  ferrous    sulfate 325 milliGRAM(s) Oral two times a day  levothyroxine 50 MICROGram(s) Oral daily  LORazepam     Tablet 0.5 milliGRAM(s) Oral daily  LORazepam   Injectable 2 milliGRAM(s) IV Push once PRN  polyethylene glycol 3350 17 Gram(s) Oral daily  QUEtiapine 150 milliGRAM(s) Oral two times a day      Physical Examination:   Constitutional: appears stated age, cheerful, NAD.  Head: Normocephalic & atraumatic.  Respiratory: Breathing comfortably on room air.    Neurological (>12):  MS: Eyes open, alert, oriented to person, Follows some simple commands.  Speech/Language: Clear, grossly fluent but perseverates, repeatedly stating her name, asking provider's name.  CNs: EOMI, no nystagmus, no diplopia. V1-3 intact to LT, well developed masseter muscles b/l. No gross facial asymmetry b/l, full eye closure strength b/l. Hearing grossly intact  to conversation.  Motor: Muscle bulk normal. Moving all extremities at least against gravity.   Sensation: Intact to LT throughout.   Coordination: Unable to assess due to participation.  Gait: Not assessed.      LABS:                        14.4   7.61  )-----------( 276      ( 22 Feb 2023 05:04 )             43.7    02-22    136  |  99  |  13  ----------------------------<  105<H>  4.4   |  20<L>  |  0.65    Ca    10.0      22 Feb 2023 05:04         COVID-19 PCR: NotDetec (18 Feb 2023 08:06)      IMAGING/ EEG:    CT Head No Cont (02.18.23  No obvious acute intracranial hemorrhage, large cortical infarct or mass   effect. If clinically indicated, short-term follow-up or MRI may be   obtained for further evaluation.    EEG report 2/19/2023  EEG Summary:  Normal EEG in the awake, drowsy and asleep states.  Excess beta  Impression/Clinical Correlate:  This is a normal VEEG. No epileptiform pattern or seizures were recorded. The presence of excessive beta activity is most often seen in setting of sedative medication use.     EEG report 2/20/2023:  EEG Summary:  Normal EEG in the awake, drowsy and asleep states.  Excess beta  Impression/Clinical Correlate:  This is a normal VEEG. No epileptiform pattern or seizures were recorded. The presence of excessive beta activity is most often seen in setting of sedative medication use.     EEG report 2/21/2023  Normal EEG in the awake, drowsy and asleep states.  Excess beta    Impression/Clinical Correlate:  This is a normal VEEG. No epileptiform pattern or seizures were recorded. The presence of excessive beta activity is most often seen in setting of sedative medication use.    THE PATIENT WAS SEEN AND EXAMINED BY ME WITH THE HOUSESTAFF DURING MORNING ROUNDS.   HPI:  42 year-old woman with a PMH of HTN, HLD, bipolar d/o, hypothyroidism, seizure disorder (on Keppra and VPA), developmentally delayed, who initially presented to OSH on 2/18 for breakthrough seizures, transferred to the EMU for further evaluation and management. Per chart review and discussion with aide at bedside, patient had 2 seizures at her group home, including an episode of generalized "tonic-clonic" shaking while seated in a recliner, lasting 2 minutes, associated with urinary incontinence. She was taken to OSH where she was reported to be postictal and received IV VPA 500mg x1 and later determined to be stable for discharge back to her group home. While awaiting transport, she had a 3rd witnessed event lasting less than 1 minute, for which she was administered ativan 1mg IVP, and decision made for transport to [facility] for continuous EEG. There was no fall or injury associated with any of these events. Her aide is unsure of when her last seizure prior to these events may have occurred.     ROS: Otherwise negative.     SUBJECTIVE: No events overnight.  No new neurologic complaints.      acetaminophen     Tablet .. 650 milliGRAM(s) Oral every 6 hours PRN  amantadine 100 milliGRAM(s) Oral daily  bismuth subsalicylate Liquid 5 milliLiter(s) Oral once  divalproex  milliGRAM(s) Oral two times a day  enoxaparin Injectable 40 milliGRAM(s) SubCutaneous every 24 hours  ferrous    sulfate 325 milliGRAM(s) Oral two times a day  levothyroxine 50 MICROGram(s) Oral daily  LORazepam     Tablet 0.5 milliGRAM(s) Oral daily  LORazepam   Injectable 2 milliGRAM(s) IV Push once PRN  polyethylene glycol 3350 17 Gram(s) Oral daily  QUEtiapine 150 milliGRAM(s) Oral two times a day      Physical Examination:   Constitutional: appears stated age, cheerful, NAD.  Head: Normocephalic & atraumatic.  Respiratory: Breathing comfortably on room air.    Neurological (>12):  MS: Eyes open, alert, oriented to person and place. Follows some simple commands.  Speech/Language: Clear, grossly fluent but perseverates, repeatedly stating her name, asking provider's name.  CNs: EOMI, no nystagmus, no diplopia. V1-3 intact to LT, well developed masseter muscles b/l. No gross facial asymmetry b/l, full eye closure strength b/l. Hearing grossly intact  to conversation.  Motor: Muscle bulk normal. Moving all extremities at least against gravity.   Sensation: Intact to LT throughout.   Coordination: Unable to assess due to participation.  Gait: Not assessed.      LABS:                        14.4   7.61  )-----------( 276      ( 22 Feb 2023 05:04 )             43.7    02-22    136  |  99  |  13  ----------------------------<  105<H>  4.4   |  20<L>  |  0.65    Ca    10.0      22 Feb 2023 05:04         COVID-19 PCR: NotDetec (18 Feb 2023 08:06)      IMAGING/ EEG:    CT Head No Cont (02.18.23  No obvious acute intracranial hemorrhage, large cortical infarct or mass   effect. If clinically indicated, short-term follow-up or MRI may be   obtained for further evaluation.    EEG report 2/19/2023  EEG Summary:  Normal EEG in the awake, drowsy and asleep states.  Excess beta  Impression/Clinical Correlate:  This is a normal VEEG. No epileptiform pattern or seizures were recorded. The presence of excessive beta activity is most often seen in setting of sedative medication use.     EEG report 2/20/2023:  EEG Summary:  Normal EEG in the awake, drowsy and asleep states.  Excess beta  Impression/Clinical Correlate:  This is a normal VEEG. No epileptiform pattern or seizures were recorded. The presence of excessive beta activity is most often seen in setting of sedative medication use.     EEG report 2/21/2023  Normal EEG in the awake, drowsy and asleep states.  Excess beta  Impression/Clinical Correlate:  This is a normal VEEG. No epileptiform pattern or seizures were recorded. The presence of excessive beta activity is most often seen in setting of sedative medication use.       EEG Report 2/22/2023  Normal EEG in the awake, drowsy and asleep states.  Excess beta  Impression/Clinical Correlate:  This is a normal VEEG. No epileptiform pattern or seizures were recorded. The presence of excessive beta activity is most often seen in setting of sedative medication use.

## 2023-02-23 PROCEDURE — 95720 EEG PHY/QHP EA INCR W/VEEG: CPT

## 2023-02-23 RX ADMIN — Medication 0.25 MILLIGRAM(S): at 11:53

## 2023-02-23 RX ADMIN — Medication 325 MILLIGRAM(S): at 18:19

## 2023-02-23 RX ADMIN — QUETIAPINE FUMARATE 100 MILLIGRAM(S): 200 TABLET, FILM COATED ORAL at 18:19

## 2023-02-23 RX ADMIN — QUETIAPINE FUMARATE 100 MILLIGRAM(S): 200 TABLET, FILM COATED ORAL at 06:08

## 2023-02-23 RX ADMIN — ENOXAPARIN SODIUM 40 MILLIGRAM(S): 100 INJECTION SUBCUTANEOUS at 06:08

## 2023-02-23 RX ADMIN — Medication 325 MILLIGRAM(S): at 06:09

## 2023-02-23 RX ADMIN — Medication 50 MICROGRAM(S): at 06:08

## 2023-02-23 NOTE — EEG REPORT - NS EEG TEXT BOX
Day 5 – 	Start: 2/22/2023  08:00    	End: 2/23/2023  08:00  	Duration: 24 hr  00 min    Daily EEG Visual Analysis    FINDINGS:  The background was continuous, symmetric, spontaneously variable and reactive. During wakefulness, only fragments of 9 Hz posterior rhythm could be discerned.  No Breach rhythms. Excess diffuse beta noted.     Background Slowing:  No generalized background slowing was present.    Focal Slowing:   None were present.    Sleep Background:  Drowsiness was characterized by fragmentation, attenuation, and slowing of the background activity.    Sleep was characterized by the presence of vertex waves, symmetric sleep spindles and K-complexes.    Other Non-Epileptiform Findings:  Diffuse excess beta activity.    Activation Procedures:   Hyperventilation was not performed.    Photic stimulation was not performed.    Interictal Epileptiform Activity:   None were present.    Events:  No events or seizures recorded.    Artifacts:  Intermittent myogenic and movement artifacts were noted.    ECG:  The heart rate on single channel ECG was predominantly between  BPM.    AEDs:       EEG Summary:    Normal EEG in the awake, drowsy and asleep states.  Excess beta    Impression/Clinical Correlate:    This is a normal VEEG. No epileptiform pattern or seizures were recorded. The presence of excessive beta activity is most often seen in setting of sedative medication use    This is a prelim report only, pending review with attending prior to finalization.     Stephen Bowers MD     Fellow, [facility]     ------------------------------------     EEG Reading Room: [phone]     On Call Service After Hours: [phone]  Day 5 – 	Start: 2/22/2023  08:00    	End: 2/23/2023  08:00  	Duration: 24 hr  00 min    Daily EEG Visual Analysis    FINDINGS:  The background was continuous, symmetric, spontaneously variable and reactive. During wakefulness, only fragments of 9 Hz posterior rhythm could be discerned.  No Breach rhythms. Excess diffuse beta noted.     Background Slowing:  No generalized background slowing was present.    Focal Slowing:   None were present.    Sleep Background:  Drowsiness was characterized by fragmentation, attenuation, and slowing of the background activity.    Sleep was characterized by the presence of vertex waves, symmetric sleep spindles and K-complexes.    Other Non-Epileptiform Findings:  Diffuse excess beta activity.    Activation Procedures:   Hyperventilation was not performed.    Photic stimulation was not performed.    Interictal Epileptiform Activity:   None were present.    Events:  No events or seizures recorded.    Artifacts:  Intermittent myogenic and movement artifacts were noted.    ECG:  The heart rate on single channel ECG was predominantly between  BPM.    AEDs:       EEG Summary:    Normal EEG in the awake, drowsy and asleep states.  Excess beta    Impression/Clinical Correlate:    This is a normal VEEG. No epileptiform pattern or seizures were recorded. The presence of excessive beta activity is most often seen in setting of sedative medication use      Stephen Bowers MD     Fellow, [facility]     Angel Bhakta MD  Neurology Attending Physician      ------------------------------------     EEG Reading Room: [phone]     On Call Service After Hours: [phone]

## 2023-02-23 NOTE — PROGRESS NOTE ADULT - SUBJECTIVE AND OBJECTIVE BOX
Neurology - Consult Note    -  Spectra: [phone] ([facility]), [phone] ([facility])  -    HPI: Patient MARLEY MCKEON is a 42y (1980) wo/man with a PMHx significant for ***      Review of Systems:  INCOMPLETE   CONSTITUTIONAL: No fevers or chills  EYES AND ENT: No visual changes or no throat pain   NECK: No pain or stiffness  RESPIRATORY: No hemoptysis or shortness of breath  CARDIOVASCULAR: No chest pain or palpitations  GASTROINTESTINAL: No melena or hematochezia  GENITOURINARY: No dysuria or hematuria  NEUROLOGICAL: +As stated in HPI above  SKIN: No itching, burning, rashes, or lesions   All other review of systems is negative unless indicated above.    Allergies:  Benadryl (Hives)  Benadryl (Unknown)  penicillin (Rash)      PMHx/PSHx/Family Hx: As above, otherwise see below   Bipolar illness    Seizure    Hypothyroidism    Mentally challenged    Bipolar disorder    Hypothyroidism    Hyperlipidemia    H/O osteoporosis    History of seizure disorder    H/O sinus tachycardia        Social Hx:  No current use of tobacco, alcohol, or illicit drugs  Lives with ***    Medications:  MEDICATIONS  (STANDING):  bismuth subsalicylate Liquid 5 milliLiter(s) Oral once  enoxaparin Injectable 40 milliGRAM(s) SubCutaneous every 24 hours  ferrous    sulfate 325 milliGRAM(s) Oral two times a day  levothyroxine 50 MICROGram(s) Oral daily  LORazepam     Tablet 0.25 milliGRAM(s) Oral daily  polyethylene glycol 3350 17 Gram(s) Oral daily  QUEtiapine 100 milliGRAM(s) Oral two times a day    MEDICATIONS  (PRN):  acetaminophen     Tablet .. 650 milliGRAM(s) Oral every 6 hours PRN Moderate Pain (4 - 6), Severe Pain (7 - 10)  LORazepam   Injectable 1 milliGRAM(s) IV Push once PRN for seizure activity > 3 mins      Vitals:  T(C): 36.4 (02-23-23 @ 05:08), Max: 36.9 (02-22-23 @ 17:15)  HR: 85 (02-23-23 @ 05:08) (78 - 107)  BP: 116/74 (02-23-23 @ 05:08) (116/72 - 126/85)  RR: 18 (02-23-23 @ 05:08) (17 - 18)  SpO2: 96% (02-23-23 @ 05:08) (95% - 98%)    Physical Examination: INCOMPLETE  General - NAD  Cardiovascular - Peripheral pulses palpable, no edema  Eyes - Fundoscopy with flat, sharp optic discs and no hemorrhage or exudates; Fundoscopy not well visualized; Fundoscopy not performed due to safety precautions in the setting of the COVID-19 pandemic    Neurologic Exam:  Mental status - Awake, Alert, Oriented to person, place, and time. Speech fluent, repetition and naming intact. Follows simple and complex commands. Attention/concentration, recent and remote memory (including registration and recall), and fund of knowledge intact    Cranial nerves - PERRLA, VFF, EOMI, face sensation (V1-V3) intact b/l, facial strength intact without asymmetry b/l, hearing intact b/l, palate with symmetric elevation, trapezius OR sternocleidomastiod 5/5 strength b/l, tongue midline on protrusion with full lateral movement    Motor - Normal bulk and tone throughout. No pronator drift.  Strength testing            Deltoid      Biceps      Triceps     Wrist Extension    Wrist Flexion     Interossei         R            5                 5               5                     5                              5                        5                 5  L             5                 5               5                     5                              5                        5                 5              Hip Flexion    Hip Extension    Knee Flexion    Knee Extension    Dorsiflexion    Plantar Flexion  R              5                           5                       5                           5                            5                          5  L              5                           5                        5                           5                            5                          5    Sensation - Light touch/temperature OR pain/vibration intact throughout    DTR's -             Biceps      Triceps     Brachioradialis      Patellar    Ankle    Toes/plantar response  R             2+             2+                  2+                       2+            2+                 Down  L              2+             2+                 2+                        2+           2+                 Down    Coordination - Finger to Nose intact b/l. No tremors appreciated    Gait and station - Normal casual gait. Romberg (-)    Labs:                        14.4   7.61  )-----------( 276      ( 22 Feb 2023 05:04 )             43.7     02-22    136  |  99  |  13  ----------------------------<  105<H>  4.4   |  20<L>  |  0.65    Ca    10.0      22 Feb 2023 05:04      CAPILLARY BLOOD GLUCOSE              CSF:                  Radiology:  CT Head No Cont:  (18 Feb 2023 05:47)      CT Head No Cont (02.18.23  No obvious acute intracranial hemorrhage, large cortical infarct or mass   effect. If clinically indicated, short-term follow-up or MRI may be   obtained for further evaluation.    EEG report 2/19/2023  EEG Summary:  Normal EEG in the awake, drowsy and asleep states.  Excess beta  Impression/Clinical Correlate:  This is a normal VEEG. No epileptiform pattern or seizures were recorded. The presence of excessive beta activity is most often seen in setting of sedative medication use.     EEG report 2/20/2023:  EEG Summary:  Normal EEG in the awake, drowsy and asleep states.  Excess beta  Impression/Clinical Correlate:  This is a normal VEEG. No epileptiform pattern or seizures were recorded. The presence of excessive beta activity is most often seen in setting of sedative medication use.     EEG report 2/21/2023  Normal EEG in the awake, drowsy and asleep states.  Excess beta  Impression/Clinical Correlate:  This is a normal VEEG. No epileptiform pattern or seizures were recorded. The presence of excessive beta activity is most often seen in setting of sedative medication use.     EEG Report 2/22/2023  Normal EEG in the awake, drowsy and asleep states.  Excess beta  Impression/Clinical Correlate:  This is a normal VEEG. No epileptiform pattern or seizures were recorded. The presence of excessive beta activity is most often seen in setting of sedative medication use.   Neurology - Consult Note    -  Spectra: [phone] ([facility]), [phone] ([facility])  -    Interval hx: Insisted on sleeping on the recliner and then urinated herself and wouldnt let staff clean her.    Pt seen at bedside with neurology team. Pt has no acute concerns.       Review of Systems:     CONSTITUTIONAL: No fevers or chills  EYES AND ENT: No visual changes or no throat pain   NECK: No pain or stiffness  RESPIRATORY: No hemoptysis or shortness of breath  NEUROLOGICAL: +As stated in HPI above  SKIN: No itching, burning, rashes, or lesions   All other review of systems is negative unless indicated above.    Allergies:  Benadryl (Hives)  Benadryl (Unknown)  penicillin (Rash)      PMHx/PSHx/Family Hx: As above, otherwise see below   Bipolar illness    Seizure    Hypothyroidism    Mentally challenged    Bipolar disorder    Hypothyroidism    Hyperlipidemia    H/O osteoporosis    History of seizure disorder    H/O sinus tachycardia        Social Hx:  No current use of tobacco, alcohol, or illicit drugs    Medications:  MEDICATIONS  (STANDING):  bismuth subsalicylate Liquid 5 milliLiter(s) Oral once  enoxaparin Injectable 40 milliGRAM(s) SubCutaneous every 24 hours  ferrous    sulfate 325 milliGRAM(s) Oral two times a day  levothyroxine 50 MICROGram(s) Oral daily  LORazepam     Tablet 0.25 milliGRAM(s) Oral daily  polyethylene glycol 3350 17 Gram(s) Oral daily  QUEtiapine 100 milliGRAM(s) Oral two times a day    MEDICATIONS  (PRN):  acetaminophen     Tablet .. 650 milliGRAM(s) Oral every 6 hours PRN Moderate Pain (4 - 6), Severe Pain (7 - 10)  LORazepam   Injectable 1 milliGRAM(s) IV Push once PRN for seizure activity > 3 mins      Vitals:  T(C): 36.4 (02-23-23 @ 05:08), Max: 36.9 (02-22-23 @ 17:15)  HR: 85 (02-23-23 @ 05:08) (78 - 107)  BP: 116/74 (02-23-23 @ 05:08) (116/72 - 126/85)  RR: 18 (02-23-23 @ 05:08) (17 - 18)  SpO2: 96% (02-23-23 @ 05:08) (95% - 98%)    Physical Examination:  Constitutional: appears stated age, cheerful, NAD.  Head: Normocephalic & atraumatic.  Respiratory: Breathing comfortably on room air.    Neurological (>12):  MS: Eyes open, alert, oriented to person and place. Follows some simple commands.  Speech/Language: Clear, grossly fluent but perseverates, repeatedly stating her name, asking provider's name.  CNs: EOMI, no nystagmus, no diplopia. V1-3 intact to LT, well developed masseter muscles b/l. No gross facial asymmetry b/l, full eye closure strength b/l. Hearing grossly intact  to conversation.  Motor: Muscle bulk normal. Moving all extremities at least against gravity.   Sensation: Intact to LT throughout.   Coordination: Unable to assess due to participation.  Gait: Not assessed.    Labs:                        14.4   7.61  )-----------( 276      ( 22 Feb 2023 05:04 )             43.7     02-22    136  |  99  |  13  ----------------------------<  105<H>  4.4   |  20<L>  |  0.65    Ca    10.0      22 Feb 2023 05:04      CAPILLARY BLOOD GLUCOSE              CSF:                  Radiology:  CT Head No Cont:  (18 Feb 2023 05:47)      CT Head No Cont (02.18.23  No obvious acute intracranial hemorrhage, large cortical infarct or mass   effect. If clinically indicated, short-term follow-up or MRI may be   obtained for further evaluation.    EEG report 2/19/2023  EEG Summary:  Normal EEG in the awake, drowsy and asleep states.  Excess beta  Impression/Clinical Correlate:  This is a normal VEEG. No epileptiform pattern or seizures were recorded. The presence of excessive beta activity is most often seen in setting of sedative medication use.     EEG report 2/20/2023:  EEG Summary:  Normal EEG in the awake, drowsy and asleep states.  Excess beta  Impression/Clinical Correlate:  This is a normal VEEG. No epileptiform pattern or seizures were recorded. The presence of excessive beta activity is most often seen in setting of sedative medication use.     EEG report 2/21/2023  Normal EEG in the awake, drowsy and asleep states.  Excess beta  Impression/Clinical Correlate:  This is a normal VEEG. No epileptiform pattern or seizures were recorded. The presence of excessive beta activity is most often seen in setting of sedative medication use.     EEG Report 2/22/2023  Normal EEG in the awake, drowsy and asleep states.  Excess beta  Impression/Clinical Correlate:  This is a normal VEEG. No epileptiform pattern or seizures were recorded. The presence of excessive beta activity is most often seen in setting of sedative medication use.    EEG 2/23/23    Normal EEG in the awake, drowsy and asleep states.  Excess beta    Impression/Clinical Correlate:    This is a normal VEEG. No epileptiform pattern or seizures were recorded. The presence of excessive beta activity is most often seen in setting of sedative medication use    This is a prelim report only, pending review with attending prior to finalization.

## 2023-02-23 NOTE — PROGRESS NOTE ADULT - ASSESSMENT
42 year-old woman with a PMH of HTN, HLD, bipolar d/o, hypothyroidism, seizure disorder (on Keppra and VPA), developmentally delayed, who presented to OSH on 2/18 with recurrent breakthrough seizures, transferred to the EMU on 2/18 for further monitoring and management. Exam reported to be baseline at this time. CTH from OSH unrevealing. VPA level from OSH subtherapeutic at 43, but noted to be 52.26 when corrected for albumin of 3.9. CBC/CMP wnl. Per history from family, patient had hx of meningitis age 18 mo, possible inception of seizures age 2 years (potentially earlier) with eyes rolling back and becoming less participatory. Per  Gian and family/ mother, patient may be having (once clarified) predominantly evening time events described as becoming quiet, less engaged, not talking, periodic eye blinking. No delmy tonic/ clonic activity noted.       Impression: Breakthrough seizures in the setting of borderline VPA level possibly due to medication nonadherence vs inadequate dosing. Initial goal to characterize events if epileptogenic.    Plan:   [x] c/w Video EEG  [x] CBC, CMP, Mg, Phos, CK, lactate  [x] VPA- 43 and Keppra- 23.2 level  [x] UA neg for infxn  [x] Discontinue home keppra  [] discontinue depakote 2/22  [] decrease ativan to .25 QD on 2/22  [ ] dec seroquel to 100mg bid 2/22  [] d/c amantadine 2/22   [x] psych consult for agitation - recc continue with current regimen   [ ] Seizure rescue: Ativan 1mg IVP for seizure activity > 3 mins  [] Telemetry monitoring; Neurochecks/VS per unit protocol  [] Seizure, fall and aspiration precautions   [] Given concern for seizure, advise patient to not drive (for 1 yr per [facility] guidelines), operate heavy machinery, avoid heights, pools, bathtubs, locked doors.  [] Diet: Regular (Kosher)  [] DVT ppx lovenox             42 year-old woman with a PMH of HTN, HLD, bipolar d/o, hypothyroidism, seizure disorder (on Keppra and VPA), developmentally delayed, who presented to OSH on 2/18 with recurrent breakthrough seizures, transferred to the EMU on 2/18 for further monitoring and management. Exam reported to be baseline at this time. CTH from OSH unrevealing. VPA level from OSH subtherapeutic at 43, but noted to be 52.26 when corrected for albumin of 3.9. CBC/CMP wnl. Per history from family, patient had hx of meningitis age 18 mo, possible inception of seizures age 2 years (potentially earlier) with eyes rolling back and becoming less participatory. Per  Gian and family/ mother, patient may be having (once clarified) predominantly evening time events described as becoming quiet, less engaged, not talking, periodic eye blinking. No delmy tonic/ clonic activity noted.       Impression: Breakthrough seizures in the setting of borderline VPA level possibly due to medication nonadherence vs inadequate dosing. Initial goal to characterize events if epileptogenic.    Plan:   [x] c/w Video EEG  []MR brain w and w/o contrast with epilepsy protocol  [x] CBC, CMP, Mg, Phos, CK, lactate  [x] VPA- 43 and Keppra- 23.2 level  [x] UA neg for infxn  [x] Discontinue home keppra  [] discontinue depakote 2/22  [] decrease ativan to .25 QD on 2/22  [ ] dec seroquel to 100mg bid 2/22  [] d/c amantadine 2/22   [x] psych consult for agitation - recc continue with current regimen   [ ] Seizure rescue: Ativan 1mg IVP for seizure activity > 3 mins  [] Telemetry monitoring; Neurochecks/VS per unit protocol  [] Seizure, fall and aspiration precautions   [] Given concern for seizure, advise patient to not drive (for 1 yr per [facility] guidelines), operate heavy machinery, avoid heights, pools, bathtubs, locked doors.  [] Diet: Regular (Kosher)  [] DVT ppx lovenox    Case seen and discussed with neurologist Dr. Cardenas

## 2023-02-24 PROCEDURE — 95720 EEG PHY/QHP EA INCR W/VEEG: CPT

## 2023-02-24 PROCEDURE — 99232 SBSQ HOSP IP/OBS MODERATE 35: CPT | Mod: GC

## 2023-02-24 RX ORDER — ZONISAMIDE 100 MG
100 CAPSULE ORAL
Refills: 0 | Status: DISCONTINUED | OUTPATIENT
Start: 2023-02-24 | End: 2023-02-25

## 2023-02-24 RX ADMIN — ENOXAPARIN SODIUM 40 MILLIGRAM(S): 100 INJECTION SUBCUTANEOUS at 05:48

## 2023-02-24 RX ADMIN — POLYETHYLENE GLYCOL 3350 17 GRAM(S): 17 POWDER, FOR SOLUTION ORAL at 11:28

## 2023-02-24 RX ADMIN — Medication 50 MICROGRAM(S): at 05:47

## 2023-02-24 RX ADMIN — Medication 325 MILLIGRAM(S): at 05:47

## 2023-02-24 RX ADMIN — Medication 100 MILLIGRAM(S): at 17:41

## 2023-02-24 RX ADMIN — QUETIAPINE FUMARATE 100 MILLIGRAM(S): 200 TABLET, FILM COATED ORAL at 17:39

## 2023-02-24 RX ADMIN — QUETIAPINE FUMARATE 100 MILLIGRAM(S): 200 TABLET, FILM COATED ORAL at 05:47

## 2023-02-24 RX ADMIN — Medication 325 MILLIGRAM(S): at 17:39

## 2023-02-24 NOTE — PROGRESS NOTE ADULT - ASSESSMENT
42 year-old woman with a PMH of HTN, HLD, bipolar d/o, hypothyroidism, seizure disorder (on Keppra and VPA), developmentally delayed, who presented to OSH on 2/18 with recurrent breakthrough seizures, transferred to the EMU on 2/18 for further monitoring and management. Exam reported to be baseline at this time. CTH from OSH unrevealing. VPA level from OSH subtherapeutic at 43, but noted to be 52.26 when corrected for albumin of 3.9. CBC/CMP wnl. Per history from family, patient had hx of meningitis age 18 mo, possible inception of seizures age 2 years (potentially earlier) with eyes rolling back and becoming less participatory. Per  Gian and family/ mother, patient may be having (once clarified) predominantly evening time events described as becoming quiet, less engaged, not talking, periodic eye blinking. No delmy tonic/ clonic activity noted.       Impression: Breakthrough seizures in the setting of borderline VPA level possibly due to medication nonadherence vs inadequate dosing. Initial goal to characterize events if epileptogenic.    Plan:   [x] c/w Video EEG  []MR brain w and w/o contrast with epilepsy protocol  [x] CBC, CMP, Mg, Phos, CK, lactate  [x] VPA- 43 and Keppra- 23.2 level  [x] UA neg for infxn  [x] Discontinue home keppra  [] discontinue depakote 2/22  [] decrease ativan to .25 QD on 2/22  [ ] dec seroquel to 100mg bid 2/22  [] d/c amantadine 2/22   [x] psych consult for agitation - recc continue with current regimen   [ ] Seizure rescue: Ativan 1mg IVP for seizure activity > 3 mins  [] Telemetry monitoring; Neurochecks/VS per unit protocol  [] Seizure, fall and aspiration precautions   [] Given concern for seizure, advise patient to not drive (for 1 yr per [facility] guidelines), operate heavy machinery, avoid heights, pools, bathtubs, locked doors.  [] Diet: Regular (Kosher)  [] DVT ppx lovenox    Case seen and discussed with neurologist Dr. Cardenas         42 year-old woman with a PMH of HTN, HLD, bipolar d/o, hypothyroidism, seizure disorder (on Keppra and VPA), developmentally delayed, who presented to OSH on 2/18 with recurrent breakthrough seizures, transferred to the EMU on 2/18 for further monitoring and management. Exam reported to be baseline at this time. CTH from OSH unrevealing. VPA level from OSH subtherapeutic at 43, but noted to be 52.26 when corrected for albumin of 3.9. CBC/CMP wnl. Per history from family, patient had hx of meningitis age 18 mo, possible inception of seizures age 2 years (potentially earlier) with eyes rolling back and becoming less participatory. Per  Gian and family/ mother, patient may be having (once clarified) predominantly evening time events described as becoming quiet, less engaged, not talking, periodic eye blinking. No delmy tonic/ clonic activity noted.       Impression: Breakthrough seizures in the setting of borderline VPA level possibly due to medication nonadherence vs inadequate dosing. Initial goal to characterize events if epileptogenic.    Plan:   [x] c/w Video EEG  []MR brain w and w/o contrast with epilepsy protocol  [x] CBC, CMP, Mg, Phos, CK, lactate  [x] VPA- 43 and Keppra- 23.2 level  [x] UA neg for infxn  [] decrease ativan to .25 QD on 2/22  [ ] dec seroquel to 100mg bid 2/22  [x] Discontinue home keppra  [] discontinue depakote 2/22  [] d/c amantadine 2/22   [x] psych consult for agitation - rec continue with current regimen   [ ] Seizure rescue: Ativan 1mg IVP for seizure activity > 3 mins  [] Telemetry monitoring; Neurochecks/VS per unit protocol  [] Seizure, fall and aspiration precautions   [] Given concern for seizure, advise patient to not drive (for 1 yr per [facility] guidelines), operate heavy machinery, avoid heights, pools, bathtubs, locked doors.  [] Diet: Regular (Kosher)  [] DVT ppx lovenox    Case seen and discussed with neurologist Dr. Cardenas

## 2023-02-24 NOTE — PROGRESS NOTE ADULT - SUBJECTIVE AND OBJECTIVE BOX
Neurology - Consult Note    -  Spectra: [phone] ([facility]), [phone] ([facility])  -    HPI: Patient MARLEY MCKEON is a 42y (1980) wo/man with a PMHx significant for ***      Review of Systems:  INCOMPLETE   CONSTITUTIONAL: No fevers or chills  EYES AND ENT: No visual changes or no throat pain   NECK: No pain or stiffness  RESPIRATORY: No hemoptysis or shortness of breath  CARDIOVASCULAR: No chest pain or palpitations  GASTROINTESTINAL: No melena or hematochezia  GENITOURINARY: No dysuria or hematuria  NEUROLOGICAL: +As stated in HPI above  SKIN: No itching, burning, rashes, or lesions   All other review of systems is negative unless indicated above.    Allergies:  Benadryl (Hives)  Benadryl (Unknown)  penicillin (Rash)      PMHx/PSHx/Family Hx: As above, otherwise see below   Bipolar illness    Seizure    Hypothyroidism    Mentally challenged    Bipolar disorder    Hypothyroidism    Hyperlipidemia    H/O osteoporosis    History of seizure disorder    H/O sinus tachycardia        Social Hx:  No current use of tobacco, alcohol, or illicit drugs  Lives with ***    Medications:  MEDICATIONS  (STANDING):  bismuth subsalicylate Liquid 5 milliLiter(s) Oral once  enoxaparin Injectable 40 milliGRAM(s) SubCutaneous every 24 hours  ferrous    sulfate 325 milliGRAM(s) Oral two times a day  levothyroxine 50 MICROGram(s) Oral daily  LORazepam     Tablet 0.25 milliGRAM(s) Oral daily  polyethylene glycol 3350 17 Gram(s) Oral daily  QUEtiapine 100 milliGRAM(s) Oral two times a day    MEDICATIONS  (PRN):  acetaminophen     Tablet .. 650 milliGRAM(s) Oral every 6 hours PRN Moderate Pain (4 - 6), Severe Pain (7 - 10)  LORazepam   Injectable 1 milliGRAM(s) IV Push once PRN for seizure activity > 3 mins      Vitals:  T(C): 36.7 (02-24-23 @ 05:00), Max: 36.9 (02-24-23 @ 00:56)  HR: 83 (02-24-23 @ 05:00) (70 - 102)  BP: 114/70 (02-24-23 @ 05:00) (114/70 - 132/79)  RR: 18 (02-24-23 @ 05:00) (17 - 20)  SpO2: 96% (02-24-23 @ 05:00) (96% - 98%)    Physical Examination: INCOMPLETE  General - NAD  Cardiovascular - Peripheral pulses palpable, no edema  Eyes - Fundoscopy with flat, sharp optic discs and no hemorrhage or exudates; Fundoscopy not well visualized; Fundoscopy not performed due to safety precautions in the setting of the COVID-19 pandemic    Neurologic Exam:  Mental status - Awake, Alert, Oriented to person, place, and time. Speech fluent, repetition and naming intact. Follows simple and complex commands. Attention/concentration, recent and remote memory (including registration and recall), and fund of knowledge intact    Cranial nerves - PERRLA, VFF, EOMI, face sensation (V1-V3) intact b/l, facial strength intact without asymmetry b/l, hearing intact b/l, palate with symmetric elevation, trapezius OR sternocleidomastiod 5/5 strength b/l, tongue midline on protrusion with full lateral movement    Motor - Normal bulk and tone throughout. No pronator drift.  Strength testing            Deltoid      Biceps      Triceps     Wrist Extension    Wrist Flexion     Interossei         R            5                 5               5                     5                              5                        5                 5  L             5                 5               5                     5                              5                        5                 5              Hip Flexion    Hip Extension    Knee Flexion    Knee Extension    Dorsiflexion    Plantar Flexion  R              5                           5                       5                           5                            5                          5  L              5                           5                        5                           5                            5                          5    Sensation - Light touch/temperature OR pain/vibration intact throughout    DTR's -             Biceps      Triceps     Brachioradialis      Patellar    Ankle    Toes/plantar response  R             2+             2+                  2+                       2+            2+                 Down  L              2+             2+                 2+                        2+           2+                 Down    Coordination - Finger to Nose intact b/l. No tremors appreciated    Gait and station - Normal casual gait. Romberg (-)    Labs:          CAPILLARY BLOOD GLUCOSE              CSF:                  Radiology:  CT Head No Cont:  (18 Feb 2023 05:47)     Neurology - Consult Note    -  Spectra: [phone] ([facility]), [phone] ([facility])  -    Interval hx:   No acute overnight events.     Pt seen at bedside with neurology team. Pt has no acute complaints.         Review of Systems:  INCOMPLETE   CONSTITUTIONAL: No fevers or chills  EYES AND ENT: No visual changes or no throat pain   NECK: No pain or stiffness  RESPIRATORY: No hemoptysis or shortness of breath  CARDIOVASCULAR: No chest pain or palpitations  GASTROINTESTINAL: No melena or hematochezia  GENITOURINARY: No dysuria or hematuria  NEUROLOGICAL: +As stated in HPI above  SKIN: No itching, burning, rashes, or lesions   All other review of systems is negative unless indicated above.    Allergies:  Benadryl (Hives)  Benadryl (Unknown)  penicillin (Rash)      PMHx/PSHx/Family Hx: As above, otherwise see below   Bipolar illness    Seizure    Hypothyroidism    Mentally challenged    Bipolar disorder    Hypothyroidism    Hyperlipidemia    H/O osteoporosis    History of seizure disorder    H/O sinus tachycardia        Social Hx:  No current use of tobacco, alcohol, or illicit drugs  Lives with ***    Medications:  MEDICATIONS  (STANDING):  bismuth subsalicylate Liquid 5 milliLiter(s) Oral once  enoxaparin Injectable 40 milliGRAM(s) SubCutaneous every 24 hours  ferrous    sulfate 325 milliGRAM(s) Oral two times a day  levothyroxine 50 MICROGram(s) Oral daily  LORazepam     Tablet 0.25 milliGRAM(s) Oral daily  polyethylene glycol 3350 17 Gram(s) Oral daily  QUEtiapine 100 milliGRAM(s) Oral two times a day    MEDICATIONS  (PRN):  acetaminophen     Tablet .. 650 milliGRAM(s) Oral every 6 hours PRN Moderate Pain (4 - 6), Severe Pain (7 - 10)  LORazepam   Injectable 1 milliGRAM(s) IV Push once PRN for seizure activity > 3 mins      Vitals:  T(C): 36.7 (02-24-23 @ 05:00), Max: 36.9 (02-24-23 @ 00:56)  HR: 83 (02-24-23 @ 05:00) (70 - 102)  BP: 114/70 (02-24-23 @ 05:00) (114/70 - 132/79)  RR: 18 (02-24-23 @ 05:00) (17 - 20)  SpO2: 96% (02-24-23 @ 05:00) (96% - 98%)    Physical Examination:  Constitutional: appears stated age, cheerful, NAD.  Head: Normocephalic & atraumatic.  Respiratory: Breathing comfortably on room air.    Neurological (>12):  MS: Eyes open, alert, oriented to person and place. Follows some simple commands.  Speech/Language: Clear, grossly fluent but perseverates, repeatedly stating her name, asking provider's name.  CNs: EOMI, no nystagmus, no diplopia. V1-3 intact to LT, well developed masseter muscles b/l. No gross facial asymmetry b/l, full eye closure strength b/l. Hearing grossly intact  to conversation.  Motor: Muscle bulk normal. Moving all extremities at least against gravity.   Sensation: Intact to LT throughout.   Coordination: Unable to assess due to participation.  Gait: Not assessed.    Labs:          CAPILLARY BLOOD GLUCOSE              CSF:                  Radiology:  CT Head No Cont:  (18 Feb 2023 05:47)    CT Head No Cont (02.18.23  No obvious acute intracranial hemorrhage, large cortical infarct or mass   effect. If clinically indicated, short-term follow-up or MRI may be   obtained for further evaluation.    EEG report 2/19/2023  EEG Summary:  Normal EEG in the awake, drowsy and asleep states.  Excess beta  Impression/Clinical Correlate:  This is a normal VEEG. No epileptiform pattern or seizures were recorded. The presence of excessive beta activity is most often seen in setting of sedative medication use.     EEG report 2/20/2023:  EEG Summary:  Normal EEG in the awake, drowsy and asleep states.  Excess beta  Impression/Clinical Correlate:  This is a normal VEEG. No epileptiform pattern or seizures were recorded. The presence of excessive beta activity is most often seen in setting of sedative medication use.     EEG report 2/21/2023  Normal EEG in the awake, drowsy and asleep states.  Excess beta  Impression/Clinical Correlate:  This is a normal VEEG. No epileptiform pattern or seizures were recorded. The presence of excessive beta activity is most often seen in setting of sedative medication use.     EEG Report 2/22/2023  Normal EEG in the awake, drowsy and asleep states.  Excess beta  Impression/Clinical Correlate:  This is a normal VEEG. No epileptiform pattern or seizures were recorded. The presence of excessive beta activity is most often seen in setting of sedative medication use.    EEG 2/23/23    Normal EEG in the awake, drowsy and asleep states.  Excess beta    Impression/Clinical Correlate:    This is a normal VEEG. No epileptiform pattern or seizures were recorded. The presence of excessive beta activity is most often seen in setting of sedative medication use    This is a prelim report only, pending review with attending prior to finalization.    Neurology - Consult Note    -  Spectra: [phone] ([facility]), [phone] ([facility])  -    Interval hx:   No acute overnight events.     Pt seen at bedside with neurology team. Pt has no acute complaints.         Review of Systems:  CONSTITUTIONAL: No fevers or chills  EYES AND ENT: No visual changes or no throat pain   NECK: No pain or stiffness  RESPIRATORY: No hemoptysis or shortness of breath  NEUROLOGICAL: +As stated in HPI above  SKIN: No itching, burning, rashes, or lesions   All other review of systems is negative unless indicated above.    Allergies:  Benadryl (Hives)  Benadryl (Unknown)  penicillin (Rash)      PMHx/PSHx/Family Hx: As above, otherwise see below   Bipolar illness    Seizure    Hypothyroidism    Mentally challenged    Bipolar disorder    Hypothyroidism    Hyperlipidemia    H/O osteoporosis    History of seizure disorder    H/O sinus tachycardia        Social Hx:  No current use of tobacco, alcohol, or illicit drugs  Lives with ***    Medications:  MEDICATIONS  (STANDING):  bismuth subsalicylate Liquid 5 milliLiter(s) Oral once  enoxaparin Injectable 40 milliGRAM(s) SubCutaneous every 24 hours  ferrous    sulfate 325 milliGRAM(s) Oral two times a day  levothyroxine 50 MICROGram(s) Oral daily  LORazepam     Tablet 0.25 milliGRAM(s) Oral daily  polyethylene glycol 3350 17 Gram(s) Oral daily  QUEtiapine 100 milliGRAM(s) Oral two times a day    MEDICATIONS  (PRN):  acetaminophen     Tablet .. 650 milliGRAM(s) Oral every 6 hours PRN Moderate Pain (4 - 6), Severe Pain (7 - 10)  LORazepam   Injectable 1 milliGRAM(s) IV Push once PRN for seizure activity > 3 mins      Vitals:  T(C): 36.7 (02-24-23 @ 05:00), Max: 36.9 (02-24-23 @ 00:56)  HR: 83 (02-24-23 @ 05:00) (70 - 102)  BP: 114/70 (02-24-23 @ 05:00) (114/70 - 132/79)  RR: 18 (02-24-23 @ 05:00) (17 - 20)  SpO2: 96% (02-24-23 @ 05:00) (96% - 98%)    Physical Examination:  Constitutional: appears stated age, cheerful, NAD.  Head: Normocephalic & atraumatic.  Respiratory: Breathing comfortably on room air.    Neurological (>12):  MS: Eyes open, alert, oriented to person and place. Follows some simple commands.  Speech/Language: Clear, grossly fluent but perseverates, repeatedly stating her name, asking provider's name.  CNs: EOMI, no nystagmus, no diplopia. V1-3 intact to LT, well developed masseter muscles b/l. No gross facial asymmetry b/l, full eye closure strength b/l. Hearing grossly intact  to conversation.  Motor: Muscle bulk normal. Moving all extremities at least against gravity.   Sensation: Intact to LT throughout.   Coordination: Unable to assess due to participation.  Gait: Not assessed.    Labs:          CAPILLARY BLOOD GLUCOSE              CSF:                  Radiology:  CT Head No Cont:  (18 Feb 2023 05:47)    CT Head No Cont (02.18.23  No obvious acute intracranial hemorrhage, large cortical infarct or mass   effect. If clinically indicated, short-term follow-up or MRI may be   obtained for further evaluation.    EEG report 2/19/2023  EEG Summary:  Normal EEG in the awake, drowsy and asleep states.  Excess beta  Impression/Clinical Correlate:  This is a normal VEEG. No epileptiform pattern or seizures were recorded. The presence of excessive beta activity is most often seen in setting of sedative medication use.     EEG report 2/20/2023:  EEG Summary:  Normal EEG in the awake, drowsy and asleep states.  Excess beta  Impression/Clinical Correlate:  This is a normal VEEG. No epileptiform pattern or seizures were recorded. The presence of excessive beta activity is most often seen in setting of sedative medication use.     EEG report 2/21/2023  Normal EEG in the awake, drowsy and asleep states.  Excess beta  Impression/Clinical Correlate:  This is a normal VEEG. No epileptiform pattern or seizures were recorded. The presence of excessive beta activity is most often seen in setting of sedative medication use.     EEG Report 2/22/2023  Normal EEG in the awake, drowsy and asleep states.  Excess beta  Impression/Clinical Correlate:  This is a normal VEEG. No epileptiform pattern or seizures were recorded. The presence of excessive beta activity is most often seen in setting of sedative medication use.    EEG 2/23/23    Normal EEG in the awake, drowsy and asleep states.  Excess beta    Impression/Clinical Correlate:    This is a normal VEEG. No epileptiform pattern or seizures were recorded. The presence of excessive beta activity is most often seen in setting of sedative medication use    This is a prelim report only, pending review with attending prior to finalization.

## 2023-02-24 NOTE — EEG REPORT - NS EEG TEXT BOX
Day 6 – 	Start: 2/23/2023  08:00    	End: 2/24/2023  08:00  	Duration: 24 hr  00 min    Daily EEG Visual Analysis    FINDINGS:  The background was continuous, symmetric, spontaneously variable and reactive. During wakefulness, only fragments of 9 Hz posterior rhythm could be discerned.  No Breach rhythms. Excess diffuse beta noted.     Background Slowing:  No generalized background slowing was present.    Focal Slowing:   None were present.    Sleep Background:  Drowsiness was characterized by fragmentation, attenuation, and slowing of the background activity.    Sleep was characterized by the presence of vertex waves, symmetric sleep spindles and K-complexes.    Other Non-Epileptiform Findings:  Diffuse excess beta activity, frontal predominant, waxes and wanes throughout the study without clear evolution or clinical correlate.     Activation Procedures:   Hyperventilation was not performed.    Photic stimulation was not performed.    Interictal Epileptiform Activity:   None were present.    Events:  No events or seizures recorded.    Artifacts:  Intermittent myogenic and movement artifacts were noted.    ECG:  The heart rate on single channel ECG was predominantly between  BPM.    AEDs:     Zonisamide 100mg BID    EEG Summary:    Normal EEG in the awake, drowsy and asleep states.  Excess beta    Impression/Clinical Correlate:    This is a normal VEEG. No epileptiform pattern or seizures were recorded. The presence of excessive beta activity never evolves and has no clear clinical correlation, is most often seen in setting of sedative medication use. Recommend repeat EEG once off sedative medications.       This is a prelim report only, pending review with attending prior to finalization.     Stephen Bowers MD     Fellow, [facility]     ------------------------------------     EEG Reading Room: [phone]     On Call Service After Hours: [phone]  Day 6 – 	Start: 2/23/2023  08:00    	End: 2/24/2023  08:00  	Duration: 24 hr  00 min    Daily EEG Visual Analysis    FINDINGS:  The background was continuous, symmetric, spontaneously variable and reactive. During wakefulness, only fragments of 9 Hz posterior rhythm could be discerned.  No Breach rhythms. Excess diffuse beta noted.     Background Slowing:  No generalized background slowing was present.    Focal Slowing:   None were present.    Sleep Background:  Drowsiness was characterized by fragmentation, attenuation, and slowing of the background activity.    Sleep was characterized by the presence of vertex waves, symmetric sleep spindles and K-complexes.    Other Non-Epileptiform Findings:  Diffuse excess beta activity, frontal predominant, waxes and wanes throughout the study without clear evolution or clinical correlate.     Activation Procedures:   Hyperventilation was not performed.    Photic stimulation was not performed.    Interictal Epileptiform Activity:   None were present.    Events:  No events or seizures recorded.    Artifacts:  Intermittent myogenic and movement artifacts were noted.    ECG:  The heart rate on single channel ECG was predominantly between  BPM.    AEDs:     Zonisamide 100mg BID    EEG Summary:    Normal EEG in the awake, drowsy and asleep states.  Excess beta    Impression/Clinical Correlate:    This is a normal VEEG. No epileptiform pattern or seizures were recorded. The presence of excessive beta activity never evolves and has no clear clinical correlation, is most often seen in setting of sedative medication use. Recommend repeat EEG once off sedative medications.       Stephen Bowers MD     Fellow, [facility]     Angel Bhakta MD  Neurology Attending Physician      ------------------------------------     EEG Reading Room: [phone]     On Call Service After Hours: [phone]

## 2023-02-25 PROCEDURE — 76377 3D RENDER W/INTRP POSTPROCES: CPT | Mod: 26

## 2023-02-25 PROCEDURE — 99231 SBSQ HOSP IP/OBS SF/LOW 25: CPT | Mod: GC

## 2023-02-25 PROCEDURE — 70553 MRI BRAIN STEM W/O & W/DYE: CPT | Mod: 26

## 2023-02-25 RX ORDER — BENZOCAINE AND MENTHOL 5; 1 G/100ML; G/100ML
1 LIQUID ORAL
Refills: 0 | Status: DISCONTINUED | OUTPATIENT
Start: 2023-02-25 | End: 2023-03-13

## 2023-02-25 RX ORDER — ONDANSETRON 8 MG/1
4 TABLET, FILM COATED ORAL ONCE
Refills: 0 | Status: DISCONTINUED | OUTPATIENT
Start: 2023-02-25 | End: 2023-02-25

## 2023-02-25 RX ORDER — LACOSAMIDE 50 MG/1
100 TABLET ORAL EVERY 12 HOURS
Refills: 0 | Status: DISCONTINUED | OUTPATIENT
Start: 2023-02-25 | End: 2023-02-27

## 2023-02-25 RX ORDER — ACETAMINOPHEN 500 MG
1000 TABLET ORAL ONCE
Refills: 0 | Status: DISCONTINUED | OUTPATIENT
Start: 2023-02-25 | End: 2023-02-25

## 2023-02-25 RX ORDER — ZONISAMIDE 100 MG
200 CAPSULE ORAL
Refills: 0 | Status: DISCONTINUED | OUTPATIENT
Start: 2023-02-25 | End: 2023-03-13

## 2023-02-25 RX ADMIN — Medication 100 MILLIGRAM(S): at 05:10

## 2023-02-25 RX ADMIN — Medication 325 MILLIGRAM(S): at 05:10

## 2023-02-25 RX ADMIN — Medication 50 MICROGRAM(S): at 05:10

## 2023-02-25 RX ADMIN — Medication 200 MILLIGRAM(S): at 17:40

## 2023-02-25 RX ADMIN — Medication 1 MILLIGRAM(S): at 08:40

## 2023-02-25 RX ADMIN — Medication 325 MILLIGRAM(S): at 17:40

## 2023-02-25 RX ADMIN — LACOSAMIDE 120 MILLIGRAM(S): 50 TABLET ORAL at 17:41

## 2023-02-25 RX ADMIN — QUETIAPINE FUMARATE 100 MILLIGRAM(S): 200 TABLET, FILM COATED ORAL at 05:10

## 2023-02-25 RX ADMIN — QUETIAPINE FUMARATE 100 MILLIGRAM(S): 200 TABLET, FILM COATED ORAL at 17:40

## 2023-02-25 NOTE — PROGRESS NOTE ADULT - SUBJECTIVE AND OBJECTIVE BOX
NEUROLOGY PROGRESS NOTE    Interval hx:   No acute overnight events. Patient scheduled for MRI under anesthesia at 8:00am today.    Pt seen at bedside with neurology team. Pt has no acute complaints.     Allergies:  Benadryl (Hives)  Benadryl (Unknown)  penicillin (Rash)    PMHx/PSHx/Family Hx: As above, otherwise see below   Bipolar illness    Seizure    Hypothyroidism    Mentally challenged    Bipolar disorder    Hypothyroidism    Hyperlipidemia    H/O osteoporosis    History of seizure disorder    H/O sinus tachycardia        Social Hx:  No current use of tobacco, alcohol, or illicit drugs  Lives with ***    Medications:  MEDICATIONS  (STANDING):  bismuth subsalicylate Liquid 5 milliLiter(s) Oral once  enoxaparin Injectable 40 milliGRAM(s) SubCutaneous every 24 hours  ferrous    sulfate 325 milliGRAM(s) Oral two times a day  levothyroxine 50 MICROGram(s) Oral daily  LORazepam     Tablet 0.25 milliGRAM(s) Oral daily  polyethylene glycol 3350 17 Gram(s) Oral daily  QUEtiapine 100 milliGRAM(s) Oral two times a day    MEDICATIONS  (PRN):  acetaminophen     Tablet .. 650 milliGRAM(s) Oral every 6 hours PRN Moderate Pain (4 - 6), Severe Pain (7 - 10)  LORazepam   Injectable 1 milliGRAM(s) IV Push once PRN for seizure activity > 3 mins      Vitals:  T(C): 36.7 (02-24-23 @ 05:00), Max: 36.9 (02-24-23 @ 00:56)  HR: 83 (02-24-23 @ 05:00) (70 - 102)  BP: 114/70 (02-24-23 @ 05:00) (114/70 - 132/79)  RR: 18 (02-24-23 @ 05:00) (17 - 20)  SpO2: 96% (02-24-23 @ 05:00) (96% - 98%)    Physical Examination:  Constitutional: appears stated age, cheerful, NAD.  Head: Normocephalic & atraumatic.  Respiratory: Breathing comfortably on room air.    Neurological (>12):  MS: Eyes open, alert, oriented to person and place. Follows some simple commands.  Speech/Language: Clear, grossly fluent but perseverates, repeatedly stating her name, asking provider's name.  CNs: EOMI, no nystagmus, no diplopia. V1-3 intact to LT, well developed masseter muscles b/l. No gross facial asymmetry b/l, full eye closure strength b/l. Hearing grossly intact  to conversation.  Motor: Muscle bulk normal. Moving all extremities at least against gravity.   Sensation: Intact to LT throughout.   Coordination: Unable to assess due to participation.  Gait: Not assessed.    Labs:  Labs reviewed from 2/22/23    Radiology:  CT Head No Cont:  (18 Feb 2023 05:47)    CT Head No Cont (02.18.23  No obvious acute intracranial hemorrhage, large cortical infarct or mass   effect. If clinically indicated, short-term follow-up or MRI may be   obtained for further evaluation.    EEG report 2/19/2023  EEG Summary:  Normal EEG in the awake, drowsy and asleep states.  Excess beta  Impression/Clinical Correlate:  This is a normal VEEG. No epileptiform pattern or seizures were recorded. The presence of excessive beta activity is most often seen in setting of sedative medication use.     EEG report 2/20/2023:  EEG Summary:  Normal EEG in the awake, drowsy and asleep states.  Excess beta  Impression/Clinical Correlate:  This is a normal VEEG. No epileptiform pattern or seizures were recorded. The presence of excessive beta activity is most often seen in setting of sedative medication use.     EEG report 2/21/2023  Normal EEG in the awake, drowsy and asleep states.  Excess beta  Impression/Clinical Correlate:  This is a normal VEEG. No epileptiform pattern or seizures were recorded. The presence of excessive beta activity is most often seen in setting of sedative medication use.     EEG Report 2/22/2023  Normal EEG in the awake, drowsy and asleep states.  Excess beta  Impression/Clinical Correlate:  This is a normal VEEG. No epileptiform pattern or seizures were recorded. The presence of excessive beta activity is most often seen in setting of sedative medication use.    EEG Report  2/23/23    Normal EEG in the awake, drowsy and asleep states.  Excess beta    Impression/Clinical Correlate:    This is a normal VEEG. No epileptiform pattern or seizures were recorded. The presence of excessive beta activity is most often seen in setting of sedative medication use    This is a prelim report only, pending review with attending prior to finalization.    NEUROLOGY PROGRESS NOTE    Interval hx:   No acute overnight events. Patient scheduled for MRI under anesthesia at 8:00am today.    Pt seen at bedside with neurology team. She complained of sore throat after extubation when returning from MRI    Allergies:  Benadryl (Hives)  Benadryl (Unknown)  penicillin (Rash)    PMHx/PSHx/Family Hx: As above, otherwise see below   Bipolar illness    Seizure    Hypothyroidism    Mentally challenged    Bipolar disorder    Hypothyroidism    Hyperlipidemia    H/O osteoporosis    History of seizure disorder    H/O sinus tachycardia    Social Hx:  No current use of tobacco, alcohol, or illicit drugs    Medications:  MEDICATIONS  (STANDING):  bismuth subsalicylate Liquid 5 milliLiter(s) Oral once  enoxaparin Injectable 40 milliGRAM(s) SubCutaneous every 24 hours  ferrous    sulfate 325 milliGRAM(s) Oral two times a day  levothyroxine 50 MICROGram(s) Oral daily  LORazepam     Tablet 0.25 milliGRAM(s) Oral daily  polyethylene glycol 3350 17 Gram(s) Oral daily  QUEtiapine 100 milliGRAM(s) Oral two times a day    MEDICATIONS  (PRN):  acetaminophen     Tablet .. 650 milliGRAM(s) Oral every 6 hours PRN Moderate Pain (4 - 6), Severe Pain (7 - 10)  LORazepam   Injectable 1 milliGRAM(s) IV Push once PRN for seizure activity > 3 mins    Vitals:  Vital Signs Last 24 Hrs  T(C): 36.5 (25 Feb 2023 11:45), Max: 37.2 (25 Feb 2023 00:33)  T(F): 97.7 (25 Feb 2023 11:45), Max: 98.9 (25 Feb 2023 00:33)  HR: 95 (25 Feb 2023 12:00) (60 - 108)  BP: 115/65 (25 Feb 2023 12:00) (109/66 - 138/83)  BP(mean): 81 (25 Feb 2023 11:30) (81 - 96)  RR: 16 (25 Feb 2023 12:00) (16 - 20)  SpO2: 97% (25 Feb 2023 12:00) (95% - 97%)    Parameters below as of 25 Feb 2023 11:05  Patient On (Oxygen Delivery Method): room air    Physical Examination:  Constitutional: appears stated age, sometimes uncooperative  Head: Normocephalic & atraumatic.  Respiratory: Breathing comfortably on room air.    Neurological (>12):  MS: Eyes open, alert, oriented to person and place. Follows some simple commands.  Speech/Language: Clear, grossly fluent but perseverates, repeatedly stating her name, asking provider's name.  CNs: EOMI, no nystagmus, no diplopia. V1-3 intact to LT, well developed masseter muscles b/l. No gross facial asymmetry b/l, full eye closure strength b/l. Hearing grossly intact  to conversation.  Motor: Muscle bulk normal. Moving all extremities at least against gravity.   Sensation: Intact to LT throughout.   Coordination: Unable to assess due to participation.  Gait: Not assessed.    Labs:  Labs reviewed from 2/22/23    Radiology:  pending final MRI brain read    CT Head No Cont:  (18 Feb 2023 05:47)    CT Head No Cont (02.18.23  No obvious acute intracranial hemorrhage, large cortical infarct or mass   effect. If clinically indicated, short-term follow-up or MRI may be   obtained for further evaluation.    EEG 2/24-2/25  Normal EEG in the awake, drowsy and asleep states.  Polyspikes, focal, left posterior temporal-central region  Excess beta    Impression/Clinical Correlate:    This is an abnormal VEEG. There is evidence of potentially epileptogenic cortical irritability in the left posterior temporal-cental region. No seizures were recorded. The presence of excessive beta activity never evolves and has no clear clinical correlation, is most often seen in setting of sedative medication use but has also been described in rare cases of intellectual impairment.     EEG 2/23-2/24  Normal EEG in the awake, drowsy and asleep states.  3.	Excess beta    Impression/Clinical Correlate:    This is a normal VEEG. No epileptiform pattern or seizures were recorded. The presence of excessive beta activity is most often seen in setting of sedative medication use    This is a prelim report only, pending review with attending prior to finalization.

## 2023-02-25 NOTE — EEG REPORT - NS EEG TEXT BOX
Day 6 – 	Start: 2/23/2023  08:00    	End: 2/24/2023  08:00  	Duration: 24 hr  00 min    Daily EEG Visual Analysis    FINDINGS:  The background was continuous, symmetric, spontaneously variable and reactive. During wakefulness, only fragments of 9 Hz posterior rhythm could be discerned.  No Breach rhythms. Excess diffuse beta noted.     Background Slowing:  No generalized background slowing was present.    Focal Slowing:   None were present.    Sleep Background:  Drowsiness was characterized by fragmentation, attenuation, and slowing of the background activity.    Sleep was characterized by the presence of vertex waves, symmetric sleep spindles and K-complexes.    Other Non-Epileptiform Findings:  Diffuse excess beta activity, frontal predominant, waxes and wanes throughout the study without clear evolution or clinical correlate.     Activation Procedures:   Hyperventilation was not performed.    Photic stimulation was not performed.    Interictal Epileptiform Activity:   None were present.    Events:  No events or seizures recorded.    Artifacts:  Intermittent myogenic and movement artifacts were noted.    ECG:  The heart rate on single channel ECG was predominantly between  BPM.    AEDs:     Zonisamide 100mg BID    EEG Summary:    Normal EEG in the awake, drowsy and asleep states.  Excess beta    Impression/Clinical Correlate:    This is a normal VEEG. No epileptiform pattern or seizures were recorded. The presence of excessive beta activity never evolves and has no clear clinical correlation, is most often seen in setting of sedative medication use. Recommend repeat EEG once off sedative medications.     Bj Andrews MD PhD  Director, Epilepsy Division, [facility]       ------------------------------------     EEG Reading Room: [phone]     On Call Service After Hours: [phone]   Day 7 – 	Start: 2/24/2023  08:00   	End: 2/25/2023  08:00 	Duration: 24 hr  00 min  Daily EEG Visual Analysis  FINDINGS:  The background was continuous, symmetric, spontaneously variable and reactive. During wakefulness, only fragments of 9 Hz posterior rhythm could be discerned.  No Breach rhythms. Excess diffuse beta noted.   Background Slowing: No generalized background slowing was present.  Focal Slowing:  None were present.  Sleep Background: Drowsiness was characterized by fragmentation, attenuation, and slowing of the background activity.   Sleep was characterized by the presence of vertex waves, symmetric sleep spindles and K-complexes.  Other Non-Epileptiform Findings: Diffuse excess beta activity, frontal predominant, waxes and wanes throughout the study without clear evolution or clinical correlate.   Activation Procedures:  Hyperventilation was not performed.   Photic stimulation was not performed.  Interictal Epileptiform Activity:  None were present.  Events: No events or seizures recorded.  Artifacts: Intermittent myogenic and movement artifacts were noted.  ECG: The heart rate on single channel ECG was predominantly between  BPM.  AEDs:  Zonisamide 100mg BID   EEG Summary:  Normal EEG in the awake, drowsy and asleep states. Excess beta  Impression/Clinical Correlate:  This is a normal VEEG. No epileptiform pattern or seizures were recorded. The presence of excessive beta activity never evolves and has no clear clinical correlation, is most often seen in setting of sedative medication use but has also been described in rare cases of intellectual impairment.       Bj Andrews MD, PhD Director, Epilepsy Division, [facility] ------------------------------------ EEG Reading Room: [phone] On Call Service After Hours: [phone]     Day 7 – 	Start: 2/24/2023  08:00   	End: 2/25/2023  09:24 	Duration: 25hr  24min  Daily EEG Visual Analysis  FINDINGS:  The background was continuous, symmetric, spontaneously variable and reactive. During wakefulness, only fragments of 9 Hz posterior rhythm could be discerned.  No Breach rhythms. Excess diffuse beta noted.   Background Slowing: No generalized background slowing was present.  Focal Slowing:  None were present.  Sleep Background: Drowsiness was characterized by fragmentation, attenuation, and slowing of the background activity.   Sleep was characterized by the presence of vertex waves, symmetric sleep spindles and K-complexes.  Other Non-Epileptiform Findings: Diffuse excess beta activity, frontal predominant, waxes and wanes throughout the study without clear evolution or clinical correlate.   Activation Procedures:  Hyperventilation was not performed.   Photic stimulation was not performed.  Interictal Epileptiform Activity:  None were present.  Events: No events or seizures recorded.  Artifacts: Intermittent myogenic and movement artifacts were noted.  ECG: The heart rate on single channel ECG was predominantly between  BPM.  AEDs:  Zonisamide 100mg BID   EEG Summary:  Normal EEG in the awake, drowsy and asleep states. Excess beta  Impression/Clinical Correlate:  This is a normal VEEG. No epileptiform pattern or seizures were recorded. The presence of excessive beta activity never evolves and has no clear clinical correlation, is most often seen in setting of sedative medication use but has also been described in rare cases of intellectual impairment.       Bj Andrews MD, PhD Director, Epilepsy Division, [facility] ------------------------------------ EEG Reading Room: [phone] On Call Service After Hours: [phone]      Day 6 – 	Start: 2/23/2023  08:00   	End: 2/24/2023  08:00 	Duration: 24 hr  00 min  Daily EEG Visual Analysis  FINDINGS:  The background was continuous, symmetric, spontaneously variable and reactive. During wakefulness, only fragments of 9 Hz posterior rhythm could be discerned.  No Breach rhythms. Excess diffuse beta noted.   Background Slowing: No generalized background slowing was present.  Focal Slowing:  None were present.  Sleep Background: Drowsiness was characterized by fragmentation, attenuation, and slowing of the background activity.   Sleep was characterized by the presence of vertex waves, symmetric sleep spindles and K-complexes.  Other Non-Epileptiform Findings: Diffuse excess beta activity, frontal predominant, waxes and wanes throughout the study without clear evolution or clinical correlate.   Activation Procedures:  Hyperventilation was not performed.   Photic stimulation was not performed.  Interictal Epileptiform Activity:  None were present.  Events: No events or seizures recorded.  Artifacts: Intermittent myogenic and movement artifacts were noted.  ECG: The heart rate on single channel ECG was predominantly between  BPM.  AEDs:   Zonisamide 100mg BID  Day 7 – 	Start: 2/24/2023  08:00   	End: 2/25/2023  09:24 	Duration: 25 hr  24 min  Daily EEG Visual Analysis  FINDINGS:  The background was continuous, symmetric, spontaneously variable and reactive. During wakefulness, only fragments of 9 Hz posterior rhythm could be discerned.  No Breach rhythms. Excess diffuse beta noted.   Background Slowing: No generalized background slowing was present.  Focal Slowing:  None were present.  Sleep Background: Drowsiness was characterized by fragmentation, attenuation, and slowing of the background activity.   Sleep was characterized by the presence of vertex waves, symmetric sleep spindles and K-complexes.  Other Non-Epileptiform Findings: Diffuse excess beta activity, frontal predominant, waxes and wanes throughout the study without clear evolution or clinical correlate.   Activation Procedures:  Hyperventilation was not performed.   Photic stimulation was not performed.  Interictal Epileptiform Activity:  There are frequent polyspike discharges phase reversing in the left posterior temporal – central region.   Laplacian   Bipolar    Events: No events or seizures recorded.  Artifacts: Intermittent myogenic and movement artifacts were noted.  ECG: The heart rate on single channel ECG was predominantly between  BPM.  AEDs:  Zonisamide 100mg BID  EEG Summary:  Normal EEG in the awake, drowsy and asleep states. Polyspikes, focal, left posterior temporal-central region Excess beta  Impression/Clinical Correlate:  This is an abnormal VEEG. There is evidence of potentially epileptogenic cortical irritability in the left posterior temporal-cental region. No seizures were recorded. The presence of excessive beta activity never evolves and has no clear clinical correlation, is most often seen in setting of sedative medication use but has also been described in rare cases of intellectual impairment.    Can Bowers MD Fellow, [facility]    Bj Andrews MD, PhD Director, Epilepsy Division, [facility] ------------------------------------ EEG Reading Room: [phone] On Call Service After Hours: [phone]      Day 7 – 	Start: 2/24/2023  08:00   	End: 2/25/2023  09:24 	Duration: 25 hr  24 min  Daily EEG Visual Analysis  FINDINGS:  The background was continuous, symmetric, spontaneously variable and reactive. During wakefulness, only fragments of 9 Hz posterior rhythm could be discerned.  No Breach rhythms. Excess diffuse beta noted.   Background Slowing: No generalized background slowing was present.  Focal Slowing:  None were present.  Sleep Background: Drowsiness was characterized by fragmentation, attenuation, and slowing of the background activity.   Sleep was characterized by the presence of vertex waves, symmetric sleep spindles and K-complexes.  Other Non-Epileptiform Findings: Diffuse excess beta activity, frontal predominant, waxes and wanes throughout the study without clear evolution or clinical correlate.   Activation Procedures:  Hyperventilation was not performed.   Photic stimulation was not performed.  Interictal Epileptiform Activity:  There are frequent polyspike discharges phase reversing in the left posterior temporal – central region.   Laplacian   Bipolar    Events: No events or seizures recorded.  Artifacts: Intermittent myogenic and movement artifacts were noted.  ECG: The heart rate on single channel ECG was predominantly between  BPM.  AEDs:  Zonisamide 100mg BID  EEG Summary:  Normal EEG in the awake, drowsy and asleep states. Polyspikes, focal, left posterior temporal-central region Excess beta  Impression/Clinical Correlate:  This is an abnormal VEEG. There is evidence of potentially epileptogenic cortical irritability in the left posterior temporal-cental region. No seizures were recorded. The presence of excessive beta activity never evolves and has no clear clinical correlation, is most often seen in setting of sedative medication use but has also been described in rare cases of intellectual impairment.    Can Bowers MD Fellow, [facility]    Bj Andrews MD, PhD Director, Epilepsy Division, [facility] ------------------------------------ EEG Reading Room: [phone] On Call Service After Hours: [phone]

## 2023-02-25 NOTE — PROGRESS NOTE ADULT - ASSESSMENT
42 year-old woman with a PMH of HTN, HLD, bipolar d/o, hypothyroidism, seizure disorder (on Keppra and VPA), developmentally delayed, who presented to OSH on 2/18 with recurrent breakthrough seizures, transferred to the EMU on 2/18 for further monitoring and management. Exam reported to be baseline at this time. CTH from OSH unrevealing. VPA level from OSH subtherapeutic at 43, but noted to be 52.26 when corrected for albumin of 3.9. CBC/CMP wnl. Per history from family, patient had hx of meningitis age 18 mo, possible inception of seizures age 2 years (potentially earlier) with eyes rolling back and becoming less participatory. Per  Gian and family/ mother, patient may be having (once clarified) predominantly evening time events described as becoming quiet, less engaged, not talking, periodic eye blinking. No delmy tonic/ clonic activity noted.       Impression: Breakthrough seizures in the setting of borderline VPA level possibly due to medication nonadherence vs inadequate dosing. Initial goal to characterize events if epileptogenic.    Plan:   []follow up MR brain w and w/o contrast with epilepsy protocol - 2/25 at 8am  [x] c/w Video EEG  [x] CBC, CMP, Mg, Phos, CK, lactate  [x] VPA- 43 and Keppra- 23.2 level  [x] UA neg for infxn  [] decrease ativan to .25 QD on 2/22  [ ] dec seroquel to 100mg bid 2/22  [x] Discontinue home keppra  [] discontinue depakote 2/22  [] d/c amantadine 2/22   [x] psych consult for agitation - rec continue with current regimen   [ ] Seizure rescue: Ativan 1mg IVP for seizure activity > 3 mins  [] Telemetry monitoring; Neurochecks/VS per unit protocol  [] Seizure, fall and aspiration precautions   [] Given concern for seizure, advise patient to not drive (for 1 yr per [facility] guidelines), operate heavy machinery, avoid heights, pools, bathtubs, locked doors.  [] Diet: Regular (Kosher)  [] DVT ppx lovenox    Case seen and discussed with neurologist Dr. Cardenas 42 year-old woman with a PMH of HTN, HLD, bipolar d/o, hypothyroidism, seizure disorder (on Keppra and VPA), developmentally delayed, who presented to OSH on 2/18 with recurrent breakthrough seizures, transferred to the EMU on 2/18 for further monitoring and management. Exam reported to be baseline at this time. CTH from OSH unrevealing. VPA level from OSH subtherapeutic at 43, but noted to be 52.26 when corrected for albumin of 3.9. CBC/CMP wnl. Per history from family, patient had hx of meningitis age 18 mo, possible inception of seizures age 2 years (potentially earlier) with eyes rolling back and becoming less participatory. Per  Gian and family/ mother, patient may be having (once clarified) predominantly evening time events described as becoming quiet, less engaged, not talking, periodic eye blinking. No delmy tonic/ clonic activity noted. EEG overnight 2/25 shows focal polyspikes in left posterior temporal-central region.      Impression: Breakthrough seizures in the setting of borderline VPA level possibly due to medication nonadherence vs inadequate dosing. Initial goal to characterize events if epileptogenic.    Plan:   [] follow up MR brain w and w/o contrast with epilepsy protocol - 2/25 at 8am; pending final read  [] IV lacosamide 100mg BID started on 2/25  [] continue VEEG until 2/26  [x] CBC, CMP, Mg, Phos, CK, lactate  [x] VPA- 43 and Keppra- 23.2 level  [x] UA neg for infxn  [x] OFF Ativan  [ ] decrease Seroquel to 100mg bid 2/22  [x] D/C home LEV and VPA  [x] D/C amantadine 2/22   [x] psych consult for agitation - rec continue with current regimen   [ ] Seizure rescue: Ativan 1mg IVP for seizure activity > 3 mins  [] Telemetry monitoring; Neurochecks/VS per unit protocol  [] Seizure, fall and aspiration precautions   [] Given concern for seizure, advise patient to not drive (for 1 yr per [facility] guidelines), operate heavy machinery, avoid heights, pools, bathtubs, locked doors.  [] Diet: Regular (Kosher)  [] DVT ppx lovenox    Case seen and discussed with neurologist Dr. Cardenas

## 2023-02-25 NOTE — EEG REPORT - NS EEG HISTORY CONTINUOUS VIDEO EEG SUG
medical management of seizure exacerbation/characterization of seizures
medical management of seizure exacerbation/medication adjustment

## 2023-02-26 PROCEDURE — 99232 SBSQ HOSP IP/OBS MODERATE 35: CPT | Mod: GC

## 2023-02-26 RX ORDER — AMANTADINE HCL 100 MG
0 CAPSULE ORAL
Qty: 0 | Refills: 0 | DISCHARGE

## 2023-02-26 RX ORDER — MIDAZOLAM HYDROCHLORIDE 1 MG/ML
1 INJECTION, SOLUTION INTRAMUSCULAR; INTRAVENOUS
Qty: 0 | Refills: 0 | DISCHARGE

## 2023-02-26 RX ORDER — QUETIAPINE FUMARATE 200 MG/1
1 TABLET, FILM COATED ORAL
Qty: 60 | Refills: 3
Start: 2023-02-26 | End: 2023-06-25

## 2023-02-26 RX ORDER — MIDAZOLAM HYDROCHLORIDE 1 MG/ML
1 INJECTION, SOLUTION INTRAMUSCULAR; INTRAVENOUS
Qty: 1 | Refills: 0
Start: 2023-02-26 | End: 2023-03-27

## 2023-02-26 RX ORDER — DIVALPROEX SODIUM 500 MG/1
250 TABLET, DELAYED RELEASE ORAL
Qty: 0 | Refills: 0 | DISCHARGE

## 2023-02-26 RX ORDER — LEVETIRACETAM 250 MG/1
1500 TABLET, FILM COATED ORAL
Qty: 0 | Refills: 0 | DISCHARGE

## 2023-02-26 RX ORDER — ZONISAMIDE 100 MG
2 CAPSULE ORAL
Qty: 120 | Refills: 3
Start: 2023-02-26 | End: 2023-06-25

## 2023-02-26 RX ORDER — QUETIAPINE FUMARATE 200 MG/1
50 TABLET, FILM COATED ORAL ONCE
Refills: 0 | Status: COMPLETED | OUTPATIENT
Start: 2023-02-26 | End: 2023-02-26

## 2023-02-26 RX ORDER — QUETIAPINE FUMARATE 200 MG/1
150 TABLET, FILM COATED ORAL
Qty: 0 | Refills: 0 | DISCHARGE

## 2023-02-26 RX ADMIN — Medication 200 MILLIGRAM(S): at 16:53

## 2023-02-26 RX ADMIN — QUETIAPINE FUMARATE 100 MILLIGRAM(S): 200 TABLET, FILM COATED ORAL at 18:20

## 2023-02-26 RX ADMIN — LACOSAMIDE 120 MILLIGRAM(S): 50 TABLET ORAL at 05:15

## 2023-02-26 RX ADMIN — Medication 325 MILLIGRAM(S): at 05:14

## 2023-02-26 RX ADMIN — Medication 325 MILLIGRAM(S): at 16:51

## 2023-02-26 RX ADMIN — Medication 200 MILLIGRAM(S): at 05:14

## 2023-02-26 RX ADMIN — QUETIAPINE FUMARATE 100 MILLIGRAM(S): 200 TABLET, FILM COATED ORAL at 05:14

## 2023-02-26 RX ADMIN — POLYETHYLENE GLYCOL 3350 17 GRAM(S): 17 POWDER, FOR SOLUTION ORAL at 16:51

## 2023-02-26 RX ADMIN — LACOSAMIDE 120 MILLIGRAM(S): 50 TABLET ORAL at 16:52

## 2023-02-26 RX ADMIN — Medication 50 MICROGRAM(S): at 05:13

## 2023-02-26 RX ADMIN — QUETIAPINE FUMARATE 50 MILLIGRAM(S): 200 TABLET, FILM COATED ORAL at 00:24

## 2023-02-26 NOTE — PROGRESS NOTE ADULT - ASSESSMENT
42 year-old woman with a PMH of HTN, HLD, bipolar d/o, hypothyroidism, seizure disorder (on Keppra and VPA), developmentally delayed, who presented to OSH on 2/18 with recurrent breakthrough seizures, transferred to the EMU on 2/18 for further monitoring and management. Exam reported to be baseline at this time. CTH from OSH unrevealing. VPA level from OSH subtherapeutic at 43, but noted to be 52.26 when corrected for albumin of 3.9. CBC/CMP wnl. Per history from family, patient had hx of meningitis age 18 mo, possible inception of seizures age 2 years (potentially earlier) with eyes rolling back and becoming less participatory. Per  Gian and family/ mother, patient may be having (once clarified) predominantly evening time events described as becoming quiet, less engaged, not talking, periodic eye blinking. No delmy tonic/ clonic activity noted. EEG overnight 2/25 shows focal polyspikes in left posterior temporal-central region.      Impression: Breakthrough seizures in the setting of borderline VPA level possibly due to medication nonadherence vs inadequate dosing. Initial goal to characterize events if epileptogenic.    Plan:   [] MR brain w/wo contrast with epilepsy protocol - nonspecific subcentimeter T2/FLAIR hyperintensity w/in L posterior temporal lobe.  [x] IV lacosamide 100mg BID started on 2/25  [x] Increased zonisamide to 200 mg BID  [x] continue VEEG until 2/26  [x] CBC, CMP, Mg, Phos, CK, lactate  [x] VPA- 43 and Keppra- 23.2 level  [x] UA neg for infxn  [x] OFF Ativan  [x] Decrease quetiapine to 100mg bid 2/22  [x] D/C home LEV and VPA  [x] D/C amantadine 2/22   [x] Psych consult for agitation - rec continue with current regimen   [ ] Seizure rescue: Ativan 1mg IVP for seizure activity > 3 mins  [] Telemetry monitoring; Neurochecks/VS per unit protocol  [] Seizure, fall and aspiration precautions   [] Given concern for seizure, advise patient to not drive (for 1 yr per [facility] guidelines), operate heavy machinery, avoid heights, pools, bathtubs, locked doors.  [] Diet: Regular (Kosher)  [] DVT ppx lovenox    Case seen and discussed with neurologist Dr. Cardenas 42 year-old woman with a PMH of HTN, HLD, bipolar d/o, hypothyroidism, seizure disorder (on Keppra and VPA), developmentally delayed, who presented to OSH on 2/18 with recurrent breakthrough seizures, transferred to the EMU on 2/18 for further monitoring and management. Exam reported to be baseline at this time. CTH from OSH unrevealing. VPA level from OSH subtherapeutic at 43, but noted to be 52.26 when corrected for albumin of 3.9. CBC/CMP wnl. Per history from family, patient had hx of meningitis age 18 mo, possible inception of seizures age 2 years (potentially earlier) with eyes rolling back and becoming less participatory. Per  Gian and family/ mother, patient may be having (once clarified) predominantly evening time events described as becoming quiet, less engaged, not talking, periodic eye blinking. No delmy tonic/ clonic activity noted. EEG overnight 2/25 shows focal polyspikes in left posterior temporal-central region.      Impression: Breakthrough seizures in the setting of borderline VPA level possibly due to medication nonadherence vs inadequate dosing. Initial goal to characterize events if epileptogenic.    Plan:   [x] MR brain w/wo contrast with epilepsy protocol - nonspecific subcentimeter T2/FLAIR hyperintensity w/in L posterior temporal lobe.  [x] IV lacosamide 100mg BID started on 2/25  [x] Increased zonisamide to 200 mg BID  [x] continue VEEG until 2/26  [x] CBC, CMP, Mg, Phos, CK, lactate  [x] VPA- 43 and Keppra- 23.2 level  [x] UA neg for infxn  [x] OFF Ativan  [x] Decrease quetiapine to 100mg bid 2/22  [x] D/C home LEV and VPA  [x] D/C amantadine 2/22   [x] Psych consult for agitation - rec continue with current regimen   [ ] Seizure rescue: Ativan 1mg IVP for seizure activity > 3 mins  [] Telemetry monitoring; Neurochecks/VS per unit protocol  [] Seizure, fall and aspiration precautions   [] Given concern for seizure, advise patient to not drive (for 1 yr per [facility] guidelines), operate heavy machinery, avoid heights, pools, bathtubs, locked doors.  [] Diet: Regular (Kosher)  [] DVT ppx lovenox    Case seen and discussed with neurologist Dr. Cardenas

## 2023-02-26 NOTE — PROGRESS NOTE ADULT - SUBJECTIVE AND OBJECTIVE BOX
Neurology  Progress Note  02-26-23    Name: MARLEY MCKEON 42y    Overnight: very agitated and screaming, running in halls - given extra 50mg quetiapine, will consider changing standing to bedtime, not 5pm.    Review of Systems: ***    Medications:  MEDICATIONS  (STANDING):  bismuth subsalicylate Liquid 5 milliLiter(s) Oral once  enoxaparin Injectable 40 milliGRAM(s) SubCutaneous every 24 hours  ferrous    sulfate 325 milliGRAM(s) Oral two times a day  lacosamide IVPB 100 milliGRAM(s) IV Intermittent every 12 hours  levothyroxine 50 MICROGram(s) Oral daily  polyethylene glycol 3350 17 Gram(s) Oral daily  QUEtiapine 100 milliGRAM(s) Oral two times a day  zonisamide 200 milliGRAM(s) Oral two times a day    MEDICATIONS  (PRN):  acetaminophen     Tablet .. 650 milliGRAM(s) Oral every 6 hours PRN Moderate Pain (4 - 6), Severe Pain (7 - 10)  benzocaine/menthol Lozenge 1 Lozenge Oral four times a day PRN Sore Throat  guaiFENesin Oral Liquid (Sugar-Free) 200 milliGRAM(s) Oral every 8 hours PRN Cough  LORazepam   Injectable 1 milliGRAM(s) IV Push once PRN for seizure activity > 3 mins      Vitals:  T(C): 36.6 (02-26-23 @ 00:30), Max: 36.8 (02-25-23 @ 16:39)  HR: 103 (02-26-23 @ 00:30) (76 - 108)  BP: 131/88 (02-26-23 @ 00:30) (109/66 - 131/88)  RR: 18 (02-26-23 @ 00:30) (16 - 18)  SpO2: 94% (02-26-23 @ 00:30) (94% - 97%)      Neurologic Examination: INCOMPLETE  ***    Labs:  No new labs since 2/22/23.    EEG REPORT 2/25/23:  EEG Summary:    Normal EEG in the awake, drowsy and asleep states.  Polyspikes, focal, left posterior temporal-central region  Excess beta    Impression/Clinical Correlate:    This is an abnormal VEEG. There is evidence of potentially epileptogenic cortical irritability in the left posterior temporal-cental region. No seizures were recorded. The presence of excessive beta activity never evolves and has no clear clinical correlation, is most often seen in setting of sedative medication use but has also been described in rare cases of intellectual impairment.       Radiology:  MR Brain-Seizure, Epilepsy w/wo IV Cont (02.25.23 @ 11:07)  FINDINGS:  No acute infarction or acute intracranial hemorrhage.  The hippocampi appear unremarkable. There is no evidence for congenital   malformation/developmental anomaly.  Nonspecific subcentimeter T2/FLAIR hyperintense white matter foci are   noted within the left posterior temporal lobe (19-28).  No hydrocephalus. No extra-axial fluid collections. The skull base flow   voids are present.    The visualized intraorbital contents are normal. The imaged portions of   the paranasal sinuses are clear. The mastoid air cells are clear. The   visualized osseous structures, soft tissues and partially visualized   parotid glands appear normal.    IMPRESSION:    No acute infarct or hemorrhage.    Nonspecific subcentimeter T2/FLAIR hyperintense white matter foci in the   posterior left temporal lobe.        Neurology  Progress Note  02-26-23    Name: MARLEY MCKEON 42y    Overnight: very agitated and screaming, running in halls - given extra 50mg quetiapine, will consider changing standing to bedtime, not 5pm.    Review of Systems: No complaints. Limited ROS due to baseline cognition.    Medications:  MEDICATIONS  (STANDING):  bismuth subsalicylate Liquid 5 milliLiter(s) Oral once  enoxaparin Injectable 40 milliGRAM(s) SubCutaneous every 24 hours  ferrous    sulfate 325 milliGRAM(s) Oral two times a day  lacosamide IVPB 100 milliGRAM(s) IV Intermittent every 12 hours  levothyroxine 50 MICROGram(s) Oral daily  polyethylene glycol 3350 17 Gram(s) Oral daily  QUEtiapine 100 milliGRAM(s) Oral two times a day  zonisamide 200 milliGRAM(s) Oral two times a day    MEDICATIONS  (PRN):  acetaminophen     Tablet .. 650 milliGRAM(s) Oral every 6 hours PRN Moderate Pain (4 - 6), Severe Pain (7 - 10)  benzocaine/menthol Lozenge 1 Lozenge Oral four times a day PRN Sore Throat  guaiFENesin Oral Liquid (Sugar-Free) 200 milliGRAM(s) Oral every 8 hours PRN Cough  LORazepam   Injectable 1 milliGRAM(s) IV Push once PRN for seizure activity > 3 mins      Vitals:  T(C): 36.6 (02-26-23 @ 00:30), Max: 36.8 (02-25-23 @ 16:39)  HR: 103 (02-26-23 @ 00:30) (76 - 108)  BP: 131/88 (02-26-23 @ 00:30) (109/66 - 131/88)  RR: 18 (02-26-23 @ 00:30) (16 - 18)  SpO2: 94% (02-26-23 @ 00:30) (94% - 97%)      Physical Examination:  Constitutional: appears stated age, sometimes uncooperative  Head: Normocephalic & atraumatic.  Respiratory: Breathing comfortably on room air.    Neurological (>12):  MS: Eyes open, alert, oriented to person, names family in the room, and place. Follows some simple commands.  Speech/Language: Clear, grossly fluent but perseverates, repeatedly stating her name.  CNs: EOMI, no nystagmus. V1-3 intact to LT, well developed masseter muscles b/l. No gross facial asymmetry b/l, full eye closure strength b/l. Hearing grossly intact  to conversation.  Motor: Muscle bulk normal. Moving all extremities at least against gravity.   Sensation: Intact to LT throughout.   Coordination: Unable to assess due to participation.  Gait: Not assessed.    Labs:  No new labs since 2/22/23.    EEG REPORT 2/25/23:  EEG Summary:    Normal EEG in the awake, drowsy and asleep states.  Polyspikes, focal, left posterior temporal-central region  Excess beta    Impression/Clinical Correlate:    This is an abnormal VEEG. There is evidence of potentially epileptogenic cortical irritability in the left posterior temporal-cental region. No seizures were recorded. The presence of excessive beta activity never evolves and has no clear clinical correlation, is most often seen in setting of sedative medication use but has also been described in rare cases of intellectual impairment.       Radiology:  MR Brain-Seizure, Epilepsy w/wo IV Cont (02.25.23 @ 11:07)  FINDINGS:  No acute infarction or acute intracranial hemorrhage.  The hippocampi appear unremarkable. There is no evidence for congenital   malformation/developmental anomaly.  Nonspecific subcentimeter T2/FLAIR hyperintense white matter foci are   noted within the left posterior temporal lobe (19-28).  No hydrocephalus. No extra-axial fluid collections. The skull base flow   voids are present.    The visualized intraorbital contents are normal. The imaged portions of   the paranasal sinuses are clear. The mastoid air cells are clear. The   visualized osseous structures, soft tissues and partially visualized   parotid glands appear normal.    IMPRESSION:    No acute infarct or hemorrhage.    Nonspecific subcentimeter T2/FLAIR hyperintense white matter foci in the   posterior left temporal lobe.

## 2023-02-26 NOTE — PROGRESS NOTE ADULT - ATTENDING COMMENTS
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  Plan : dc home Monday , her nursing home was not available today for picking her up  PT/OT for home PT/OT  zonisamide 200 bid  vimpat 100 bid  seroquel 100 bid ( from 200 bid) off keppra, depakote and lorazepam  fosamax was dc as well    will follow up w Dr. Andrews/Ronald and will have an elective admission in 1 month

## 2023-02-27 PROCEDURE — 99231 SBSQ HOSP IP/OBS SF/LOW 25: CPT | Mod: FS

## 2023-02-27 RX ORDER — FERROUS SULFATE 325(65) MG
1 TABLET ORAL
Qty: 0 | Refills: 0 | DISCHARGE

## 2023-02-27 RX ORDER — DOCUSATE SODIUM 100 MG
2 CAPSULE ORAL
Qty: 60 | Refills: 0
Start: 2023-02-27 | End: 2023-03-28

## 2023-02-27 RX ORDER — CHOLECALCIFEROL (VITAMIN D3) 125 MCG
2 CAPSULE ORAL
Qty: 60 | Refills: 0
Start: 2023-02-27 | End: 2023-03-28

## 2023-02-27 RX ORDER — HYDROCORTISONE 1 %
1 OINTMENT (GRAM) TOPICAL DAILY
Refills: 0 | Status: DISCONTINUED | OUTPATIENT
Start: 2023-02-27 | End: 2023-03-13

## 2023-02-27 RX ORDER — ALENDRONATE SODIUM 70 MG/1
1 TABLET ORAL
Qty: 0 | Refills: 0 | DISCHARGE

## 2023-02-27 RX ORDER — CHOLECALCIFEROL (VITAMIN D3) 125 MCG
2 CAPSULE ORAL
Qty: 0 | Refills: 0 | DISCHARGE

## 2023-02-27 RX ORDER — ZONISAMIDE 100 MG
2 CAPSULE ORAL
Qty: 120 | Refills: 3
Start: 2023-02-27 | End: 2023-06-26

## 2023-02-27 RX ORDER — LEVOTHYROXINE SODIUM 125 MCG
1 TABLET ORAL
Qty: 0 | Refills: 0 | DISCHARGE

## 2023-02-27 RX ORDER — FERROUS SULFATE 325(65) MG
1 TABLET ORAL
Qty: 30 | Refills: 0
Start: 2023-02-27 | End: 2023-03-28

## 2023-02-27 RX ORDER — QUETIAPINE FUMARATE 200 MG/1
1 TABLET, FILM COATED ORAL
Qty: 60 | Refills: 3
Start: 2023-02-27 | End: 2023-06-26

## 2023-02-27 RX ORDER — POLYETHYLENE GLYCOL 3350 17 G/17G
17 POWDER, FOR SOLUTION ORAL
Qty: 0 | Refills: 0 | DISCHARGE

## 2023-02-27 RX ORDER — LACOSAMIDE 50 MG/1
100 TABLET ORAL
Refills: 0 | Status: DISCONTINUED | OUTPATIENT
Start: 2023-02-27 | End: 2023-03-01

## 2023-02-27 RX ORDER — DOCUSATE SODIUM 100 MG
2 CAPSULE ORAL
Qty: 0 | Refills: 0 | DISCHARGE

## 2023-02-27 RX ORDER — QUETIAPINE FUMARATE 200 MG/1
1 TABLET, FILM COATED ORAL
Qty: 60 | Refills: 0
Start: 2023-02-27 | End: 2023-03-28

## 2023-02-27 RX ORDER — LEVOTHYROXINE SODIUM 125 MCG
1 TABLET ORAL
Qty: 30 | Refills: 0
Start: 2023-02-27 | End: 2023-03-28

## 2023-02-27 RX ORDER — QUETIAPINE FUMARATE 200 MG/1
50 TABLET, FILM COATED ORAL ONCE
Refills: 0 | Status: COMPLETED | OUTPATIENT
Start: 2023-02-27 | End: 2023-02-27

## 2023-02-27 RX ORDER — SENNA PLUS 8.6 MG/1
1 TABLET ORAL DAILY
Refills: 0 | Status: DISCONTINUED | OUTPATIENT
Start: 2023-02-27 | End: 2023-03-13

## 2023-02-27 RX ORDER — LACOSAMIDE 50 MG/1
1 TABLET ORAL
Qty: 60 | Refills: 0
Start: 2023-02-27 | End: 2023-03-28

## 2023-02-27 RX ORDER — MIDAZOLAM HYDROCHLORIDE 1 MG/ML
1 INJECTION, SOLUTION INTRAMUSCULAR; INTRAVENOUS
Qty: 1 | Refills: 0
Start: 2023-02-27 | End: 2023-03-28

## 2023-02-27 RX ORDER — POLYETHYLENE GLYCOL 3350 17 G/17G
17 POWDER, FOR SOLUTION ORAL
Qty: 510 | Refills: 0
Start: 2023-02-27 | End: 2023-03-28

## 2023-02-27 RX ADMIN — QUETIAPINE FUMARATE 100 MILLIGRAM(S): 200 TABLET, FILM COATED ORAL at 19:23

## 2023-02-27 RX ADMIN — Medication 50 MICROGRAM(S): at 05:30

## 2023-02-27 RX ADMIN — Medication 325 MILLIGRAM(S): at 17:00

## 2023-02-27 RX ADMIN — QUETIAPINE FUMARATE 50 MILLIGRAM(S): 200 TABLET, FILM COATED ORAL at 15:29

## 2023-02-27 RX ADMIN — QUETIAPINE FUMARATE 100 MILLIGRAM(S): 200 TABLET, FILM COATED ORAL at 09:13

## 2023-02-27 RX ADMIN — LACOSAMIDE 120 MILLIGRAM(S): 50 TABLET ORAL at 05:28

## 2023-02-27 RX ADMIN — POLYETHYLENE GLYCOL 3350 17 GRAM(S): 17 POWDER, FOR SOLUTION ORAL at 17:00

## 2023-02-27 RX ADMIN — LACOSAMIDE 100 MILLIGRAM(S): 50 TABLET ORAL at 17:00

## 2023-02-27 RX ADMIN — Medication 200 MILLIGRAM(S): at 17:00

## 2023-02-27 RX ADMIN — SENNA PLUS 1 TABLET(S): 8.6 TABLET ORAL at 17:00

## 2023-02-27 RX ADMIN — Medication 325 MILLIGRAM(S): at 05:30

## 2023-02-27 RX ADMIN — Medication 200 MILLIGRAM(S): at 05:28

## 2023-02-27 NOTE — PROGRESS NOTE ADULT - ATTENDING COMMENTS
42 F with epilepsy, possible infantile spasms, bipolar d/o, hypothyroidism, HTN, HLD admitted at outside hospital for seizures and transferred to [facility] EMU 2/18.    Family reports she has events 1x/mo of crying, grimacing, and being unresponsive at some point during this. Happens 1 hour after getting morning meds. These events did not happen during this hospitalization. Meds have been changed.    Exam 2/27:  Awake, alert, oriented to self, her mother and sister. Asks examiner's name multiple times and doesn't remember it.  Speaks in full sentences. Comprehension slightly impaired  No drift BUE  No ataxia BUE  Walks independently with visual supervision    vEEG: frequent L posterior temporal-central spikes, frequent runs of frontally predominant beta, excess beta  MRI brain w/wo con, epilepsy protocol (2/25): L posterior temporal WM T2-hyperintense foci    Vitals stable  TSH 2.90 wnl    Discharge meds:  Zonisamide 200 mg bid  Lacosamide 100 mg bid  Midazolam (Nayzilam) intranasally 5 mg PRN GTC seizure > 3 min, may repeat once in 10 min for continued seizure if patient is not excessively sedated or having difficulty breathing  Quetiapine 100 mg bid  Levothyroxine 50 mcg qAM    Assessment:  Breakthrough seizures, now stable  Meds adjusted due to polypharmacy    Plan:  -meds as above  -currently stable for discharge  -discharge when able to arrange with group home  -PT/OT for home PT/OT  -epilepsy clinic in 1 month with Dr. Cardenas or Dr. Andrews  -elective EMU admission in 4-6 weeks  -follow up with PMD regarding osteoporosis screening (was on alendronate previously while on valproic acid; alendronate stopped this admission as valproic acid was discontinued)    Discussed with family at bedside.    Deidra Sesay MD

## 2023-02-27 NOTE — OCCUPATIONAL THERAPY INITIAL EVALUATION ADULT - ADDITIONAL COMMENTS
Per pt's mother, pt lives in a group home under 24 hr care. Pt needs assist/supervision with all ADLs and mobility.

## 2023-02-27 NOTE — OCCUPATIONAL THERAPY INITIAL EVALUATION ADULT - PERTINENT HX OF CURRENT PROBLEM, REHAB EVAL
42 year-old woman with a PMH of HTN, HLD, bipolar d/o, hypothyroidism, seizure disorder (on Keppra and VPA), developmentally delayed, who initially presented to OSH on 2/18 for breakthrough seizures, transferred to the EMU for further evaluation and management. Per chart review and discussion with aide at bedside, patient had 2 seizures at her group home, including an episode of generalized "tonic-clonic" shaking while seated in a recliner, lasting 2 minutes, associated with urinary incontinence. She was taken to OSH where she was reported to be postictal and received IV VPA 500mg x1 and later determined to be stable for discharge back to her group home. While awaiting transport, she had a 3rd witnessed event lasting less than 1 minute, for which she was administered ativan 1mg IVP, and decision made for transport to [facility] for continuous EEG. There was no fall or injury associated with any of these events. Her aide is unsure of when her last seizure prior to these events may have occurred.    CT Head: IMPRESSION: No obvious acute intracranial hemorrhage, large cortical infarct or mass   effect. If clinically indicated, short-term follow-up or MRI may be obtained for further evaluation.  MR Brain Seizure: IMPRESSION: No acute infarct or hemorrhage. Nonspecific subcentimeter T2/FLAIR hyperintense white matter foci in the posterior left temporal lobe.

## 2023-02-27 NOTE — OCCUPATIONAL THERAPY INITIAL EVALUATION ADULT - EATING, PREVIOUS LEVEL OF FUNCTION, OT EVAL
needed assist
I have personally performed a face to face diagnostic evaluation on this patient. I have reviewed the ACP note and agree with the history, exam and plan of care, except as noted.

## 2023-02-27 NOTE — PROGRESS NOTE ADULT - SUBJECTIVE AND OBJECTIVE BOX
THE PATIENT WAS SEEN AND EXAMINED BY ME WITH THE HOUSE STAFF DURING MORNING ROUNDS.     HPI:  42 year-old woman with a PMH of HTN, HLD, bipolar d/o, hypothyroidism, seizure disorder (on Keppra and VPA), developmentally delayed, who initially presented to OSH on 2/18 for breakthrough seizures, transferred to the EMU for further evaluation and management. Per chart review and discussion with aide at bedside, patient had 2 seizures at her group home, including an episode of generalized "tonic-clonic" shaking while seated in a recliner, lasting 2 minutes, associated with urinary incontinence. She was taken to OSH where she was reported to be postictal and received IV VPA 500mg x1 and later determined to be stable for discharge back to her group home. While awaiting transport, she had a 3rd witnessed event lasting less than 1 minute, for which she was administered ativan 1mg IVP, and decision made for transport to [facility] for continuous EEG. There was no fall or injury associated with any of these events. Her aide is unsure of when her last seizure prior to these events may have occurred.     ROS: Otherwise negative.     SUBJECTIVE: No events overnight.  No new neurologic complaints.      acetaminophen     Tablet .. 650 milliGRAM(s) Oral every 6 hours PRN  benzocaine/menthol Lozenge 1 Lozenge Oral four times a day PRN  bismuth subsalicylate Liquid 5 milliLiter(s) Oral once  enoxaparin Injectable 40 milliGRAM(s) SubCutaneous every 24 hours  ferrous    sulfate 325 milliGRAM(s) Oral two times a day  guaiFENesin Oral Liquid (Sugar-Free) 200 milliGRAM(s) Oral every 8 hours PRN  lacosamide IVPB 100 milliGRAM(s) IV Intermittent every 12 hours  levothyroxine 50 MICROGram(s) Oral daily  LORazepam   Injectable 1 milliGRAM(s) IV Push once PRN  polyethylene glycol 3350 17 Gram(s) Oral daily  QUEtiapine 100 milliGRAM(s) Oral two times a day  zonisamide 200 milliGRAM(s) Oral two times a day      Physical Exam: Neurological Exam:  MS: Eyes open, alert, oriented to person and place. Follows some simple commands.  Speech/Language: Clear, grossly fluent but perseverates, repeatedly stating her name, asking provider's name.  CNs: EOMI, no nystagmus, no diplopia. V1-3 intact to LT, well developed masseter muscles b/l. No gross facial asymmetry b/l, full eye closure strength b/l. Hearing grossly intact  to conversation.  Motor: Muscle bulk normal. Moving all extremities at least against gravity.   Sensation: Intact to LT throughout.   Coordination: Unable to assess due to participation.  Gait: Not assessed.      LABS:  Vital Signs Last 24 Hrs  T(C): 36.6 (27 Feb 2023 05:00), Max: 36.8 (26 Feb 2023 13:48)  T(F): 97.9 (27 Feb 2023 05:00), Max: 98.2 (26 Feb 2023 13:48)  HR: 83 (27 Feb 2023 05:00) (68 - 97)  BP: 115/80 (27 Feb 2023 05:00) (111/70 - 132/82)  RR: 18 (27 Feb 2023 05:00) (18 - 18)  SpO2: 97% (27 Feb 2023 05:00) (96% - 99%)    IMAGING:     EEG REPORT 2/25/23:  EEG Summary:    Normal EEG in the awake, drowsy and asleep states.  Polyspikes, focal, left posterior temporal-central region  Excess beta    Impression/Clinical Correlate:    This is an abnormal VEEG. There is evidence of potentially epileptogenic cortical irritability in the left posterior temporal-cental region. No seizures were recorded. The presence of excessive beta activity never evolves and has no clear clinical correlation, is most often seen in setting of sedative medication use but has also been described in rare cases of intellectual impairment.       MR Brain-Seizure, Epilepsy w/wo IV Cont (02.25.23 @ 11:07)  FINDINGS:  No acute infarction or acute intracranial hemorrhage.  The hippocampi appear unremarkable. There is no evidence for congenital   malformation/developmental anomaly.  Nonspecific subcentimeter T2/FLAIR hyperintense white matter foci are   noted within the left posterior temporal lobe (19-28).  No hydrocephalus. No extra-axial fluid collections. The skull base flow   voids are present.    The visualized intraorbital contents are normal. The imaged portions of   the paranasal sinuses are clear. The mastoid air cells are clear. The   visualized osseous structures, soft tissues and partially visualized   parotid glands appear normal.    IMPRESSION:  No acute infarct or hemorrhage.    Nonspecific subcentimeter T2/FLAIR hyperintense white matter foci in the   posterior left temporal lobe.    EEG 2/24-2/25  Normal EEG in the awake, drowsy and asleep states.  3.	Polyspikes, focal, left posterior temporal-central region  4.	Excess beta    Impression/Clinical Correlate:    This is an abnormal VEEG. There is evidence of potentially epileptogenic cortical irritability in the left posterior temporal-cental region. No seizures were recorded. The presence of excessive beta activity never evolves and has no clear clinical correlation, is most often seen in setting of sedative medication use but has also been described in rare cases of intellectual impairment.     EEG 2/23-2/24  Normal EEG in the awake, drowsy and asleep states.  5.	Excess beta    Impression/Clinical Correlate:    This is a normal VEEG. No epileptiform pattern or seizures were recorded. The presence of excessive beta activity is most often seen in setting of sedative medication use    This is a prelim report only, pending review with attending prior to finalization.      THE PATIENT WAS SEEN AND EXAMINED BY ME WITH THE HOUSE STAFF DURING MORNING ROUNDS.     HPI:  42 year-old woman with a PMH of HTN, HLD, bipolar d/o, hypothyroidism, seizure disorder (on Keppra and VPA), developmentally delayed, who initially presented to OSH on 2/18 for breakthrough seizures, transferred to the EMU for further evaluation and management. Per chart review and discussion with aide at bedside, patient had 2 seizures at her group home, including an episode of generalized "tonic-clonic" shaking while seated in a recliner, lasting 2 minutes, associated with urinary incontinence. She was taken to OSH where she was reported to be postictal and received IV VPA 500mg x1 and later determined to be stable for discharge back to her group home. While awaiting transport, she had a 3rd witnessed event lasting less than 1 minute, for which she was administered ativan 1mg IVP, and decision made for transport to [facility] for continuous EEG. There was no fall or injury associated with any of these events. Her aide is unsure of when her last seizure prior to these events may have occurred.     ROS: Otherwise negative.     SUBJECTIVE: No events overnight.  No new neurologic complaints.      acetaminophen     Tablet .. 650 milliGRAM(s) Oral every 6 hours PRN  benzocaine/menthol Lozenge 1 Lozenge Oral four times a day PRN  bismuth subsalicylate Liquid 5 milliLiter(s) Oral once  enoxaparin Injectable 40 milliGRAM(s) SubCutaneous every 24 hours  ferrous    sulfate 325 milliGRAM(s) Oral two times a day  guaiFENesin Oral Liquid (Sugar-Free) 200 milliGRAM(s) Oral every 8 hours PRN  lacosamide IVPB 100 milliGRAM(s) IV Intermittent every 12 hours  levothyroxine 50 MICROGram(s) Oral daily  LORazepam   Injectable 1 milliGRAM(s) IV Push once PRN  polyethylene glycol 3350 17 Gram(s) Oral daily  QUEtiapine 100 milliGRAM(s) Oral two times a day  zonisamide 200 milliGRAM(s) Oral two times a day      Physical Exam: Neurological Exam:  MS: Eyes open, alert, oriented to person and place. Follows some simple commands.  Speech/Language: Clear, grossly fluent but perseverates, repeatedly stating her name, asking provider's name.  CNs: EOMI, no nystagmus, no diplopia. V1-3 intact to LT, well developed masseter muscles b/l. No gross facial asymmetry b/l, full eye closure strength b/l. Hearing grossly intact  to conversation.  Motor: Muscle bulk normal. Moving all extremities at least against gravity.   Sensation: Intact to LT throughout.   Coordination: Unable to assess due to participation.  Gait: Not assessed.    LABS:  Vital Signs Last 24 Hrs  T(C): 36.6 (27 Feb 2023 05:00), Max: 36.8 (26 Feb 2023 13:48)  T(F): 97.9 (27 Feb 2023 05:00), Max: 98.2 (26 Feb 2023 13:48)  HR: 83 (27 Feb 2023 05:00) (68 - 97)  BP: 115/80 (27 Feb 2023 05:00) (111/70 - 132/82)  RR: 18 (27 Feb 2023 05:00) (18 - 18)  SpO2: 97% (27 Feb 2023 05:00) (96% - 99%)    IMAGING:     EEG REPORT 2/25/23:  EEG Summary:    Normal EEG in the awake, drowsy and asleep states.  Polyspikes, focal, left posterior temporal-central region  Excess beta    Impression/Clinical Correlate:    This is an abnormal VEEG. There is evidence of potentially epileptogenic cortical irritability in the left posterior temporal-cental region. No seizures were recorded. The presence of excessive beta activity never evolves and has no clear clinical correlation, is most often seen in setting of sedative medication use but has also been described in rare cases of intellectual impairment.       MR Brain-Seizure, Epilepsy w/wo IV Cont (02.25.23 @ 11:07)  FINDINGS:  No acute infarction or acute intracranial hemorrhage.  The hippocampi appear unremarkable. There is no evidence for congenital   malformation/developmental anomaly.  Nonspecific subcentimeter T2/FLAIR hyperintense white matter foci are   noted within the left posterior temporal lobe (19-28).  No hydrocephalus. No extra-axial fluid collections. The skull base flow   voids are present.    The visualized intraorbital contents are normal. The imaged portions of   the paranasal sinuses are clear. The mastoid air cells are clear. The   visualized osseous structures, soft tissues and partially visualized   parotid glands appear normal.    IMPRESSION:  No acute infarct or hemorrhage.    Nonspecific subcentimeter T2/FLAIR hyperintense white matter foci in the   posterior left temporal lobe.    EEG 2/24-2/25  Normal EEG in the awake, drowsy and asleep states.  3.	Polyspikes, focal, left posterior temporal-central region  4.	Excess beta    Impression/Clinical Correlate:    This is an abnormal VEEG. There is evidence of potentially epileptogenic cortical irritability in the left posterior temporal-cental region. No seizures were recorded. The presence of excessive beta activity never evolves and has no clear clinical correlation, is most often seen in setting of sedative medication use but has also been described in rare cases of intellectual impairment.     EEG 2/23-2/24  Normal EEG in the awake, drowsy and asleep states.  5.	Excess beta    Impression/Clinical Correlate:    This is a normal VEEG. No epileptiform pattern or seizures were recorded. The presence of excessive beta activity is most often seen in setting of sedative medication use    This is a prelim report only, pending review with attending prior to finalization.      THE PATIENT WAS SEEN AND EXAMINED BY ME WITH THE HOUSE STAFF DURING MORNING ROUNDS.     HPI:  42 year-old woman with a PMH of HTN, HLD, bipolar d/o, hypothyroidism, seizure disorder (on Keppra and VPA), developmentally delayed, who initially presented to OSH on 2/18 for breakthrough seizures, transferred to the EMU for further evaluation and management. Per chart review and discussion with aide at bedside, patient had 2 seizures at her group home, including an episode of generalized "tonic-clonic" shaking while seated in a recliner, lasting 2 minutes, associated with urinary incontinence. She was taken to OSH where she was reported to be postictal and received IV VPA 500mg x1 and later determined to be stable for discharge back to her group home. While awaiting transport, she had a 3rd witnessed event lasting less than 1 minute, for which she was administered ativan 1mg IVP, and decision made for transport to [facility] for continuous EEG. There was no fall or injury associated with any of these events. Her aide is unsure of when her last seizure prior to these events may have occurred.     ROS: Otherwise negative.     SUBJECTIVE: No events overnight.  No new neurologic complaints.      acetaminophen     Tablet .. 650 milliGRAM(s) Oral every 6 hours PRN  benzocaine/menthol Lozenge 1 Lozenge Oral four times a day PRN  bismuth subsalicylate Liquid 5 milliLiter(s) Oral once  enoxaparin Injectable 40 milliGRAM(s) SubCutaneous every 24 hours  ferrous    sulfate 325 milliGRAM(s) Oral two times a day  guaiFENesin Oral Liquid (Sugar-Free) 200 milliGRAM(s) Oral every 8 hours PRN  lacosamide IVPB 100 milliGRAM(s) IV Intermittent every 12 hours  levothyroxine 50 MICROGram(s) Oral daily  LORazepam   Injectable 1 milliGRAM(s) IV Push once PRN  polyethylene glycol 3350 17 Gram(s) Oral daily  QUEtiapine 100 milliGRAM(s) Oral two times a day  zonisamide 200 milliGRAM(s) Oral two times a day    Physical Examination:   Constitutional: appears stated age, cheerful, NAD.  Head: Normocephalic & atraumatic.  Respiratory: Breathing comfortably on room air.    Neurological (>12):  MS: Eyes open, alert, oriented to person, Follows some simple commands.  Speech/Language: Clear, grossly fluent but perseverates, repeatedly stating her name, asking provider's name.  CNs: EOMI, no nystagmus, no diplopia. V1-3 intact to LT, well developed masseter muscles b/l. No gross facial asymmetry b/l, full eye closure strength b/l. Hearing grossly intact  to conversation.  Motor: Muscle bulk normal. Moving all extremities at least against gravity.   Sensation: Intact to LT throughout.   Coordination: Unable to assess due to participation.  Gait: Not assessed.    LABS:  Vital Signs Last 24 Hrs  T(C): 36.6 (27 Feb 2023 05:00), Max: 36.8 (26 Feb 2023 13:48)  T(F): 97.9 (27 Feb 2023 05:00), Max: 98.2 (26 Feb 2023 13:48)  HR: 83 (27 Feb 2023 05:00) (68 - 97)  BP: 115/80 (27 Feb 2023 05:00) (111/70 - 132/82)  RR: 18 (27 Feb 2023 05:00) (18 - 18)  SpO2: 97% (27 Feb 2023 05:00) (96% - 99%)    IMAGING:     EEG REPORT 2/25/23:  EEG Summary:    Normal EEG in the awake, drowsy and asleep states.  Polyspikes, focal, left posterior temporal-central region  Excess beta    Impression/Clinical Correlate:    This is an abnormal VEEG. There is evidence of potentially epileptogenic cortical irritability in the left posterior temporal-cental region. No seizures were recorded. The presence of excessive beta activity never evolves and has no clear clinical correlation, is most often seen in setting of sedative medication use but has also been described in rare cases of intellectual impairment.       MR Brain-Seizure, Epilepsy w/wo IV Cont (02.25.23 @ 11:07)  FINDINGS:  No acute infarction or acute intracranial hemorrhage.  The hippocampi appear unremarkable. There is no evidence for congenital   malformation/developmental anomaly.  Nonspecific subcentimeter T2/FLAIR hyperintense white matter foci are   noted within the left posterior temporal lobe (19-28).  No hydrocephalus. No extra-axial fluid collections. The skull base flow   voids are present.    The visualized intraorbital contents are normal. The imaged portions of   the paranasal sinuses are clear. The mastoid air cells are clear. The   visualized osseous structures, soft tissues and partially visualized   parotid glands appear normal.    IMPRESSION:  No acute infarct or hemorrhage.    Nonspecific subcentimeter T2/FLAIR hyperintense white matter foci in the   posterior left temporal lobe.    EEG 2/24-2/25  Normal EEG in the awake, drowsy and asleep states.  3.	Polyspikes, focal, left posterior temporal-central region  4.	Excess beta    Impression/Clinical Correlate:    This is an abnormal VEEG. There is evidence of potentially epileptogenic cortical irritability in the left posterior temporal-cental region. No seizures were recorded. The presence of excessive beta activity never evolves and has no clear clinical correlation, is most often seen in setting of sedative medication use but has also been described in rare cases of intellectual impairment.     EEG 2/23-2/24  Normal EEG in the awake, drowsy and asleep states.  5.	Excess beta    Impression/Clinical Correlate:    This is a normal VEEG. No epileptiform pattern or seizures were recorded. The presence of excessive beta activity is most often seen in setting of sedative medication use    This is a prelim report only, pending review with attending prior to finalization.

## 2023-02-27 NOTE — PROGRESS NOTE ADULT - ASSESSMENT
42 year-old woman with a PMH of HTN, HLD, bipolar d/o, hypothyroidism, seizure disorder (on Keppra and VPA), developmentally delayed, who presented to OSH on 2/18 with recurrent breakthrough seizures, transferred to the EMU on 2/18 for further monitoring and management. Exam reported to be baseline at this time. CTH from OSH unrevealing. VPA level from OSH subtherapeutic at 43, but noted to be 52.26 when corrected for albumin of 3.9. CBC/CMP wnl. Per history from family, patient had hx of meningitis age 18 mo, possible inception of seizures age 2 years (potentially earlier) with eyes rolling back and becoming less participatory. Per  Gian and family/ mother, patient may be having (once clarified) predominantly evening time events described as becoming quiet, less engaged, not talking, periodic eye blinking. No delmy tonic/ clonic activity noted. EEG overnight 2/25 shows focal polyspikes in left posterior temporal-central region.      Impression: Breakthrough seizures in the setting of borderline VPA level possibly due to medication nonadherence vs inadequate dosing. Initial goal to characterize events if epileptogenic.    Plan:   [x] MR brain w/wo contrast with epilepsy protocol - nonspecific subcentimeter T2/FLAIR hyperintensity w/in L posterior temporal lobe.  [x] IV lacosamide 100mg BID started on 2/25  [x] Increased zonisamide to 200 mg BID  [x] continue VEEG until 2/26  [x] CBC, CMP, Mg, Phos, CK, lactate  [x] VPA- 43 and Keppra- 23.2 level  [x] UA neg for infxn  [x] OFF Ativan  [x] Decrease quetiapine to 100mg bid 2/22  [x] D/C home LEV and VPA  [x] D/C amantadine 2/22   [x] Psych consult for agitation - rec continue with current regimen   [ ] Seizure rescue: Ativan 1mg IVP for seizure activity > 3 mins  [] Telemetry monitoring; Neurochecks/VS per unit protocol  [] Seizure, fall and aspiration precautions   [] Given concern for seizure, advise patient to not drive (for 1 yr per [facility] guidelines), operate heavy machinery, avoid heights, pools, bathtubs, locked doors.  [] Diet: Regular (Kosher)  [] DVT ppx lovenox  [ ] PT/OT for home PT/OT     42 year-old woman with a PMH of HTN, HLD, bipolar d/o, hypothyroidism, seizure disorder (on Keppra and VPA), developmentally delayed, who presented to OSH on 2/18 with recurrent breakthrough seizures, transferred to the EMU on 2/18 for further monitoring and management. Exam reported to be baseline at this time. CTH from OSH unrevealing. VPA level from OSH subtherapeutic at 43, but noted to be 52.26 when corrected for albumin of 3.9. CBC/CMP wnl. Per history from family, patient had hx of meningitis age 18 mo, possible inception of seizures age 2 years (potentially earlier) with eyes rolling back and becoming less participatory. Per  Gian and family/ mother, patient may be having (once clarified) predominantly evening time events described as becoming quiet, less engaged, not talking, periodic eye blinking. No delmy tonic/ clonic activity noted. EEG overnight 2/25 shows focal polyspikes in left posterior temporal-central region.      Impression: Breakthrough seizures in the setting of borderline VPA level possibly due to medication nonadherence vs inadequate dosing. Initial goal to characterize events if epileptogenic.    Plan:   [x] MR brain w/wo contrast with epilepsy protocol - nonspecific subcentimeter T2/FLAIR hyperintensity w/in L posterior temporal lobe.  [x] IV lacosamide 100mg BID started on 2/25  [x] Increased zonisamide to 200 mg BID  [x] continue VEEG until 2/26  [x] CBC, CMP, Mg, Phos, CK, lactate  [x] VPA- 43 and Keppra- 23.2 level  [x] UA neg for infxn  [x] OFF Ativan  [x] Decrease quetiapine to 100mg bid 2/22  [x] D/C home LEV and VPA  [x] D/C amantadine 2/22   [x] Psych consult for agitation - rec continue with current regimen   [ ] Seizure rescue: Ativan 1mg IVP for seizure activity > 3 mins  [] Telemetry monitoring; Neurochecks/VS per unit protocol  [] Seizure, fall and aspiration precautions   [] Given concern for seizure, advise patient to not drive (for 1 yr per [facility] guidelines), operate heavy machinery, avoid heights, pools, bathtubs, locked doors.  [] Diet: Regular (Kosher)  [] DVT ppx lovenox  [ ] PT/OT for home PT/OT    CORE MEASURES:        AED levels [] Sent [] Pending [] Resulted     LFTs [] normal [] elevated      Plan and education provided to [] patient []family at bedside [] awaiting for family     Seizure Semiology  [] Tonic clonic  [] Clonic  [] Tonic  [] Unresponsive  [] Focal with impaired awareness  [] Focal without impaired awareness    Obtain screening lower extremity venous ultrasound in patients who meet 1 or more of the following criteria as patient is high risk for DVT/PE on admission:   [] History of DVT/PE  [] Hypercoagulable states (Factor V Leiden, Cancer, OCP, etc. )  [] Prolonged immobility (hemiplegia/hemiparesis/post operative or any other extended immobilization)  [] Transferred from outside facility (Rehab or Long term care)     42 year-old woman with a PMH of HTN, HLD, bipolar d/o, hypothyroidism, seizure disorder (on Keppra and VPA), developmentally delayed, who presented to OSH on 2/18 with recurrent breakthrough seizures, transferred to the EMU on 2/18 for further monitoring and management. Exam reported to be baseline at this time. CTH from OSH unrevealing. VPA level from OSH subtherapeutic at 43, but noted to be 52.26 when corrected for albumin of 3.9. CBC/CMP wnl. Per history from family, patient had hx of meningitis age 18 mo, possible inception of seizures age 2 years (potentially earlier) with eyes rolling back and becoming less participatory. Per  Gian and family/ mother, patient may be having (once clarified) predominantly evening time events described as becoming quiet, less engaged, not talking, periodic eye blinking. No delmy tonic/ clonic activity noted. EEG overnight 2/25 shows focal polyspikes in left posterior temporal-central region.      Impression: Breakthrough seizures in the setting of borderline VPA level possibly due to medication nonadherence vs inadequate dosing. Initial goal to characterize events if epileptogenic.    Plan:   [x] MR brain w/wo contrast with epilepsy protocol - nonspecific subcentimeter T2/FLAIR hyperintensity w/in L posterior temporal lobe.  [x] IV lacosamide 100mg BID started on 2/25  [x] Increased zonisamide to 200 mg BID  [x] continue VEEG until 2/26  [x] CBC, CMP, Mg, Phos, CK, lactate  [x] VPA- 43 and Keppra- 23.2 level  [x] UA neg for infxn  [x] OFF Ativan  [x] Decrease quetiapine to 100mg bid 2/22  [x] D/C home LEV and VPA  [x] D/C amantadine 2/22   [x] Psych consult for agitation - rec continue with current regimen   [ ] Seizure rescue: Ativan 1mg IVP for seizure activity > 3 mins  [] Telemetry monitoring; Neurochecks/VS per unit protocol  [] Seizure, fall and aspiration precautions   [] Given concern for seizure, advise patient to not drive (for 1 yr per [facility] guidelines), operate heavy machinery, avoid heights, pools, bathtubs, locked doors.  [] Diet: Regular (Kosher)  [] DVT ppx lovenox  [] PT/OT for home PT/OT  [] Discharge to group home     CORE MEASURES:        AED levels [] Sent [] Pending [] Resulted     LFTs [] normal [] elevated      Plan and education provided to [] patient []family at bedside [] awaiting for family     Seizure Semiology  [] Tonic clonic  [] Clonic  [] Tonic  [] Unresponsive  [] Focal with impaired awareness  [] Focal without impaired awareness    Obtain screening lower extremity venous ultrasound in patients who meet 1 or more of the following criteria as patient is high risk for DVT/PE on admission:   [] History of DVT/PE  [] Hypercoagulable states (Factor V Leiden, Cancer, OCP, etc. )  [] Prolonged immobility (hemiplegia/hemiparesis/post operative or any other extended immobilization)  [] Transferred from outside facility (Rehab or Long term care)     42 year-old woman with a PMH of HTN, HLD, bipolar d/o, hypothyroidism, seizure disorder (on Keppra and VPA), developmentally delayed, who presented to OSH on 2/18 with recurrent breakthrough seizures, transferred to the EMU on 2/18 for further monitoring and management. Exam reported to be baseline at this time. CTH from OSH unrevealing. VPA level from OSH subtherapeutic at 43, but noted to be 52.26 when corrected for albumin of 3.9. CBC/CMP wnl. Per history from family, patient had hx of meningitis age 18 mo, possible inception of seizures age 2 years (potentially earlier) with eyes rolling back and becoming less participatory. Per  Gian and family/ mother, patient may be having (once clarified) predominantly evening time events described as becoming quiet, less engaged, not talking, periodic eye blinking. No delmy tonic/ clonic activity noted. EEG overnight 2/25 shows focal polyspikes in left posterior temporal-central region.      Impression: Breakthrough seizures in the setting of borderline VPA level possibly due to medication nonadherence vs inadequate dosing. Initial goal to characterize events if epileptogenic.    Plan:   [x] MR brain w/wo contrast with epilepsy protocol - nonspecific subcentimeter T2/FLAIR hyperintensity w/in L posterior temporal lobe.  [x] IV lacosamide 100mg BID started on 2/25 - can be IV or PO  [x] Increased zonisamide to 200 mg BID  [x] continue VEEG until 2/26  [x] CBC, CMP, Mg, Phos, CK, lactate  [x] VPA- 43 and Keppra- 23.2 level  [x] UA neg for infxn  [x] OFF Ativan  [x] Decrease quetiapine to 100mg bid 2/22  [x] D/C home LEV and VPA  [x] alendronate stopped as pt no longer on valproic acid - follow up with primary care  [x] D/C amantadine 2/22   [x] Psych consult for agitation - rec continue with current regimen   [] continue levothyroxine 50 mcg qAM  [ ] Seizure rescue: Ativan 1mg IVP for seizure activity > 3 mins; on discharge: Nayzilam 5 mg intranasal PRN GTC seizure > 3 min, may repeat once in 10 min in other nostril if seizures continue; do not repeat dose if patient excessively sedated or having trouble breathing  [] Telemetry monitoring; Neurochecks/VS per unit protocol  [] Seizure, fall and aspiration precautions   [] Given concern for seizure, advise patient to not drive (for 1 yr per [facility] guidelines), operate heavy machinery, avoid heights, pools, bathtubs, locked doors.  [] Diet: Regular (Kosher)  [] DVT ppx lovenox  [] PT/OT for home PT/OT  [] Discharge to group home   [] Follow up in epilepsy clinic with Dr. Andrews or Dr. Cardenas in 1 month  [] Elective EMU admission to be scheduled in 4-6 weeks  [] Follow up with PMD to assess need for osteoporosis screening/medication    CORE MEASURES:        AED levels [] Sent [] Pending [] Resulted     LFTs [] normal [] elevated      Plan and education provided to [] patient []family at bedside [] awaiting for family     Seizure Semiology  [] Tonic clonic  [] Clonic  [] Tonic  [] Unresponsive  [] Focal with impaired awareness  [] Focal without impaired awareness    Obtain screening lower extremity venous ultrasound in patients who meet 1 or more of the following criteria as patient is high risk for DVT/PE on admission:   [] History of DVT/PE  [] Hypercoagulable states (Factor V Leiden, Cancer, OCP, etc. )  [] Prolonged immobility (hemiplegia/hemiparesis/post operative or any other extended immobilization)  [] Transferred from outside facility (Rehab or Long term care)

## 2023-02-27 NOTE — OCCUPATIONAL THERAPY INITIAL EVALUATION ADULT - LUE MMT, REHAB EVAL
Other (Free Text): Reassured her that her headaches and the small lump on the edge of suture line are normal. Instructed her to continue taking her Tylenol and Ibuprofen every three hours as needed for pain. Also assured her that the slight swelling around her eyes is normal due to gravity. Note Text (......Xxx Chief Complaint.): This diagnosis correlates with the Detail Level: Zone 3+/5

## 2023-02-28 ENCOUNTER — TRANSCRIPTION ENCOUNTER (OUTPATIENT)
Age: 43
End: 2023-02-28

## 2023-02-28 LAB — SARS-COV-2 RNA SPEC QL NAA+PROBE: DETECTED

## 2023-02-28 PROCEDURE — 95720 EEG PHY/QHP EA INCR W/VEEG: CPT

## 2023-02-28 PROCEDURE — 99232 SBSQ HOSP IP/OBS MODERATE 35: CPT | Mod: FS

## 2023-02-28 RX ORDER — QUETIAPINE FUMARATE 200 MG/1
50 TABLET, FILM COATED ORAL
Refills: 0 | Status: DISCONTINUED | OUTPATIENT
Start: 2023-02-28 | End: 2023-03-13

## 2023-02-28 RX ORDER — HALOPERIDOL DECANOATE 100 MG/ML
5 INJECTION INTRAMUSCULAR ONCE
Refills: 0 | Status: DISCONTINUED | OUTPATIENT
Start: 2023-02-28 | End: 2023-02-28

## 2023-02-28 RX ORDER — QUETIAPINE FUMARATE 200 MG/1
1 TABLET, FILM COATED ORAL
Qty: 60 | Refills: 0
Start: 2023-02-28 | End: 2023-03-29

## 2023-02-28 RX ORDER — QUETIAPINE FUMARATE 200 MG/1
150 TABLET, FILM COATED ORAL EVERY 12 HOURS
Refills: 0 | Status: DISCONTINUED | OUTPATIENT
Start: 2023-02-28 | End: 2023-03-11

## 2023-02-28 RX ORDER — HALOPERIDOL DECANOATE 100 MG/ML
2 INJECTION INTRAMUSCULAR ONCE
Refills: 0 | Status: DISCONTINUED | OUTPATIENT
Start: 2023-02-28 | End: 2023-02-28

## 2023-02-28 RX ORDER — QUETIAPINE FUMARATE 200 MG/1
50 TABLET, FILM COATED ORAL ONCE
Refills: 0 | Status: DISCONTINUED | OUTPATIENT
Start: 2023-02-28 | End: 2023-02-28

## 2023-02-28 RX ORDER — QUETIAPINE FUMARATE 200 MG/1
50 TABLET, FILM COATED ORAL DAILY
Refills: 0 | Status: DISCONTINUED | OUTPATIENT
Start: 2023-02-28 | End: 2023-02-28

## 2023-02-28 RX ADMIN — LACOSAMIDE 100 MILLIGRAM(S): 50 TABLET ORAL at 06:25

## 2023-02-28 RX ADMIN — Medication 325 MILLIGRAM(S): at 19:32

## 2023-02-28 RX ADMIN — LACOSAMIDE 100 MILLIGRAM(S): 50 TABLET ORAL at 19:32

## 2023-02-28 RX ADMIN — Medication 325 MILLIGRAM(S): at 06:25

## 2023-02-28 RX ADMIN — SENNA PLUS 1 TABLET(S): 8.6 TABLET ORAL at 12:39

## 2023-02-28 RX ADMIN — POLYETHYLENE GLYCOL 3350 17 GRAM(S): 17 POWDER, FOR SOLUTION ORAL at 19:43

## 2023-02-28 RX ADMIN — Medication 1 MILLIGRAM(S): at 11:24

## 2023-02-28 RX ADMIN — Medication 50 MICROGRAM(S): at 06:24

## 2023-02-28 RX ADMIN — QUETIAPINE FUMARATE 100 MILLIGRAM(S): 200 TABLET, FILM COATED ORAL at 07:00

## 2023-02-28 RX ADMIN — QUETIAPINE FUMARATE 150 MILLIGRAM(S): 200 TABLET, FILM COATED ORAL at 19:32

## 2023-02-28 RX ADMIN — Medication 2 MILLIGRAM(S): at 13:15

## 2023-02-28 RX ADMIN — Medication 200 MILLIGRAM(S): at 06:24

## 2023-02-28 RX ADMIN — Medication 200 MILLIGRAM(S): at 19:32

## 2023-02-28 RX ADMIN — QUETIAPINE FUMARATE 50 MILLIGRAM(S): 200 TABLET, FILM COATED ORAL at 13:36

## 2023-02-28 NOTE — PROGRESS NOTE ADULT - ATTENDING COMMENTS
Pt agitated today, wandering hallways hugging staff and patients.   Pt is COVID-positive as of 2/28 and requires isolation but is still wandering the hallways.  Difficult to redirect. Was given Ativan.  Family initially refused additional seroquel 50 mg PO but agreed after I discussed with them that pt is still agitated after receiving Ativan.  Family agreed to change seroquel to 150 mg bid and additional 50 mg q12h PRN agitation.  Family does not want pt to have Haldol, saying it caused ?seizure in the past.    Family reported an episode of unresponsiveness with eyes rolling up that occurred 5 min after getting Ativan. I saw her a few minutes after this, and she was back to baseline.    Family reports pt     afebrile, vitals normal  Awake, alert, inappropriately hugging a staff member who is walking by (who is not involved in her care), tracking  Moving all extremities symmetrically  Walking independently    Assessment:  Breakthrough seizures, now stable  Possible focal seizure on 2/28 after getting Ativan - unresponsive episode reported by family  Meds adjusted due to polypharmacy    Plan:  -repeat vEEG x1 days  -follow up with psychiatry for agitation  -1:1 observation while requiring isolation for COVID as pt not adherent with isolation  -meds as above - consider increasing lacosamide based on repeat EEG  -discharge when able to arrange with group home  -PT/OT for home PT/OT  -epilepsy clinic in 1 month with Dr. Cardenas or Dr. Andrews  -elective EMU admission in 4-6 weeks  -follow up with PMD regarding osteoporosis screening (was on alendronate previously while on valproic acid; alendronate stopped this admission as valproic acid was discontinued)    Deidra Sesay MD Pt agitated today, wandering hallways hugging staff and patients.   Pt is COVID-positive as of 2/28 and requires isolation but is still wandering the hallways.  Difficult to redirect - at times briefly redirectable but then continues the same behavior shortly afterward. Was given Ativan.  Family initially refused additional seroquel 50 mg PO but after I discussed with them that pt is still agitated after receiving Ativan, they agreed.  Family agreed to change seroquel to 150 mg bid and additional 50 mg q12h PRN agitation.  Family does not want pt to have Haldol, saying it caused ?seizure in the past.    Family reported an episode of unresponsiveness with eyes rolling up that occurred 5 min after getting Ativan. I saw her a few minutes after this, and she was back to baseline.    Exam 2/28:  afebrile, vitals normal  Awake, alert, tracking, inappropriately hugging a staff member who is walking by (who is not involved in her care)  Moving all extremities symmetrically  Walking independently    Assessment:  Breakthrough seizures, now stable  Possible focal seizure on 2/28 after getting Ativan - unresponsive episode reported by family  Meds adjusted due to polypharmacy    Plan:  -repeat vEEG x1 days  -follow up with psychiatry for agitation  -1:1 observation while requiring isolation for COVID as pt not adherent with isolation  -meds as above - consider increasing lacosamide based on repeat EEG  -discharge when able to arrange with group home  -PT/OT for home PT/OT  -epilepsy clinic in 1 month with Dr. Cardenas or Dr. nAdrews  -elective EMU admission in 4-6 weeks  -follow up with PMD regarding osteoporosis screening (was on alendronate previously while on valproic acid; alendronate stopped this admission as valproic acid was discontinued)    Deidra Sesay MD

## 2023-02-28 NOTE — PROVIDER CONTACT NOTE (OTHER) - SITUATION
Pt with multiple  VEEG alarm less than five minutes apart. .Pt reassessed with each alarm . Pt neurologically stable no change no signs of seizure activity. Pt vitals signs stable
pt family refused Seroquel 50mg PO, pt family stated pt is allergic to Haldol- tachycardia

## 2023-02-28 NOTE — PROVIDER CONTACT NOTE (OTHER) - BACKGROUND
Pt with multiple  VEEG alarm less than five minutes apart. .Pt reassessed with each alarm . Pt neurologically stable no change no signs of seizure activity. Pt vitals
pt admitted with seizures

## 2023-02-28 NOTE — PROGRESS NOTE ADULT - ASSESSMENT
42 year-old woman with a PMH of HTN, HLD, bipolar d/o, hypothyroidism, seizure disorder (on Keppra and VPA), developmentally delayed, who presented to OSH on 2/18 with recurrent breakthrough seizures, transferred to the EMU on 2/18 for further monitoring and management. Exam reported to be baseline at this time. CTH from OSH unrevealing. VPA level from OSH subtherapeutic at 43, but noted to be 52.26 when corrected for albumin of 3.9. CBC/CMP wnl. Per history from family, patient had hx of meningitis age 18 mo, possible inception of seizures age 2 years (potentially earlier) with eyes rolling back and becoming less participatory. Per  Gian and family/ mother, patient may be having (once clarified) predominantly evening time events described as becoming quiet, less engaged, not talking, periodic eye blinking. No delmy tonic/ clonic activity noted. EEG overnight 2/25 shows focal polyspikes in left posterior temporal-central region.      Impression: Breakthrough seizures in the setting of borderline VPA level possibly due to medication nonadherence vs inadequate dosing. Initial goal to characterize events if epileptogenic.    Plan:   [x] MR brain w/wo contrast with epilepsy protocol - nonspecific subcentimeter T2/FLAIR hyperintensity w/in L posterior temporal lobe.  [x] IV lacosamide 100mg BID started on 2/25 - can be IV or PO  [x] Increased zonisamide to 200 mg BID  [x] continue VEEG until 2/26  [x] CBC, CMP, Mg, Phos, CK, lactate  [x] VPA- 43 and Keppra- 23.2 level  [x] UA neg for infxn  [x] OFF Ativan  [x] Decrease quetiapine to 100mg bid 2/22  [x] D/C home LEV and VPA  [x] alendronate stopped as pt no longer on valproic acid - follow up with primary care  [x] D/C amantadine 2/22   [x] Psych consult for agitation - rec continue with current regimen   [] continue levothyroxine 50 mcg qAM  [ ] Seizure rescue: Ativan 1mg IVP for seizure activity > 3 mins; on discharge: Nayzilam 5 mg intranasal PRN GTC seizure > 3 min, may repeat once in 10 min in other nostril if seizures continue; do not repeat dose if patient excessively sedated or having trouble breathing  [] Telemetry monitoring; Neurochecks/VS per unit protocol  [] Seizure, fall and aspiration precautions   [] Given concern for seizure, advise patient to not drive (for 1 yr per [facility] guidelines), operate heavy machinery, avoid heights, pools, bathtubs, locked doors.  [] Diet: Regular (Kosher)  [] DVT ppx lovenox  [] PT/OT for home PT/OT  [] Discharge to group home   [] Follow up in epilepsy clinic with Dr. Andrews or Dr. Cardenas in 1 month  [] Elective EMU admission to be scheduled in 4-6 weeks  [] Follow up with PMD to assess need for osteoporosis screening/medication    CORE MEASURES:        AED levels [] Sent [] Pending [] Resulted     LFTs [] normal [] elevated      Plan and education provided to [] patient []family at bedside [] awaiting for family     Seizure Semiology  [] Tonic clonic  [] Clonic  [] Tonic  [] Unresponsive  [] Focal with impaired awareness  [] Focal without impaired awareness    Obtain screening lower extremity venous ultrasound in patients who meet 1 or more of the following criteria as patient is high risk for DVT/PE on admission:   [] History of DVT/PE  [] Hypercoagulable states (Factor V Leiden, Cancer, OCP, etc. )  [] Prolonged immobility (hemiplegia/hemiparesis/post operative or any other extended immobilization)  [] Transferred from outside facility (Rehab or Long term care)       42 year-old woman with a PMH of HTN, HLD, bipolar d/o, hypothyroidism, seizure disorder (on Keppra and VPA), developmentally delayed, who presented to OSH on 2/18 with recurrent breakthrough seizures, transferred to the EMU on 2/18 for further monitoring and management. Exam reported to be baseline at this time. CTH from OSH unrevealing. VPA level from OSH subtherapeutic at 43, but noted to be 52.26 when corrected for albumin of 3.9. CBC/CMP wnl. Per history from family, patient had hx of meningitis age 18 mo, possible inception of seizures age 2 years (potentially earlier) with eyes rolling back and becoming less participatory. Per  Gian and family/ mother, patient may be having (once clarified) predominantly evening time events described as becoming quiet, less engaged, not talking, periodic eye blinking. No delmy tonic/ clonic activity noted. EEG overnight 2/25 shows focal polyspikes in left posterior temporal-central region.      Impression: Breakthrough seizures in the setting of borderline VPA level possibly due to medication nonadherence vs inadequate dosing. Initial goal to characterize events if epileptogenic.    Plan:   [x] MR brain w/wo contrast with epilepsy protocol - nonspecific subcentimeter T2/FLAIR hyperintensity w/in L posterior temporal lobe.  [x] IV lacosamide 100mg BID started on 2/25 - can be IV or PO  [x] Increased zonisamide to 200 mg BID  [x] continue VEEG until 2/26  [x] CBC, CMP, Mg, Phos, CK, lactate  [x] VPA- 43 and Keppra- 23.2 level  [x] UA neg for infxn  [x] OFF Ativan  [x] Decrease quetiapine to 100mg bid 2/22  [x] D/C home LEV and VPA  [x] alendronate stopped as pt no longer on valproic acid - follow up with primary care  [x] D/C amantadine 2/22   [x] Psych consult for agitation - rec continue with current regimen   [] continue levothyroxine 50 mcg qAM  [ ] Seizure rescue: Ativan 1mg IVP for seizure activity > 3 mins; on discharge: Nayzilam 5 mg intranasal PRN GTC seizure > 3 min, may repeat once in 10 min in other nostril if seizures continue; do not repeat dose if patient excessively sedated or having trouble breathing  [] Telemetry monitoring; Neurochecks/VS per unit protocol  [] Seizure, fall and aspiration precautions   [] Given concern for seizure, advise patient to not drive (for 1 yr per [facility] guidelines), operate heavy machinery, avoid heights, pools, bathtubs, locked doors.  [] Diet: Regular (Kosher)  [] DVT ppx lovenox  [] PT/OT for home PT/OT  [] Discharge to group home on HOLD due to COVID positive   [] Follow up in epilepsy clinic with Dr. Andrews or Dr. Cardenas in 1 month  [] Elective EMU admission to be scheduled in 4-6 weeks  [] Follow up with PMD to assess need for osteoporosis screening/medication    CORE MEASURES:        AED levels [] Sent [] Pending [] Resulted     LFTs [] normal [] elevated      Plan and education provided to [] patient []family at bedside [] awaiting for family     Seizure Semiology  [] Tonic clonic  [] Clonic  [] Tonic  [] Unresponsive  [] Focal with impaired awareness  [] Focal without impaired awareness    Obtain screening lower extremity venous ultrasound in patients who meet 1 or more of the following criteria as patient is high risk for DVT/PE on admission:   [] History of DVT/PE  [] Hypercoagulable states (Factor V Leiden, Cancer, OCP, etc. )  [] Prolonged immobility (hemiplegia/hemiparesis/post operative or any other extended immobilization)  [] Transferred from outside facility (Rehab or Long term care)       42 year-old woman with a PMH of HTN, HLD, bipolar d/o, hypothyroidism, seizure disorder (on Keppra and VPA), developmentally delayed, who presented to OSH on 2/18 with recurrent breakthrough seizures, transferred to the EMU on 2/18 for further monitoring and management. Exam reported to be baseline at this time. CTH from OSH unrevealing. VPA level from OSH subtherapeutic at 43, but noted to be 52.26 when corrected for albumin of 3.9. CBC/CMP wnl. Per history from family, patient had hx of meningitis age 18 mo, possible inception of seizures age 2 years (potentially earlier) with eyes rolling back and becoming less participatory. Per  Gian and family/ mother, patient may be having (once clarified) predominantly evening time events described as becoming quiet, less engaged, not talking, periodic eye blinking. No delmy tonic/ clonic activity noted. EEG overnight 2/25 shows focal polyspikes in left posterior temporal-central region.      Impression: Breakthrough seizures in the setting of borderline VPA level possibly due to medication nonadherence vs inadequate dosing. Initial goal to characterize events if epileptogenic.    Plan:   [] 1 more day of vEEG given family reported unresponsive episode/seizure on 2/28 5 min after receiving Ativan  [x] MR brain w/wo contrast with epilepsy protocol - nonspecific subcentimeter T2/FLAIR hyperintensity w/in L posterior temporal lobe.  [x] IV lacosamide 100mg BID started on 2/25 - can be IV or PO  [x] Increased zonisamide to 200 mg BID  [x] continue VEEG until 2/26  [x] CBC, CMP, Mg, Phos, CK, lactate  [x] VPA- 43 and Keppra- 23.2 level  [x] UA neg for infxn  [x] OFF Ativan  [x] Increase quetiapine to 150 mg bid with additional 50 mg q12h PRN agitation (previously: 2/22 decreased to 100 mg bid)  [x] D/C home LEV and VPA  [x] alendronate stopped as pt no longer on valproic acid - follow up with primary care  [x] D/C amantadine 2/22   [] follow up with Psych consult for agitation  [] 1:1 observation while requiring COVID isolation as pt not adherent with isolation  [] continue levothyroxine 50 mcg qAM  [ ] Seizure rescue: Ativan 1mg IVP for seizure activity > 3 mins; on discharge: Nayzilam 5 mg intranasal PRN GTC seizure > 3 min, may repeat once in 10 min in other nostril if seizures continue; do not repeat dose if patient excessively sedated or having trouble breathing  [] Telemetry monitoring; Neurochecks/VS per unit protocol  [] Seizure, fall and aspiration precautions   [] Given concern for seizure, advise patient to not drive (for 1 yr per [facility] guidelines), operate heavy machinery, avoid heights, pools, bathtubs, locked doors.  [] Diet: Regular (Kosher)  [] DVT ppx lovenox  [] PT/OT for home PT/OT  [] Discharge to group home on HOLD due to COVID positive test 2/28 - per , group home requires 10 days isolation   [] Follow up in epilepsy clinic with Dr. Andrews or Dr. Cardenas in 1 month  [] Elective EMU admission to be scheduled in 4-6 weeks  [] Follow up with PMD to assess need for osteoporosis screening/medication    CORE MEASURES:        AED levels [] Sent [] Pending [] Resulted     LFTs [] normal [] elevated      Plan and education provided to [] patient []family at bedside [] awaiting for family     Seizure Semiology  [] Tonic clonic  [] Clonic  [] Tonic  [] Unresponsive  [] Focal with impaired awareness  [] Focal without impaired awareness    Obtain screening lower extremity venous ultrasound in patients who meet 1 or more of the following criteria as patient is high risk for DVT/PE on admission:   [] History of DVT/PE  [] Hypercoagulable states (Factor V Leiden, Cancer, OCP, etc. )  [] Prolonged immobility (hemiplegia/hemiparesis/post operative or any other extended immobilization)  [] Transferred from outside facility (Rehab or Long term care)       42 year-old woman with a PMH of HTN, HLD, bipolar d/o, hypothyroidism, seizure disorder (on Keppra and VPA), developmentally delayed, who presented to OSH on 2/18 with recurrent breakthrough seizures, transferred to the EMU on 2/18 for further monitoring and management. Exam reported to be baseline at this time. CTH from OSH unrevealing. VPA level from OSH subtherapeutic at 43, but noted to be 52.26 when corrected for albumin of 3.9. CBC/CMP wnl. Per history from family, patient had hx of meningitis age 18 mo, possible inception of seizures age 2 years (potentially earlier) with eyes rolling back and becoming less participatory. Per  Gian and family/ mother, patient may be having (once clarified) predominantly evening time events described as becoming quiet, less engaged, not talking, periodic eye blinking. No delmy tonic/ clonic activity noted. EEG overnight 2/25 shows focal polyspikes in left posterior temporal-central region.      Impression: Breakthrough seizures in the setting of borderline VPA level possibly due to medication nonadherence vs inadequate dosing. Initial goal to characterize events if epileptogenic.    Plan:   [] 1 more day of vEEG given family reported unresponsive episode/seizure on 2/28 5 min after receiving Ativan  [x] MR brain w/wo contrast with epilepsy protocol - nonspecific subcentimeter T2/FLAIR hyperintensity w/in L posterior temporal lobe.  [x] IV lacosamide 100mg BID started on 2/25 - can be IV or PO  [x] Increased zonisamide to 200 mg BID  [x] continue VEEG until 2/26  [x] CBC, CMP, Mg, Phos, CK, lactate  [x] VPA- 43 and Keppra- 23.2 level  [x] UA neg for infxn  [x] OFF Ativan  [x] Increase quetiapine to 150 mg bid with additional 50 mg q12h PRN agitation (previously: on 2/22 decreased to 100 mg bid)  [x] D/C home LEV and VPA  [x] alendronate stopped as pt no longer on valproic acid - follow up with primary care  [x] D/C amantadine 2/22   [] follow up with Psych consult for agitation  [] 1:1 observation while requiring COVID isolation as pt not adherent with isolation  [] continue levothyroxine 50 mcg qAM  [ ] Seizure rescue: Ativan 1mg IVP for seizure activity > 3 mins; on discharge: Nayzilam 5 mg intranasal PRN GTC seizure > 3 min, may repeat once in 10 min in other nostril if seizures continue; do not repeat dose if patient excessively sedated or having trouble breathing  [] Telemetry monitoring; Neurochecks/VS per unit protocol  [] Seizure, fall and aspiration precautions   [] Given concern for seizure, advise patient to not drive (for 1 yr per [facility] guidelines), operate heavy machinery, avoid heights, pools, bathtubs, locked doors.  [] Diet: Regular (Kosher)  [] DVT ppx lovenox  [] PT/OT for home PT/OT  [] Discharge to group home on HOLD due to COVID positive test 2/28 - per , group home requires 10 days isolation   [] Follow up in epilepsy clinic with Dr. Andrews or Dr. Cardenas in 1 month  [] Elective EMU admission to be scheduled in 4-6 weeks  [] Follow up with PMD to assess need for osteoporosis screening/medication    CORE MEASURES:        AED levels [] Sent [] Pending [] Resulted     LFTs [] normal [] elevated      Plan and education provided to [] patient []family at bedside [] awaiting for family     Seizure Semiology  [] Tonic clonic  [] Clonic  [] Tonic  [] Unresponsive  [] Focal with impaired awareness  [] Focal without impaired awareness    Obtain screening lower extremity venous ultrasound in patients who meet 1 or more of the following criteria as patient is high risk for DVT/PE on admission:   [] History of DVT/PE  [] Hypercoagulable states (Factor V Leiden, Cancer, OCP, etc. )  [] Prolonged immobility (hemiplegia/hemiparesis/post operative or any other extended immobilization)  [] Transferred from outside facility (Rehab or Long term care)

## 2023-02-28 NOTE — DISCHARGE NOTE NURSING/CASE MANAGEMENT/SOCIAL WORK - PATIENT PORTAL LINK FT
You can access the FollowMyHealth Patient Portal offered by [facility] by registering at the following website: [email or web address]. By joining [facility]’s FollowMyHealth portal, you will also be able to view your health information using other applications (apps) compatible with our system.

## 2023-02-28 NOTE — PROVIDER CONTACT NOTE (OTHER) - REASON
pt family refused Seroquel 50mg PO, pt family stated pt is allergic to Haldol- tachycardia
Pt with multiple  VEEG alarm less than five minutes apart. .Pt reassessed with each alarm . Pt neurologically stable no change no signs of seizure activity. Pt vitals

## 2023-02-28 NOTE — PROGRESS NOTE ADULT - SUBJECTIVE AND OBJECTIVE BOX
THE PATIENT WAS SEEN AND EXAMINED BY ME WITH THE HOUSE STAFF DURING MORNING ROUNDS.     HPI:  42 year-old woman with a PMH of HTN, HLD, bipolar d/o, hypothyroidism, seizure disorder (on Keppra and VPA), developmentally delayed, who initially presented to OSH on 2/18 for breakthrough seizures, transferred to the EMU for further evaluation and management. Per chart review and discussion with aide at bedside, patient had 2 seizures at her group home, including an episode of generalized "tonic-clonic" shaking while seated in a recliner, lasting 2 minutes, associated with urinary incontinence. She was taken to OSH where she was reported to be postictal and received IV VPA 500mg x1 and later determined to be stable for discharge back to her group home. While awaiting transport, she had a 3rd witnessed event lasting less than 1 minute, for which she was administered ativan 1mg IVP, and decision made for transport to [facility] for continuous EEG. There was no fall or injury associated with any of these events. Her aide is unsure of when her last seizure prior to these events may have occurred. (18 Feb 2023 18:37)    ROS: Otherwise negative.     SUBJECTIVE: No events overnight.  No new neurologic complaints.      acetaminophen     Tablet .. 650 milliGRAM(s) Oral every 6 hours PRN  benzocaine/menthol Lozenge 1 Lozenge Oral four times a day PRN  bismuth subsalicylate Liquid 5 milliLiter(s) Oral once  enoxaparin Injectable 40 milliGRAM(s) SubCutaneous every 24 hours  ferrous    sulfate 325 milliGRAM(s) Oral two times a day  guaiFENesin Oral Liquid (Sugar-Free) 200 milliGRAM(s) Oral every 8 hours PRN  hydrocortisone 1% Cream 1 Application(s) Topical daily  lacosamide 100 milliGRAM(s) Oral two times a day  levothyroxine 50 MICROGram(s) Oral daily  LORazepam   Injectable 1 milliGRAM(s) IV Push once PRN  polyethylene glycol 3350 17 Gram(s) Oral daily  QUEtiapine 100 milliGRAM(s) Oral two times a day  senna 1 Tablet(s) Oral daily  zonisamide 200 milliGRAM(s) Oral two times a day    Physical Examination:   Constitutional: appears stated age, cheerful, NAD.  Head: Normocephalic & atraumatic.  Respiratory: Breathing comfortably on room air.    Neurological (>12):  MS: Eyes open, alert, oriented to person, Follows some simple commands.  Speech/Language: Clear, grossly fluent but perseverates, repeatedly stating her name, asking provider's name.  CNs: EOMI, no nystagmus, no diplopia. V1-3 intact to LT, well developed masseter muscles b/l. No gross facial asymmetry b/l, full eye closure strength b/l. Hearing grossly intact  to conversation.  Motor: Muscle bulk normal. Moving all extremities at least against gravity.   Sensation: Intact to LT throughout.   Coordination: Unable to assess due to participation.  Gait: ambulates independently    Vital Signs Last 24 Hrs  T(C): 36.6 (28 Feb 2023 06:27), Max: 36.7 (27 Feb 2023 09:12)  T(F): 97.9 (28 Feb 2023 06:27), Max: 98.1 (27 Feb 2023 09:12)  HR: 57 (28 Feb 2023 06:27) (57 - 90)  BP: 126/74 (28 Feb 2023 06:27) (109/85 - 126/74)  RR: 19 (28 Feb 2023 06:27) (18 - 20)  SpO2: 97% (28 Feb 2023 06:27) (97% - 98%)     COVID-19 PCR: NotDetec (18 Feb 2023 08:06)      IMAGING:     EEG REPORT 2/25/23:  EEG Summary:    Normal EEG in the awake, drowsy and asleep states.  Polyspikes, focal, left posterior temporal-central region  Excess beta    Impression/Clinical Correlate:    This is an abnormal VEEG. There is evidence of potentially epileptogenic cortical irritability in the left posterior temporal-cental region. No seizures were recorded. The presence of excessive beta activity never evolves and has no clear clinical correlation, is most often seen in setting of sedative medication use but has also been described in rare cases of intellectual impairment.       MR Brain-Seizure, Epilepsy w/wo IV Cont (02.25.23 @ 11:07)  FINDINGS:  No acute infarction or acute intracranial hemorrhage.  The hippocampi appear unremarkable. There is no evidence for congenital   malformation/developmental anomaly.  Nonspecific subcentimeter T2/FLAIR hyperintense white matter foci are   noted within the left posterior temporal lobe (19-28).  No hydrocephalus. No extra-axial fluid collections. The skull base flow   voids are present.    The visualized intraorbital contents are normal. The imaged portions of   the paranasal sinuses are clear. The mastoid air cells are clear. The   visualized osseous structures, soft tissues and partially visualized   parotid glands appear normal.    IMPRESSION:  No acute infarct or hemorrhage.    Nonspecific subcentimeter T2/FLAIR hyperintense white matter foci in the   posterior left temporal lobe.    EEG 2/24-2/25  Normal EEG in the awake, drowsy and asleep states.  3.	Polyspikes, focal, left posterior temporal-central region  4.	Excess beta    Impression/Clinical Correlate:    This is an abnormal VEEG. There is evidence of potentially epileptogenic cortical irritability in the left posterior temporal-cental region. No seizures were recorded. The presence of excessive beta activity never evolves and has no clear clinical correlation, is most often seen in setting of sedative medication use but has also been described in rare cases of intellectual impairment.     EEG 2/23-2/24  Normal EEG in the awake, drowsy and asleep states.  5.	Excess beta    Impression/Clinical Correlate:    This is a normal VEEG. No epileptiform pattern or seizures were recorded. The presence of excessive beta activity is most often seen in setting of sedative medication use    This is a prelim report only, pending review with attending prior to finalization.      THE PATIENT WAS SEEN AND EXAMINED BY ME WITH THE HOUSE STAFF DURING MORNING ROUNDS.     HPI:  42 year-old woman with a PMH of HTN, HLD, bipolar d/o, hypothyroidism, seizure disorder (on Keppra and VPA), developmentally delayed, who initially presented to OSH on 2/18 for breakthrough seizures, transferred to the EMU for further evaluation and management. Per chart review and discussion with aide at bedside, patient had 2 seizures at her group home, including an episode of generalized "tonic-clonic" shaking while seated in a recliner, lasting 2 minutes, associated with urinary incontinence. She was taken to OSH where she was reported to be postictal and received IV VPA 500mg x1 and later determined to be stable for discharge back to her group home. While awaiting transport, she had a 3rd witnessed event lasting less than 1 minute, for which she was administered ativan 1mg IVP, and decision made for transport to [facility] for continuous EEG. There was no fall or injury associated with any of these events. Her aide is unsure of when her last seizure prior to these events may have occurred. (18 Feb 2023 18:37)    ROS: Otherwise negative.     SUBJECTIVE: No events overnight.  No new neurologic complaints.      acetaminophen     Tablet .. 650 milliGRAM(s) Oral every 6 hours PRN  benzocaine/menthol Lozenge 1 Lozenge Oral four times a day PRN  bismuth subsalicylate Liquid 5 milliLiter(s) Oral once  enoxaparin Injectable 40 milliGRAM(s) SubCutaneous every 24 hours  ferrous    sulfate 325 milliGRAM(s) Oral two times a day  guaiFENesin Oral Liquid (Sugar-Free) 200 milliGRAM(s) Oral every 8 hours PRN  hydrocortisone 1% Cream 1 Application(s) Topical daily  lacosamide 100 milliGRAM(s) Oral two times a day  levothyroxine 50 MICROGram(s) Oral daily  LORazepam   Injectable 1 milliGRAM(s) IV Push once PRN  polyethylene glycol 3350 17 Gram(s) Oral daily  QUEtiapine 100 milliGRAM(s) Oral two times a day  senna 1 Tablet(s) Oral daily  zonisamide 200 milliGRAM(s) Oral two times a day    Physical Examination:   Constitutional: appears stated age, cheerful, NAD.  Head: Normocephalic & atraumatic.  Respiratory: Breathing comfortably on room air.    Neurological (>12):  MS: Eyes open, alert, oriented to person, Follows some simple commands. Roaming the hallway hugging patients and staff  Speech/Language: Clear, grossly fluent but perseverates, repeatedly stating her name  CNs: EOMI, no nystagmus, no diplopia. V1-3 intact to LT, well developed masseter muscles b/l. No gross facial asymmetry b/l, full eye closure strength b/l. Hearing grossly intact  to conversation.  Motor: Muscle bulk normal. Moving all extremities at least against gravity.   Sensation: Intact to LT throughout.   Coordination: Unable to assess due to participation.  Gait: ambulates independently    Vital Signs Last 24 Hrs  T(C): 36.6 (28 Feb 2023 06:27), Max: 36.7 (27 Feb 2023 09:12)  T(F): 97.9 (28 Feb 2023 06:27), Max: 98.1 (27 Feb 2023 09:12)  HR: 57 (28 Feb 2023 06:27) (57 - 90)  BP: 126/74 (28 Feb 2023 06:27) (109/85 - 126/74)  RR: 19 (28 Feb 2023 06:27) (18 - 20)  SpO2: 97% (28 Feb 2023 06:27) (97% - 98%)     COVID-19 PCR: NotDetec (18 Feb 2023 08:06)      IMAGING:     EEG REPORT 2/25/23:  EEG Summary:    Normal EEG in the awake, drowsy and asleep states.  Polyspikes, focal, left posterior temporal-central region  Excess beta    Impression/Clinical Correlate:    This is an abnormal VEEG. There is evidence of potentially epileptogenic cortical irritability in the left posterior temporal-cental region. No seizures were recorded. The presence of excessive beta activity never evolves and has no clear clinical correlation, is most often seen in setting of sedative medication use but has also been described in rare cases of intellectual impairment.       MR Brain-Seizure, Epilepsy w/wo IV Cont (02.25.23 @ 11:07)  FINDINGS:  No acute infarction or acute intracranial hemorrhage.  The hippocampi appear unremarkable. There is no evidence for congenital   malformation/developmental anomaly.  Nonspecific subcentimeter T2/FLAIR hyperintense white matter foci are   noted within the left posterior temporal lobe (19-28).  No hydrocephalus. No extra-axial fluid collections. The skull base flow   voids are present.    The visualized intraorbital contents are normal. The imaged portions of   the paranasal sinuses are clear. The mastoid air cells are clear. The   visualized osseous structures, soft tissues and partially visualized   parotid glands appear normal.    IMPRESSION:  No acute infarct or hemorrhage.    Nonspecific subcentimeter T2/FLAIR hyperintense white matter foci in the   posterior left temporal lobe.    EEG 2/24-2/25  Normal EEG in the awake, drowsy and asleep states.  3.	Polyspikes, focal, left posterior temporal-central region  4.	Excess beta    Impression/Clinical Correlate:    This is an abnormal VEEG. There is evidence of potentially epileptogenic cortical irritability in the left posterior temporal-cental region. No seizures were recorded. The presence of excessive beta activity never evolves and has no clear clinical correlation, is most often seen in setting of sedative medication use but has also been described in rare cases of intellectual impairment.     EEG 2/23-2/24  Normal EEG in the awake, drowsy and asleep states.  5.	Excess beta    Impression/Clinical Correlate:    This is a normal VEEG. No epileptiform pattern or seizures were recorded. The presence of excessive beta activity is most often seen in setting of sedative medication use    This is a prelim report only, pending review with attending prior to finalization.      THE PATIENT WAS SEEN AND EXAMINED BY ME WITH THE HOUSE STAFF DURING MORNING ROUNDS.     HPI:  42 year-old woman with a PMH of HTN, HLD, bipolar d/o, hypothyroidism, seizure disorder (on Keppra and VPA), developmentally delayed, who initially presented to OSH on 2/18 for breakthrough seizures, transferred to the EMU for further evaluation and management. Per chart review and discussion with aide at bedside, patient had 2 seizures at her group home, including an episode of generalized "tonic-clonic" shaking while seated in a recliner, lasting 2 minutes, associated with urinary incontinence. She was taken to OSH where she was reported to be postictal and received IV VPA 500mg x1 and later determined to be stable for discharge back to her group home. While awaiting transport, she had a 3rd witnessed event lasting less than 1 minute, for which she was administered ativan 1mg IVP, and decision made for transport to [facility] for continuous EEG. There was no fall or injury associated with any of these events. Her aide is unsure of when her last seizure prior to these events may have occurred. (18 Feb 2023 18:37)    ROS: Otherwise negative.     SUBJECTIVE: Pt has been agitated today, running around the hallways, entering other patients' rooms and hugging them, hugging and kissing staff members. Received Ativan and family reported 5 min afterward, pt had unresponsive episode with eyes rolling upward. Pt was at baseline on exam shortly afterward. COVID test returned positive today.    acetaminophen     Tablet .. 650 milliGRAM(s) Oral every 6 hours PRN  benzocaine/menthol Lozenge 1 Lozenge Oral four times a day PRN  bismuth subsalicylate Liquid 5 milliLiter(s) Oral once  enoxaparin Injectable 40 milliGRAM(s) SubCutaneous every 24 hours  ferrous    sulfate 325 milliGRAM(s) Oral two times a day  guaiFENesin Oral Liquid (Sugar-Free) 200 milliGRAM(s) Oral every 8 hours PRN  hydrocortisone 1% Cream 1 Application(s) Topical daily  lacosamide 100 milliGRAM(s) Oral two times a day  levothyroxine 50 MICROGram(s) Oral daily  LORazepam   Injectable 1 milliGRAM(s) IV Push once PRN  polyethylene glycol 3350 17 Gram(s) Oral daily  QUEtiapine 100 milliGRAM(s) Oral two times a day  senna 1 Tablet(s) Oral daily  zonisamide 200 milliGRAM(s) Oral two times a day    Physical Examination:   Constitutional: appears stated age, cheerful, NAD.  Head: Normocephalic & atraumatic.  Respiratory: Breathing comfortably on room air.    Neurological (>12):  MS: Eyes open, alert, oriented to person, Follows some simple commands. Roaming the hallway hugging patients and staff  Speech/Language: Clear, grossly fluent but perseverates, repeatedly stating her name  CNs: EOMI, no nystagmus, no diplopia. V1-3 intact to LT, well developed masseter muscles b/l. No gross facial asymmetry b/l, full eye closure strength b/l. Hearing grossly intact  to conversation.  Motor: Muscle bulk normal. Moving all extremities at least against gravity.   Sensation: Intact to LT throughout.   Coordination: Unable to assess due to participation.  Gait: ambulates independently    Vital Signs Last 24 Hrs  T(C): 36.6 (28 Feb 2023 06:27), Max: 36.7 (27 Feb 2023 09:12)  T(F): 97.9 (28 Feb 2023 06:27), Max: 98.1 (27 Feb 2023 09:12)  HR: 57 (28 Feb 2023 06:27) (57 - 90)  BP: 126/74 (28 Feb 2023 06:27) (109/85 - 126/74)  RR: 19 (28 Feb 2023 06:27) (18 - 20)  SpO2: 97% (28 Feb 2023 06:27) (97% - 98%)     COVID-19 PCR: NotDetec (18 Feb 2023 08:06)      IMAGING:     EEG REPORT 2/25/23:  EEG Summary:    Normal EEG in the awake, drowsy and asleep states.  Polyspikes, focal, left posterior temporal-central region  Excess beta    Impression/Clinical Correlate:    This is an abnormal VEEG. There is evidence of potentially epileptogenic cortical irritability in the left posterior temporal-cental region. No seizures were recorded. The presence of excessive beta activity never evolves and has no clear clinical correlation, is most often seen in setting of sedative medication use but has also been described in rare cases of intellectual impairment.       MR Brain-Seizure, Epilepsy w/wo IV Cont (02.25.23 @ 11:07)  FINDINGS:  No acute infarction or acute intracranial hemorrhage.  The hippocampi appear unremarkable. There is no evidence for congenital   malformation/developmental anomaly.  Nonspecific subcentimeter T2/FLAIR hyperintense white matter foci are   noted within the left posterior temporal lobe (19-28).  No hydrocephalus. No extra-axial fluid collections. The skull base flow   voids are present.    The visualized intraorbital contents are normal. The imaged portions of   the paranasal sinuses are clear. The mastoid air cells are clear. The   visualized osseous structures, soft tissues and partially visualized   parotid glands appear normal.    IMPRESSION:  No acute infarct or hemorrhage.    Nonspecific subcentimeter T2/FLAIR hyperintense white matter foci in the   posterior left temporal lobe.    EEG 2/24-2/25  Normal EEG in the awake, drowsy and asleep states.  3.	Polyspikes, focal, left posterior temporal-central region  4.	Excess beta    Impression/Clinical Correlate:    This is an abnormal VEEG. There is evidence of potentially epileptogenic cortical irritability in the left posterior temporal-cental region. No seizures were recorded. The presence of excessive beta activity never evolves and has no clear clinical correlation, is most often seen in setting of sedative medication use but has also been described in rare cases of intellectual impairment.     EEG 2/23-2/24  Normal EEG in the awake, drowsy and asleep states.  5.	Excess beta    Impression/Clinical Correlate:    This is a normal VEEG. No epileptiform pattern or seizures were recorded. The presence of excessive beta activity is most often seen in setting of sedative medication use    This is a prelim report only, pending review with attending prior to finalization.

## 2023-02-28 NOTE — DISCHARGE NOTE NURSING/CASE MANAGEMENT/SOCIAL WORK - NSDCPEPTSTRK_GEN_ALL_CORE
23 Hour(s) 20 Minute(s) Call 911 for stroke/Need for follow up after discharge/Prescribed medications/Risk factors for stroke/Stroke education booklet/Stroke support groups for patients, families, and friends/Stroke warning signs and symptoms/Signs and symptoms of stroke

## 2023-02-28 NOTE — PROVIDER CONTACT NOTE (OTHER) - ACTION/TREATMENT ORDERED:
pt educated by rounding MD, if pt become agitated, restless and walking around, pt will be given Seroquel- pt is COVID positive- pt on Airborne/contact precaution PCA at bedside
PA made aware as per PA no new orders given  at this time . Continue to monitor

## 2023-03-01 LAB — SARS-COV-2 RNA SPEC QL NAA+PROBE: DETECTED

## 2023-03-01 PROCEDURE — 93010 ELECTROCARDIOGRAM REPORT: CPT

## 2023-03-01 PROCEDURE — 95718 EEG PHYS/QHP 2-12 HR W/VEEG: CPT

## 2023-03-01 PROCEDURE — 99231 SBSQ HOSP IP/OBS SF/LOW 25: CPT | Mod: FS

## 2023-03-01 PROCEDURE — 99232 SBSQ HOSP IP/OBS MODERATE 35: CPT

## 2023-03-01 RX ORDER — LACOSAMIDE 50 MG/1
150 TABLET ORAL
Refills: 0 | Status: DISCONTINUED | OUTPATIENT
Start: 2023-03-01 | End: 2023-03-13

## 2023-03-01 RX ORDER — CHLORHEXIDINE GLUCONATE 213 G/1000ML
1 SOLUTION TOPICAL
Refills: 0 | Status: DISCONTINUED | OUTPATIENT
Start: 2023-03-01 | End: 2023-03-13

## 2023-03-01 RX ADMIN — LACOSAMIDE 150 MILLIGRAM(S): 50 TABLET ORAL at 17:45

## 2023-03-01 RX ADMIN — Medication 325 MILLIGRAM(S): at 06:40

## 2023-03-01 RX ADMIN — QUETIAPINE FUMARATE 150 MILLIGRAM(S): 200 TABLET, FILM COATED ORAL at 06:40

## 2023-03-01 RX ADMIN — ENOXAPARIN SODIUM 40 MILLIGRAM(S): 100 INJECTION SUBCUTANEOUS at 06:39

## 2023-03-01 RX ADMIN — QUETIAPINE FUMARATE 150 MILLIGRAM(S): 200 TABLET, FILM COATED ORAL at 17:47

## 2023-03-01 RX ADMIN — Medication 1 APPLICATION(S): at 11:21

## 2023-03-01 RX ADMIN — Medication 200 MILLIGRAM(S): at 06:41

## 2023-03-01 RX ADMIN — Medication 200 MILLIGRAM(S): at 17:46

## 2023-03-01 RX ADMIN — POLYETHYLENE GLYCOL 3350 17 GRAM(S): 17 POWDER, FOR SOLUTION ORAL at 17:47

## 2023-03-01 RX ADMIN — Medication 325 MILLIGRAM(S): at 17:46

## 2023-03-01 RX ADMIN — Medication 50 MICROGRAM(S): at 06:41

## 2023-03-01 RX ADMIN — LACOSAMIDE 100 MILLIGRAM(S): 50 TABLET ORAL at 06:41

## 2023-03-01 NOTE — EEG REPORT - NS EEG TEXT BOX
Day 8 – 	Start: 2/28/2023  14:07    	End: 3/1/2023  08:00  	Duration: 17 hr  51 min    Daily EEG Visual Analysis    FINDINGS:  The background was continuous, symmetric, spontaneously variable and reactive. During wakefulness, only fragments of 9 Hz posterior rhythm could be discerned.  No Breach rhythms. Excess diffuse frontal beta noted.     Background Slowing:  No generalized background slowing was present.    Focal Slowing:   None were present.    Sleep Background:  Drowsiness was characterized by fragmentation, attenuation, and slowing of the background activity.    Sleep was characterized by the presence of vertex waves, symmetric sleep spindles and K-complexes.    Other Non-Epileptiform Findings:  Diffuse beta activity, less prominent than previous recordings    Activation Procedures:   Hyperventilation was not performed.    Photic stimulation was not performed.    Interictal Epileptiform Activity:   None were present.    Events:  No events or seizures recorded.    Artifacts:  Intermittent myogenic and movement artifacts were noted.    ECG:  The heart rate on single channel ECG was predominantly between  BPM.    AEDs:   LAC 100mg BID  Zonegran 200mg BID      EEG Summary:    Normal EEG in the awake, drowsy and asleep states.  Excess beta    Impression/Clinical Correlate:    No seizures were recorded. The presence of excessive beta activity never evolves and has no clear clinical correlation, is most often seen in setting of sedative medication use but has also been described in rare cases of intellectual impairment.     This is a prelim report only, pending review with attending prior to finalization.     Stephen Bowers MD     Fellow, [facility]     ------------------------------------     EEG Reading Room: [phone]     On Call Service After Hours: [phone]  Day 8 – 	Start: 2/28/2023  14:07    	End: 3/1/2023  08:00  	Duration: 17 hr  51 min    Daily EEG Visual Analysis    FINDINGS:  The background was continuous, symmetric, spontaneously variable and reactive. During wakefulness, only fragments of 9 Hz posterior rhythm could be discerned.  No Breach rhythms. Excess diffuse frontal beta noted.     Background Slowing:  No generalized background slowing was present.    Focal Slowing:   None were present.    Sleep Background:  Drowsiness was characterized by fragmentation, attenuation, and slowing of the background activity.    Sleep was characterized by the presence of vertex waves, symmetric sleep spindles and K-complexes.    Other Non-Epileptiform Findings:  Diffuse beta activity, less prominent than previous recordings    Activation Procedures:   Hyperventilation was not performed.    Photic stimulation was not performed.    Interictal Epileptiform Activity:   None were present.    Events:  No events or seizures recorded.    Artifacts:  Intermittent myogenic and movement artifacts were noted.    ECG:  The heart rate on single channel ECG was predominantly between  BPM.    AEDs:   LAC 100mg BID  Zonegran 200mg BID      EEG Summary:    Normal EEG in the awake, drowsy and asleep states.  Excess beta    Impression/Clinical Correlate:    No seizures were recorded. The presence of excessive beta activity never evolves and has no clear clinical correlation, is most often seen in setting of sedative medication use but has also been described in rare cases of intellectual impairment.     Stephen Bowers MD   Fellow, [facility]     ------------------------------------     EEG Reading Room: [phone]     On Call Service After Hours: [phone]

## 2023-03-01 NOTE — DIETITIAN INITIAL EVALUATION ADULT - REASON INDICATOR FOR ASSESSMENT
Pt seen for length of stay assessment.   Source of information: medical record, pt, pt's mother at bedside, PCA. Pt noted with developmental delay. Chart reviewed, events noted.

## 2023-03-01 NOTE — BH CONSULTATION LIAISON PROGRESS NOTE - CURRENT MEDICATION
MEDICATIONS  (STANDING):  bismuth subsalicylate Liquid 5 milliLiter(s) Oral once  enoxaparin Injectable 40 milliGRAM(s) SubCutaneous every 24 hours  ferrous    sulfate 325 milliGRAM(s) Oral two times a day  hydrocortisone 1% Cream 1 Application(s) Topical daily  lacosamide 100 milliGRAM(s) Oral two times a day  levothyroxine 50 MICROGram(s) Oral daily  polyethylene glycol 3350 17 Gram(s) Oral daily  QUEtiapine 150 milliGRAM(s) Oral every 12 hours  senna 1 Tablet(s) Oral daily  zonisamide 200 milliGRAM(s) Oral two times a day    MEDICATIONS  (PRN):  acetaminophen     Tablet .. 650 milliGRAM(s) Oral every 6 hours PRN Moderate Pain (4 - 6), Severe Pain (7 - 10)  benzocaine/menthol Lozenge 1 Lozenge Oral four times a day PRN Sore Throat  guaiFENesin Oral Liquid (Sugar-Free) 200 milliGRAM(s) Oral every 8 hours PRN Cough  LORazepam   Injectable 1 milliGRAM(s) IV Push once PRN for seizure activity > 3 mins  QUEtiapine 50 milliGRAM(s) Oral two times a day PRN agitation

## 2023-03-01 NOTE — BH CONSULTATION LIAISON PROGRESS NOTE - NSBHCHARTREVIEWVS_PSY_A_CORE FT
Vital Signs Last 24 Hrs  T(C): 36.9 (01 Mar 2023 06:26), Max: 36.9 (28 Feb 2023 23:30)  T(F): 98.4 (01 Mar 2023 06:26), Max: 98.4 (28 Feb 2023 23:30)  HR: 91 (01 Mar 2023 06:26) (91 - 123)  BP: 110/74 (01 Mar 2023 06:26) (110/74 - 145/83)  BP(mean): --  RR: 18 (01 Mar 2023 06:26) (18 - 21)  SpO2: 95% (01 Mar 2023 06:26) (93% - 96%)    Parameters below as of 01 Mar 2023 06:26  Patient On (Oxygen Delivery Method): room air

## 2023-03-01 NOTE — DIETITIAN INITIAL EVALUATION ADULT - PERTINENT MEDS FT
MEDICATIONS  (STANDING):  bismuth subsalicylate Liquid 5 milliLiter(s) Oral once  chlorhexidine 2% Cloths 1 Application(s) Topical <User Schedule>  enoxaparin Injectable 40 milliGRAM(s) SubCutaneous every 24 hours  ferrous    sulfate 325 milliGRAM(s) Oral two times a day  hydrocortisone 1% Cream 1 Application(s) Topical daily  lacosamide 150 milliGRAM(s) Oral two times a day  levothyroxine 50 MICROGram(s) Oral daily  polyethylene glycol 3350 17 Gram(s) Oral daily  QUEtiapine 150 milliGRAM(s) Oral every 12 hours  senna 1 Tablet(s) Oral daily  zonisamide 200 milliGRAM(s) Oral two times a day    MEDICATIONS  (PRN):  acetaminophen     Tablet .. 650 milliGRAM(s) Oral every 6 hours PRN Moderate Pain (4 - 6), Severe Pain (7 - 10)  benzocaine/menthol Lozenge 1 Lozenge Oral four times a day PRN Sore Throat  guaiFENesin Oral Liquid (Sugar-Free) 200 milliGRAM(s) Oral every 8 hours PRN Cough  LORazepam     Tablet 0.5 milliGRAM(s) Oral daily PRN Agitation  LORazepam   Injectable 1 milliGRAM(s) IV Push once PRN for seizure activity > 3 mins  QUEtiapine 50 milliGRAM(s) Oral two times a day PRN agitation

## 2023-03-01 NOTE — DIETITIAN INITIAL EVALUATION ADULT - REASON
Nutrition-focused physical examination deferred at this time - pt agitated. No overt signs of fat/muscle wasting visually observed.

## 2023-03-01 NOTE — DIETITIAN INITIAL EVALUATION ADULT - PERSON TAUGHT/METHOD
education not warranted/appropriate at this time. Pt and mother made aware RD remains available for any questions/concerns.

## 2023-03-01 NOTE — DIETITIAN INITIAL EVALUATION ADULT - REASON FOR ADMISSION
Convulsions    Per chart: 42 year-old woman with a PMH of HTN, HLD, bipolar d/o, hypothyroidism, seizure disorder (on Keppra and VPA), developmentally delayed, who presented to OSH on 2/18 with recurrent breakthrough seizures, transferred to the EMU on 2/18 for further monitoring and management. Impression: Breakthrough seizures in the setting of borderline VPA level possibly due to medication nonadherence vs inadequate dosing. Initial goal to characterize events if epileptogenic.   Pt was planned for discharge 2/28, however found to be COVID-19+.

## 2023-03-01 NOTE — PROGRESS NOTE ADULT - ASSESSMENT
42 year-old woman with a PMH of HTN, HLD, bipolar d/o, hypothyroidism, seizure disorder (on Keppra and VPA), developmentally delayed, who presented to OSH on 2/18 with recurrent breakthrough seizures, transferred to the EMU on 2/18 for further monitoring and management. Exam reported to be baseline at this time. CTH from OSH unrevealing. VPA level from OSH subtherapeutic at 43, but noted to be 52.26 when corrected for albumin of 3.9. CBC/CMP wnl. Per history from family, patient had hx of meningitis age 18 mo, possible inception of seizures age 2 years (potentially earlier) with eyes rolling back and becoming less participatory. Per  Gian and family/ mother, patient may be having (once clarified) predominantly evening time events described as becoming quiet, less engaged, not talking, periodic eye blinking. No delmy tonic/ clonic activity noted. EEG overnight 2/25 shows focal polyspikes in left posterior temporal-central region.      Impression: Breakthrough seizures in the setting of borderline VPA level possibly due to medication nonadherence vs inadequate dosing. Initial goal to characterize events if epileptogenic.    Plan:   [] 1 more day of vEEG given family reported unresponsive episode/seizure on 2/28 5 min after receiving Ativan  [x] MR brain w/wo contrast with epilepsy protocol - nonspecific subcentimeter T2/FLAIR hyperintensity w/in L posterior temporal lobe.  [x] IV lacosamide 100mg BID started on 2/25 - can be IV or PO  [x] Increased zonisamide to 200 mg BID  [x] continue VEEG until 2/26  [x] CBC, CMP, Mg, Phos, CK, lactate  [x] VPA- 43 and Keppra- 23.2 level  [x] UA neg for infxn  [x] OFF Ativan  [x] Increase quetiapine to 150 mg bid with additional 50 mg q12h PRN agitation (previously: on 2/22 decreased to 100 mg bid)  [x] D/C home LEV and VPA  [x] alendronate stopped as pt no longer on valproic acid - follow up with primary care  [x] D/C amantadine 2/22   [] follow up with Psych consult for agitation  [] 1:1 observation while requiring COVID isolation as pt not adherent with isolation  [] continue levothyroxine 50 mcg qAM  [ ] Seizure rescue: Ativan 1mg IVP for seizure activity > 3 mins; on discharge: Nayzilam 5 mg intranasal PRN GTC seizure > 3 min, may repeat once in 10 min in other nostril if seizures continue; do not repeat dose if patient excessively sedated or having trouble breathing  [] Telemetry monitoring; Neurochecks/VS per unit protocol  [] Seizure, fall and aspiration precautions   [] Given concern for seizure, advise patient to not drive (for 1 yr per [facility] guidelines), operate heavy machinery, avoid heights, pools, bathtubs, locked doors.  [] Diet: Regular (Kosher)  [] DVT ppx lovenox  [] PT/OT for home PT/OT  [] Discharge to group home on HOLD due to COVID positive test 2/28 - per , group home requires 10 days isolation   [] Follow up in epilepsy clinic with Dr. Andrews or Dr. Cardenas in 1 month  [] Elective EMU admission to be scheduled in 4-6 weeks  [] Follow up with PMD to assess need for osteoporosis screening/medication    CORE MEASURES:        AED levels [] Sent [] Pending [] Resulted     LFTs [] normal [] elevated      Plan and education provided to [] patient []family at bedside [] awaiting for family     Seizure Semiology  [] Tonic clonic  [] Clonic  [] Tonic  [] Unresponsive  [] Focal with impaired awareness  [] Focal without impaired awareness    Obtain screening lower extremity venous ultrasound in patients who meet 1 or more of the following criteria as patient is high risk for DVT/PE on admission:   [] History of DVT/PE  [] Hypercoagulable states (Factor V Leiden, Cancer, OCP, etc. )  [] Prolonged immobility (hemiplegia/hemiparesis/post operative or any other extended immobilization)  [] Transferred from outside facility (Rehab or Long term care)       42 year-old woman with a PMH of HTN, HLD, bipolar d/o, hypothyroidism, seizure disorder (on Keppra and VPA), developmentally delayed, who presented to OSH on 2/18 with recurrent breakthrough seizures, transferred to the EMU on 2/18 for further monitoring and management. Exam reported to be baseline at this time. CTH from OSH unrevealing. VPA level from OSH subtherapeutic at 43, but noted to be 52.26 when corrected for albumin of 3.9. CBC/CMP wnl. Per history from family, patient had hx of meningitis age 18 mo, possible inception of seizures age 2 years (potentially earlier) with eyes rolling back and becoming less participatory. Per  Gian and family/ mother, patient may be having (once clarified) predominantly evening time events described as becoming quiet, less engaged, not talking, periodic eye blinking. No delmy tonic/ clonic activity noted. EEG overnight 2/25 shows focal polyspikes in left posterior temporal-central region.      Impression: Breakthrough seizures in the setting of borderline VPA level possibly due to medication nonadherence vs inadequate dosing. Initial goal to characterize events if epileptogenic.    Plan:   [x] EEG to be discontinued today  [x] MR brain w/wo contrast with epilepsy protocol - nonspecific subcentimeter T2/FLAIR hyperintensity w/in L posterior temporal lobe.  [] IV lacosamide 100mg BID started on 2/25 - can be IV or PO. Lacosamide increased 150mg BID   [x] Increased zonisamide to 200 mg BID  [] continue VEEG until 2/26. 1 more day of vEEG given family reported unresponsive episode/seizure on 2/28 5 min after receiving Ativan  [x] CBC, CMP, Mg, Phos, CK, lactate  [x] VPA- 43 and Keppra- 23.2 level  [x] UA neg for infxn  [x] Increase quetiapine to 150 mg bid with additional 50 mg q12h PRN agitation (previously: on 2/22 decreased to 100 mg bid)  [x] NO Haldol as per Family, patient has a martita episode   [x] D/C home LEV and VPA  [x] alendronate stopped as pt no longer on valproic acid - follow up with primary care  [x] D/C amantadine 2/22   [] follow up with Psych consult for agitation  [] 1:1 observation while requiring COVID isolation as pt not adherent with isolation  [] continue levothyroxine 50 mcg qAM  [ ] Seizure rescue: Ativan 1mg IVP for seizure activity > 3 mins; on discharge: Nayzilam 5 mg intranasal PRN GTC seizure > 3 min, may repeat once in 10 min in other nostril if seizures continue; do not repeat dose if patient excessively sedated or having trouble breathing  [] Telemetry monitoring; Neurochecks/VS per unit protocol  [] Seizure, fall and aspiration precautions   [] Given concern for seizure, advise patient to not drive (for 1 yr per [facility] guidelines), operate heavy machinery, avoid heights, pools, bathtubs, locked doors.  [] Diet: Regular (Kosher)  [] DVT ppx lovenox  [] PT/OT for home PT/OT  [] Discharge to group home on HOLD due to COVID positive test 2/28 - per SW, group home requires 10 days isolation   [] Follow up in epilepsy clinic with Dr. Andrews or Dr. Cardenas in 1 month  [] Elective EMU admission to be scheduled in 4-6 weeks  [] Follow up with PMD to assess need for osteoporosis screening/medication    CORE MEASURES:        AED levels [] Sent [] Pending [] Resulted     LFTs [] normal [] elevated      Plan and education provided to [x] patient [x]family at bedside [] awaiting for family     Seizure Semiology  [] Tonic clonic  [] Clonic  [] Tonic  [] Unresponsive  [] Focal with impaired awareness  [] Focal without impaired awareness    Obtain screening lower extremity venous ultrasound in patients who meet 1 or more of the following criteria as patient is high risk for DVT/PE on admission:   [] History of DVT/PE  [] Hypercoagulable states (Factor V Leiden, Cancer, OCP, etc. )  [] Prolonged immobility (hemiplegia/hemiparesis/post operative or any other extended immobilization)  [] Transferred from outside facility (Rehab or Long term care)       42 year-old woman with a PMH of HTN, HLD, bipolar d/o, hypothyroidism, seizure disorder (on Keppra and VPA), developmentally delayed, who presented to OSH on 2/18 with recurrent breakthrough seizures, transferred to the EMU on 2/18 for further monitoring and management. Exam reported to be baseline at this time. CTH from OSH unrevealing. VPA level from OSH subtherapeutic at 43, but noted to be 52.26 when corrected for albumin of 3.9. CBC/CMP wnl. Per history from family, patient had hx of meningitis age 18 mo, possible inception of seizures age 2 years (potentially earlier) with eyes rolling back and becoming less participatory. Per  Gian and family/ mother, patient may be having (once clarified) predominantly evening time events described as becoming quiet, less engaged, not talking, periodic eye blinking. No delmy tonic/ clonic activity noted. EEG overnight 2/25 shows focal polyspikes in left posterior temporal-central region.      Impression: Breakthrough seizures in the setting of borderline VPA level possibly due to medication nonadherence vs inadequate dosing. Initial goal to characterize events if epileptogenic.    Plan:   [x] EEG to be discontinued today  [x] MR brain w/wo contrast with epilepsy protocol - nonspecific subcentimeter T2/FLAIR hyperintensity w/in L posterior temporal lobe.  [] Lacosamide 150 mg bid (increased on 3/1 due to unresponsive episode 2/28; previously started lacosamide 100 mg bid on 2/25)   [x] Increased zonisamide to 200 mg BID  [x] CBC, CMP, Mg, Phos, CK, lactate  [x] VPA- 43 and Keppra- 23.2 level  [x] UA neg for infxn  [x] Increased quetiapine to 150 mg bid with additional 50 mg q12h PRN agitation on 2/28 (previously: on 2/22 decreased to 100 mg bid)  [] ativan 0.5 mg PO daily PRN agitation  [x] NO Haldol as per Family, patient had an episode after Haldol in the past of unresponsiveness, head tilted back, b/l hands shaking  [x] D/C home LEV and VPA  [x] alendronate stopped as pt no longer on valproic acid - follow up with primary care  [x] D/C amantadine 2/22   [] follow up with Psych consult for agitation  [] 1:1 observation while requiring COVID isolation as pt not adherent with isolation  [] continue levothyroxine 50 mcg qAM  [ ] Seizure rescue: Ativan 1mg IVP for seizure activity > 3 mins; on discharge: Nayzilam 5 mg intranasal PRN GTC seizure > 3 min, may repeat once in 10 min in other nostril if seizures continue; do not repeat dose if patient excessively sedated or having trouble breathing  [] Telemetry monitoring; Neurochecks/VS per unit protocol  [] Seizure, fall and aspiration precautions   [] Given concern for seizure, advise patient to not drive (for 1 yr per [facility] guidelines), operate heavy machinery, avoid heights, pools, bathtubs, locked doors.  [] Diet: Regular (Kosher)  [] DVT ppx lovenox  [] PT/OT for home PT/OT  [] Discharge to group home on HOLD due to COVID positive test 2/28 - per , group home requires 10 days isolation   [] Follow up in epilepsy clinic with Dr. Andrews or Dr. Cardenas in 1 month  [] Elective EMU admission to be scheduled in 4-6 weeks  [] Follow up with PMD to assess need for osteoporosis screening/medication    CORE MEASURES:        AED levels [] Sent [] Pending [] Resulted     LFTs [] normal [] elevated      Plan and education provided to [x] patient [x]family at bedside [] awaiting for family     Seizure Semiology  [] Tonic clonic  [] Clonic  [] Tonic  [] Unresponsive  [] Focal with impaired awareness  [] Focal without impaired awareness    Obtain screening lower extremity venous ultrasound in patients who meet 1 or more of the following criteria as patient is high risk for DVT/PE on admission:   [] History of DVT/PE  [] Hypercoagulable states (Factor V Leiden, Cancer, OCP, etc. )  [] Prolonged immobility (hemiplegia/hemiparesis/post operative or any other extended immobilization)  [] Transferred from outside facility (Rehab or Long term care)

## 2023-03-01 NOTE — DIETITIAN INITIAL EVALUATION ADULT - OTHER INFO
Reports -195 lbs. Denies recent wt changes.   Dosing wt not in chart at this time  Wt history per chart: 188 lbs (02-18, stated), 195.1 lbs (12/10/21). RD to continue to monitor weight trends as able/available.     Per chart, pt currently ordered for ferrous sulfate and oral synthroid in-house.

## 2023-03-01 NOTE — DIETITIAN INITIAL EVALUATION ADULT - ADD RECOMMEND
1) Continue Kosher diet.   2) RD to honor food preferences as feasible to optimize protein-energy intake to meet estimated needs.  3) Monitor PO intake, GI tolerance, skin integrity, labs, weight, and bowel movement regularity.   4) Honor food preferences as feasible. Assist with meals PRN and encourage PO intake.  5) RD remains available upon request and will follow-up per protocol.

## 2023-03-01 NOTE — BH CONSULTATION LIAISON PROGRESS NOTE - NSBHCONSULTRECOMMENDOTHER_PSY_A_CORE FT
C/w Seroquel 150mg PO BID     C/w Ativan 0.5mg PO daily    PRN: Haldol 2.5mg-5mg PO/IV q6h PRN agitation, Ativan 1mg-2mg PO/IV q6h PRN agitation (family would like to be notified before PRNs are given)    Consider alternative to Zonegran which can be associated with agitation    OP psych f/u with own psychiatrist after discharge

## 2023-03-01 NOTE — DIETITIAN INITIAL EVALUATION ADULT - ENERGY INTAKE
Pt's mother reports pt doesn't like the food here so she is bringing food from home and pt is eating it. Food preferences obtained and to be honored as able.

## 2023-03-01 NOTE — PROGRESS NOTE ADULT - ATTENDING COMMENTS
Discussed case briefly with patient's outpatient neurologist Dr. Taylor ([phone]) per family's request. Patient is planned to transfer outpatient care to [facility] epilepsy clinic.    Pt has been drowsy since receiving Ativan for agitation yesterday. Family reports she woke up to eat today and then went back to sleep.  vEEG prelim report 2/28-3/1 - less beta activity than previously, per discussion with fellow. no epileptiform abnormalities.  Saturating well on room air, afebrile    Assessment:  Epilepsy  Agitation  COVID-19 infection    Plan:  As above    Deidra Sesay MD

## 2023-03-01 NOTE — BH CONSULTATION LIAISON PROGRESS NOTE - NSBHASSESSMENTFT_PSY_ALL_CORE
42F single, disabled, noncaregiver, living in group home for persons with developmental disabilities, with PPHx intellectual disability and bipolar disorder, no prior SA or psych admissions, follows with OP psychiatrist at her facility, no prior SA or psych admissions, no active substance abuse, with PMH significant for HTN, HLD, hypothyroidism, seizure disorder (on Keppra and VPA), initially presented to OSH on 2/18 for breakthrough seizures, transferred to the EMU for further evaluation and management, psychiatry consulted for med management.  On interview pt is calm and cooperative, albeit limited historian, disorganized.  Denies depression or anxiety, states she enjoys watching "[place]."  Family at bedside providing collateral; states pt has appeared obtunded after being given her medications over the past 6 mos; expressing concerns that pt has been overmedicated and taking the same meds for >20 years, requesting adjustment.  States OP psychiatrist reduced Seroquel by 100mg/day as well as Ativan, deny pt has been agitated although has chronically poor frustration tolerance at baseline in s/o ID.  Mother reports pt is now off of Keppra with reduced VPA, and does not seem obtunded after Seroquel.

## 2023-03-01 NOTE — DIETITIAN INITIAL EVALUATION ADULT - ORAL INTAKE PTA/DIET HISTORY
Pt's mother reports pt is a fussy eater but will eat the foods she enjoys which includes green salads, breaded flounder, and cheese pasta.   Confirms NKFA.

## 2023-03-01 NOTE — DIETITIAN INITIAL EVALUATION ADULT - NSFNSNUTRCHEWSWALLOWFT_GEN_A_CORE
Pt's mother denies pt with any chewing/swallowing difficulty. 
Cat bite of hand, right, initial encounter

## 2023-03-01 NOTE — DIETITIAN INITIAL EVALUATION ADULT - NSFNSNUTRHOMESUPPLEMENTFT_GEN_A_CORE
Pt's mother reports pt was being given Vitamin C, Vitamin D, iron, and a multivitamin at the group home. Denies use of any additional nutrition/dietary supplements PTA.

## 2023-03-01 NOTE — BH CONSULTATION LIAISON PROGRESS NOTE - NSBHFUPINTERVALHXFT_PSY_A_CORE
Pt seen for med management, pt agitated yesterday, received Ativan PRN, slept poorly ON per staff at bedside.  This morning pt lethargic, speaking softly, states she does not feel well physically but cannot elaborate.  Unable to answer questions regarding mood, AVH, SI/HI.  Pt's home dose Seroquel was reduced to 100mg BID per primary team but was increased back to 150mg BID yesterday.

## 2023-03-01 NOTE — DIETITIAN INITIAL EVALUATION ADULT - NSFNSGIIOFT_GEN_A_CORE
Denies nausea, vomiting, diarrhea. Mother reports constipation. PCA reports last BM 3/1. Pt currently on bowel regimen (senna, miralax).

## 2023-03-01 NOTE — PROGRESS NOTE ADULT - SUBJECTIVE AND OBJECTIVE BOX
THE PATIENT WAS SEEN AND EXAMINED BY ME WITH THE HOUSESTAFF DURING MORNING ROUNDS.     HPI:  42 year-old woman with a PMH of HTN, HLD, bipolar d/o, hypothyroidism, seizure disorder (on Keppra and VPA), developmentally delayed, who initially presented to OSH on 2/18 for breakthrough seizures, transferred to the EMU for further evaluation and management. Per chart review and discussion with aide at bedside, patient had 2 seizures at her group home, including an episode of generalized "tonic-clonic" shaking while seated in a recliner, lasting 2 minutes, associated with urinary incontinence. She was taken to OSH where she was reported to be postictal and received IV VPA 500mg x1 and later determined to be stable for discharge back to her group home. While awaiting transport, she had a 3rd witnessed event lasting less than 1 minute, for which she was administered ativan 1mg IVP, and decision made for transport to [facility] for continuous EEG. There was no fall or injury associated with any of these events. Her aide is unsure of when her last seizure prior to these events may have occurred.    ROS: Otherwise negative.     SUBJECTIVE: No events overnight.  No new neurologic complaints.      acetaminophen     Tablet .. 650 milliGRAM(s) Oral every 6 hours PRN  benzocaine/menthol Lozenge 1 Lozenge Oral four times a day PRN  bismuth subsalicylate Liquid 5 milliLiter(s) Oral once  enoxaparin Injectable 40 milliGRAM(s) SubCutaneous every 24 hours  ferrous    sulfate 325 milliGRAM(s) Oral two times a day  guaiFENesin Oral Liquid (Sugar-Free) 200 milliGRAM(s) Oral every 8 hours PRN  hydrocortisone 1% Cream 1 Application(s) Topical daily  lacosamide 100 milliGRAM(s) Oral two times a day  levothyroxine 50 MICROGram(s) Oral daily  LORazepam   Injectable 1 milliGRAM(s) IV Push once PRN  polyethylene glycol 3350 17 Gram(s) Oral daily  QUEtiapine 150 milliGRAM(s) Oral every 12 hours  QUEtiapine 50 milliGRAM(s) Oral two times a day PRN  senna 1 Tablet(s) Oral daily  zonisamide 200 milliGRAM(s) Oral two times a day    Physical Examination:   Constitutional: appears stated age, cheerful, NAD.  Head: Normocephalic & atraumatic.  Respiratory: Breathing comfortably on room air.    Neurological (>12):  MS: Eyes open, alert, oriented to person, Follows some simple commands. Roaming the hallway hugging patients and staff  Speech/Language: Clear, grossly fluent but perseverates, repeatedly stating her name  CNs: EOMI, no nystagmus, no diplopia. V1-3 intact to LT, well developed masseter muscles b/l. No gross facial asymmetry b/l, full eye closure strength b/l. Hearing grossly intact  to conversation.  Motor: Muscle bulk normal. Moving all extremities at least against gravity.   Sensation: Intact to LT throughout.   Coordination: Unable to assess due to participation.  Gait: ambulates independently      Vital Signs Last 24 Hrs  T(C): 36.9 (01 Mar 2023 06:26), Max: 36.9 (28 Feb 2023 23:30)  T(F): 98.4 (01 Mar 2023 06:26), Max: 98.4 (28 Feb 2023 23:30)  HR: 91 (01 Mar 2023 06:26) (91 - 123)  BP: 110/74 (01 Mar 2023 06:26) (110/74 - 145/83)  RR: 18 (01 Mar 2023 06:26) (18 - 21)  SpO2: 95% (01 Mar 2023 06:26) (93% - 96%)    COVID-19 PCR: Detected (28 Feb 2023 19:58)    IMAGING:     EEG REPORT 2/25/23:  EEG Summary:    Normal EEG in the awake, drowsy and asleep states.  Polyspikes, focal, left posterior temporal-central region  Excess beta    Impression/Clinical Correlate:    This is an abnormal VEEG. There is evidence of potentially epileptogenic cortical irritability in the left posterior temporal-cental region. No seizures were recorded. The presence of excessive beta activity never evolves and has no clear clinical correlation, is most often seen in setting of sedative medication use but has also been described in rare cases of intellectual impairment.       MR Brain-Seizure, Epilepsy w/wo IV Cont (02.25.23 @ 11:07)  FINDINGS:  No acute infarction or acute intracranial hemorrhage.  The hippocampi appear unremarkable. There is no evidence for congenital   malformation/developmental anomaly.  Nonspecific subcentimeter T2/FLAIR hyperintense white matter foci are   noted within the left posterior temporal lobe (19-28).  No hydrocephalus. No extra-axial fluid collections. The skull base flow   voids are present.    The visualized intraorbital contents are normal. The imaged portions of   the paranasal sinuses are clear. The mastoid air cells are clear. The   visualized osseous structures, soft tissues and partially visualized   parotid glands appear normal.    IMPRESSION:  No acute infarct or hemorrhage.    Nonspecific subcentimeter T2/FLAIR hyperintense white matter foci in the   posterior left temporal lobe.    EEG 2/24-2/25  Normal EEG in the awake, drowsy and asleep states.  3.	Polyspikes, focal, left posterior temporal-central region  4.	Excess beta    Impression/Clinical Correlate:    This is an abnormal VEEG. There is evidence of potentially epileptogenic cortical irritability in the left posterior temporal-cental region. No seizures were recorded. The presence of excessive beta activity never evolves and has no clear clinical correlation, is most often seen in setting of sedative medication use but has also been described in rare cases of intellectual impairment.     EEG 2/23-2/24  Normal EEG in the awake, drowsy and asleep states.  5.	Excess beta    Impression/Clinical Correlate:    This is a normal VEEG. No epileptiform pattern or seizures were recorded. The presence of excessive beta activity is most often seen in setting of sedative medication use    This is a prelim report only, pending review with attending prior to finalization.      THE PATIENT WAS SEEN AND EXAMINED BY ME WITH THE HOUSESTAFF DURING MORNING ROUNDS.     HPI:  42 year-old woman with a PMH of HTN, HLD, bipolar d/o, hypothyroidism, seizure disorder (on Keppra and VPA), developmentally delayed, who initially presented to OSH on 2/18 for breakthrough seizures, transferred to the EMU for further evaluation and management. Per chart review and discussion with aide at bedside, patient had 2 seizures at her group home, including an episode of generalized "tonic-clonic" shaking while seated in a recliner, lasting 2 minutes, associated with urinary incontinence. She was taken to OSH where she was reported to be postictal and received IV VPA 500mg x1 and later determined to be stable for discharge back to her group home. While awaiting transport, she had a 3rd witnessed event lasting less than 1 minute, for which she was administered ativan 1mg IVP, and decision made for transport to [facility] for continuous EEG. There was no fall or injury associated with any of these events. Her aide is unsure of when her last seizure prior to these events may have occurred.    ROS: Otherwise negative.     SUBJECTIVE: No events overnight.  No new neurologic complaints.      acetaminophen     Tablet .. 650 milliGRAM(s) Oral every 6 hours PRN  benzocaine/menthol Lozenge 1 Lozenge Oral four times a day PRN  bismuth subsalicylate Liquid 5 milliLiter(s) Oral once  enoxaparin Injectable 40 milliGRAM(s) SubCutaneous every 24 hours  ferrous    sulfate 325 milliGRAM(s) Oral two times a day  guaiFENesin Oral Liquid (Sugar-Free) 200 milliGRAM(s) Oral every 8 hours PRN  hydrocortisone 1% Cream 1 Application(s) Topical daily  lacosamide 100 milliGRAM(s) Oral two times a day  levothyroxine 50 MICROGram(s) Oral daily  LORazepam   Injectable 1 milliGRAM(s) IV Push once PRN  polyethylene glycol 3350 17 Gram(s) Oral daily  QUEtiapine 150 milliGRAM(s) Oral every 12 hours  QUEtiapine 50 milliGRAM(s) Oral two times a day PRN  senna 1 Tablet(s) Oral daily  zonisamide 200 milliGRAM(s) Oral two times a day    Physical Examination:   Constitutional: appears stated age, NAD.  Head: Normocephalic & atraumatic.  Respiratory: Breathing comfortably on room air.    Neurological (>12):  MS: Eyes open and alert. Laying in bed.   Speech/Language: Not participant in conversation.   CNs: EOMI, no nystagmus, no diplopia. Hearing grossly intact  to conversation.  Motor: Muscle bulk normal. Not assessed laying in bed.   Coordination: Unable to assess due to participation.  Gait: Not assessed     Vital Signs Last 24 Hrs  T(C): 36.9 (01 Mar 2023 06:26), Max: 36.9 (28 Feb 2023 23:30)  T(F): 98.4 (01 Mar 2023 06:26), Max: 98.4 (28 Feb 2023 23:30)  HR: 91 (01 Mar 2023 06:26) (91 - 123)  BP: 110/74 (01 Mar 2023 06:26) (110/74 - 145/83)  RR: 18 (01 Mar 2023 06:26) (18 - 21)  SpO2: 95% (01 Mar 2023 06:26) (93% - 96%)    COVID-19 PCR: Detected (28 Feb 2023 19:58)    IMAGING:     EEG REPORT 2/25/23:  EEG Summary:    Normal EEG in the awake, drowsy and asleep states.  Polyspikes, focal, left posterior temporal-central region  Excess beta    Impression/Clinical Correlate:    This is an abnormal VEEG. There is evidence of potentially epileptogenic cortical irritability in the left posterior temporal-cental region. No seizures were recorded. The presence of excessive beta activity never evolves and has no clear clinical correlation, is most often seen in setting of sedative medication use but has also been described in rare cases of intellectual impairment.       MR Brain-Seizure, Epilepsy w/wo IV Cont (02.25.23 @ 11:07)  FINDINGS:  No acute infarction or acute intracranial hemorrhage.  The hippocampi appear unremarkable. There is no evidence for congenital   malformation/developmental anomaly.  Nonspecific subcentimeter T2/FLAIR hyperintense white matter foci are   noted within the left posterior temporal lobe (19-28).  No hydrocephalus. No extra-axial fluid collections. The skull base flow   voids are present.    The visualized intraorbital contents are normal. The imaged portions of   the paranasal sinuses are clear. The mastoid air cells are clear. The   visualized osseous structures, soft tissues and partially visualized   parotid glands appear normal.    IMPRESSION:  No acute infarct or hemorrhage.    Nonspecific subcentimeter T2/FLAIR hyperintense white matter foci in the   posterior left temporal lobe.    EEG 2/24-2/25  Normal EEG in the awake, drowsy and asleep states.  3.	Polyspikes, focal, left posterior temporal-central region  4.	Excess beta    Impression/Clinical Correlate:    This is an abnormal VEEG. There is evidence of potentially epileptogenic cortical irritability in the left posterior temporal-cental region. No seizures were recorded. The presence of excessive beta activity never evolves and has no clear clinical correlation, is most often seen in setting of sedative medication use but has also been described in rare cases of intellectual impairment.     EEG 2/23-2/24  Normal EEG in the awake, drowsy and asleep states.  5.	Excess beta    Impression/Clinical Correlate:    This is a normal VEEG. No epileptiform pattern or seizures were recorded. The presence of excessive beta activity is most often seen in setting of sedative medication use    This is a prelim report only, pending review with attending prior to finalization.

## 2023-03-02 LAB
ALBUMIN SERPL ELPH-MCNC: 4.3 G/DL — SIGNIFICANT CHANGE UP (ref 3.3–5)
ALP SERPL-CCNC: 65 U/L — SIGNIFICANT CHANGE UP (ref 40–120)
ALT FLD-CCNC: 12 U/L — SIGNIFICANT CHANGE UP (ref 10–45)
ANION GAP SERPL CALC-SCNC: 14 MMOL/L — SIGNIFICANT CHANGE UP (ref 5–17)
AST SERPL-CCNC: 11 U/L — SIGNIFICANT CHANGE UP (ref 10–40)
BILIRUB SERPL-MCNC: 0.2 MG/DL — SIGNIFICANT CHANGE UP (ref 0.2–1.2)
BUN SERPL-MCNC: 17 MG/DL — SIGNIFICANT CHANGE UP (ref 7–23)
CALCIUM SERPL-MCNC: 9.8 MG/DL — SIGNIFICANT CHANGE UP (ref 8.4–10.5)
CHLORIDE SERPL-SCNC: 101 MMOL/L — SIGNIFICANT CHANGE UP (ref 96–108)
CO2 SERPL-SCNC: 21 MMOL/L — LOW (ref 22–31)
CREAT SERPL-MCNC: 0.7 MG/DL — SIGNIFICANT CHANGE UP (ref 0.5–1.3)
EGFR: 110 ML/MIN/1.73M2 — SIGNIFICANT CHANGE UP
GLUCOSE SERPL-MCNC: 101 MG/DL — HIGH (ref 70–99)
HCT VFR BLD CALC: 43.5 % — SIGNIFICANT CHANGE UP (ref 34.5–45)
HGB BLD-MCNC: 13.9 G/DL — SIGNIFICANT CHANGE UP (ref 11.5–15.5)
MCHC RBC-ENTMCNC: 28.1 PG — SIGNIFICANT CHANGE UP (ref 27–34)
MCHC RBC-ENTMCNC: 32 GM/DL — SIGNIFICANT CHANGE UP (ref 32–36)
MCV RBC AUTO: 87.9 FL — SIGNIFICANT CHANGE UP (ref 80–100)
MRSA PCR RESULT.: SIGNIFICANT CHANGE UP
NRBC # BLD: 0 /100 WBCS — SIGNIFICANT CHANGE UP (ref 0–0)
PLATELET # BLD AUTO: 278 K/UL — SIGNIFICANT CHANGE UP (ref 150–400)
POTASSIUM SERPL-MCNC: 4 MMOL/L — SIGNIFICANT CHANGE UP (ref 3.5–5.3)
POTASSIUM SERPL-SCNC: 4 MMOL/L — SIGNIFICANT CHANGE UP (ref 3.5–5.3)
PROT SERPL-MCNC: 7.6 G/DL — SIGNIFICANT CHANGE UP (ref 6–8.3)
RBC # BLD: 4.95 M/UL — SIGNIFICANT CHANGE UP (ref 3.8–5.2)
RBC # FLD: 14.3 % — SIGNIFICANT CHANGE UP (ref 10.3–14.5)
S AUREUS DNA NOSE QL NAA+PROBE: SIGNIFICANT CHANGE UP
SODIUM SERPL-SCNC: 136 MMOL/L — SIGNIFICANT CHANGE UP (ref 135–145)
WBC # BLD: 8.1 K/UL — SIGNIFICANT CHANGE UP (ref 3.8–10.5)
WBC # FLD AUTO: 8.1 K/UL — SIGNIFICANT CHANGE UP (ref 3.8–10.5)

## 2023-03-02 PROCEDURE — 99231 SBSQ HOSP IP/OBS SF/LOW 25: CPT | Mod: FS

## 2023-03-02 RX ADMIN — Medication 1 APPLICATION(S): at 18:38

## 2023-03-02 RX ADMIN — Medication 325 MILLIGRAM(S): at 05:55

## 2023-03-02 RX ADMIN — Medication 200 MILLIGRAM(S): at 17:34

## 2023-03-02 RX ADMIN — LACOSAMIDE 150 MILLIGRAM(S): 50 TABLET ORAL at 05:55

## 2023-03-02 RX ADMIN — Medication 200 MILLIGRAM(S): at 05:56

## 2023-03-02 RX ADMIN — QUETIAPINE FUMARATE 150 MILLIGRAM(S): 200 TABLET, FILM COATED ORAL at 05:56

## 2023-03-02 RX ADMIN — Medication 325 MILLIGRAM(S): at 17:16

## 2023-03-02 RX ADMIN — LACOSAMIDE 150 MILLIGRAM(S): 50 TABLET ORAL at 17:25

## 2023-03-02 RX ADMIN — ENOXAPARIN SODIUM 40 MILLIGRAM(S): 100 INJECTION SUBCUTANEOUS at 05:55

## 2023-03-02 RX ADMIN — CHLORHEXIDINE GLUCONATE 1 APPLICATION(S): 213 SOLUTION TOPICAL at 06:04

## 2023-03-02 RX ADMIN — POLYETHYLENE GLYCOL 3350 17 GRAM(S): 17 POWDER, FOR SOLUTION ORAL at 17:34

## 2023-03-02 RX ADMIN — Medication 50 MICROGRAM(S): at 05:56

## 2023-03-02 RX ADMIN — QUETIAPINE FUMARATE 150 MILLIGRAM(S): 200 TABLET, FILM COATED ORAL at 17:15

## 2023-03-02 NOTE — EEG REPORT - NS EEG TEXT BOX
Day 9 – 	Start: 3/1/2023  08:00    	End: 3/1/2023  16:17  	Duration: 8 hr  17 min    Daily EEG Visual Analysis    FINDINGS:  The background was continuous, symmetric, spontaneously variable and reactive. During wakefulness, only fragments of 8 Hz posterior rhythm could be discerned.  No Breach rhythms. Excess diffuse frontal beta noted.     Background Slowing:  Diffuse theta even during max wakefulness    Focal Slowing:   None were present.    Sleep Background:  Drowsiness was characterized by fragmentation, attenuation, and slowing of the background activity.    Sleep was characterized by the presence of vertex waves, symmetric sleep spindles and K-complexes.    Other Non-Epileptiform Findings:  Diffuse beta activity, less prominent than previous recordings    Activation Procedures:   Hyperventilation was not performed.    Photic stimulation was not performed.    Interictal Epileptiform Activity:   None were present.    Events:  No events or seizures recorded.    Artifacts:  Intermittent myogenic and movement artifacts were noted.    ECG:  The heart rate on single channel ECG was predominantly between  BPM.    AEDs:   LAC 100mg BID  Zonegran 200mg BID        EEG Summary:    Normal EEG in the awake, drowsy and asleep states.    Background slowing, generalized, mild  Excess beta    Impression/Clinical Correlate:    This is an abnormal VEEG.  Mild diffuse/multifocal cerebral dysfunction, not specific in etiology. No seizures were recorded. The presence of excessive beta activity never evolves and has no clear clinical correlation, is most often seen in setting of sedative medication use but has also been described in rare cases of intellectual impairment.     This is a prelim report only, pending review with attending prior to finalization.     Stephen Bowers MD     Fellow, [facility]     ------------------------------------     EEG Reading Room: [phone]     On Call Service After Hours: [phone]    Day 9 – 	Start: 3/1/2023  08:00    	End: 3/1/2023  16:17  	Duration: 8 hr  17 min    Daily EEG Visual Analysis    FINDINGS:  The background was continuous, symmetric, spontaneously variable and reactive. During wakefulness, only fragments of 8 Hz posterior rhythm could be discerned.  No Breach rhythms. Excess diffuse frontal beta noted.     Background Slowing:  Diffuse theta even during max wakefulness    Focal Slowing:   None were present.    Sleep Background:  Drowsiness was characterized by fragmentation, attenuation, and slowing of the background activity.    Sleep was characterized by the presence of vertex waves, symmetric sleep spindles and K-complexes.    Other Non-Epileptiform Findings:  Diffuse beta activity, less prominent than previous recordings    Activation Procedures:   Hyperventilation was not performed.    Photic stimulation was not performed.    Interictal Epileptiform Activity:   None were present.    Events:  No events or seizures recorded.    Artifacts:  Intermittent myogenic and movement artifacts were noted.    ECG:  The heart rate on single channel ECG was predominantly between  BPM.    AEDs:   LAC 100mg BID  Zonegran 200mg BID    EEG Summary:    Normal EEG in the awake, drowsy and asleep states.    Background slowing, generalized, mild  Excess beta    Impression/Clinical Correlate:    This is an abnormal VEEG.  Mild diffuse/multifocal cerebral dysfunction, not specific in etiology. No seizures were recorded. The presence of excessive beta activity never evolves and has no clear clinical correlation, is most often seen in setting of sedative medication use but has also been described in rare cases of intellectual impairment.     This is a prelim report only, pending review with attending prior to finalization.     Stephen Bowers MD   Fellow, [facility]     ------------------------------------     EEG Reading Room: [phone]   On Call Service After Hours: [phone]

## 2023-03-02 NOTE — PROGRESS NOTE ADULT - SUBJECTIVE AND OBJECTIVE BOX
THE PATIENT WAS SEEN AND EXAMINED BY ME WITH THE HOUSESTAFF DURING MORNING ROUNDS.     HPI:  42 year-old woman with HTN, HLD, bipolar d/o, hypothyroidism, seizure disorder (on Keppra and VPA), developmentally delayed, who presented to OSH on 2/18 for breakthrough seizures, transferred to the EMU for further evaluation and management. Per chart review and discussion with aide at bedside, patient had 2 seizures at her group home, including an episode of generalized "tonic-clonic" shaking while seated in a recliner, lasting 2 minutes, associated with urinary incontinence. She was taken to OSH where she was reported to be postictal and received IV VPA 500mg x1 and later determined to be stable for discharge back to her group home. While awaiting transport, she had a 3rd witnessed event lasting less than 1 minute, for which she was administered ativan 1mg IVP, and decision made for transport to [facility] for continuous EEG. There was no fall or injury associated with any of these events. Her aide is unsure of when her last seizure prior to these events may have occurred.    ROS: Otherwise negative.     SUBJECTIVE: No events overnight.    Medications:  acetaminophen     Tablet .. 650 milliGRAM(s) Oral every 6 hours PRN  benzocaine/menthol Lozenge 1 Lozenge Oral four times a day PRN  bismuth subsalicylate Liquid 5 milliLiter(s) Oral once  chlorhexidine 2% Cloths 1 Application(s) Topical <User Schedule>  enoxaparin Injectable 40 milliGRAM(s) SubCutaneous every 24 hours  ferrous    sulfate 325 milliGRAM(s) Oral two times a day  guaiFENesin Oral Liquid (Sugar-Free) 200 milliGRAM(s) Oral every 8 hours PRN  hydrocortisone 1% Cream 1 Application(s) Topical daily  lacosamide 150 milliGRAM(s) Oral two times a day  levothyroxine 50 MICROGram(s) Oral daily  LORazepam     Tablet 0.5 milliGRAM(s) Oral daily PRN  LORazepam   Injectable 1 milliGRAM(s) IV Push once PRN  polyethylene glycol 3350 17 Gram(s) Oral daily  QUEtiapine 150 milliGRAM(s) Oral every 12 hours  QUEtiapine 50 milliGRAM(s) Oral two times a day PRN  senna 1 Tablet(s) Oral daily  zonisamide 200 milliGRAM(s) Oral two times a day    Physical Examination:   Constitutional: appears stated age, NAD.  Head: Normocephalic & atraumatic.  Respiratory: Breathing comfortably on room air.    Neurological:  MS: Eyes open and alert. Laying in bed.   Speech/Language: Not participant in conversation.   CNs: EOMI, no nystagmus, no diplopia. Hearing grossly intact  to conversation.  Motor: Muscle bulk normal. Not assessed laying in bed.   Coordination: Unable to assess due to participation.  Gait: Not assessed     LABS:     COVID-19 PCR: Detected (28 Feb 2023 19:58)    IMAGING:     CT Head 2/18/23:  No obvious acute intracranial hemorrhage, large cortical infarct or mass   effect. If clinically indicated, short-term follow-up or MRI may be   obtained for further evaluation.    MRI Brain Epilepsy protocol w/wo contrast 2/25/23:  No acute infarct or hemorrhage.  Nonspecific subcentimeter T2/FLAIR hyperintense white matter foci in the   posterior left temporal lobe.    EEG 3/1/23:  EEG Summary:    Normal EEG in the awake, drowsy and asleep states.  Excess beta    Impression/Clinical Correlate:    No seizures were recorded. The presence of excessive beta activity never evolves and has no clear clinical correlation, is most often seen in setting of sedative medication use but has also been described in rare cases of intellectual impairment.      THE PATIENT WAS SEEN AND EXAMINED BY ME WITH THE HOUSESTAFF DURING MORNING ROUNDS.     HPI:  42 year-old woman with HTN, HLD, bipolar d/o, hypothyroidism, seizure disorder (on Keppra and VPA), developmentally delayed, who presented to OSH on 2/18 for breakthrough seizures, transferred to the EMU for further evaluation and management. Per chart review and discussion with aide at bedside, patient had 2 seizures at her group home, including an episode of generalized "tonic-clonic" shaking while seated in a recliner, lasting 2 minutes, associated with urinary incontinence. She was taken to OSH where she was reported to be postictal and received IV VPA 500mg x1 and later determined to be stable for discharge back to her group home. While awaiting transport, she had a 3rd witnessed event lasting less than 1 minute, for which she was administered ativan 1mg IVP, and decision made for transport to [facility] for continuous EEG. There was no fall or injury associated with any of these events. Her aide is unsure of when her last seizure prior to these events may have occurred.    ROS: Otherwise negative.     SUBJECTIVE: No events overnight.    Medications:  acetaminophen     Tablet .. 650 milliGRAM(s) Oral every 6 hours PRN  benzocaine/menthol Lozenge 1 Lozenge Oral four times a day PRN  bismuth subsalicylate Liquid 5 milliLiter(s) Oral once  chlorhexidine 2% Cloths 1 Application(s) Topical <User Schedule>  enoxaparin Injectable 40 milliGRAM(s) SubCutaneous every 24 hours  ferrous    sulfate 325 milliGRAM(s) Oral two times a day  guaiFENesin Oral Liquid (Sugar-Free) 200 milliGRAM(s) Oral every 8 hours PRN  hydrocortisone 1% Cream 1 Application(s) Topical daily  lacosamide 150 milliGRAM(s) Oral two times a day  levothyroxine 50 MICROGram(s) Oral daily  LORazepam     Tablet 0.5 milliGRAM(s) Oral daily PRN  LORazepam   Injectable 1 milliGRAM(s) IV Push once PRN  polyethylene glycol 3350 17 Gram(s) Oral daily  QUEtiapine 150 milliGRAM(s) Oral every 12 hours  QUEtiapine 50 milliGRAM(s) Oral two times a day PRN  senna 1 Tablet(s) Oral daily  zonisamide 200 milliGRAM(s) Oral two times a day    Physical Examination:   Constitutional: appears stated age, NAD.  Head: Normocephalic & atraumatic.  Respiratory: Breathing comfortably on room air.    Neurological:  MS: Eyes open and alert. Seated in chair.   Speech/Language: Speech fluent.   CNs: Face appears symmetric. Hearing grossly intact to conversation.  Motor: Muscle bulk normal.   Gait: Not assessed     LABS:     COVID-19 PCR: Detected (28 Feb 2023 19:58)    IMAGING:     CT Head 2/18/23:  No obvious acute intracranial hemorrhage, large cortical infarct or mass   effect. If clinically indicated, short-term follow-up or MRI may be   obtained for further evaluation.    MRI Brain Epilepsy protocol w/wo contrast 2/25/23:  No acute infarct or hemorrhage.  Nonspecific subcentimeter T2/FLAIR hyperintense white matter foci in the   posterior left temporal lobe.    EEG 3/1/23:  EEG Summary:    Normal EEG in the awake, drowsy and asleep states.  Excess beta    Impression/Clinical Correlate:    No seizures were recorded. The presence of excessive beta activity never evolves and has no clear clinical correlation, is most often seen in setting of sedative medication use but has also been described in rare cases of intellectual impairment.

## 2023-03-02 NOTE — PROGRESS NOTE ADULT - ATTENDING COMMENTS
Pt seen today sitting in chair, awake, calm, says she feels well. She says she is watching [place] on TV - in reality, there is a cartoon (not [place]) playing.    Afebrile, O2 sat normal on room air.    Assessment:  Epilepsy  Agitation  COVID-19 infection, asymptomatic    Plan:  As above    Deidra Sesay MD

## 2023-03-02 NOTE — PROGRESS NOTE ADULT - ASSESSMENT
41yo woman with HTN, HLD, bipolar d/o, hypothyroidism, seizure disorder (on Keppra and VPA), developmentally delayed, who presented to OSH on 2/18 with recurrent breakthrough seizures, transferred to the EMU on 2/18 for further monitoring and management. CTH from OSH unrevealing. VPA level from OSH subtherapeutic at 43, but noted to be 52.26 when corrected for albumin of 3.9. CBC/CMP wnl. Per history from family, patient had hx of meningitis age 18 mo, possible inception of seizures age 2 years (potentially earlier) with eyes rolling back and becoming less participatory. Per  Gian and family/mother, patient may be having (once clarified) predominantly evening time events described as becoming quiet, less engaged, not talking, periodic eye blinking. No delmy tonic/clonic activity noted. EEG overnight 2/25 shows focal polyspikes in left posterior temporal-central region.    Impression: Breakthrough seizures in the setting of borderline VPA level possibly due to medication nonadherence vs inadequate dosing.    Plan:   [x] EEG to be discontinued today  [x] MR brain w/wo contrast with epilepsy protocol - nonspecific subcentimeter T2/FLAIR hyperintensity w/in L posterior temporal lobe.  [x] Lacosamide 150mg bid (increased on 3/1 due to unresponsive episode 2/28; previously started lacosamide 100mg BID on 2/25)  [x] Increased zonisamide to 200mg BID  [x] VPA- 43 and Keppra- 23.2 level  [x] UA neg for infection  [x] Increased quetiapine to 150mg BID with additional 50mg q12h PRN agitation on 2/28 (previously: on 2/22 decreased to 100 mg bid)  [] ativan 0.5mg PO daily PRN agitation  [] Psych following for agitation  [] 1:1 observation while requiring COVID isolation as pt not adherent with isolation  [x] NO Haldol as per Family, patient had an episode after Haldol in the past of unresponsiveness, head tilted back, b/l hands shaking  [x] D/C home LEV and VPA  [x] D/c'd amantadine 2/22   [x] continue levothyroxine 50 mcg qAM  [ ] Seizure rescue: Ativan 1mg IVP for seizure activity > 3 mins; on discharge: Nayzilam 5 mg intranasal PRN GTC seizure > 3 min, may repeat once in 10 min in other nostril if seizures continue; do not repeat dose if patient excessively sedated or having trouble breathing  [] Seizure, fall and aspiration precautions   [] Given concern for seizure, advise patient to not drive (for 1 yr per [facility] guidelines), operate heavy machinery, avoid heights, pools, bathtubs, locked doors.  [] Diet: Regular (Kosher)  [x] DVT ppx lovenox  [] PT/OT for home PT/OT  [] Discharge to group home on HOLD due to COVID positive test 2/28 - per SW, group home requires 10 days isolation   [] Follow up in epilepsy clinic with Dr. Andrews or Dr. Cardenas in 1 month  [] Elective EMU admission to be scheduled in 4-6 weeks  [x] alendronate stopped as pt no longer on VPA, follow up with primary care Follow up with PMD to assess need for osteoporosis screening/medication    CORE MEASURES:        AED levels [] Sent [] Pending [] Resulted     LFTs [] normal [] elevated      Plan and education provided to [x] patient [x]family at bedside [] awaiting for family     Seizure Semiology  [] Tonic clonic  [] Clonic  [] Tonic  [] Unresponsive  [] Focal with impaired awareness  [] Focal without impaired awareness    Obtain screening lower extremity venous ultrasound in patients who meet 1 or more of the following criteria as patient is high risk for DVT/PE on admission:   [] History of DVT/PE  [] Hypercoagulable states (Factor V Leiden, Cancer, OCP, etc. )  [] Prolonged immobility (hemiplegia/hemiparesis/post operative or any other extended immobilization)  [] Transferred from outside facility (Rehab or Long term care)   Sanjuanita is here today for No chief complaint on file.  .        Medication Refills needed today?  NO,   if you receive a prescription today would you like it to be sent to [facility]? NO    Medications: medications verified and updated    Patient would like communication of their results via:    Phone or card    Tobacco history: verified      Health Maintenance Summary     Pneumococcal Vaccine 0-64 (1 of 2 - PPSV23)  Overdue - never done    COVID-19 Vaccine (1)  Overdue - never done    DTaP/Tdap/Td Vaccine (6 - Tdap)  Overdue since 10/2/1995    Depression Screening (Yearly)  Overdue since 12/5/2020    Influenza Vaccine (1)  Due since 9/1/2021    Cervical Cancer Screen 30-64 - (PAP/HPV Co-Testing - Every 5 Years)  Next due on 6/18/2023    Hepatitis B Vaccine   Completed    Meningococcal Vaccine   Aged Out    HPV Vaccine   Aged Out          Patient is due for topics as listed above but is not proceeding with Immunization(s) Influenza at this time.     COVID-19 Vaccine Interest Assessment:   • Not interested in receiving COVID-19 vaccine.  If the patient reports an outside immunization, please update the WIR/ICARE information in the Immunizations activity          41yo woman with HTN, HLD, bipolar d/o, hypothyroidism, seizure disorder (on Keppra and VPA), developmentally delayed, who presented to OSH on 2/18 with recurrent breakthrough seizures, transferred to the EMU on 2/18 for further monitoring and management. CTH from OSH unrevealing. VPA level from OSH subtherapeutic at 43, but noted to be 52.26 when corrected for albumin of 3.9. CBC/CMP wnl. Per history from family, patient had hx of meningitis age 18 mo, possible inception of seizures age 2 years (potentially earlier) with eyes rolling back and becoming less participatory. Per  Gian and family/mother, patient may be having (once clarified) predominantly evening time events described as becoming quiet, less engaged, not talking, periodic eye blinking. No delmy tonic/clonic activity noted. EEG overnight 2/25 shows focal polyspikes in left posterior temporal-central region.    Impression: Breakthrough seizures in the setting of borderline VPA level possibly due to medication nonadherence vs inadequate dosing.    Plan:   [x] EEG discontinued 3/1  [x] MR brain w/wo contrast with epilepsy protocol - nonspecific subcentimeter T2/FLAIR hyperintensity w/in L posterior temporal lobe.  [x] Lacosamide 150mg bid (increased on 3/1 due to unresponsive episode 2/28; previously started lacosamide 100mg BID on 2/25)  [x] Increased zonisamide to 200mg BID  [x] VPA- 43 and Keppra- 23.2 level  [x] Increased quetiapine to 150mg BID with additional 50mg q12h PRN agitation on 2/28 (previously: on 2/22 decreased to 100 mg bid)  [x] ativan 0.5mg PO daily PRN agitation  [] Psych following for agitation  [] 1:1 observation while requiring COVID isolation as pt not adherent with isolation  [] NO Haldol as per Family, patient had an episode after Haldol in the past of unresponsiveness, head tilted back, b/l hands shaking  [x] UA neg for infection on 2/18  [x] D/c'd home LEV, VPA and amantidine  [ ] Seizure rescue: Ativan 1mg IVP for seizure activity > 3 mins; on discharge: Nayzilam 5 mg intranasal PRN GTC seizure > 3 min, may repeat once in 10 min in other nostril if seizures continue; do not repeat dose if patient excessively sedated or having trouble breathing  [x] Seizure, fall and aspiration precautions   [] Given concern for seizure, advise patient to not drive (for 1 yr per [facility] guidelines), operate heavy machinery, avoid heights, pools, bathtubs, locked doors.  [x] Diet: Regular (Kosher)  [x] DVT ppx lovenox  [] PT/OT - home PT/OT  [] Discharge to group home on HOLD due to COVID positive test 2/28 - per , group home requires 10 days isolation   [] Follow up in epilepsy clinic with Dr. Andrews or Dr. Cardenas in 1 month, elective EMU admission to be scheduled in 4-6 weeks  [x] alendronate stopped as pt no longer on VPA, follow up with primary care Follow up with PMD to assess need for osteoporosis screening/medication    CORE MEASURES:        AED levels [] Sent [] Pending [x] Resulted     LFTs [x] normal [] elevated      Plan and education provided to [x] patient [x]family at bedside [] awaiting for family     Seizure Semiology  [x] Tonic clonic  [] Clonic  [] Tonic  [] Unresponsive  [] Focal with impaired awareness  [] Focal without impaired awareness    Obtain screening lower extremity venous ultrasound in patients who meet 1 or more of the following criteria as patient is high risk for DVT/PE on admission:   [] History of DVT/PE  [] Hypercoagulable states (Factor V Leiden, Cancer, OCP, etc. )  [] Prolonged immobility (hemiplegia/hemiparesis/post operative or any other extended immobilization)  [] Transferred from outside facility (Rehab or Long term care)   43yo woman with HTN, HLD, bipolar d/o, hypothyroidism, seizure disorder (on Keppra and VPA), developmentally delayed, who presented to OSH on 2/18 with recurrent breakthrough seizures, transferred to the EMU on 2/18 for further monitoring and management. CTH from OSH unrevealing. VPA level from OSH subtherapeutic at 43, but noted to be 52.26 when corrected for albumin of 3.9. CBC/CMP wnl. Per history from family, patient had hx of meningitis age 18 mo, possible inception of seizures age 2 years (potentially earlier) with eyes rolling back and becoming less participatory. Per  Gian and family/mother, patient may be having (once clarified) predominantly evening time events described as becoming quiet, less engaged, not talking, periodic eye blinking. No delmy tonic/clonic activity noted. EEG overnight 2/25 shows focal polyspikes in left posterior temporal-central region.    Impression: Breakthrough seizures in the setting of borderline VPA level possibly due to medication nonadherence vs inadequate dosing.    Plan:   [x] EEG discontinued 3/1  [x] MR brain w/wo contrast with epilepsy protocol - nonspecific subcentimeter T2/FLAIR hyperintensity w/in L posterior temporal lobe.  [x] Lacosamide 150mg bid (increased on 3/1 due to unresponsive episode 2/28; previously started lacosamide 100mg BID on 2/25)  [x] Increased zonisamide to 200mg BID  [x] VPA- 43 and Keppra- 23.2 level  [x] Increased quetiapine to 150mg BID with additional 50mg q12h PRN agitation on 2/28 (previously: on 2/22 decreased to 100 mg bid)  [x] ativan 0.5mg PO daily PRN agitation  [] Psych following for agitation  [] 1:1 observation while requiring COVID isolation as pt not adherent with isolation  [] NO Haldol as per Family, patient had an episode after Haldol in the past of unresponsiveness, head tilted back, b/l hands shaking  [x] UA neg for infection on 2/18  [x] D/c'd home LEV, VPA and amantidine  [ ] Seizure rescue: Ativan 1mg IVP for seizure activity > 3 mins; on discharge: Nayzilam 5 mg intranasal PRN GTC seizure > 3 min, may repeat once in 10 min in other nostril if seizures continue; do not repeat dose if patient excessively sedated or having trouble breathing  [x] Seizure, fall and aspiration precautions   [] Given concern for seizure, advise patient to not drive (for 1 yr per [facility] guidelines), operate heavy machinery, avoid heights, pools, bathtubs, locked doors.  [x] Diet: Regular (Kosher)  [x] DVT ppx lovenox  [] PT/OT - home PT/OT  [] Discharge to group home on HOLD due to COVID positive test 2/28 - per , group home requires 10 days isolation   [] Follow up in epilepsy clinic with Dr. Andrews or Dr. Cardenas in 1 month, elective EMU admission to be scheduled in 4-6 weeks  [x] alendronate stopped as pt no longer on VPA, follow up with primary careto assess need for osteoporosis screening/medication    CORE MEASURES:        AED levels [] Sent [] Pending [x] Resulted     LFTs [x] normal [] elevated      Plan and education provided to [x] patient [x]family at bedside [] awaiting for family     Seizure Semiology  [x] Tonic clonic  [] Clonic  [] Tonic  [] Unresponsive  [] Focal with impaired awareness  [] Focal without impaired awareness    Obtain screening lower extremity venous ultrasound in patients who meet 1 or more of the following criteria as patient is high risk for DVT/PE on admission:   [] History of DVT/PE  [] Hypercoagulable states (Factor V Leiden, Cancer, OCP, etc. )  [] Prolonged immobility (hemiplegia/hemiparesis/post operative or any other extended immobilization)  [] Transferred from outside facility (Rehab or Long term care)

## 2023-03-03 PROCEDURE — 99231 SBSQ HOSP IP/OBS SF/LOW 25: CPT | Mod: FS

## 2023-03-03 RX ADMIN — Medication 200 MILLIGRAM(S): at 06:13

## 2023-03-03 RX ADMIN — QUETIAPINE FUMARATE 150 MILLIGRAM(S): 200 TABLET, FILM COATED ORAL at 06:15

## 2023-03-03 RX ADMIN — LACOSAMIDE 150 MILLIGRAM(S): 50 TABLET ORAL at 17:12

## 2023-03-03 RX ADMIN — Medication 200 MILLIGRAM(S): at 17:10

## 2023-03-03 RX ADMIN — SENNA PLUS 1 TABLET(S): 8.6 TABLET ORAL at 11:02

## 2023-03-03 RX ADMIN — POLYETHYLENE GLYCOL 3350 17 GRAM(S): 17 POWDER, FOR SOLUTION ORAL at 17:09

## 2023-03-03 RX ADMIN — QUETIAPINE FUMARATE 150 MILLIGRAM(S): 200 TABLET, FILM COATED ORAL at 17:11

## 2023-03-03 RX ADMIN — Medication 325 MILLIGRAM(S): at 06:15

## 2023-03-03 RX ADMIN — Medication 325 MILLIGRAM(S): at 17:09

## 2023-03-03 RX ADMIN — CHLORHEXIDINE GLUCONATE 1 APPLICATION(S): 213 SOLUTION TOPICAL at 06:23

## 2023-03-03 RX ADMIN — ENOXAPARIN SODIUM 40 MILLIGRAM(S): 100 INJECTION SUBCUTANEOUS at 06:13

## 2023-03-03 RX ADMIN — LACOSAMIDE 150 MILLIGRAM(S): 50 TABLET ORAL at 06:13

## 2023-03-03 RX ADMIN — Medication 1 APPLICATION(S): at 11:03

## 2023-03-03 RX ADMIN — Medication 50 MICROGRAM(S): at 06:15

## 2023-03-03 NOTE — PROGRESS NOTE ADULT - SUBJECTIVE AND OBJECTIVE BOX
THE PATIENT WAS SEEN AND EXAMINED BY ME WITH THE HOUSESTAFF DURING MORNING ROUNDS.     HPI:  42 year-old woman with HTN, HLD, bipolar d/o, hypothyroidism, seizure disorder (on Keppra and VPA), developmentally delayed, who presented to OSH on 2/18 for breakthrough seizures, transferred to the EMU for further evaluation and management. Per chart review and discussion with aide at bedside, patient had 2 seizures at her group home, including an episode of generalized "tonic-clonic" shaking while seated in a recliner, lasting 2 minutes, associated with urinary incontinence. She was taken to OSH where she was reported to be postictal and received IV VPA 500mg x1 and later determined to be stable for discharge back to her group home. While awaiting transport, she had a 3rd witnessed event lasting less than 1 minute, for which she was administered ativan 1mg IVP, and decision made for transport to [facility] for continuous EEG. There was no fall or injury associated with any of these events. Her aide is unsure of when her last seizure prior to these events may have occurred.    SUBJECTIVE: No events overnight. No new neurologic complaints.      Medications:  acetaminophen     Tablet .. 650 milliGRAM(s) Oral every 6 hours PRN  benzocaine/menthol Lozenge 1 Lozenge Oral four times a day PRN  bismuth subsalicylate Liquid 5 milliLiter(s) Oral once  chlorhexidine 2% Cloths 1 Application(s) Topical <User Schedule>  enoxaparin Injectable 40 milliGRAM(s) SubCutaneous every 24 hours  ferrous    sulfate 325 milliGRAM(s) Oral two times a day  guaiFENesin Oral Liquid (Sugar-Free) 200 milliGRAM(s) Oral every 8 hours PRN  hydrocortisone 1% Cream 1 Application(s) Topical daily  lacosamide 150 milliGRAM(s) Oral two times a day  levothyroxine 50 MICROGram(s) Oral daily  LORazepam     Tablet 0.5 milliGRAM(s) Oral daily PRN  LORazepam   Injectable 1 milliGRAM(s) IV Push once PRN  polyethylene glycol 3350 17 Gram(s) Oral daily  QUEtiapine 50 milliGRAM(s) Oral two times a day PRN  QUEtiapine 150 milliGRAM(s) Oral every 12 hours  senna 1 Tablet(s) Oral daily  zonisamide 200 milliGRAM(s) Oral two times a day    PHYSICAL EXAM:   Vital Signs Last 24 Hrs  T(C): 36.8 (03 Mar 2023 04:57), Max: 36.8 (03 Mar 2023 04:57)  T(F): 98.2 (03 Mar 2023 04:57), Max: 98.2 (03 Mar 2023 04:57)  HR: 93 (03 Mar 2023 04:57) (86 - 94)  BP: 124/64 (03 Mar 2023 04:57) (124/64 - 132/83)  BP(mean): --  RR: 18 (03 Mar 2023 04:57) (18 - 18)  SpO2: 100% (03 Mar 2023 04:57) (95% - 100%)    Parameters below as of 03 Mar 2023 04:57  Patient On (Oxygen Delivery Method): room air    LABS:                        13.9   8.10  )-----------( 278      ( 02 Mar 2023 09:15 )             43.5    03-02    136  |  101  |  17  ----------------------------<  101<H>  4.0   |  21<L>  |  0.70    Ca    9.8      02 Mar 2023 09:15    TPro  7.6  /  Alb  4.3  /  TBili  0.2  /  DBili  x   /  AST  11  /  ALT  12  /  AlkPhos  65  03-02    IMAGING:     CT Head 2/18/23:  No obvious acute intracranial hemorrhage, large cortical infarct or mass   effect. If clinically indicated, short-term follow-up or MRI may be   obtained for further evaluation.    MRI Brain Epilepsy protocol w/wo contrast 2/25/23:  No acute infarct or hemorrhage.  Nonspecific subcentimeter T2/FLAIR hyperintense white matter foci in the   posterior left temporal lobe.    EEG 3/2:  EEG Summary:    Normal EEG in the awake, drowsy and asleep states.    Background slowing, generalized, mild  Excess beta    Impression/Clinical Correlate:    This is an abnormal VEEG.  Mild diffuse/multifocal cerebral dysfunction, not specific in etiology. No seizures were recorded. The presence of excessive beta activity never evolves and has no clear clinical correlation, is most often seen in setting of sedative medication use but has also been described in rare cases of intellectual impairment.        THE PATIENT WAS SEEN AND EXAMINED BY ME WITH THE HOUSESTAFF DURING MORNING ROUNDS.     HPI:  42 year-old woman with HTN, HLD, bipolar d/o, hypothyroidism, seizure disorder (on Keppra and VPA), developmentally delayed, who presented to OSH on 2/18 for breakthrough seizures, transferred to the EMU for further evaluation and management. Per chart review and discussion with aide at bedside, patient had 2 seizures at her group home, including an episode of generalized "tonic-clonic" shaking while seated in a recliner, lasting 2 minutes, associated with urinary incontinence. She was taken to OSH where she was reported to be postictal and received IV VPA 500mg x1 and later determined to be stable for discharge back to her group home. While awaiting transport, she had a 3rd witnessed event lasting less than 1 minute, for which she was administered ativan 1mg IVP, and decision made for transport to [facility] for continuous EEG. There was no fall or injury associated with any of these events. Her aide is unsure of when her last seizure prior to these events may have occurred.    SUBJECTIVE: No events overnight. No new neurologic complaints.      Medications:  acetaminophen     Tablet .. 650 milliGRAM(s) Oral every 6 hours PRN  benzocaine/menthol Lozenge 1 Lozenge Oral four times a day PRN  bismuth subsalicylate Liquid 5 milliLiter(s) Oral once  chlorhexidine 2% Cloths 1 Application(s) Topical <User Schedule>  enoxaparin Injectable 40 milliGRAM(s) SubCutaneous every 24 hours  ferrous    sulfate 325 milliGRAM(s) Oral two times a day  guaiFENesin Oral Liquid (Sugar-Free) 200 milliGRAM(s) Oral every 8 hours PRN  hydrocortisone 1% Cream 1 Application(s) Topical daily  lacosamide 150 milliGRAM(s) Oral two times a day  levothyroxine 50 MICROGram(s) Oral daily  LORazepam     Tablet 0.5 milliGRAM(s) Oral daily PRN  LORazepam   Injectable 1 milliGRAM(s) IV Push once PRN  polyethylene glycol 3350 17 Gram(s) Oral daily  QUEtiapine 50 milliGRAM(s) Oral two times a day PRN  QUEtiapine 150 milliGRAM(s) Oral every 12 hours  senna 1 Tablet(s) Oral daily  zonisamide 200 milliGRAM(s) Oral two times a day    PHYSICAL EXAM:   Vital Signs Last 24 Hrs  T(C): 36.8 (03 Mar 2023 04:57), Max: 36.8 (03 Mar 2023 04:57)  T(F): 98.2 (03 Mar 2023 04:57), Max: 98.2 (03 Mar 2023 04:57)  HR: 93 (03 Mar 2023 04:57) (86 - 94)  BP: 124/64 (03 Mar 2023 04:57) (124/64 - 132/83)  BP(mean): --  RR: 18 (03 Mar 2023 04:57) (18 - 18)  SpO2: 100% (03 Mar 2023 04:57) (95% - 100%)    Parameters below as of 03 Mar 2023 04:57  Patient On (Oxygen Delivery Method): room air    Constitutional: appears stated age, NAD.  Head: Normocephalic & atraumatic.  Respiratory: Breathing comfortably on room air. Clear lungs.     Neurological:  MS: Eyes open and alert. Seated in chair.   Speech/Language: Speech fluent.   CNs: Face appears symmetric. Hearing grossly intact to conversation.  Motor: Muscle bulk normal.   Gait: Not assessed     LABS:                        13.9   8.10  )-----------( 278      ( 02 Mar 2023 09:15 )             43.5    03-02    136  |  101  |  17  ----------------------------<  101<H>  4.0   |  21<L>  |  0.70    Ca    9.8      02 Mar 2023 09:15    TPro  7.6  /  Alb  4.3  /  TBili  0.2  /  DBili  x   /  AST  11  /  ALT  12  /  AlkPhos  65  03-02    IMAGING:     CT Head 2/18/23:  No obvious acute intracranial hemorrhage, large cortical infarct or mass   effect. If clinically indicated, short-term follow-up or MRI may be   obtained for further evaluation.    MRI Brain Epilepsy protocol w/wo contrast 2/25/23:  No acute infarct or hemorrhage.  Nonspecific subcentimeter T2/FLAIR hyperintense white matter foci in the   posterior left temporal lobe.    EEG 3/2:  EEG Summary:    Normal EEG in the awake, drowsy and asleep states.    Background slowing, generalized, mild  Excess beta    Impression/Clinical Correlate:    This is an abnormal VEEG.  Mild diffuse/multifocal cerebral dysfunction, not specific in etiology. No seizures were recorded. The presence of excessive beta activity never evolves and has no clear clinical correlation, is most often seen in setting of sedative medication use but has also been described in rare cases of intellectual impairment.

## 2023-03-03 NOTE — PROGRESS NOTE ADULT - ASSESSMENT
41yo woman with HTN, HLD, bipolar d/o, hypothyroidism, seizure disorder (on Keppra and VPA), developmentally delayed, who presented to OSH on 2/18 with recurrent breakthrough seizures, transferred to the EMU on 2/18 for further monitoring and management. CTH from OSH unrevealing. VPA level from OSH subtherapeutic at 43, but noted to be 52.26 when corrected for albumin of 3.9. CBC/CMP wnl. Per history from family, patient had hx of meningitis age 18 mo, possible inception of seizures age 2 years (potentially earlier) with eyes rolling back and becoming less participatory. Per  Gian and family/mother, patient may be having (once clarified) predominantly evening time events described as becoming quiet, less engaged, not talking, periodic eye blinking. No delmy tonic/clonic activity noted. EEG overnight 2/25 shows focal polyspikes in left posterior temporal-central region.    Impression: Breakthrough seizures in the setting of borderline VPA level, possibly due to medication nonadherence vs inadequate dosing.    Plan:   [x] EEG discontinued 3/1  [x] MR brain w/wo contrast with epilepsy protocol - nonspecific subcentimeter T2/FLAIR hyperintensity w/in L posterior temporal lobe.  [x] Lacosamide 150mg bid (increased on 3/1 due to unresponsive episode 2/28; previously started lacosamide 100mg BID on 2/25)  [x] Increased zonisamide to 200mg BID  [x] VPA- 43 and Keppra- 23.2 level  [x] Increased quetiapine to 150mg BID with additional 50mg q12h PRN agitation on 2/28 (previously: on 2/22 decreased to 100 mg bid)  [x] ativan 0.5mg PO daily PRN agitation  [] Psych following for agitation  [] 1:1 observation while requiring COVID isolation as pt not adherent with isolation  [] NO Haldol as per Family, patient had an episode after Haldol in the past of unresponsiveness, head tilted back, b/l hands shaking  [x] UA neg for infection on 2/18  [x] D/c'd home LEV, VPA and amantidine  [ ] Seizure rescue: Ativan 1mg IVP for seizure activity > 3 mins; on discharge: Nayzilam 5 mg intranasal PRN GTC seizure > 3 min, may repeat once in 10 min in other nostril if seizures continue; do not repeat dose if patient excessively sedated or having trouble breathing  [x] Seizure, fall and aspiration precautions   [] Given concern for seizure, advise patient to not drive (for 1 yr per [facility] guidelines), operate heavy machinery, avoid heights, pools, bathtubs, locked doors.  [x] Diet: Regular (Kosher)  [x] DVT ppx Lovenox  [] PT/OT - Home PT/OT  [] Discharge to group home on HOLD due to COVID positive test 2/28 - per , group home requires 10 days isolation   [] Follow up in epilepsy clinic with Dr. Andrews or Dr. Cardenas in 1 month, elective EMU admission to be scheduled in 4-6 weeks  [x] alendronate stopped as pt no longer on VPA, follow up with primary careto assess need for osteoporosis screening/medication    CORE MEASURES:        AED levels [] Sent [] Pending [x] Resulted     LFTs [x] normal [] elevated      Plan and education provided to [x] patient [x]family at bedside [] awaiting for family     Seizure Semiology  [x] Tonic clonic  [] Clonic  [] Tonic  [] Unresponsive  [] Focal with impaired awareness  [] Focal without impaired awareness    Obtain screening lower extremity venous ultrasound in patients who meet 1 or more of the following criteria as patient is high risk for DVT/PE on admission:   [] History of DVT/PE  [] Hypercoagulable states (Factor V Leiden, Cancer, OCP, etc. )  [] Prolonged immobility (hemiplegia/hemiparesis/post operative or any other extended immobilization)  [] Transferred from outside facility (Rehab or Long term care) 41yo woman with HTN, HLD, bipolar d/o, hypothyroidism, seizure disorder (on Keppra and VPA), developmentally delayed, who presented to OSH on 2/18 with recurrent breakthrough seizures, transferred to the EMU on 2/18 for further monitoring and management. CTH from OSH unrevealing. VPA level from OSH subtherapeutic at 43, but noted to be 52.26 when corrected for albumin of 3.9. CBC/CMP wnl. Per history from family, patient had hx of meningitis age 18mo, possible inception of seizures age 2 years (potentially earlier) with eyes rolling back and becoming less participatory. Per  Gian and family/mother, patient may be having (once clarified) predominantly evening time events described as becoming quiet, less engaged, not talking, periodic eye blinking. No delmy tonic/clonic activity noted. EEG overnight 2/25 shows focal polyspikes in left posterior temporal-central region.    Impression: Breakthrough seizures in the setting of borderline VPA level, possibly due to medication nonadherence vs inadequate dosing.    Plan:   [x] EEG discontinued 3/1  [x] MR brain w/wo contrast with epilepsy protocol - nonspecific subcentimeter T2/FLAIR hyperintensity w/in L posterior temporal lobe  [x] Lacosamide 150mg bid (increased on 3/1 due to unresponsive episode 2/28; previously started lacosamide 100mg BID on 2/25)  [x] Increased zonisamide to 200mg BID  [x] d/c'd home LEV, VPA and amantadine  [x] Increased quetiapine to 150mg BID with additional 50mg q12h PRN agitation on 2/28 (previously: on 2/22 decreased to 100 mg bid)  [x] ativan 0.5mg PO daily PRN agitation  [] Psych following for agitation  [] 1:1 observation while requiring COVID isolation as pt not adherent with isolation  [] NO Haldol as per Family, patient had an episode after Haldol in the past of unresponsiveness, head tilted back, b/l hands shaking  [x] UA neg for infection on 2/18  [ ] Seizure rescue: Ativan 1mg IVP for seizure activity > 3 mins; on discharge: Nayzilam 5 mg intranasal PRN GTC seizure > 3 min, may repeat once in 10 min in other nostril if seizures continue; do not repeat dose if patient excessively sedated or having trouble breathing  [x] Seizure, fall and aspiration precautions   [] Given concern for seizure, advise patient to not drive (for 1 yr per [facility] guidelines), operate heavy machinery, avoid heights, pools, bathtubs, locked doors.  [x] Diet: Regular (Kosher)  [x] DVT ppx Lovenox  [] PT/OT - Home PT/OT  [] Discharge to group home on HOLD due to COVID positive test 2/28 - per , group home requires 10 days isolation   [] Follow up in epilepsy clinic with Dr. Andrews or Dr. Cardenas in 1 month, elective EMU admission to be scheduled in 4-6 weeks  [x] alendronate stopped as pt no longer on VPA, follow up with primary careto assess need for osteoporosis screening/medication    CORE MEASURES:        AED levels [] Sent [] Pending [x] Resulted     LFTs [x] normal [] elevated      Plan and education provided to [x] patient [x]family at bedside [] awaiting for family     Seizure Semiology  [x] Tonic clonic  [] Clonic  [] Tonic  [] Unresponsive  [] Focal with impaired awareness  [] Focal without impaired awareness    Obtain screening lower extremity venous ultrasound in patients who meet 1 or more of the following criteria as patient is high risk for DVT/PE on admission:   [] History of DVT/PE  [] Hypercoagulable states (Factor V Leiden, Cancer, OCP, etc. )  [] Prolonged immobility (hemiplegia/hemiparesis/post operative or any other extended immobilization)  [] Transferred from outside facility (Rehab or Long term care) 43yo woman with HTN, HLD, bipolar d/o, hypothyroidism, seizure disorder (on Keppra and VPA), developmentally delayed, who presented to OSH on 2/18 with recurrent breakthrough seizures, transferred to the EMU on 2/18 for further monitoring and management. CTH from OSH unrevealing. VPA level from OSH subtherapeutic at 43, but noted to be 52.26 when corrected for albumin of 3.9. CBC/CMP wnl. Per history from family, patient had hx of meningitis age 18mo, possible inception of seizures age 2 years (potentially earlier) with eyes rolling back and becoming less participatory. Per  Gian and family/mother, patient may be having (once clarified) predominantly evening time events described as becoming quiet, less engaged, not talking, periodic eye blinking. No delmy tonic/clonic activity noted. EEG overnight 2/25 shows focal polyspikes in left posterior temporal-central region.    Impression: Breakthrough seizures in the setting of borderline VPA level, possibly due to medication nonadherence vs inadequate dosing.    Plan:   [x] EEG discontinued 3/1  [x] MR brain w/wo contrast with epilepsy protocol - nonspecific subcentimeter T2/FLAIR hyperintensity w/in L posterior temporal lobe  [x] Lacosamide 150mg bid (increased on 3/1 due to unresponsive episode 2/28; previously started lacosamide 100mg BID on 2/25)  [x] Increased zonisamide to 200mg BID  [x] d/c'd home LEV, VPA and amantadine  [x] Increased quetiapine to 150mg BID with additional 50mg q12h PRN agitation on 2/28 (previously: on 2/22 decreased to 100 mg bid)  [x] ativan 0.5mg PO daily PRN agitation  [] Psych following for agitation  [] 1:1 observation while requiring COVID isolation as pt not adherent with isolation  [] NO Haldol as per Family, patient had an episode after Haldol in the past of unresponsiveness, head tilted back, b/l hands shaking  [x] UA neg for infection on 2/18  [ ] Seizure rescue: Ativan 1mg IVP for seizure activity > 3 mins; on discharge: Nayzilam 5 mg intranasal PRN GTC seizure > 3 min, may repeat once in 10 min in other nostril if seizures continue; do not repeat dose if patient excessively sedated or having trouble breathing  [x] Seizure, fall and aspiration precautions   [] Given concern for seizure, advise patient to not drive (for 1 yr per [facility] guidelines), operate heavy machinery, avoid heights, pools, bathtubs, locked doors.  [x] Diet: Regular (Kosher)  [x] DVT ppx Lovenox  [] PT/OT - Home PT/OT  [] Discharge to group home on HOLD due to COVID positive test 2/28 - per , group home requires 10 days isolation   [] Follow up in epilepsy clinic with Dr. Andrews or Dr. Cardenas in 1 month, elective EMU admission to be scheduled in 4-6 weeks  [x] alendronate stopped as pt no longer on VPA, follow up with primary care to assess need for osteoporosis screening/medication    CORE MEASURES:        AED levels [] Sent [] Pending [x] Resulted     LFTs [x] normal [] elevated      Plan and education provided to [x] patient [x]family at bedside [] awaiting for family     Seizure Semiology  [x] Tonic clonic  [] Clonic  [] Tonic  [] Unresponsive  [] Focal with impaired awareness  [] Focal without impaired awareness    Obtain screening lower extremity venous ultrasound in patients who meet 1 or more of the following criteria as patient is high risk for DVT/PE on admission:   [] History of DVT/PE  [] Hypercoagulable states (Factor V Leiden, Cancer, OCP, etc. )  [] Prolonged immobility (hemiplegia/hemiparesis/post operative or any other extended immobilization)  [] Transferred from outside facility (Rehab or Long term care)

## 2023-03-04 PROCEDURE — 99231 SBSQ HOSP IP/OBS SF/LOW 25: CPT | Mod: GC

## 2023-03-04 RX ADMIN — QUETIAPINE FUMARATE 150 MILLIGRAM(S): 200 TABLET, FILM COATED ORAL at 17:40

## 2023-03-04 RX ADMIN — Medication 200 MILLIGRAM(S): at 17:41

## 2023-03-04 RX ADMIN — ENOXAPARIN SODIUM 40 MILLIGRAM(S): 100 INJECTION SUBCUTANEOUS at 06:02

## 2023-03-04 RX ADMIN — CHLORHEXIDINE GLUCONATE 1 APPLICATION(S): 213 SOLUTION TOPICAL at 06:25

## 2023-03-04 RX ADMIN — QUETIAPINE FUMARATE 150 MILLIGRAM(S): 200 TABLET, FILM COATED ORAL at 06:04

## 2023-03-04 RX ADMIN — SENNA PLUS 1 TABLET(S): 8.6 TABLET ORAL at 11:22

## 2023-03-04 RX ADMIN — Medication 325 MILLIGRAM(S): at 17:41

## 2023-03-04 RX ADMIN — Medication 1 APPLICATION(S): at 11:23

## 2023-03-04 RX ADMIN — LACOSAMIDE 150 MILLIGRAM(S): 50 TABLET ORAL at 17:41

## 2023-03-04 RX ADMIN — Medication 200 MILLIGRAM(S): at 06:02

## 2023-03-04 RX ADMIN — LACOSAMIDE 150 MILLIGRAM(S): 50 TABLET ORAL at 06:02

## 2023-03-04 RX ADMIN — Medication 50 MICROGRAM(S): at 06:02

## 2023-03-04 RX ADMIN — Medication 325 MILLIGRAM(S): at 06:02

## 2023-03-04 NOTE — PROGRESS NOTE ADULT - ATTENDING COMMENTS
Two episodes O2 sat 91% on room air on 3/3, resolved - continue to monitor. Otherwise O2 sat normal on room air. Afebrile.    Pt seen today sitting in chair, awake, alert, not in distress, normal respiratory effort at rest on room air, said she feels well.    Assessment:  Epilepsy  Agitation  COVID-19 infection, asymptomatic    Plan:  As above  Plan to discharge to group home 3/10 (after 10 days of isolation - COVID+ 2/28), or earlier if SW is able to arrange an alternate isolation location    Deidra Sesay MD

## 2023-03-04 NOTE — PROGRESS NOTE ADULT - ASSESSMENT
41yo woman with HTN, HLD, bipolar d/o, hypothyroidism, seizure disorder (on Keppra and VPA), developmentally delayed, who presented to OSH on 2/18 with recurrent breakthrough seizures, transferred to the EMU on 2/18 for further monitoring and management. CTH from OSH unrevealing. VPA level from OSH subtherapeutic at 43, but noted to be 52.26 when corrected for albumin of 3.9. CBC/CMP wnl. Per history from family, patient had hx of meningitis age 18mo, possible inception of seizures age 2 years (potentially earlier) with eyes rolling back and becoming less participatory. Per  Gian and family/mother, patient may be having (once clarified) predominantly evening time events described as becoming quiet, less engaged, not talking, periodic eye blinking. No delmy tonic/clonic activity noted. EEG overnight 2/25 shows focal polyspikes in left posterior temporal-central region.    Impression: Breakthrough seizures in the setting of borderline VPA level, possibly due to medication nonadherence vs inadequate dosing. Found to have asymptomatic +COVID    Plan:   [x] EEG discontinued 3/1  [x] MR brain w/wo contrast with epilepsy protocol - nonspecific subcentimeter T2/FLAIR hyperintensity w/in L posterior temporal lobe  [x] Lacosamide 150mg bid (increased on 3/1 due to unresponsive episode 2/28; previously started lacosamide 100mg BID on 2/25)  [x] Increased zonisamide to 200mg BID  [x] d/c'd home LEV, VPA and amantadine  [x] Increased quetiapine to 150mg BID with additional 50mg q12h PRN agitation on 2/28 (previously: on 2/22 decreased to 100 mg bid)  [x] ativan 0.5mg PO daily PRN agitation  [] Psych following for agitation  [] 1:1 observation while requiring COVID isolation as pt not adherent with isolation  [] NO Haldol as per Family, patient had an episode after Haldol in the past of unresponsiveness, head tilted back, b/l hands shaking  [x] UA neg for infection on 2/18  [ ] Seizure rescue: Ativan 1mg IVP for seizure activity > 3 mins; on discharge: Nayzilam 5 mg intranasal PRN GTC seizure > 3 min, may repeat once in 10 min in other nostril if seizures continue; do not repeat dose if patient excessively sedated or having trouble breathing  [x] Seizure, fall and aspiration precautions   [] Given concern for seizure, advise patient to not drive (for 1 yr per [facility] guidelines), operate heavy machinery, avoid heights, pools, bathtubs, locked doors.  [x] Diet: Regular (Kosher)  [x] DVT ppx Lovenox  [] PT/OT - Home PT/OT  [] Discharge to group home on HOLD due to COVID positive test 2/28 - per , group home requires 10 days isolation   [] Follow up in epilepsy clinic with Dr. Andrews or Dr. Cardenas in 1 month, elective EMU admission to be scheduled in 4-6 weeks  [x] alendronate stopped as pt no longer on VPA, follow up with primary care to assess need for osteoporosis screening/medication    CORE MEASURES:        AED levels [] Sent [] Pending [x] Resulted     LFTs [x] normal [] elevated      Plan and education provided to [x] patient [x]family at bedside [] awaiting for family     Seizure Semiology  [x] Tonic clonic  [] Clonic  [] Tonic  [] Unresponsive  [] Focal with impaired awareness  [] Focal without impaired awareness    Obtain screening lower extremity venous ultrasound in patients who meet 1 or more of the following criteria as patient is high risk for DVT/PE on admission:   [] History of DVT/PE  [] Hypercoagulable states (Factor V Leiden, Cancer, OCP, etc. )  [] Prolonged immobility (hemiplegia/hemiparesis/post operative or any other extended immobilization)  [] Transferred from outside facility (Rehab or Long term care) 41yo woman with HTN, HLD, bipolar d/o, hypothyroidism, seizure disorder (on Keppra and VPA), developmentally delayed, who presented to OSH on 2/18 with recurrent breakthrough seizures, transferred to the EMU on 2/18 for further monitoring and management. CTH from OSH unrevealing. VPA level from OSH subtherapeutic at 43, but noted to be 52.26 when corrected for albumin of 3.9. CBC/CMP wnl. Per history from family, patient had hx of meningitis age 18mo, possible inception of seizures age 2 years (potentially earlier) with eyes rolling back and becoming less participatory. Per  Gian and family/mother, patient may be having (once clarified) predominantly evening time events described as becoming quiet, less engaged, not talking, periodic eye blinking. No delmy tonic/clonic activity noted. EEG overnight 2/25 shows focal polyspikes in left posterior temporal-central region.    Impression: Breakthrough seizures in the setting of borderline VPA level, possibly due to medication nonadherence vs inadequate dosing. Medications changed. Found to have asymptomatic +COVID 2/28    Plan:   [x] EEG discontinued 3/1  [x] MR brain w/wo contrast with epilepsy protocol - nonspecific subcentimeter T2/FLAIR hyperintensity w/in L posterior temporal lobe  [x] Lacosamide 150mg bid (increased on 3/1 due to unresponsive episode 2/28; previously started lacosamide 100mg BID on 2/25)  [x] Increased zonisamide to 200mg BID  [x] d/c'd home LEV, VPA and amantadine  [x] Increased quetiapine to 150mg BID with additional 50mg q12h PRN agitation on 2/28 (previously: on 2/22 decreased to 100 mg bid)  [x] ativan 0.5mg PO daily PRN agitation  [] Psych following for agitation  [] 1:1 observation while requiring COVID isolation as pt not adherent with isolation  [] NO Haldol as per Family, patient had an episode after Haldol in the past of unresponsiveness, head tilted back, b/l hands shaking  [x] UA neg for infection on 2/18  [ ] Seizure rescue: Ativan 1mg IVP for seizure activity > 3 mins; on discharge: Nayzilam 5 mg intranasal PRN GTC seizure > 3 min, may repeat once in 10 min in other nostril if seizures continue; do not repeat dose if patient excessively sedated or having trouble breathing  [x] Seizure, fall and aspiration precautions   [] Given concern for seizure, advise patient to not drive (for 1 yr per [facility] guidelines), operate heavy machinery, avoid heights, pools, bathtubs, locked doors.  [x] Diet: Regular (Kosher)  [x] DVT ppx Lovenox  [] PT/OT - Home PT/OT  [] Discharge to group home on HOLD due to COVID positive test 2/28 - per , group home requires 10 days isolation   [] Follow up in epilepsy clinic with Dr. Andrews or Dr. Cardenas in 1 month, elective EMU admission to be scheduled in 4-6 weeks  [x] PMD follow-up: alendronate stopped as pt no longer on VPA, follow up with primary care to assess need for osteoporosis screening/medication    CORE MEASURES:        AED levels [] Sent [] Pending [x] Resulted     LFTs [x] normal [] elevated      Plan and education provided to [x] patient [x]family at bedside [] awaiting for family     Seizure Semiology  [x] Tonic clonic  [] Clonic  [] Tonic  [] Unresponsive  [] Focal with impaired awareness  [] Focal without impaired awareness    Obtain screening lower extremity venous ultrasound in patients who meet 1 or more of the following criteria as patient is high risk for DVT/PE on admission:   [] History of DVT/PE  [] Hypercoagulable states (Factor V Leiden, Cancer, OCP, etc. )  [] Prolonged immobility (hemiplegia/hemiparesis/post operative or any other extended immobilization)  [] Transferred from outside facility (Rehab or Long term care)

## 2023-03-04 NOTE — PROGRESS NOTE ADULT - SUBJECTIVE AND OBJECTIVE BOX
SUBJECTIVE/INTERVAL HISTORY:    PAST MEDICAL & SURGICAL HISTORY:  Bipolar illness      Seizure  last seizure 08/2020      Hypothyroidism      Mentally challenged  Moderate intellectual disability      Bipolar disorder      Hypothyroidism      Hyperlipidemia      H/O osteoporosis      History of seizure disorder  last seizure 6/4/2018      H/O sinus tachycardia      History of hysteroscopy  s/p hysteroscopy for thickened endometrium &amp; removal of uterine polyp      History of dental surgery  2019, 01/2020        FAMILY HISTORY:      MEDICATIONS (HOME):  Home Medications:  clindamycin phosphate 1 % external lotion: apply to face qd 8PM (20 Feb 2023 13:22)  PreviDent 5000 polus toothpaste: 2 times a day (20 Feb 2023 13:22)  Saline Nasal Mist: 2 spray(s) in each nostril every 2 hours, As Needed (20 Feb 2023 13:22)    MEDICATIONS  (STANDING):  bismuth subsalicylate Liquid 5 milliLiter(s) Oral once  chlorhexidine 2% Cloths 1 Application(s) Topical <User Schedule>  enoxaparin Injectable 40 milliGRAM(s) SubCutaneous every 24 hours  ferrous    sulfate 325 milliGRAM(s) Oral two times a day  hydrocortisone 1% Cream 1 Application(s) Topical daily  lacosamide 150 milliGRAM(s) Oral two times a day  levothyroxine 50 MICROGram(s) Oral daily  polyethylene glycol 3350 17 Gram(s) Oral daily  QUEtiapine 150 milliGRAM(s) Oral every 12 hours  senna 1 Tablet(s) Oral daily  zonisamide 200 milliGRAM(s) Oral two times a day    MEDICATIONS  (PRN):  acetaminophen     Tablet .. 650 milliGRAM(s) Oral every 6 hours PRN Moderate Pain (4 - 6), Severe Pain (7 - 10)  benzocaine/menthol Lozenge 1 Lozenge Oral four times a day PRN Sore Throat  guaiFENesin Oral Liquid (Sugar-Free) 200 milliGRAM(s) Oral every 8 hours PRN Cough  LORazepam     Tablet 0.5 milliGRAM(s) Oral daily PRN Agitation  LORazepam   Injectable 1 milliGRAM(s) IV Push once PRN for seizure activity > 3 mins  QUEtiapine 50 milliGRAM(s) Oral two times a day PRN agitation    ALLERGIES/INTOLERANCES:  Allergies  Benadryl (Hives)  Benadryl (Unknown)  penicillin (Rash)    Intolerances    VITALS & EXAMINATION:  Vital Signs Last 24 Hrs  T(C): 36.3 (04 Mar 2023 00:46), Max: 36.7 (03 Mar 2023 17:00)  T(F): 97.3 (04 Mar 2023 00:46), Max: 98.1 (03 Mar 2023 17:00)  HR: 100 (04 Mar 2023 06:26) (83 - 104)  BP: 144/84 (04 Mar 2023 06:26) (118/71 - 144/84)  BP(mean): --  RR: 18 (04 Mar 2023 06:26) (18 - 18)  SpO2: 96% (04 Mar 2023 00:46) (91% - 99%)    Parameters below as of 04 Mar 2023 06:26  Patient On (Oxygen Delivery Method): room air    Constitutional: appears stated age, NAD.  Head: Normocephalic & atraumatic.  Respiratory: Breathing comfortably on room air. Clear lungs.     Neurological:  MS: Eyes open and alert. Seated in chair.   Speech/Language: Speech fluent.   CNs: Face appears symmetric. Hearing grossly intact to conversation.  Motor: Muscle bulk normal.   Gait: Not assessed        LABORATORY:  CBC                       13.9   8.10  )-----------( 278      ( 02 Mar 2023 09:15 )             43.5     Chem 03-02    136  |  101  |  17  ----------------------------<  101<H>  4.0   |  21<L>  |  0.70    Ca    9.8      02 Mar 2023 09:15    TPro  7.6  /  Alb  4.3  /  TBili  0.2  /  DBili  x   /  AST  11  /  ALT  12  /  AlkPhos  65  03-02    LFTs LIVER FUNCTIONS - ( 02 Mar 2023 09:15 )  Alb: 4.3 g/dL / Pro: 7.6 g/dL / ALK PHOS: 65 U/L / ALT: 12 U/L / AST: 11 U/L / GGT: x           STUDIES & IMAGING:      CT Head 2/18/23:  No obvious acute intracranial hemorrhage, large cortical infarct or mass   effect. If clinically indicated, short-term follow-up or MRI may be   obtained for further evaluation.    MRI Brain Epilepsy protocol w/wo contrast 2/25/23:  No acute infarct or hemorrhage.  Nonspecific subcentimeter T2/FLAIR hyperintense white matter foci in the   posterior left temporal lobe.    EEG 3/2:  EEG Summary:    Normal EEG in the awake, drowsy and asleep states.    Background slowing, generalized, mild  Excess beta    Impression/Clinical Correlate:    This is an abnormal VEEG.  Mild diffuse/multifocal cerebral dysfunction, not specific in etiology. No seizures were recorded. The presence of excessive beta activity never evolves and has no clear clinical correlation, is most often seen in setting of sedative medication use but has also been described in rare cases of intellectual impairment.      SUBJECTIVE/INTERVAL HISTORY: No acute overnight events. Pt sitting in chair, reports no issues, denies SOB or CP.     PAST MEDICAL & SURGICAL HISTORY:  Bipolar illness      Seizure  last seizure 08/2020      Hypothyroidism      Mentally challenged  Moderate intellectual disability      Bipolar disorder      Hypothyroidism      Hyperlipidemia      H/O osteoporosis      History of seizure disorder  last seizure 6/4/2018      H/O sinus tachycardia      History of hysteroscopy  s/p hysteroscopy for thickened endometrium &amp; removal of uterine polyp      History of dental surgery  2019, 01/2020        FAMILY HISTORY:      MEDICATIONS (HOME):  Home Medications:  clindamycin phosphate 1 % external lotion: apply to face qd 8PM (20 Feb 2023 13:22)  PreviDent 5000 polus toothpaste: 2 times a day (20 Feb 2023 13:22)  Saline Nasal Mist: 2 spray(s) in each nostril every 2 hours, As Needed (20 Feb 2023 13:22)    MEDICATIONS  (STANDING):  bismuth subsalicylate Liquid 5 milliLiter(s) Oral once  chlorhexidine 2% Cloths 1 Application(s) Topical <User Schedule>  enoxaparin Injectable 40 milliGRAM(s) SubCutaneous every 24 hours  ferrous    sulfate 325 milliGRAM(s) Oral two times a day  hydrocortisone 1% Cream 1 Application(s) Topical daily  lacosamide 150 milliGRAM(s) Oral two times a day  levothyroxine 50 MICROGram(s) Oral daily  polyethylene glycol 3350 17 Gram(s) Oral daily  QUEtiapine 150 milliGRAM(s) Oral every 12 hours  senna 1 Tablet(s) Oral daily  zonisamide 200 milliGRAM(s) Oral two times a day    MEDICATIONS  (PRN):  acetaminophen     Tablet .. 650 milliGRAM(s) Oral every 6 hours PRN Moderate Pain (4 - 6), Severe Pain (7 - 10)  benzocaine/menthol Lozenge 1 Lozenge Oral four times a day PRN Sore Throat  guaiFENesin Oral Liquid (Sugar-Free) 200 milliGRAM(s) Oral every 8 hours PRN Cough  LORazepam     Tablet 0.5 milliGRAM(s) Oral daily PRN Agitation  LORazepam   Injectable 1 milliGRAM(s) IV Push once PRN for seizure activity > 3 mins  QUEtiapine 50 milliGRAM(s) Oral two times a day PRN agitation    ALLERGIES/INTOLERANCES:  Allergies  Benadryl (Hives)  Benadryl (Unknown)  penicillin (Rash)    Intolerances    VITALS & EXAMINATION:  Vital Signs Last 24 Hrs  T(C): 36.3 (04 Mar 2023 00:46), Max: 36.7 (03 Mar 2023 17:00)  T(F): 97.3 (04 Mar 2023 00:46), Max: 98.1 (03 Mar 2023 17:00)  HR: 100 (04 Mar 2023 06:26) (83 - 104)  BP: 144/84 (04 Mar 2023 06:26) (118/71 - 144/84)  BP(mean): --  RR: 18 (04 Mar 2023 06:26) (18 - 18)  SpO2: 96% (04 Mar 2023 00:46) (91% - 99%)    Parameters below as of 04 Mar 2023 06:26  Patient On (Oxygen Delivery Method): room air    Constitutional: appears stated age, NAD.  Head: Normocephalic & atraumatic.  Respiratory: Breathing comfortably on room air. Clear lungs.   Cards: RRR    Neurological:  MS: Eyes open and alert. Seated in chair.   Speech/Language: Speech fluent.   CNs: Face appears symmetric. Hearing grossly intact to conversation.  Motor: Muscle bulk normal. Symmetric b/l UE no drift  Gait: Not assessed        LABORATORY:  CBC                       13.9   8.10  )-----------( 278      ( 02 Mar 2023 09:15 )             43.5     Chem 03-02    136  |  101  |  17  ----------------------------<  101<H>  4.0   |  21<L>  |  0.70    Ca    9.8      02 Mar 2023 09:15    TPro  7.6  /  Alb  4.3  /  TBili  0.2  /  DBili  x   /  AST  11  /  ALT  12  /  AlkPhos  65  03-02    LFTs LIVER FUNCTIONS - ( 02 Mar 2023 09:15 )  Alb: 4.3 g/dL / Pro: 7.6 g/dL / ALK PHOS: 65 U/L / ALT: 12 U/L / AST: 11 U/L / GGT: x           STUDIES & IMAGING:    CT Head 2/18/23:  No obvious acute intracranial hemorrhage, large cortical infarct or mass   effect. If clinically indicated, short-term follow-up or MRI may be   obtained for further evaluation.    MRI Brain Epilepsy protocol w/wo contrast 2/25/23:  No acute infarct or hemorrhage.  Nonspecific subcentimeter T2/FLAIR hyperintense white matter foci in the   posterior left temporal lobe.    EEG 3/2:  EEG Summary:  Normal EEG in the awake, drowsy and asleep states.  Background slowing, generalized, mild  Excess beta  Impression/Clinical Correlate:  This is an abnormal VEEG.  Mild diffuse/multifocal cerebral dysfunction, not specific in etiology. No seizures were recorded. The presence of excessive beta activity never evolves and has no clear clinical correlation, is most often seen in setting of sedative medication use but has also been described in rare cases of intellectual impairment.

## 2023-03-05 PROCEDURE — 99231 SBSQ HOSP IP/OBS SF/LOW 25: CPT | Mod: GC

## 2023-03-05 RX ADMIN — ENOXAPARIN SODIUM 40 MILLIGRAM(S): 100 INJECTION SUBCUTANEOUS at 06:25

## 2023-03-05 RX ADMIN — QUETIAPINE FUMARATE 150 MILLIGRAM(S): 200 TABLET, FILM COATED ORAL at 06:26

## 2023-03-05 RX ADMIN — Medication 1 APPLICATION(S): at 13:36

## 2023-03-05 RX ADMIN — LACOSAMIDE 150 MILLIGRAM(S): 50 TABLET ORAL at 17:34

## 2023-03-05 RX ADMIN — Medication 325 MILLIGRAM(S): at 06:27

## 2023-03-05 RX ADMIN — Medication 200 MILLIGRAM(S): at 17:33

## 2023-03-05 RX ADMIN — Medication 50 MICROGRAM(S): at 06:27

## 2023-03-05 RX ADMIN — Medication 200 MILLIGRAM(S): at 06:26

## 2023-03-05 RX ADMIN — CHLORHEXIDINE GLUCONATE 1 APPLICATION(S): 213 SOLUTION TOPICAL at 06:29

## 2023-03-05 RX ADMIN — Medication 325 MILLIGRAM(S): at 17:35

## 2023-03-05 RX ADMIN — LACOSAMIDE 150 MILLIGRAM(S): 50 TABLET ORAL at 06:28

## 2023-03-05 RX ADMIN — QUETIAPINE FUMARATE 150 MILLIGRAM(S): 200 TABLET, FILM COATED ORAL at 17:34

## 2023-03-05 NOTE — PROGRESS NOTE ADULT - ASSESSMENT
41yo woman with HTN, HLD, bipolar d/o, hypothyroidism, seizure disorder (on Keppra and VPA), developmentally delayed, who presented to OSH on 2/18 with recurrent breakthrough seizures, transferred to the EMU on 2/18 for further monitoring and management. CTH from OSH unrevealing. VPA level from OSH subtherapeutic at 43, but noted to be 52.26 when corrected for albumin of 3.9. CBC/CMP wnl. Per history from family, patient had hx of meningitis age 18mo, possible inception of seizures age 2 years (potentially earlier) with eyes rolling back and becoming less participatory. Per  Gian and family/mother, patient may be having (once clarified) predominantly evening time events described as becoming quiet, less engaged, not talking, periodic eye blinking. No delmy tonic/clonic activity noted. EEG overnight 2/25 shows focal polyspikes in left posterior temporal-central region.    Impression: Breakthrough seizures in the setting of borderline VPA level, possibly due to medication nonadherence vs inadequate dosing. Medications changed. Found to have asymptomatic +COVID 2/28    Plan:   [] SW said cleared on 3/10 to go to [facility] home - after 10 days from COVID +,  [x] EEG discontinued 3/1  [x] MR brain w/wo contrast with epilepsy protocol - nonspecific subcentimeter T2/FLAIR hyperintensity w/in L posterior temporal lobe  [x] Lacosamide 150mg bid (increased on 3/1 due to unresponsive episode 2/28; previously started lacosamide 100mg BID on 2/25)  [x] Increased zonisamide to 200mg BID  [x] d/c'd home LEV, VPA and amantadine  [x] Increased quetiapine to 150mg BID with additional 50mg q12h PRN agitation on 2/28 (previously: on 2/22 decreased to 100 mg bid)  [x] ativan 0.5mg PO daily PRN agitation  [] Psych following for agitation  [] 1:1 observation while requiring COVID isolation as pt not adherent with isolation  [] NO Haldol as per Family, patient had an episode after Haldol in the past of unresponsiveness, head tilted back, b/l hands shaking  [x] UA neg for infection on 2/18  [ ] Seizure rescue: Ativan 1mg IVP for seizure activity > 3 mins; on discharge: Nayzilam 5 mg intranasal PRN GTC seizure > 3 min, may repeat once in 10 min in other nostril if seizures continue; do not repeat dose if patient excessively sedated or having trouble breathing  [x] Seizure, fall and aspiration precautions   [] Given concern for seizure, advise patient to not drive (for 1 yr per [facility] guidelines), operate heavy machinery, avoid heights, pools, bathtubs, locked doors.  [x] Diet: Regular (Kosher)  [x] DVT ppx Lovenox  [] PT/OT - Home PT/OT  [] Discharge to group home on HOLD due to COVID positive test 2/28 - per SW, group home requires 10 days isolation   [] Follow up in epilepsy clinic with Dr. Andrews or Dr. Cardenas in 1 month, elective EMU admission to be scheduled in 4-6 weeks  [x] PMD follow-up: alendronate stopped as pt no longer on VPA, follow up with primary care to assess need for osteoporosis screening/medication    CORE MEASURES:        AED levels [] Sent [] Pending [x] Resulted     LFTs [x] normal [] elevated      Plan and education provided to [x] patient [x]family at bedside [] awaiting for family     Seizure Semiology  [x] Tonic clonic  [] Clonic  [] Tonic  [] Unresponsive  [] Focal with impaired awareness  [] Focal without impaired awareness    Obtain screening lower extremity venous ultrasound in patients who meet 1 or more of the following criteria as patient is high risk for DVT/PE on admission:   [] History of DVT/PE    Case seen and discussed with neurologist Dr. Sesay  [] Hypercoagulable states (Factor V Leiden, Cancer, OCP, etc. )  [] Prolonged immobility (hemiplegia/hemiparesis/post operative or any other extended immobilization)  [] Transferred from outside facility (Rehab or Long term care)   41yo woman with HTN, HLD, bipolar d/o, hypothyroidism, seizure disorder (on Keppra and VPA), developmentally delayed, who presented to OSH on 2/18 with recurrent breakthrough seizures, transferred to the EMU on 2/18 for further monitoring and management. CTH from OSH unrevealing. VPA level from OSH subtherapeutic at 43, but noted to be 52.26 when corrected for albumin of 3.9. CBC/CMP wnl. Per history from family, patient had hx of meningitis age 18mo, possible inception of seizures age 2 years (potentially earlier) with eyes rolling back and becoming less participatory. Per  Gian and family/mother, patient may be having (once clarified) predominantly evening time events described as becoming quiet, less engaged, not talking, periodic eye blinking. No delmy tonic/clonic activity noted. EEG overnight 2/25 shows focal polyspikes in left posterior temporal-central region.    Impression: Breakthrough seizures in the setting of borderline VPA level, possibly due to medication nonadherence vs inadequate dosing. Medications changed. Found to have asymptomatic +COVID 2/28    Plan:   [] Discharge on 3/10 to group home - after 10 days' isolation since 2/28 COVID+  [] 1:1 observation while requiring COVID isolation as pt was not adherent with isolation  [x] EEG discontinued 3/1  [x] MR brain w/wo contrast with epilepsy protocol - nonspecific subcentimeter T2/FLAIR hyperintensity w/in L posterior temporal lobe  [x] Lacosamide 150mg bid (increased on 3/1 due to unresponsive episode 2/28; previously started lacosamide 100mg BID on 2/25)  [x] Increased zonisamide to 200mg BID  [x] d/c'd home LEV, VPA and amantadine  [x] Increased quetiapine to 150mg BID with additional 50mg q12h PRN agitation on 2/28 (previously: on 2/22 decreased to 100 mg bid)  [x] ativan 0.5mg PO daily PRN agitation  [] Psych following for agitation  [] NO Haldol as per Family, patient had an episode after Haldol in the past of unresponsiveness, head tilted back, b/l hands shaking  [x] UA neg for infection on 2/18  [ ] Seizure rescue: Ativan 1mg IVP for seizure activity > 3 mins; on discharge: Nayzilam 5 mg intranasal PRN GTC seizure > 3 min, may repeat once in 10 min in other nostril if seizures continue; do not repeat dose if patient excessively sedated or having trouble breathing  [x] Seizure, fall and aspiration precautions   [] Given concern for seizure, advise patient to not drive (for 1 yr per [facility] guidelines), operate heavy machinery, avoid heights, pools, bathtubs, locked doors.  [x] Diet: Regular (Kosher)  [x] DVT ppx Lovenox  [] PT/OT - Home PT/OT  [] Discharge to group home on HOLD due to COVID positive test 2/28 - per , group home requires 10 days isolation   [] Follow up in epilepsy clinic with Dr. Andrews or Dr. Cardenas in 1 month, elective EMU admission to be scheduled in 4-6 weeks  [x] PMD follow-up: alendronate stopped as pt no longer on VPA, follow up with primary care to assess need for osteoporosis screening/medication    CORE MEASURES:        AED levels [] Sent [] Pending [x] Resulted     LFTs [x] normal [] elevated      Plan and education provided to [x] patient [x]family at bedside [] awaiting for family     Seizure Semiology  [x] Tonic clonic  [] Clonic  [] Tonic  [] Unresponsive  [] Focal with impaired awareness  [] Focal without impaired awareness    Obtain screening lower extremity venous ultrasound in patients who meet 1 or more of the following criteria as patient is high risk for DVT/PE on admission:   [] History of DVT/PE    Case seen and discussed with neurologist Dr. Sesay  [] Hypercoagulable states (Factor V Leiden, Cancer, OCP, etc. )  [] Prolonged immobility (hemiplegia/hemiparesis/post operative or any other extended immobilization)  [] Transferred from outside facility (Rehab or Long term care)   41yo woman with HTN, HLD, bipolar d/o, hypothyroidism, seizure disorder (on Keppra and VPA), developmentally delayed, who presented to OSH on 2/18 with recurrent breakthrough seizures, transferred to the EMU on 2/18 for further monitoring and management. CTH from OSH unrevealing. VPA level from OSH subtherapeutic at 43, but noted to be 52.26 when corrected for albumin of 3.9. CBC/CMP wnl. Per history from family, patient had hx of meningitis age 18mo, possible inception of seizures age 2 years (potentially earlier) with eyes rolling back and becoming less participatory. Per  Gian and family/mother, patient may be having (once clarified) predominantly evening time events described as becoming quiet, less engaged, not talking, periodic eye blinking. No delmy tonic/clonic activity noted. EEG overnight 2/25 shows focal polyspikes in left posterior temporal-central region.    Impression: Breakthrough seizures in the setting of borderline VPA level, possibly due to medication nonadherence vs inadequate dosing. Medications changed. Found to have asymptomatic +COVID 2/28    Plan:   [] Discharge on 3/10 to group home - after 10 days' isolation since 2/28 COVID+  [] 1:1 observation while requiring COVID isolation as pt was not adherent with isolation  [x] EEG discontinued 3/1  [x] MR brain w/wo contrast with epilepsy protocol - nonspecific subcentimeter T2/FLAIR hyperintensity w/in L posterior temporal lobe  [x] Lacosamide 150mg bid (increased on 3/1 due to unresponsive episode 2/28; previously started lacosamide 100mg BID on 2/25)  [x] Increased zonisamide to 200mg BID  [x] d/c'd home LEV, VPA and amantadine  [x] Increased quetiapine to 150mg BID with additional 50mg q12h PRN agitation on 2/28 (previously: on 2/22 decreased to 100 mg bid)  [x] ativan 0.5mg PO daily PRN agitation  [] Psych following for agitation  [] NO Haldol as per Family, patient had an episode after Haldol in the past of unresponsiveness, head tilted back, b/l hands shaking  [x] UA neg for infection on 2/18  [ ] Seizure rescue: Ativan 1mg IVP for seizure activity > 3 mins; on discharge: Nayzilam 5 mg intranasal PRN GTC seizure > 3 min, may repeat once in 10 min in other nostril if seizures continue; do not repeat dose if patient excessively sedated or having trouble breathing  [x] Seizure, fall and aspiration precautions   [] Given concern for seizure, advise patient to not drive (for 1 yr per [facility] guidelines), operate heavy machinery, avoid heights, pools, bathtubs, locked doors.  [x] Diet: Regular (Kosher)  [x] DVT ppx Lovenox  [] PT/OT - Home PT/OT  [] Discharge to group home on HOLD due to COVID positive test 2/28 - per , group home requires 10 days isolation   [] follow up with family or PMD regarding reason for iron supplement  [] Follow up in epilepsy clinic with Dr. Andrews or Dr. Cardenas in 1 month, elective EMU admission to be scheduled in 4-6 weeks  [x] PMD follow-up: alendronate stopped as pt no longer on VPA, follow up with primary care to assess need for osteoporosis screening/medication and iron supplement    CORE MEASURES:        AED levels [] Sent [] Pending [x] Resulted     LFTs [x] normal [] elevated      Plan and education provided to [x] patient [x]family at bedside [] awaiting for family     Seizure Semiology  [x] Tonic clonic  [] Clonic  [] Tonic  [] Unresponsive  [] Focal with impaired awareness  [] Focal without impaired awareness    Obtain screening lower extremity venous ultrasound in patients who meet 1 or more of the following criteria as patient is high risk for DVT/PE on admission:   [] History of DVT/PE    Case seen and discussed with neurologist Dr. Sesay  [] Hypercoagulable states (Factor V Leiden, Cancer, OCP, etc. )  [] Prolonged immobility (hemiplegia/hemiparesis/post operative or any other extended immobilization)  [] Transferred from outside facility (Rehab or Long term care)

## 2023-03-05 NOTE — PROGRESS NOTE ADULT - SUBJECTIVE AND OBJECTIVE BOX
Neurology - Consult Note    -  Spectra: [phone] ([facility]), [phone] ([facility])  -    HPI: Patient MARLEY MCKEON is a 43y (1980) wo/man with a PMHx significant for ***      Review of Systems:  INCOMPLETE   CONSTITUTIONAL: No fevers or chills  EYES AND ENT: No visual changes or no throat pain   NECK: No pain or stiffness  RESPIRATORY: No hemoptysis or shortness of breath  CARDIOVASCULAR: No chest pain or palpitations  GASTROINTESTINAL: No melena or hematochezia  GENITOURINARY: No dysuria or hematuria  NEUROLOGICAL: +As stated in HPI above  SKIN: No itching, burning, rashes, or lesions   All other review of systems is negative unless indicated above.    Allergies:  Benadryl (Hives)  Benadryl (Unknown)  penicillin (Rash)      PMHx/PSHx/Family Hx: As above, otherwise see below   Bipolar illness    Seizure    Hypothyroidism    Mentally challenged    Bipolar disorder    Hypothyroidism    Hyperlipidemia    H/O osteoporosis    History of seizure disorder    H/O sinus tachycardia        Social Hx:  No current use of tobacco, alcohol, or illicit drugs  Lives with ***    Medications:  MEDICATIONS  (STANDING):  bismuth subsalicylate Liquid 5 milliLiter(s) Oral once  chlorhexidine 2% Cloths 1 Application(s) Topical <User Schedule>  enoxaparin Injectable 40 milliGRAM(s) SubCutaneous every 24 hours  ferrous    sulfate 325 milliGRAM(s) Oral two times a day  hydrocortisone 1% Cream 1 Application(s) Topical daily  lacosamide 150 milliGRAM(s) Oral two times a day  levothyroxine 50 MICROGram(s) Oral daily  polyethylene glycol 3350 17 Gram(s) Oral daily  QUEtiapine 150 milliGRAM(s) Oral every 12 hours  senna 1 Tablet(s) Oral daily  zonisamide 200 milliGRAM(s) Oral two times a day    MEDICATIONS  (PRN):  acetaminophen     Tablet .. 650 milliGRAM(s) Oral every 6 hours PRN Moderate Pain (4 - 6), Severe Pain (7 - 10)  benzocaine/menthol Lozenge 1 Lozenge Oral four times a day PRN Sore Throat  guaiFENesin Oral Liquid (Sugar-Free) 200 milliGRAM(s) Oral every 8 hours PRN Cough  LORazepam     Tablet 0.5 milliGRAM(s) Oral daily PRN Agitation  LORazepam   Injectable 1 milliGRAM(s) IV Push once PRN for seizure activity > 3 mins  QUEtiapine 50 milliGRAM(s) Oral two times a day PRN agitation      Vitals:  T(C): 37.1 (03-05-23 @ 06:24), Max: 37.1 (03-05-23 @ 06:24)  HR: 91 (03-05-23 @ 06:24) (85 - 91)  BP: 131/76 (03-05-23 @ 06:24) (118/78 - 131/76)  RR: 18 (03-05-23 @ 06:24) (18 - 18)  SpO2: 98% (03-05-23 @ 06:24) (97% - 99%)    Physical Examination: INCOMPLETE  General - NAD  Cardiovascular - Peripheral pulses palpable, no edema  Eyes - Fundoscopy with flat, sharp optic discs and no hemorrhage or exudates; Fundoscopy not well visualized; Fundoscopy not performed due to safety precautions in the setting of the COVID-19 pandemic    Neurologic Exam:  Mental status - Awake, Alert, Oriented to person, place, and time. Speech fluent, repetition and naming intact. Follows simple and complex commands. Attention/concentration, recent and remote memory (including registration and recall), and fund of knowledge intact    Cranial nerves - PERRLA, VFF, EOMI, face sensation (V1-V3) intact b/l, facial strength intact without asymmetry b/l, hearing intact b/l, palate with symmetric elevation, trapezius OR sternocleidomastiod 5/5 strength b/l, tongue midline on protrusion with full lateral movement    Motor - Normal bulk and tone throughout. No pronator drift.  Strength testing            Deltoid      Biceps      Triceps     Wrist Extension    Wrist Flexion     Interossei         R            5                 5               5                     5                              5                        5                 5  L             5                 5               5                     5                              5                        5                 5              Hip Flexion    Hip Extension    Knee Flexion    Knee Extension    Dorsiflexion    Plantar Flexion  R              5                           5                       5                           5                            5                          5  L              5                           5                        5                           5                            5                          5    Sensation - Light touch/temperature OR pain/vibration intact throughout    DTR's -             Biceps      Triceps     Brachioradialis      Patellar    Ankle    Toes/plantar response  R             2+             2+                  2+                       2+            2+                 Down  L              2+             2+                 2+                        2+           2+                 Down    Coordination - Finger to Nose intact b/l. No tremors appreciated    Gait and station - Normal casual gait. Romberg (-)    Labs:          CAPILLARY BLOOD GLUCOSE              CSF:                  Radiology:     Neurology - Consult Note    -  Spectra: [phone] ([facility]), [phone] ([facility])  -    SUBJECTIVE/INTERVAL HISTORY: No acute overnight events. Pt sitting in chair, reports no issues.        Review of Systems:    CONSTITUTIONAL: No fevers or chills  EYES AND ENT: No visual changes or no throat pain   NECK: No pain or stiffness  RESPIRATORY: No hemoptysis or shortness of breath  CARDIOVASCULAR: No chest pain or palpitations  NEUROLOGICAL: +As stated in HPI above  SKIN: No itching, burning, rashes, or lesions   All other review of systems is negative unless indicated above.    Allergies:  Benadryl (Hives)  Benadryl (Unknown)  penicillin (Rash)      PMHx/PSHx/Family Hx: As above, otherwise see below   Bipolar illness    Seizure    Hypothyroidism    Mentally challenged    Bipolar disorder    Hypothyroidism    Hyperlipidemia    H/O osteoporosis    History of seizure disorder    H/O sinus tachycardia        Social Hx:  No current use of tobacco, alcohol, or illicit drugs    Medications:  MEDICATIONS  (STANDING):  bismuth subsalicylate Liquid 5 milliLiter(s) Oral once  chlorhexidine 2% Cloths 1 Application(s) Topical <User Schedule>  enoxaparin Injectable 40 milliGRAM(s) SubCutaneous every 24 hours  ferrous    sulfate 325 milliGRAM(s) Oral two times a day  hydrocortisone 1% Cream 1 Application(s) Topical daily  lacosamide 150 milliGRAM(s) Oral two times a day  levothyroxine 50 MICROGram(s) Oral daily  polyethylene glycol 3350 17 Gram(s) Oral daily  QUEtiapine 150 milliGRAM(s) Oral every 12 hours  senna 1 Tablet(s) Oral daily  zonisamide 200 milliGRAM(s) Oral two times a day    MEDICATIONS  (PRN):  acetaminophen     Tablet .. 650 milliGRAM(s) Oral every 6 hours PRN Moderate Pain (4 - 6), Severe Pain (7 - 10)  benzocaine/menthol Lozenge 1 Lozenge Oral four times a day PRN Sore Throat  guaiFENesin Oral Liquid (Sugar-Free) 200 milliGRAM(s) Oral every 8 hours PRN Cough  LORazepam     Tablet 0.5 milliGRAM(s) Oral daily PRN Agitation  LORazepam   Injectable 1 milliGRAM(s) IV Push once PRN for seizure activity > 3 mins  QUEtiapine 50 milliGRAM(s) Oral two times a day PRN agitation      Vitals:  T(C): 37.1 (03-05-23 @ 06:24), Max: 37.1 (03-05-23 @ 06:24)  HR: 91 (03-05-23 @ 06:24) (85 - 91)  BP: 131/76 (03-05-23 @ 06:24) (118/78 - 131/76)  RR: 18 (03-05-23 @ 06:24) (18 - 18)  SpO2: 98% (03-05-23 @ 06:24) (97% - 99%)    Constitutional: appears stated age, NAD.  Head: Normocephalic & atraumatic.  Respiratory: Breathing comfortably on room air. Clear lungs.   Cards: RRR    Neurological:  MS: Eyes open and alert. Seated in chair.   Speech/Language: Speech fluent.   CNs: Face appears symmetric. Hearing grossly intact to conversation.  Motor: Muscle bulk normal. Symmetric b/l UE no drift  Gait: Not assessed     Labs:          CAPILLARY BLOOD GLUCOSE              CSF:                  Radiology:    CT Head 2/18/23:  No obvious acute intracranial hemorrhage, large cortical infarct or mass   effect. If clinically indicated, short-term follow-up or MRI may be   obtained for further evaluation.    MRI Brain Epilepsy protocol w/wo contrast 2/25/23:  No acute infarct or hemorrhage.  Nonspecific subcentimeter T2/FLAIR hyperintense white matter foci in the   posterior left temporal lobe.    EEG 3/2:  EEG Summary:  Normal EEG in the awake, drowsy and asleep states.  Background slowing, generalized, mild  Excess beta  Impression/Clinical Correlate:  This is an abnormal VEEG.  Mild diffuse/multifocal cerebral dysfunction, not specific in etiology. No seizures were recorded. The presence of excessive beta activity never evolves and has no clear clinical correlation, is most often seen in setting of sedative medication use but has also been described in rare cases of intellectual impairment.

## 2023-03-05 NOTE — PROGRESS NOTE ADULT - ATTENDING COMMENTS
Pt seen today sitting in chair, calm, awake, alert, not in distress, normal respiratory effort at rest on room air, no complaints. O2 sat normal on room air.    Assessment:  Epilepsy  Agitation  COVID-19 infection, asymptomatic    Plan:  As above  Plan to discharge to group home 3/10 (after 10 days of isolation - COVID+ 2/28), or earlier if SW is able to arrange an alternate isolation location    Deidra Sesay MD

## 2023-03-06 RX ORDER — AMANTADINE HCL 100 MG
100 CAPSULE ORAL DAILY
Refills: 0 | Status: DISCONTINUED | OUTPATIENT
Start: 2023-03-06 | End: 2023-03-13

## 2023-03-06 RX ORDER — NYSTATIN CREAM 100000 [USP'U]/G
1 CREAM TOPICAL
Refills: 0 | Status: DISCONTINUED | OUTPATIENT
Start: 2023-03-06 | End: 2023-03-13

## 2023-03-06 RX ADMIN — CHLORHEXIDINE GLUCONATE 1 APPLICATION(S): 213 SOLUTION TOPICAL at 06:12

## 2023-03-06 RX ADMIN — LACOSAMIDE 150 MILLIGRAM(S): 50 TABLET ORAL at 05:54

## 2023-03-06 RX ADMIN — Medication 100 MILLIGRAM(S): at 22:12

## 2023-03-06 RX ADMIN — Medication 50 MICROGRAM(S): at 05:54

## 2023-03-06 RX ADMIN — Medication 200 MILLIGRAM(S): at 17:58

## 2023-03-06 RX ADMIN — POLYETHYLENE GLYCOL 3350 17 GRAM(S): 17 POWDER, FOR SOLUTION ORAL at 18:00

## 2023-03-06 RX ADMIN — Medication 325 MILLIGRAM(S): at 05:54

## 2023-03-06 RX ADMIN — QUETIAPINE FUMARATE 150 MILLIGRAM(S): 200 TABLET, FILM COATED ORAL at 18:04

## 2023-03-06 RX ADMIN — NYSTATIN CREAM 1 APPLICATION(S): 100000 CREAM TOPICAL at 18:04

## 2023-03-06 RX ADMIN — ENOXAPARIN SODIUM 40 MILLIGRAM(S): 100 INJECTION SUBCUTANEOUS at 05:54

## 2023-03-06 RX ADMIN — Medication 200 MILLIGRAM(S): at 05:55

## 2023-03-06 RX ADMIN — LACOSAMIDE 150 MILLIGRAM(S): 50 TABLET ORAL at 17:57

## 2023-03-06 RX ADMIN — QUETIAPINE FUMARATE 150 MILLIGRAM(S): 200 TABLET, FILM COATED ORAL at 05:54

## 2023-03-06 RX ADMIN — Medication 1 APPLICATION(S): at 12:32

## 2023-03-06 RX ADMIN — Medication 325 MILLIGRAM(S): at 17:57

## 2023-03-06 NOTE — PROGRESS NOTE ADULT - ATTENDING COMMENTS
patient stable, mother with unrealistic expectation. will be dc Friday or Monday  restart Amantadine for unclear reason

## 2023-03-06 NOTE — PROGRESS NOTE ADULT - SUBJECTIVE AND OBJECTIVE BOX
THE PATIENT WAS SEEN AND EXAMINED BY ME WITH THE HOUSESTAFF DURING MORNING ROUNDS.     HPI:  42 year-old woman with HTN, HLD, bipolar d/o, hypothyroidism, seizure disorder (on Keppra and VPA), developmentally delayed, who presented to OSH on 2/18 for breakthrough seizures, transferred to the EMU for further evaluation and management. Per chart review and discussion with aide at bedside, patient had 2 seizures at her group home, including an episode of generalized "tonic-clonic" shaking while seated in a recliner, lasting 2 minutes, associated with urinary incontinence. She was taken to OSH where she was reported to be postictal and received IV VPA 500mg x1 and later determined to be stable for discharge back to her group home. While awaiting transport, she had a 3rd witnessed event lasting less than 1 minute, for which she was administered ativan 1mg IVP, and decision made for transport to [facility] for continuous EEG. There was no fall or injury associated with any of these events. Her aide is unsure of when her last seizure prior to these events may have occurred.    SUBJECTIVE: No events overnight. No new neurologic complaints.     Medications:  acetaminophen     Tablet .. 650 milliGRAM(s) Oral every 6 hours PRN  benzocaine/menthol Lozenge 1 Lozenge Oral four times a day PRN  bismuth subsalicylate Liquid 5 milliLiter(s) Oral once  chlorhexidine 2% Cloths 1 Application(s) Topical <User Schedule>  enoxaparin Injectable 40 milliGRAM(s) SubCutaneous every 24 hours  ferrous    sulfate 325 milliGRAM(s) Oral two times a day  guaiFENesin Oral Liquid (Sugar-Free) 200 milliGRAM(s) Oral every 8 hours PRN  hydrocortisone 1% Cream 1 Application(s) Topical daily  lacosamide 150 milliGRAM(s) Oral two times a day  levothyroxine 50 MICROGram(s) Oral daily  LORazepam     Tablet 0.5 milliGRAM(s) Oral daily PRN  LORazepam   Injectable 1 milliGRAM(s) IV Push once PRN  polyethylene glycol 3350 17 Gram(s) Oral daily  QUEtiapine 150 milliGRAM(s) Oral every 12 hours  QUEtiapine 50 milliGRAM(s) Oral two times a day PRN  senna 1 Tablet(s) Oral daily  zonisamide 200 milliGRAM(s) Oral two times a day    Physical Exam:  Constitutional: appears stated age, NAD.  Head: Normocephalic & atraumatic.  Respiratory: Breathing comfortably on room air. Clear lungs.     Neurological:  MS: Eyes open and alert. Seated in chair.   Speech/Language: Speech fluent.   CNs: Face appears symmetric. Hearing grossly intact to conversation.  Motor: Muscle bulk normal.   Gait: Not assessed     LABS:     COVID-19 PCR: Detected (28 Feb 2023 19:58)    IMAGING:     CT Head 2/18/23:  No obvious acute intracranial hemorrhage, large cortical infarct or mass   effect. If clinically indicated, short-term follow-up or MRI may be   obtained for further evaluation.    MRI Brain Epilepsy protocol w/wo contrast 2/25/23:  No acute infarct or hemorrhage.  Nonspecific subcentimeter T2/FLAIR hyperintense white matter foci in the   posterior left temporal lobe.    EEG 3/2:  EEG Summary:    Normal EEG in the awake, drowsy and asleep states.    Background slowing, generalized, mild  Excess beta    Impression/Clinical Correlate:    This is an abnormal VEEG.  Mild diffuse/multifocal cerebral dysfunction, not specific in etiology. No seizures were recorded. The presence of excessive beta activity never evolves and has no clear clinical correlation, is most often seen in setting of sedative medication use but has also been described in rare cases of intellectual impairment.

## 2023-03-06 NOTE — PROGRESS NOTE ADULT - ASSESSMENT
43yo woman with HTN, HLD, bipolar d/o, hypothyroidism, seizure disorder (on Keppra and VPA), developmentally delayed, who presented to OSH on 2/18 with recurrent breakthrough seizures, transferred to the EMU on 2/18 for further monitoring and management. CTH from OSH unrevealing. VPA level from OSH subtherapeutic at 43, but noted to be 52.26 when corrected for albumin of 3.9. CBC/CMP wnl. Per history from family, patient had hx of meningitis age 18mo, possible inception of seizures age 2 years (potentially earlier) with eyes rolling back and becoming less participatory. Per  Gian and family/mother, patient may be having (once clarified) predominantly evening time events described as becoming quiet, less engaged, not talking, periodic eye blinking. No delmy tonic/clonic activity noted. EEG overnight 2/25 shows focal polyspikes in left posterior temporal-central region.    Impression: Breakthrough seizures in the setting of borderline VPA level, possibly due to medication nonadherence vs inadequate dosing.    Plan:   [x] EEG discontinued 3/1  [x] MR brain w/wo contrast with epilepsy protocol - nonspecific subcentimeter T2/FLAIR hyperintensity w/in L posterior temporal lobe  [x] Lacosamide 150mg bid (increased on 3/1 due to unresponsive episode 2/28; previously started lacosamide 100mg BID on 2/25)  [x] Increased zonisamide to 200mg BID  [x] d/c'd home LEV, VPA and amantadine  [x] Increased quetiapine to 150mg BID with additional 50mg q12h PRN agitation on 2/28 (previously: on 2/22 decreased to 100 mg bid)  [x] ativan 0.5mg PO daily PRN agitation  [] Psych following for agitation  [] 1:1 observation while requiring COVID isolation as pt not adherent with isolation  [] NO Haldol as per Family, patient had an episode after Haldol in the past of unresponsiveness, head tilted back, b/l hands shaking  [x] UA neg for infection on 2/18  [ ] Seizure rescue: Ativan 1mg IVP for seizure activity > 3 mins; on discharge: Nayzilam 5 mg intranasal PRN GTC seizure > 3 min, may repeat once in 10 min in other nostril if seizures continue; do not repeat dose if patient excessively sedated or having trouble breathing  [x] Seizure, fall and aspiration precautions   [] Given concern for seizure, advise patient to not drive (for 1 yr per [facility] guidelines), operate heavy machinery, avoid heights, pools, bathtubs, locked doors.  [x] Diet: Regular (Kosher)  [x] DVT ppx Lovenox  [x] PT/OT - Home PT/OT  [] Discharge to group home on HOLD due to COVID positive test 2/28 - per , group home requires 10 days isolation   [] Follow up in epilepsy clinic with Dr. Andrews or Dr. Cardenas in 1 month, elective EMU admission to be scheduled in 4-6 weeks  [x] alendronate stopped as pt no longer on VPA, follow up with primary care to assess need for osteoporosis screening/medication    CORE MEASURES:        AED levels [] Sent [] Pending [x] Resulted     LFTs [x] normal [] elevated      Plan and education provided to [x] patient [x]family at bedside [] awaiting for family     Seizure Semiology  [x] Tonic clonic  [] Clonic  [] Tonic  [] Unresponsive  [] Focal with impaired awareness  [] Focal without impaired awareness    Obtain screening lower extremity venous ultrasound in patients who meet 1 or more of the following criteria as patient is high risk for DVT/PE on admission:   [] History of DVT/PE  [] Hypercoagulable states (Factor V Leiden, Cancer, OCP, etc. )  [] Prolonged immobility (hemiplegia/hemiparesis/post operative or any other extended immobilization)  [] Transferred from outside facility (Rehab or Long term care)   41yo woman with HTN, HLD, bipolar d/o, hypothyroidism, seizure disorder (on Keppra and VPA), developmentally delayed, who presented to OSH on 2/18 with recurrent breakthrough seizures, transferred to the EMU on 2/18 for further monitoring and management. CTH from OSH unrevealing. VPA level from OSH subtherapeutic at 43, but noted to be 52.26 when corrected for albumin of 3.9. CBC/CMP wnl. Per history from family, patient had hx of meningitis age 18mo, possible inception of seizures age 2 years (potentially earlier) with eyes rolling back and becoming less participatory. Per  Gian and family/mother, patient may be having (once clarified) predominantly evening time events described as becoming quiet, less engaged, not talking, periodic eye blinking. No delmy tonic/clonic activity noted. EEG overnight 2/25 shows focal polyspikes in left posterior temporal-central region.    Impression: Breakthrough seizures in the setting of borderline VPA level, possibly due to medication nonadherence vs inadequate dosing.    Plan:   [x] EEG discontinued 3/1  [x] MR Brain w/wo contrast with epilepsy protocol - nonspecific subcentimeter T2/FLAIR hyperintensity w/in L posterior temporal lobe  [x] Lacosamide 150mg bid (increased on 3/1 due to unresponsive episode 2/28; previously started lacosamide 100mg BID on 2/25)  [x] Increased zonisamide to 200mg BID  [x] Restart amantadine, family reports it was helping with abnormal facial movements   [x] d/c'd home LEV, VPA  [x] UA neg for infection on 2/18  [ ] Seizure rescue: Ativan 1mg IVP for seizure activity > 3 mins; on discharge: Nayzilam 5 mg intranasal PRN GTC seizure > 3 min, may repeat once in 10 min in other nostril if seizures continue; do not repeat dose if patient excessively sedated or having trouble breathing    [] Psych following for agitation  [x] ativan 0.5mg PO daily PRN agitation  [x] Increased quetiapine to 150mg BID with additional 50mg q12h PRN agitation on 2/28 (previously: on 2/22 decreased to 100 mg bid)  [] 1:1 observation while requiring COVID isolation as pt not adherent with isolation  [] NO Haldol as per Family, patient had an episode after Haldol in the past of unresponsiveness, head tilted back, b/l hands shaking    [x] Seizure, fall and aspiration precautions   [x] Diet: Regular (Kosher)  [x] DVT ppx Lovenox  [x] PT/OT - Home PT/OT  [] Discharge to group home on HOLD due to COVID positive test 2/28 - per , group home requires 10 days isolation   [] Given concern for seizure, advise patient to not drive (for 1 yr per [facility] guidelines), operate heavy machinery, avoid heights, pools, bathtubs, locked doors.  [] Follow up in epilepsy clinic with Dr. Andrews or Dr. Cardenas in 1 month, elective EMU admission to be scheduled in 4-6 weeks  [x] alendronate stopped as pt no longer on VPA, follow up with primary care to assess need for osteoporosis screening/medication    CORE MEASURES:        AED levels [] Sent [] Pending [x] Resulted     LFTs [x] normal [] elevated      Plan and education provided to [x] patient [x]family at bedside [] awaiting for family     Seizure Semiology  [x] Tonic clonic  [] Clonic  [] Tonic  [] Unresponsive  [] Focal with impaired awareness  [] Focal without impaired awareness    Obtain screening lower extremity venous ultrasound in patients who meet 1 or more of the following criteria as patient is high risk for DVT/PE on admission:   [] History of DVT/PE  [] Hypercoagulable states (Factor V Leiden, Cancer, OCP, etc. )  [] Prolonged immobility (hemiplegia/hemiparesis/post operative or any other extended immobilization)  [] Transferred from outside facility (Rehab or Long term care)

## 2023-03-07 PROCEDURE — 99232 SBSQ HOSP IP/OBS MODERATE 35: CPT

## 2023-03-07 RX ORDER — BACITRACIN ZINC 500 UNIT/G
1 OINTMENT IN PACKET (EA) TOPICAL THREE TIMES A DAY
Refills: 0 | Status: DISCONTINUED | OUTPATIENT
Start: 2023-03-07 | End: 2023-03-13

## 2023-03-07 RX ADMIN — CHLORHEXIDINE GLUCONATE 1 APPLICATION(S): 213 SOLUTION TOPICAL at 06:52

## 2023-03-07 RX ADMIN — Medication 200 MILLIGRAM(S): at 17:36

## 2023-03-07 RX ADMIN — QUETIAPINE FUMARATE 150 MILLIGRAM(S): 200 TABLET, FILM COATED ORAL at 17:36

## 2023-03-07 RX ADMIN — LACOSAMIDE 150 MILLIGRAM(S): 50 TABLET ORAL at 06:18

## 2023-03-07 RX ADMIN — ENOXAPARIN SODIUM 40 MILLIGRAM(S): 100 INJECTION SUBCUTANEOUS at 06:17

## 2023-03-07 RX ADMIN — Medication 200 MILLIGRAM(S): at 06:17

## 2023-03-07 RX ADMIN — Medication 100 MILLIGRAM(S): at 12:08

## 2023-03-07 RX ADMIN — Medication 1 APPLICATION(S): at 14:52

## 2023-03-07 RX ADMIN — POLYETHYLENE GLYCOL 3350 17 GRAM(S): 17 POWDER, FOR SOLUTION ORAL at 17:36

## 2023-03-07 RX ADMIN — LACOSAMIDE 150 MILLIGRAM(S): 50 TABLET ORAL at 17:35

## 2023-03-07 RX ADMIN — Medication 1 APPLICATION(S): at 11:54

## 2023-03-07 RX ADMIN — QUETIAPINE FUMARATE 150 MILLIGRAM(S): 200 TABLET, FILM COATED ORAL at 06:17

## 2023-03-07 RX ADMIN — NYSTATIN CREAM 1 APPLICATION(S): 100000 CREAM TOPICAL at 06:18

## 2023-03-07 RX ADMIN — Medication 325 MILLIGRAM(S): at 17:36

## 2023-03-07 RX ADMIN — NYSTATIN CREAM 1 APPLICATION(S): 100000 CREAM TOPICAL at 17:44

## 2023-03-07 RX ADMIN — SENNA PLUS 1 TABLET(S): 8.6 TABLET ORAL at 12:08

## 2023-03-07 RX ADMIN — Medication 50 MICROGRAM(S): at 06:17

## 2023-03-07 RX ADMIN — Medication 325 MILLIGRAM(S): at 06:17

## 2023-03-07 NOTE — PROGRESS NOTE ADULT - ASSESSMENT
43 YO woman with HTN, HLD, bipolar d/o, hypothyroidism, seizure disorder (on Keppra and VPA), developmentally delayed, who presented to OSH on 2/18 with recurrent breakthrough seizures, transferred to the EMU on 2/18 for further monitoring and management. CTH from OSH unrevealing. VPA level from OSH subtherapeutic at 43, but noted to be 52.26 when corrected for albumin of 3.9. CBC/CMP wnl. Per history from family, patient had hx of meningitis age 18mo, possible inception of seizures age 2 years (potentially earlier) with eyes rolling back and becoming less participatory. Per  Gian and family/mother, patient may be having (once clarified) predominantly evening time events described as becoming quiet, less engaged, not talking, periodic eye blinking. No delmy tonic/clonic activity noted. EEG overnight 2/25 shows focal polyspikes in left posterior temporal-central region.    Impression: Breakthrough seizures in the setting of borderline VPA level, possibly due to medication nonadherence vs inadequate dosing.    Plan:   [x] EEG discontinued 3/1  [x] MR Brain w/wo contrast with epilepsy protocol - nonspecific subcentimeter T2/FLAIR hyperintensity w/in L posterior temporal lobe  [x] Lacosamide 150mg bid (increased on 3/1 due to unresponsive episode 2/28; previously started lacosamide 100mg BID on 2/25)  [x] Increased zonisamide to 200mg BID  [x] Restart amantadine, family reports it was helping with abnormal facial movements   [x] d/c'd home LEV, VPA  [x] UA neg for infection on 2/18  [ ] Seizure rescue: Ativan 1mg IVP for seizure activity > 3 mins; on discharge: Nayzilam 5 mg intranasal PRN GTC seizure > 3 min, may repeat once in 10 min in other nostril if seizures continue; do not repeat dose if patient excessively sedated or having trouble breathing    [] Psych following for agitation  [x] ativan 0.5mg PO daily PRN agitation  [x] Increased quetiapine to 150mg BID with additional 50mg q12h PRN agitation on 2/28 (previously: on 2/22 decreased to 100 mg bid)  [] 1:1 observation while requiring COVID isolation as pt not adherent with isolation  [] NO Haldol as per Family, patient had an episode after Haldol in the past of unresponsiveness, head tilted back, b/l hands shaking    [x] Seizure, fall and aspiration precautions   [x] Diet: Regular (Kosher)  [x] DVT ppx Lovenox  [x] PT/OT - Home PT/OT  [] Discharge to group home on HOLD due to COVID positive test 2/28 - per , group home requires 10 days isolation   [] Given concern for seizure, advise patient to not drive (for 1 yr per [facility] guidelines), operate heavy machinery, avoid heights, pools, bathtubs, locked doors.  [] Follow up in epilepsy clinic with Dr. Andrews or Dr. Cardenas in 1 month, elective EMU admission to be scheduled in 4-6 weeks  [x] alendronate stopped as pt no longer on VPA, follow up with primary care to assess need for osteoporosis screening/medication    CORE MEASURES:        AED levels [] Sent [] Pending [x] Resulted     LFTs [x] normal [] elevated      Plan and education provided to [x] patient [x]family at bedside [] awaiting for family     Seizure Semiology  [x] Tonic clonic  [] Clonic  [] Tonic  [] Unresponsive  [] Focal with impaired awareness  [] Focal without impaired awareness    Obtain screening lower extremity venous ultrasound in patients who meet 1 or more of the following criteria as patient is high risk for DVT/PE on admission:   [] History of DVT/PE  [] Hypercoagulable states (Factor V Leiden, Cancer, OCP, etc. )  [] Prolonged immobility (hemiplegia/hemiparesis/post operative or any other extended immobilization)  [] Transferred from outside facility (Rehab or Long term care)   43 YO woman with HTN, HLD, bipolar d/o, hypothyroidism, seizure disorder (on Keppra and VPA), developmentally delayed, who presented to OSH on 2/18 with recurrent breakthrough seizures, transferred to the EMU on 2/18 for further monitoring and management. CTH from OSH unrevealing. VPA level from OSH subtherapeutic at 43, but noted to be 52.26 when corrected for albumin of 3.9. CBC/CMP wnl. Per history from family, patient had hx of meningitis age 18mo, possible inception of seizures age 2 years (potentially earlier) with eyes rolling back and becoming less participatory. Per  Gian and family/mother, patient may be having (once clarified) predominantly evening time events described as becoming quiet, less engaged, not talking, periodic eye blinking. No delmy tonic/clonic activity noted. EEG overnight 2/25 shows focal polyspikes in left posterior temporal-central region.    Impression: Breakthrough seizures in the setting of borderline VPA level, possibly due to medication nonadherence vs inadequate dosing.    Plan:   [x] EEG discontinued 3/1  [x] MR Brain w/wo contrast with epilepsy protocol - nonspecific subcentimeter T2/FLAIR hyperintensity w/in L posterior temporal lobe  [x] Lacosamide 150mg bid (increased on 3/1 due to unresponsive episode 2/28; previously started lacosamide 100mg BID on 2/25)  [x] Increased zonisamide to 200mg BID  [x] Restart amantadine, family reports it was helping with abnormal facial movements   [x] d/c'd home LEV, VPA  [x] UA neg for infection on 2/18  [ ] Seizure rescue: Ativan 1mg IVP for seizure activity > 3 mins; on discharge: Nayzilam 5 mg intranasal PRN GTC seizure > 3 min, may repeat once in 10 min in other nostril if seizures continue; do not repeat dose if patient excessively sedated or having trouble breathing    [] Psych following for agitation  [x] ativan 0.5mg PO daily PRN agitation  [x] Increased quetiapine to 150mg BID with additional 50mg q12h PRN agitation on 2/28 (previously: on 2/22 decreased to 100 mg bid)  [] 1:1 observation while requiring COVID isolation as pt not adherent with isolation  [] NO Haldol as per Family, patient had an episode after Haldol in the past of unresponsiveness, head tilted back, b/l hands shaking    [x] Seizure, fall and aspiration precautions   [x] Diet: Regular (Kosher)  [x] DVT ppx Lovenox  [x] PT/OT - Home PT/OT  [] Discharge to group home on HOLD due to COVID positive test 2/28 - per , group home requires 10 days isolation. Return to Group Home Monday 3/13   [] Given concern for seizure, advise patient to not drive (for 1 yr per [facility] guidelines), operate heavy machinery, avoid heights, pools, bathtubs, locked doors.  [] Follow up in epilepsy clinic with Dr. Andrews or Dr. Cardenas in 1 month, elective EMU admission to be scheduled in 4-6 weeks  [x] alendronate stopped as pt no longer on VPA, follow up with primary care to assess need for osteoporosis screening/medication    CORE MEASURES:        AED levels [] Sent [] Pending [x] Resulted     LFTs [x] normal [] elevated      Plan and education provided to [x] patient [x]family at bedside [] awaiting for family     Seizure Semiology  [x] Tonic clonic  [] Clonic  [] Tonic  [] Unresponsive  [] Focal with impaired awareness  [] Focal without impaired awareness    Obtain screening lower extremity venous ultrasound in patients who meet 1 or more of the following criteria as patient is high risk for DVT/PE on admission:   [] History of DVT/PE  [] Hypercoagulable states (Factor V Leiden, Cancer, OCP, etc. )  [] Prolonged immobility (hemiplegia/hemiparesis/post operative or any other extended immobilization)  [] Transferred from outside facility (Rehab or Long term care)

## 2023-03-07 NOTE — PROGRESS NOTE ADULT - SUBJECTIVE AND OBJECTIVE BOX
THE PATIENT WAS SEEN AND EXAMINED BY ME WITH THE HOUSESTAFF DURING MORNING ROUNDS.     HPI:  42 year-old woman with a PMH of HTN, HLD, bipolar d/o, hypothyroidism, seizure disorder (on Keppra and VPA), developmentally delayed, who initially presented to OSH on 2/18 for breakthrough seizures, transferred to the EMU for further evaluation and management. Per chart review and discussion with aide at bedside, patient had 2 seizures at her group home, including an episode of generalized "tonic-clonic" shaking while seated in a recliner, lasting 2 minutes, associated with urinary incontinence. She was taken to OSH where she was reported to be postictal and received IV VPA 500mg x1 and later determined to be stable for discharge back to her group home. While awaiting transport, she had a 3rd witnessed event lasting less than 1 minute, for which she was administered ativan 1mg IVP, and decision made for transport to [facility] for continuous EEG. There was no fall or injury associated with any of these events. Her aide is unsure of when her last seizure prior to these events may have occurred.     ROS: Otherwise negative.     SUBJECTIVE: No events overnight.  No new neurologic complaints.      acetaminophen     Tablet .. 650 milliGRAM(s) Oral every 6 hours PRN  amantadine 100 milliGRAM(s) Oral daily  benzocaine/menthol Lozenge 1 Lozenge Oral four times a day PRN  bismuth subsalicylate Liquid 5 milliLiter(s) Oral once  chlorhexidine 2% Cloths 1 Application(s) Topical <User Schedule>  enoxaparin Injectable 40 milliGRAM(s) SubCutaneous every 24 hours  ferrous    sulfate 325 milliGRAM(s) Oral two times a day  guaiFENesin Oral Liquid (Sugar-Free) 200 milliGRAM(s) Oral every 8 hours PRN  hydrocortisone 1% Cream 1 Application(s) Topical daily  lacosamide 150 milliGRAM(s) Oral two times a day  levothyroxine 50 MICROGram(s) Oral daily  LORazepam     Tablet 0.5 milliGRAM(s) Oral daily PRN  LORazepam   Injectable 1 milliGRAM(s) IV Push once PRN  nystatin Powder 1 Application(s) Topical two times a day  polyethylene glycol 3350 17 Gram(s) Oral daily  QUEtiapine 150 milliGRAM(s) Oral every 12 hours  QUEtiapine 50 milliGRAM(s) Oral two times a day PRN  senna 1 Tablet(s) Oral daily  zonisamide 200 milliGRAM(s) Oral two times a day      Physical Exam:  Constitutional: appears stated age, NAD.  Head: Normocephalic & atraumatic.  Respiratory: Breathing comfortably on room air. Clear lungs.     Neurological:  MS: Eyes open and alert. Seated in chair.   Speech/Language: Speech fluent.   CNs: Face appears symmetric. Hearing grossly intact to conversation.  Motor: Muscle bulk normal.   Gait: Not assessed     LABS:    COVID-19 PCR: Detected (28 Feb 2023 19:58)    IMAGING:     CT Head 2/18/23:  No obvious acute intracranial hemorrhage, large cortical infarct or mass   effect. If clinically indicated, short-term follow-up or MRI may be   obtained for further evaluation.    MRI Brain Epilepsy protocol w/wo contrast 2/25/23:  No acute infarct or hemorrhage.  Nonspecific subcentimeter T2/FLAIR hyperintense white matter foci in the   posterior left temporal lobe.    EEG 3/2/23  EEG Summary:    Normal EEG in the awake, drowsy and asleep states.    Background slowing, generalized, mild  Excess beta    Impression/Clinical Correlate:    This is an abnormal VEEG.  Mild diffuse/multifocal cerebral dysfunction, not specific in etiology. No seizures were recorded. The presence of excessive beta activity never evolves and has no clear clinical correlation, is most often seen in setting of sedative medication use but has also been described in rare cases of intellectual impairment.       3/2/23: EEG  EEG Summary:    Normal EEG in the awake, drowsy and asleep states.  Excess beta    Impression/Clinical Correlate:    No seizures were recorded. The presence of excessive beta activity never evolves and has no clear clinical correlation, is most often seen in setting of sedative medication use but has also been described in rare cases of intellectual impairment.

## 2023-03-08 PROCEDURE — 99232 SBSQ HOSP IP/OBS MODERATE 35: CPT

## 2023-03-08 RX ADMIN — QUETIAPINE FUMARATE 150 MILLIGRAM(S): 200 TABLET, FILM COATED ORAL at 05:06

## 2023-03-08 RX ADMIN — Medication 200 MILLIGRAM(S): at 17:26

## 2023-03-08 RX ADMIN — NYSTATIN CREAM 1 APPLICATION(S): 100000 CREAM TOPICAL at 05:06

## 2023-03-08 RX ADMIN — Medication 200 MILLIGRAM(S): at 05:06

## 2023-03-08 RX ADMIN — SENNA PLUS 1 TABLET(S): 8.6 TABLET ORAL at 12:00

## 2023-03-08 RX ADMIN — CHLORHEXIDINE GLUCONATE 1 APPLICATION(S): 213 SOLUTION TOPICAL at 05:07

## 2023-03-08 RX ADMIN — Medication 50 MICROGRAM(S): at 05:07

## 2023-03-08 RX ADMIN — LACOSAMIDE 150 MILLIGRAM(S): 50 TABLET ORAL at 05:07

## 2023-03-08 RX ADMIN — ENOXAPARIN SODIUM 40 MILLIGRAM(S): 100 INJECTION SUBCUTANEOUS at 05:06

## 2023-03-08 RX ADMIN — Medication 1 APPLICATION(S): at 12:20

## 2023-03-08 RX ADMIN — Medication 325 MILLIGRAM(S): at 17:27

## 2023-03-08 RX ADMIN — QUETIAPINE FUMARATE 150 MILLIGRAM(S): 200 TABLET, FILM COATED ORAL at 17:26

## 2023-03-08 RX ADMIN — Medication 325 MILLIGRAM(S): at 05:07

## 2023-03-08 RX ADMIN — Medication 100 MILLIGRAM(S): at 12:00

## 2023-03-08 RX ADMIN — LACOSAMIDE 150 MILLIGRAM(S): 50 TABLET ORAL at 17:26

## 2023-03-08 RX ADMIN — NYSTATIN CREAM 1 APPLICATION(S): 100000 CREAM TOPICAL at 17:30

## 2023-03-08 NOTE — PROGRESS NOTE ADULT - SUBJECTIVE AND OBJECTIVE BOX
THE PATIENT WAS SEEN AND EXAMINED BY ME WITH THE HOUSESTAFF DURING MORNING ROUNDS.     HPI:  42 year-old woman with a PMH of HTN, HLD, bipolar d/o, hypothyroidism, seizure disorder (on Keppra and VPA), developmentally delayed, who initially presented to OSH on 2/18 for breakthrough seizures, transferred to the EMU for further evaluation and management. Per chart review and discussion with aide at bedside, patient had 2 seizures at her group home, including an episode of generalized "tonic-clonic" shaking while seated in a recliner, lasting 2 minutes, associated with urinary incontinence. She was taken to OSH where she was reported to be postictal and received IV VPA 500mg x1 and later determined to be stable for discharge back to her group home. While awaiting transport, she had a 3rd witnessed event lasting less than 1 minute, for which she was administered ativan 1mg IVP, and decision made for transport to [facility] for continuous EEG. There was no fall or injury associated with any of these events. Her aide is unsure of when her last seizure prior to these events may have occurred.    ROS: Otherwise negative.     SUBJECTIVE: No events overnight.  No new neurologic complaints.      acetaminophen     Tablet .. 650 milliGRAM(s) Oral every 6 hours PRN  amantadine 100 milliGRAM(s) Oral daily  bacitracin   Ointment 1 Application(s) Topical three times a day PRN  benzocaine/menthol Lozenge 1 Lozenge Oral four times a day PRN  bismuth subsalicylate Liquid 5 milliLiter(s) Oral once  chlorhexidine 2% Cloths 1 Application(s) Topical <User Schedule>  enoxaparin Injectable 40 milliGRAM(s) SubCutaneous every 24 hours  ferrous    sulfate 325 milliGRAM(s) Oral two times a day  guaiFENesin Oral Liquid (Sugar-Free) 200 milliGRAM(s) Oral every 8 hours PRN  hydrocortisone 1% Cream 1 Application(s) Topical daily  lacosamide 150 milliGRAM(s) Oral two times a day  levothyroxine 50 MICROGram(s) Oral daily  LORazepam     Tablet 0.5 milliGRAM(s) Oral daily PRN  LORazepam   Injectable 1 milliGRAM(s) IV Push once PRN  nystatin Powder 1 Application(s) Topical two times a day  polyethylene glycol 3350 17 Gram(s) Oral daily  QUEtiapine 150 milliGRAM(s) Oral every 12 hours  QUEtiapine 50 milliGRAM(s) Oral two times a day PRN  senna 1 Tablet(s) Oral daily  zonisamide 200 milliGRAM(s) Oral two times a day    Physical Exam:  Constitutional: appears stated age, NAD.  Head: Normocephalic & atraumatic.  Respiratory: Breathing comfortably on room air. Clear lungs.     Neurological:  MS: Eyes open and alert. Seated in chair.   Speech/Language: Speech fluent.   CNs: Face appears symmetric. Hearing grossly intact to conversation.  Motor: Muscle bulk normal.   Gait: Not assessed     LABS:    COVID-19 PCR: Detected (28 Feb 2023 19:58)    IMAGING:     CT Head 2/18/23:  No obvious acute intracranial hemorrhage, large cortical infarct or mass   effect. If clinically indicated, short-term follow-up or MRI may be   obtained for further evaluation.    MRI Brain Epilepsy protocol w/wo contrast 2/25/23:  No acute infarct or hemorrhage.  Nonspecific subcentimeter T2/FLAIR hyperintense white matter foci in the   posterior left temporal lobe.    EEG 3/2/23  EEG Summary:    Normal EEG in the awake, drowsy and asleep states.    Background slowing, generalized, mild  Excess beta    Impression/Clinical Correlate:    This is an abnormal VEEG.  Mild diffuse/multifocal cerebral dysfunction, not specific in etiology. No seizures were recorded. The presence of excessive beta activity never evolves and has no clear clinical correlation, is most often seen in setting of sedative medication use but has also been described in rare cases of intellectual impairment.       3/2/23: EEG  EEG Summary:    Normal EEG in the awake, drowsy and asleep states.  Excess beta    Impression/Clinical Correlate:    No seizures were recorded. The presence of excessive beta activity never evolves and has no clear clinical correlation, is most often seen in setting of sedative medication use but has also been described in rare cases of intellectual impairment.        THE PATIENT WAS SEEN AND EXAMINED BY ME WITH THE HOUSESTAFF DURING MORNING ROUNDS.     HPI:  42 year-old woman with a PMH of HTN, HLD, bipolar d/o, hypothyroidism, seizure disorder (on Keppra and VPA), developmentally delayed, who initially presented to OSH on 2/18 for breakthrough seizures, transferred to the EMU for further evaluation and management. Per chart review and discussion with aide at bedside, patient had 2 seizures at her group home, including an episode of generalized "tonic-clonic" shaking while seated in a recliner, lasting 2 minutes, associated with urinary incontinence. She was taken to OSH where she was reported to be postictal and received IV VPA 500mg x1 and later determined to be stable for discharge back to her group home. While awaiting transport, she had a 3rd witnessed event lasting less than 1 minute, for which she was administered ativan 1mg IVP, and decision made for transport to [facility] for continuous EEG. There was no fall or injury associated with any of these events. Her aide is unsure of when her last seizure prior to these events may have occurred.    ROS: Otherwise negative.     SUBJECTIVE: No events overnight.  No new neurologic complaints.      acetaminophen     Tablet .. 650 milliGRAM(s) Oral every 6 hours PRN  amantadine 100 milliGRAM(s) Oral daily  bacitracin   Ointment 1 Application(s) Topical three times a day PRN  benzocaine/menthol Lozenge 1 Lozenge Oral four times a day PRN  bismuth subsalicylate Liquid 5 milliLiter(s) Oral once  chlorhexidine 2% Cloths 1 Application(s) Topical <User Schedule>  enoxaparin Injectable 40 milliGRAM(s) SubCutaneous every 24 hours  ferrous    sulfate 325 milliGRAM(s) Oral two times a day  guaiFENesin Oral Liquid (Sugar-Free) 200 milliGRAM(s) Oral every 8 hours PRN  hydrocortisone 1% Cream 1 Application(s) Topical daily  lacosamide 150 milliGRAM(s) Oral two times a day  levothyroxine 50 MICROGram(s) Oral daily  LORazepam     Tablet 0.5 milliGRAM(s) Oral daily PRN  LORazepam   Injectable 1 milliGRAM(s) IV Push once PRN  nystatin Powder 1 Application(s) Topical two times a day  polyethylene glycol 3350 17 Gram(s) Oral daily  QUEtiapine 150 milliGRAM(s) Oral every 12 hours  QUEtiapine 50 milliGRAM(s) Oral two times a day PRN  senna 1 Tablet(s) Oral daily  zonisamide 200 milliGRAM(s) Oral two times a day    Physical Exam:  Constitutional: appears stated age, NAD.  Head: Normocephalic & atraumatic.  Respiratory: Breathing comfortably on room air. Clear lungs.     Neurological:  MS: Eyes open and alert. Seated in chair. Answering questions.  Speech/Language: Speech fluent. Following commands.  CNs: Face appears symmetric. Hearing grossly intact to conversation.  Motor: Muscle bulk normal.   Gait: Not assessed     LABS:    COVID-19 PCR: Detected (28 Feb 2023 19:58)    IMAGING:     CT Head 2/18/23:  No obvious acute intracranial hemorrhage, large cortical infarct or mass   effect. If clinically indicated, short-term follow-up or MRI may be   obtained for further evaluation.    MRI Brain Epilepsy protocol w/wo contrast 2/25/23:  No acute infarct or hemorrhage.  Nonspecific subcentimeter T2/FLAIR hyperintense white matter foci in the   posterior left temporal lobe.    EEG 3/2/23  EEG Summary:    Normal EEG in the awake, drowsy and asleep states.    Background slowing, generalized, mild  Excess beta    Impression/Clinical Correlate:    This is an abnormal VEEG.  Mild diffuse/multifocal cerebral dysfunction, not specific in etiology. No seizures were recorded. The presence of excessive beta activity never evolves and has no clear clinical correlation, is most often seen in setting of sedative medication use but has also been described in rare cases of intellectual impairment.       3/2/23: EEG  EEG Summary:    Normal EEG in the awake, drowsy and asleep states.  Excess beta    Impression/Clinical Correlate:    No seizures were recorded. The presence of excessive beta activity never evolves and has no clear clinical correlation, is most often seen in setting of sedative medication use but has also been described in rare cases of intellectual impairment.

## 2023-03-08 NOTE — PROGRESS NOTE ADULT - ASSESSMENT
41 YO woman with HTN, HLD, bipolar d/o, hypothyroidism, seizure disorder (on Keppra and VPA), developmentally delayed, who presented to OSH on 2/18 with recurrent breakthrough seizures, transferred to the EMU on 2/18 for further monitoring and management. CTH from OSH unrevealing. VPA level from OSH subtherapeutic at 43, but noted to be 52.26 when corrected for albumin of 3.9. CBC/CMP wnl. Per history from family, patient had hx of meningitis age 18mo, possible inception of seizures age 2 years (potentially earlier) with eyes rolling back and becoming less participatory. Per  Gian and family/mother, patient may be having (once clarified) predominantly evening time events described as becoming quiet, less engaged, not talking, periodic eye blinking. No delmy tonic/clonic activity noted. EEG overnight 2/25 shows focal polyspikes in left posterior temporal-central region.    Impression: Breakthrough seizures in the setting of borderline VPA level, possibly due to medication nonadherence vs inadequate dosing.    Plan:   [x] EEG discontinued 3/1  [x] MR Brain w/wo contrast with epilepsy protocol - nonspecific subcentimeter T2/FLAIR hyperintensity w/in L posterior temporal lobe  [x] Lacosamide 150mg bid (increased on 3/1 due to unresponsive episode 2/28; previously started lacosamide 100mg BID on 2/25)  [x] Increased zonisamide to 200mg BID  [x] Restart amantadine, family reports it was helping with abnormal facial movements   [x] d/c'd home LEV, VPA  [x] UA neg for infection on 2/18  [ ] Seizure rescue: Ativan 1mg IVP for seizure activity > 3 mins; on discharge: Nayzilam 5 mg intranasal PRN GTC seizure > 3 min, may repeat once in 10 min in other nostril if seizures continue; do not repeat dose if patient excessively sedated or having trouble breathing    [] Psych following for agitation  [x] ativan 0.5mg PO daily PRN agitation  [x] Increased quetiapine to 150mg BID with additional 50mg q12h PRN agitation on 2/28 (previously: on 2/22 decreased to 100 mg bid)  [] 1:1 observation while requiring COVID isolation as pt not adherent with isolation  [] NO Haldol as per Family, patient had an episode after Haldol in the past of unresponsiveness, head tilted back, b/l hands shaking    [x] Seizure, fall and aspiration precautions   [x] Diet: Regular (Kosher)  [x] DVT ppx Lovenox  [x] PT/OT - Home PT/OT  [] Discharge to group home on HOLD due to COVID positive test 2/28 - per , group home requires 10 days isolation. Return to Group Home Monday 3/13   [] Given concern for seizure, advise patient to not drive (for 1 yr per [facility] guidelines), operate heavy machinery, avoid heights, pools, bathtubs, locked doors.  [] Follow up in epilepsy clinic with Dr. Andrews or Dr. Cardenas in 1 month, elective EMU admission to be scheduled in 4-6 weeks  [x] alendronate stopped as pt no longer on VPA, follow up with primary care to assess need for osteoporosis screening/medication    CORE MEASURES:        AED levels [] Sent [] Pending [x] Resulted     LFTs [x] normal [] elevated      Plan and education provided to [x] patient [x]family at bedside [] awaiting for family     Seizure Semiology  [x] Tonic clonic  [] Clonic  [] Tonic  [] Unresponsive  [] Focal with impaired awareness  [] Focal without impaired awareness    Obtain screening lower extremity venous ultrasound in patients who meet 1 or more of the following criteria as patient is high risk for DVT/PE on admission:   [] History of DVT/PE  [] Hypercoagulable states (Factor V Leiden, Cancer, OCP, etc. )  [] Prolonged immobility (hemiplegia/hemiparesis/post operative or any other extended immobilization)  [] Transferred from outside facility (Rehab or Long term care)

## 2023-03-09 LAB
ANION GAP SERPL CALC-SCNC: 15 MMOL/L — SIGNIFICANT CHANGE UP (ref 5–17)
BUN SERPL-MCNC: 15 MG/DL — SIGNIFICANT CHANGE UP (ref 7–23)
CALCIUM SERPL-MCNC: 10.2 MG/DL — SIGNIFICANT CHANGE UP (ref 8.4–10.5)
CHLORIDE SERPL-SCNC: 102 MMOL/L — SIGNIFICANT CHANGE UP (ref 96–108)
CO2 SERPL-SCNC: 22 MMOL/L — SIGNIFICANT CHANGE UP (ref 22–31)
CREAT SERPL-MCNC: 0.76 MG/DL — SIGNIFICANT CHANGE UP (ref 0.5–1.3)
EGFR: 99 ML/MIN/1.73M2 — SIGNIFICANT CHANGE UP
GLUCOSE SERPL-MCNC: 124 MG/DL — HIGH (ref 70–99)
HCT VFR BLD CALC: 46 % — HIGH (ref 34.5–45)
HGB BLD-MCNC: 15.1 G/DL — SIGNIFICANT CHANGE UP (ref 11.5–15.5)
MCHC RBC-ENTMCNC: 28.5 PG — SIGNIFICANT CHANGE UP (ref 27–34)
MCHC RBC-ENTMCNC: 32.8 GM/DL — SIGNIFICANT CHANGE UP (ref 32–36)
MCV RBC AUTO: 86.8 FL — SIGNIFICANT CHANGE UP (ref 80–100)
NRBC # BLD: 0 /100 WBCS — SIGNIFICANT CHANGE UP (ref 0–0)
PLATELET # BLD AUTO: 310 K/UL — SIGNIFICANT CHANGE UP (ref 150–400)
POTASSIUM SERPL-MCNC: 3.7 MMOL/L — SIGNIFICANT CHANGE UP (ref 3.5–5.3)
POTASSIUM SERPL-SCNC: 3.7 MMOL/L — SIGNIFICANT CHANGE UP (ref 3.5–5.3)
RBC # BLD: 5.3 M/UL — HIGH (ref 3.8–5.2)
RBC # FLD: 14.1 % — SIGNIFICANT CHANGE UP (ref 10.3–14.5)
SODIUM SERPL-SCNC: 139 MMOL/L — SIGNIFICANT CHANGE UP (ref 135–145)
WBC # BLD: 9.2 K/UL — SIGNIFICANT CHANGE UP (ref 3.8–10.5)
WBC # FLD AUTO: 9.2 K/UL — SIGNIFICANT CHANGE UP (ref 3.8–10.5)

## 2023-03-09 PROCEDURE — 99232 SBSQ HOSP IP/OBS MODERATE 35: CPT

## 2023-03-09 RX ORDER — AMANTADINE HCL 100 MG
1 CAPSULE ORAL
Qty: 0 | Refills: 0 | DISCHARGE
Start: 2023-03-09

## 2023-03-09 RX ORDER — LACOSAMIDE 50 MG/1
1 TABLET ORAL
Qty: 0 | Refills: 0 | DISCHARGE
Start: 2023-03-09

## 2023-03-09 RX ADMIN — Medication 650 MILLIGRAM(S): at 21:20

## 2023-03-09 RX ADMIN — Medication 325 MILLIGRAM(S): at 05:28

## 2023-03-09 RX ADMIN — Medication 50 MICROGRAM(S): at 05:28

## 2023-03-09 RX ADMIN — Medication 200 MILLIGRAM(S): at 05:27

## 2023-03-09 RX ADMIN — Medication 0.5 MILLIGRAM(S): at 20:16

## 2023-03-09 RX ADMIN — ENOXAPARIN SODIUM 40 MILLIGRAM(S): 100 INJECTION SUBCUTANEOUS at 05:28

## 2023-03-09 RX ADMIN — Medication 100 MILLIGRAM(S): at 12:30

## 2023-03-09 RX ADMIN — POLYETHYLENE GLYCOL 3350 17 GRAM(S): 17 POWDER, FOR SOLUTION ORAL at 17:56

## 2023-03-09 RX ADMIN — Medication 650 MILLIGRAM(S): at 20:19

## 2023-03-09 RX ADMIN — Medication 200 MILLIGRAM(S): at 17:56

## 2023-03-09 RX ADMIN — QUETIAPINE FUMARATE 150 MILLIGRAM(S): 200 TABLET, FILM COATED ORAL at 05:27

## 2023-03-09 RX ADMIN — LACOSAMIDE 150 MILLIGRAM(S): 50 TABLET ORAL at 05:28

## 2023-03-09 RX ADMIN — Medication 325 MILLIGRAM(S): at 17:55

## 2023-03-09 RX ADMIN — NYSTATIN CREAM 1 APPLICATION(S): 100000 CREAM TOPICAL at 05:28

## 2023-03-09 RX ADMIN — Medication 1 APPLICATION(S): at 12:45

## 2023-03-09 RX ADMIN — NYSTATIN CREAM 1 APPLICATION(S): 100000 CREAM TOPICAL at 17:56

## 2023-03-09 RX ADMIN — CHLORHEXIDINE GLUCONATE 1 APPLICATION(S): 213 SOLUTION TOPICAL at 05:30

## 2023-03-09 RX ADMIN — QUETIAPINE FUMARATE 150 MILLIGRAM(S): 200 TABLET, FILM COATED ORAL at 17:55

## 2023-03-09 RX ADMIN — LACOSAMIDE 150 MILLIGRAM(S): 50 TABLET ORAL at 17:55

## 2023-03-09 RX ADMIN — SENNA PLUS 1 TABLET(S): 8.6 TABLET ORAL at 12:30

## 2023-03-09 NOTE — PROGRESS NOTE ADULT - ASSESSMENT
41 YO woman with HTN, HLD, bipolar d/o, hypothyroidism, seizure disorder (on Keppra and VPA), developmentally delayed, who presented to OSH on 2/18 with recurrent breakthrough seizures, transferred to the EMU on 2/18 for further monitoring and management. CTH from OSH unrevealing. VPA level from OSH subtherapeutic at 43, but noted to be 52.26 when corrected for albumin of 3.9. CBC/CMP wnl. Per history from family, patient had hx of meningitis age 18mo, possible inception of seizures age 2 years (potentially earlier) with eyes rolling back and becoming less participatory. Per  Gian and family/mother, patient may be having (once clarified) predominantly evening time events described as becoming quiet, less engaged, not talking, periodic eye blinking. No delmy tonic/clonic activity noted. EEG overnight 2/25 shows focal polyspikes in left posterior temporal-central region.    Impression: Breakthrough seizures in the setting of borderline VPA level, possibly due to medication nonadherence vs inadequate dosing.    Plan:   [x] EEG discontinued 3/1  [x] MR Brain w/wo contrast with epilepsy protocol - nonspecific subcentimeter T2/FLAIR hyperintensity w/in L posterior temporal lobe  [x] Lacosamide 150mg bid (increased on 3/1 due to unresponsive episode 2/28; previously started lacosamide 100mg BID on 2/25)  [x] Increased zonisamide to 200mg BID  [x] Restart amantadine, family reports it was helping with abnormal facial movements   [x] d/c'd home LEV, VPA  [x] UA neg for infection on 2/18  [ ] Seizure rescue: Ativan 1mg IVP for seizure activity > 3 mins; on discharge: Nayzilam 5 mg intranasal PRN GTC seizure > 3 min, may repeat once in 10 min in other nostril if seizures continue; do not repeat dose if patient excessively sedated or having trouble breathing    [] Psych following for agitation  [x] ativan 0.5mg PO daily PRN agitation  [x] Increased quetiapine to 150mg BID with additional 50mg q12h PRN agitation on 2/28 (previously: on 2/22 decreased to 100 mg bid)  [] 1:1 observation while requiring COVID isolation as pt not adherent with isolation  [] NO Haldol as per Family, patient had an episode after Haldol in the past of unresponsiveness, head tilted back, b/l hands shaking    [x] Seizure, fall and aspiration precautions   [x] Diet: Regular (Kosher)  [x] DVT ppx Lovenox  [x] PT/OT - Home PT/OT  [] Discharge to group home on HOLD due to COVID positive test 2/28 - per , group home requires 10 days isolation. Return to Group Home Monday 3/13   [] Given concern for seizure, advise patient to not drive (for 1 yr per [facility] guidelines), operate heavy machinery, avoid heights, pools, bathtubs, locked doors.  [] Follow up in epilepsy clinic with Dr. Andrews or Dr. Cardenas in 1 month, elective EMU admission to be scheduled in 4-6 weeks  [x] alendronate stopped as pt no longer on VPA, follow up with primary care to assess need for osteoporosis screening/medication    CORE MEASURES:        AED levels [] Sent [] Pending [x] Resulted     LFTs [x] normal [] elevated      Plan and education provided to [x] patient [x]family at bedside [] awaiting for family     Seizure Semiology  [x] Tonic clonic  [] Clonic  [] Tonic  [] Unresponsive  [] Focal with impaired awareness  [] Focal without impaired awareness    Obtain screening lower extremity venous ultrasound in patients who meet 1 or more of the following criteria as patient is high risk for DVT/PE on admission:   [] History of DVT/PE  [] Hypercoagulable states (Factor V Leiden, Cancer, OCP, etc. )  [] Prolonged immobility (hemiplegia/hemiparesis/post operative or any other extended immobilization)  [] Transferred from outside facility (Rehab or Long term care)

## 2023-03-09 NOTE — PROGRESS NOTE ADULT - SUBJECTIVE AND OBJECTIVE BOX
THE PATIENT WAS SEEN AND EXAMINED BY ME WITH THE HOUSESTAFF DURING MORNING ROUNDS.     HPI:  42 year-old woman with a PMH of HTN, HLD, bipolar d/o, hypothyroidism, seizure disorder (on Keppra and VPA), developmentally delayed, who initially presented to OSH on 2/18 for breakthrough seizures, transferred to the EMU for further evaluation and management. Per chart review and discussion with aide at bedside, patient had 2 seizures at her group home, including an episode of generalized "tonic-clonic" shaking while seated in a recliner, lasting 2 minutes, associated with urinary incontinence. She was taken to OSH where she was reported to be postictal and received IV VPA 500mg x1 and later determined to be stable for discharge back to her group home. While awaiting transport, she had a 3rd witnessed event lasting less than 1 minute, for which she was administered ativan 1mg IVP, and decision made for transport to [facility] for continuous EEG. There was no fall or injury associated with any of these events. Her aide is unsure of when her last seizure prior to these events may have occurred.    ROS: Otherwise negative.     SUBJECTIVE: No events overnight.  No new neurologic complaints.    acetaminophen     Tablet .. 650 milliGRAM(s) Oral every 6 hours PRN  amantadine 100 milliGRAM(s) Oral daily  bacitracin   Ointment 1 Application(s) Topical three times a day PRN  benzocaine/menthol Lozenge 1 Lozenge Oral four times a day PRN  bismuth subsalicylate Liquid 5 milliLiter(s) Oral once  chlorhexidine 2% Cloths 1 Application(s) Topical <User Schedule>  enoxaparin Injectable 40 milliGRAM(s) SubCutaneous every 24 hours  ferrous    sulfate 325 milliGRAM(s) Oral two times a day  guaiFENesin Oral Liquid (Sugar-Free) 200 milliGRAM(s) Oral every 8 hours PRN  hydrocortisone 1% Cream 1 Application(s) Topical daily  lacosamide 150 milliGRAM(s) Oral two times a day  levothyroxine 50 MICROGram(s) Oral daily  LORazepam   Injectable 1 milliGRAM(s) IV Push once PRN  nystatin Powder 1 Application(s) Topical two times a day  polyethylene glycol 3350 17 Gram(s) Oral daily  QUEtiapine 150 milliGRAM(s) Oral every 12 hours  QUEtiapine 50 milliGRAM(s) Oral two times a day PRN  senna 1 Tablet(s) Oral daily  zonisamide 200 milliGRAM(s) Oral two times a day      Physical Exam:  Constitutional: appears stated age, NAD.  Head: Normocephalic & atraumatic.  Respiratory: Breathing comfortably on room air. Clear lungs.     Neurological:  MS: Eyes open and alert. Seated in chair. Answering questions.  Speech/Language: Speech fluent. Following commands.  CNs: Face appears symmetric. Hearing grossly intact to conversation.  Motor: Muscle bulk normal.   Gait: Not assessed       LABS:    COVID-19 PCR: Detected (28 Feb 2023 19:58)      IMAGING:     CT Head 2/18/23:  No obvious acute intracranial hemorrhage, large cortical infarct or mass   effect. If clinically indicated, short-term follow-up or MRI may be   obtained for further evaluation.    MRI Brain Epilepsy protocol w/wo contrast 2/25/23:  No acute infarct or hemorrhage.  Nonspecific subcentimeter T2/FLAIR hyperintense white matter foci in the   posterior left temporal lobe.    EEG 3/2/23  EEG Summary:    Normal EEG in the awake, drowsy and asleep states.    Background slowing, generalized, mild  Excess beta    Impression/Clinical Correlate:    This is an abnormal VEEG.  Mild diffuse/multifocal cerebral dysfunction, not specific in etiology. No seizures were recorded. The presence of excessive beta activity never evolves and has no clear clinical correlation, is most often seen in setting of sedative medication use but has also been described in rare cases of intellectual impairment.       3/2/23: EEG  EEG Summary:    Normal EEG in the awake, drowsy and asleep states.  Excess beta    Impression/Clinical Correlate:    No seizures were recorded. The presence of excessive beta activity never evolves and has no clear clinical correlation, is most often seen in setting of sedative medication use but has also been described in rare cases of intellectual impairment.

## 2023-03-10 PROCEDURE — 99232 SBSQ HOSP IP/OBS MODERATE 35: CPT

## 2023-03-10 RX ORDER — QUETIAPINE FUMARATE 200 MG/1
1 TABLET, FILM COATED ORAL
Qty: 60 | Refills: 3
Start: 2023-03-10 | End: 2023-07-07

## 2023-03-10 RX ORDER — OLANZAPINE 15 MG/1
2.5 TABLET, FILM COATED ORAL ONCE
Refills: 0 | Status: COMPLETED | OUTPATIENT
Start: 2023-03-10 | End: 2023-03-10

## 2023-03-10 RX ORDER — LACOSAMIDE 50 MG/1
1 TABLET ORAL
Qty: 60 | Refills: 0
Start: 2023-03-10 | End: 2023-04-08

## 2023-03-10 RX ORDER — QUETIAPINE FUMARATE 200 MG/1
1 TABLET, FILM COATED ORAL
Qty: 60 | Refills: 0
Start: 2023-03-10 | End: 2023-04-08

## 2023-03-10 RX ORDER — ZONISAMIDE 100 MG
2 CAPSULE ORAL
Qty: 120 | Refills: 3
Start: 2023-03-10 | End: 2023-07-07

## 2023-03-10 RX ORDER — QUETIAPINE FUMARATE 200 MG/1
50 TABLET, FILM COATED ORAL ONCE
Refills: 0 | Status: COMPLETED | OUTPATIENT
Start: 2023-03-10 | End: 2023-03-10

## 2023-03-10 RX ADMIN — LACOSAMIDE 150 MILLIGRAM(S): 50 TABLET ORAL at 17:08

## 2023-03-10 RX ADMIN — Medication 0.5 MILLIGRAM(S): at 08:22

## 2023-03-10 RX ADMIN — QUETIAPINE FUMARATE 50 MILLIGRAM(S): 200 TABLET, FILM COATED ORAL at 09:29

## 2023-03-10 RX ADMIN — Medication 50 MICROGRAM(S): at 05:37

## 2023-03-10 RX ADMIN — QUETIAPINE FUMARATE 150 MILLIGRAM(S): 200 TABLET, FILM COATED ORAL at 17:09

## 2023-03-10 RX ADMIN — NYSTATIN CREAM 1 APPLICATION(S): 100000 CREAM TOPICAL at 06:50

## 2023-03-10 RX ADMIN — QUETIAPINE FUMARATE 50 MILLIGRAM(S): 200 TABLET, FILM COATED ORAL at 08:00

## 2023-03-10 RX ADMIN — QUETIAPINE FUMARATE 50 MILLIGRAM(S): 200 TABLET, FILM COATED ORAL at 14:31

## 2023-03-10 RX ADMIN — Medication 1 MILLIGRAM(S): at 14:46

## 2023-03-10 RX ADMIN — Medication 200 MILLIGRAM(S): at 05:38

## 2023-03-10 RX ADMIN — Medication 0.5 MILLIGRAM(S): at 06:00

## 2023-03-10 RX ADMIN — Medication 325 MILLIGRAM(S): at 05:37

## 2023-03-10 RX ADMIN — NYSTATIN CREAM 1 APPLICATION(S): 100000 CREAM TOPICAL at 17:12

## 2023-03-10 RX ADMIN — ENOXAPARIN SODIUM 40 MILLIGRAM(S): 100 INJECTION SUBCUTANEOUS at 05:37

## 2023-03-10 RX ADMIN — Medication 325 MILLIGRAM(S): at 17:11

## 2023-03-10 RX ADMIN — Medication 1 MILLIGRAM(S): at 09:45

## 2023-03-10 RX ADMIN — Medication 100 MILLIGRAM(S): at 12:08

## 2023-03-10 RX ADMIN — OLANZAPINE 2.5 MILLIGRAM(S): 15 TABLET, FILM COATED ORAL at 18:06

## 2023-03-10 RX ADMIN — LACOSAMIDE 150 MILLIGRAM(S): 50 TABLET ORAL at 05:37

## 2023-03-10 RX ADMIN — Medication 1 MILLIGRAM(S): at 15:00

## 2023-03-10 RX ADMIN — QUETIAPINE FUMARATE 150 MILLIGRAM(S): 200 TABLET, FILM COATED ORAL at 05:37

## 2023-03-10 RX ADMIN — CHLORHEXIDINE GLUCONATE 1 APPLICATION(S): 213 SOLUTION TOPICAL at 06:50

## 2023-03-10 RX ADMIN — Medication 200 MILLIGRAM(S): at 17:10

## 2023-03-10 NOTE — PROGRESS NOTE ADULT - SUBJECTIVE AND OBJECTIVE BOX
THE PATIENT WAS SEEN AND EXAMINED BY ME WITH THE HOUSESTAFF DURING MORNING ROUNDS.     HPI:  42 year-old woman with a PMH of HTN, HLD, bipolar d/o, hypothyroidism, seizure disorder (on Keppra and VPA), developmentally delayed, who initially presented to OSH on 2/18 for breakthrough seizures, transferred to the EMU for further evaluation and management. Per chart review and discussion with aide at bedside, patient had 2 seizures at her group home, including an episode of generalized "tonic-clonic" shaking while seated in a recliner, lasting 2 minutes, associated with urinary incontinence. She was taken to OSH where she was reported to be postictal and received IV VPA 500mg x1 and later determined to be stable for discharge back to her group home. While awaiting transport, she had a 3rd witnessed event lasting less than 1 minute, for which she was administered ativan 1mg IVP, and decision made for transport to [facility] for continuous EEG. There was no fall or injury associated with any of these events. Her aide is unsure of when her last seizure prior to these events may have occurred.    ROS: Otherwise negative.       SUBJECTIVE: Pt received 2 Ativan doses due to agitation (once last night and once today); otherwise no new neurologic complaints noted.        acetaminophen     Tablet .. 650 milliGRAM(s) Oral every 6 hours PRN  amantadine 100 milliGRAM(s) Oral daily  bacitracin   Ointment 1 Application(s) Topical three times a day PRN  benzocaine/menthol Lozenge 1 Lozenge Oral four times a day PRN  bismuth subsalicylate Liquid 5 milliLiter(s) Oral once  chlorhexidine 2% Cloths 1 Application(s) Topical <User Schedule>  enoxaparin Injectable 40 milliGRAM(s) SubCutaneous every 24 hours  ferrous    sulfate 325 milliGRAM(s) Oral two times a day  guaiFENesin Oral Liquid (Sugar-Free) 200 milliGRAM(s) Oral every 8 hours PRN  hydrocortisone 1% Cream 1 Application(s) Topical daily  lacosamide 150 milliGRAM(s) Oral two times a day  levothyroxine 50 MICROGram(s) Oral daily  LORazepam     Tablet 0.5 milliGRAM(s) Oral daily PRN  LORazepam   Injectable 1 milliGRAM(s) IV Push once PRN  nystatin Powder 1 Application(s) Topical two times a day  polyethylene glycol 3350 17 Gram(s) Oral daily  QUEtiapine 150 milliGRAM(s) Oral every 12 hours  QUEtiapine 50 milliGRAM(s) Oral two times a day PRN  senna 1 Tablet(s) Oral daily  zonisamide 200 milliGRAM(s) Oral two times a day      Physical Exam:  Constitutional: appears stated age, NAD.  Head: Normocephalic & atraumatic.  Respiratory: Breathing comfortably on room air. Clear lungs.     Neurological:  MS: Eyes open and alert. Seated in chair. Answering questions.  Speech/Language: Speech fluent. Following commands.  CNs: Face appears symmetric. Hearing grossly intact to conversation.  Motor: Muscle bulk normal.   Gait: Not assessed       LABS:                        15.1   9.20  )-----------( 310      ( 09 Mar 2023 13:56 )             46.0    03-09    139  |  102  |  15  ----------------------------<  124<H>  3.7   |  22  |  0.76    Ca    10.2      09 Mar 2023 13:56         COVID-19 PCR: Detected (28 Feb 2023 19:58)      IMAGING:     CT Head 2/18/23:  No obvious acute intracranial hemorrhage, large cortical infarct or mass   effect. If clinically indicated, short-term follow-up or MRI may be   obtained for further evaluation.    MRI Brain Epilepsy protocol w/wo contrast 2/25/23:  No acute infarct or hemorrhage.  Nonspecific subcentimeter T2/FLAIR hyperintense white matter foci in the   posterior left temporal lobe.    EEG 3/2/23  EEG Summary:    Normal EEG in the awake, drowsy and asleep states.    Background slowing, generalized, mild  Excess beta    Impression/Clinical Correlate:    This is an abnormal VEEG.  Mild diffuse/multifocal cerebral dysfunction, not specific in etiology. No seizures were recorded. The presence of excessive beta activity never evolves and has no clear clinical correlation, is most often seen in setting of sedative medication use but has also been described in rare cases of intellectual impairment.       3/2/23: EEG  EEG Summary:    Normal EEG in the awake, drowsy and asleep states.  Excess beta    Impression/Clinical Correlate:    No seizures were recorded. The presence of excessive beta activity never evolves and has no clear clinical correlation, is most often seen in setting of sedative medication use but has also been described in rare cases of intellectual impairment.  THE PATIENT WAS SEEN AND EXAMINED BY ME WITH THE HOUSESTAFF DURING MORNING ROUNDS.     HPI:  42 year-old woman with a PMH of HTN, HLD, bipolar d/o, hypothyroidism, seizure disorder (on Keppra and VPA), developmentally delayed, who initially presented to OSH on 2/18 for breakthrough seizures, transferred to the EMU for further evaluation and management. Per chart review and discussion with aide at bedside, patient had 2 seizures at her group home, including an episode of generalized "tonic-clonic" shaking while seated in a recliner, lasting 2 minutes, associated with urinary incontinence. She was taken to OSH where she was reported to be postictal and received IV VPA 500mg x1 and later determined to be stable for discharge back to her group home. While awaiting transport, she had a 3rd witnessed event lasting less than 1 minute, for which she was administered ativan 1mg IVP, and decision made for transport to [facility] for continuous EEG. There was no fall or injury associated with any of these events. Her aide is unsure of when her last seizure prior to these events may have occurred.    ROS: Otherwise negative.       SUBJECTIVE: Pt received an additional PO Ativan + PO Seroquel dose earlier today AM due to agitation; otherwise no new neurologic complaints noted.        acetaminophen     Tablet .. 650 milliGRAM(s) Oral every 6 hours PRN  amantadine 100 milliGRAM(s) Oral daily  bacitracin   Ointment 1 Application(s) Topical three times a day PRN  benzocaine/menthol Lozenge 1 Lozenge Oral four times a day PRN  bismuth subsalicylate Liquid 5 milliLiter(s) Oral once  chlorhexidine 2% Cloths 1 Application(s) Topical <User Schedule>  enoxaparin Injectable 40 milliGRAM(s) SubCutaneous every 24 hours  ferrous    sulfate 325 milliGRAM(s) Oral two times a day  guaiFENesin Oral Liquid (Sugar-Free) 200 milliGRAM(s) Oral every 8 hours PRN  hydrocortisone 1% Cream 1 Application(s) Topical daily  lacosamide 150 milliGRAM(s) Oral two times a day  levothyroxine 50 MICROGram(s) Oral daily  LORazepam     Tablet 0.5 milliGRAM(s) Oral daily PRN  LORazepam   Injectable 1 milliGRAM(s) IV Push once PRN  nystatin Powder 1 Application(s) Topical two times a day  polyethylene glycol 3350 17 Gram(s) Oral daily  QUEtiapine 150 milliGRAM(s) Oral every 12 hours  QUEtiapine 50 milliGRAM(s) Oral two times a day PRN  senna 1 Tablet(s) Oral daily  zonisamide 200 milliGRAM(s) Oral two times a day      Physical Exam:  Constitutional: appears stated age, NAD.  Head: Normocephalic & atraumatic.  Respiratory: Breathing comfortably on room air. Clear lungs.     Neurological:  MS: Eyes open and alert. Seated in chair. Answering questions.  Speech/Language: Speech fluent. Following commands.  CNs: Face appears symmetric. Hearing grossly intact to conversation.  Motor: Muscle bulk normal.   Gait: Not assessed       LABS:                        15.1   9.20  )-----------( 310      ( 09 Mar 2023 13:56 )             46.0    03-09    139  |  102  |  15  ----------------------------<  124<H>  3.7   |  22  |  0.76    Ca    10.2      09 Mar 2023 13:56         COVID-19 PCR: Detected (28 Feb 2023 19:58)      IMAGING:     CT Head 2/18/23:  No obvious acute intracranial hemorrhage, large cortical infarct or mass   effect. If clinically indicated, short-term follow-up or MRI may be   obtained for further evaluation.    MRI Brain Epilepsy protocol w/wo contrast 2/25/23:  No acute infarct or hemorrhage.  Nonspecific subcentimeter T2/FLAIR hyperintense white matter foci in the   posterior left temporal lobe.    EEG 3/2/23  EEG Summary:    Normal EEG in the awake, drowsy and asleep states.    Background slowing, generalized, mild  Excess beta    Impression/Clinical Correlate:    This is an abnormal VEEG.  Mild diffuse/multifocal cerebral dysfunction, not specific in etiology. No seizures were recorded. The presence of excessive beta activity never evolves and has no clear clinical correlation, is most often seen in setting of sedative medication use but has also been described in rare cases of intellectual impairment.       3/2/23: EEG  EEG Summary:    Normal EEG in the awake, drowsy and asleep states.  Excess beta    Impression/Clinical Correlate:    No seizures were recorded. The presence of excessive beta activity never evolves and has no clear clinical correlation, is most often seen in setting of sedative medication use but has also been described in rare cases of intellectual impairment.  THE PATIENT WAS SEEN AND EXAMINED BY ME WITH THE HOUSESTAFF DURING MORNING ROUNDS.     HPI:  42 year-old woman with a PMH of HTN, HLD, bipolar d/o, hypothyroidism, seizure disorder (on Keppra and VPA), developmentally delayed, who initially presented to OSH on 2/18 for breakthrough seizures, transferred to the EMU for further evaluation and management. Per chart review and discussion with aide at bedside, patient had 2 seizures at her group home, including an episode of generalized "tonic-clonic" shaking while seated in a recliner, lasting 2 minutes, associated with urinary incontinence. She was taken to OSH where she was reported to be postictal and received IV VPA 500mg x1 and later determined to be stable for discharge back to her group home. While awaiting transport, she had a 3rd witnessed event lasting less than 1 minute, for which she was administered ativan 1mg IVP, and decision made for transport to [facility] for continuous EEG. There was no fall or injury associated with any of these events. Her aide is unsure of when her last seizure prior to these events may have occurred.    ROS: Otherwise negative.     SUBJECTIVE: Pt received an additional PO Ativan + PO Seroquel dose earlier today AM due to agitation; otherwise no new neurologic complaints noted.      acetaminophen     Tablet .. 650 milliGRAM(s) Oral every 6 hours PRN  amantadine 100 milliGRAM(s) Oral daily  bacitracin   Ointment 1 Application(s) Topical three times a day PRN  benzocaine/menthol Lozenge 1 Lozenge Oral four times a day PRN  bismuth subsalicylate Liquid 5 milliLiter(s) Oral once  chlorhexidine 2% Cloths 1 Application(s) Topical <User Schedule>  enoxaparin Injectable 40 milliGRAM(s) SubCutaneous every 24 hours  ferrous    sulfate 325 milliGRAM(s) Oral two times a day  guaiFENesin Oral Liquid (Sugar-Free) 200 milliGRAM(s) Oral every 8 hours PRN  hydrocortisone 1% Cream 1 Application(s) Topical daily  lacosamide 150 milliGRAM(s) Oral two times a day  levothyroxine 50 MICROGram(s) Oral daily  LORazepam     Tablet 0.5 milliGRAM(s) Oral daily PRN  LORazepam   Injectable 1 milliGRAM(s) IV Push once PRN  nystatin Powder 1 Application(s) Topical two times a day  polyethylene glycol 3350 17 Gram(s) Oral daily  QUEtiapine 150 milliGRAM(s) Oral every 12 hours  QUEtiapine 50 milliGRAM(s) Oral two times a day PRN  senna 1 Tablet(s) Oral daily  zonisamide 200 milliGRAM(s) Oral two times a day    Physical Exam:  Constitutional: appears stated age, NAD.  Head: Normocephalic & atraumatic.  Respiratory: Breathing comfortably on room air. Clear lungs.     Neurological:   MS: Alert and awake. Not in distress. Speaking full sentences.   Speech/Language: Speech fluent. Following commands.  CNs: Face appears symmetric. Hearing grossly intact to conversation.  Motor: Muscle bulk normal.   Gait: Ambulating with walker       LABS:                        15.1   9.20  )-----------( 310      ( 09 Mar 2023 13:56 )             46.0    03-09    139  |  102  |  15  ----------------------------<  124<H>  3.7   |  22  |  0.76    Ca    10.2      09 Mar 2023 13:56    COVID-19 PCR: Detected (28 Feb 2023 19:58)      IMAGING:     CT Head 2/18/23:  No obvious acute intracranial hemorrhage, large cortical infarct or mass   effect. If clinically indicated, short-term follow-up or MRI may be   obtained for further evaluation.    MRI Brain Epilepsy protocol w/wo contrast 2/25/23:  No acute infarct or hemorrhage.  Nonspecific subcentimeter T2/FLAIR hyperintense white matter foci in the   posterior left temporal lobe.    EEG 3/2/23  EEG Summary:    Normal EEG in the awake, drowsy and asleep states.    Background slowing, generalized, mild  Excess beta    Impression/Clinical Correlate:    This is an abnormal VEEG.  Mild diffuse/multifocal cerebral dysfunction, not specific in etiology. No seizures were recorded. The presence of excessive beta activity never evolves and has no clear clinical correlation, is most often seen in setting of sedative medication use but has also been described in rare cases of intellectual impairment.       3/2/23: EEG  EEG Summary:    Normal EEG in the awake, drowsy and asleep states.  Excess beta    Impression/Clinical Correlate:    No seizures were recorded. The presence of excessive beta activity never evolves and has no clear clinical correlation, is most often seen in setting of sedative medication use but has also been described in rare cases of intellectual impairment.

## 2023-03-10 NOTE — PROGRESS NOTE ADULT - NS ATTEND AMEND GEN_ALL_CORE FT
Agree with plan as stated above.     Angel Bhakta MD  Neurology Attending Physician
Agree with plan as stated above.     Angel Bhakta MD  Neurology Attending Physician
Case discussed with PA and team, Agree with plan as stated above.     Angel Bhakta MD  Neurology Attending Physician
Agree with plan as stated above.     Angel Bhakta MD  Neurology Attending Physician
Pt seen today sitting in chair, awake (sometimes talking with her eyes closed but appropriately responding), calm, no complaints.    Afebrile, O2 sat normal on room air except one episode O2 sat 91% on room air, resolved - continue to monitor    Assessment:  Epilepsy  Agitation  COVID-19 infection, asymptomatic    Plan:  As above    Deidra Sesay MD

## 2023-03-10 NOTE — PROGRESS NOTE ADULT - ASSESSMENT
41 YO woman with HTN, HLD, bipolar d/o, hypothyroidism, seizure disorder (on Keppra and VPA), developmentally delayed, who presented to OSH on 2/18 with recurrent breakthrough seizures, transferred to the EMU on 2/18 for further monitoring and management. CTH from OSH unrevealing. VPA level from OSH subtherapeutic at 43, but noted to be 52.26 when corrected for albumin of 3.9. CBC/CMP wnl. Per history from family, patient had hx of meningitis age 18mo, possible inception of seizures age 2 years (potentially earlier) with eyes rolling back and becoming less participatory. Per  Gian and family/mother, patient may be having (once clarified) predominantly evening time events described as becoming quiet, less engaged, not talking, periodic eye blinking. No delmy tonic/clonic activity noted. EEG overnight 2/25 shows focal polyspikes in left posterior temporal-central region.    Impression: Breakthrough seizures in the setting of borderline VPA level, possibly due to medication nonadherence vs inadequate dosing.    Plan:   [x] EEG discontinued 3/1  [x] MR Brain w/wo contrast with epilepsy protocol - nonspecific subcentimeter T2/FLAIR hyperintensity w/in L posterior temporal lobe  [x] Lacosamide 150mg bid (increased on 3/1 due to unresponsive episode 2/28; previously started lacosamide 100mg BID on 2/25)  [x] Increased zonisamide to 200mg BID  [x] Restart amantadine, family reports it was helping with abnormal facial movements   [x] d/c'd home LEV, VPA  [x] UA neg for infection on 2/18  [ ] Seizure rescue: Ativan 1mg IVP for seizure activity > 3 mins; on discharge: Nayzilam 5 mg intranasal PRN GTC seizure > 3 min, may repeat once in 10 min in other nostril if seizures continue; do not repeat dose if patient excessively sedated or having trouble breathing    [] Psych following for agitation  [x] ativan 0.5mg PO daily PRN agitation  [x] Increased quetiapine to 150mg BID with additional 50mg q12h PRN agitation on 2/28 (previously: on 2/22 decreased to 100 mg bid)  [] 1:1 observation while requiring COVID isolation as pt not adherent with isolation  [] NO Haldol as per Family, patient had an episode after Haldol in the past of unresponsiveness, head tilted back, b/l hands shaking    [x] Seizure, fall and aspiration precautions   [x] Diet: Regular (Kosher)  [x] DVT ppx Lovenox  [x] PT/OT - Home PT/OT  [] Discharge to group home on HOLD due to COVID positive test 2/28 - per , group home requires 10 days isolation. Return to Group Home Monday 3/13   [] Given concern for seizure, advise patient to not drive (for 1 yr per [facility] guidelines), operate heavy machinery, avoid heights, pools, bathtubs, locked doors.  [] Follow up in epilepsy clinic with Dr. Andrews or Dr. Cardenas in 1 month, elective EMU admission to be scheduled in 4-6 weeks  [x] alendronate stopped as pt no longer on VPA, follow up with primary care to assess need for osteoporosis screening/medication    CORE MEASURES:        AED levels [] Sent [] Pending [x] Resulted     LFTs [x] normal [] elevated      Plan and education provided to [x] patient [x]family at bedside [] awaiting for family     Seizure Semiology  [x] Tonic clonic  [] Clonic  [] Tonic  [] Unresponsive  [] Focal with impaired awareness  [] Focal without impaired awareness    Obtain screening lower extremity venous ultrasound in patients who meet 1 or more of the following criteria as patient is high risk for DVT/PE on admission:   [] History of DVT/PE  [] Hypercoagulable states (Factor V Leiden, Cancer, OCP, etc. )  [] Prolonged immobility (hemiplegia/hemiparesis/post operative or any other extended immobilization)  [] Transferred from outside facility (Rehab or Long term care)

## 2023-03-11 PROCEDURE — 99232 SBSQ HOSP IP/OBS MODERATE 35: CPT | Mod: GC

## 2023-03-11 RX ORDER — QUETIAPINE FUMARATE 200 MG/1
200 TABLET, FILM COATED ORAL
Refills: 0 | Status: DISCONTINUED | OUTPATIENT
Start: 2023-03-11 | End: 2023-03-13

## 2023-03-11 RX ADMIN — NYSTATIN CREAM 1 APPLICATION(S): 100000 CREAM TOPICAL at 05:59

## 2023-03-11 RX ADMIN — Medication 5 MILLILITER(S): at 17:38

## 2023-03-11 RX ADMIN — QUETIAPINE FUMARATE 200 MILLIGRAM(S): 200 TABLET, FILM COATED ORAL at 18:00

## 2023-03-11 RX ADMIN — LACOSAMIDE 150 MILLIGRAM(S): 50 TABLET ORAL at 05:55

## 2023-03-11 RX ADMIN — LACOSAMIDE 150 MILLIGRAM(S): 50 TABLET ORAL at 17:29

## 2023-03-11 RX ADMIN — Medication 50 MICROGRAM(S): at 05:55

## 2023-03-11 RX ADMIN — Medication 200 MILLIGRAM(S): at 17:28

## 2023-03-11 RX ADMIN — CHLORHEXIDINE GLUCONATE 1 APPLICATION(S): 213 SOLUTION TOPICAL at 05:58

## 2023-03-11 RX ADMIN — SENNA PLUS 1 TABLET(S): 8.6 TABLET ORAL at 12:57

## 2023-03-11 RX ADMIN — Medication 325 MILLIGRAM(S): at 05:53

## 2023-03-11 RX ADMIN — ENOXAPARIN SODIUM 40 MILLIGRAM(S): 100 INJECTION SUBCUTANEOUS at 05:53

## 2023-03-11 RX ADMIN — QUETIAPINE FUMARATE 150 MILLIGRAM(S): 200 TABLET, FILM COATED ORAL at 05:54

## 2023-03-11 RX ADMIN — Medication 200 MILLIGRAM(S): at 05:54

## 2023-03-11 RX ADMIN — Medication 100 MILLIGRAM(S): at 12:58

## 2023-03-11 NOTE — PROGRESS NOTE ADULT - ASSESSMENT
41 YO woman with HTN, HLD, bipolar d/o, hypothyroidism, seizure disorder (on Keppra and VPA), developmentally delayed, who presented to OSH on 2/18 with recurrent breakthrough seizures, transferred to the EMU on 2/18 for further monitoring and management. CTH from OSH unrevealing. VPA level from OSH subtherapeutic at 43, but noted to be 52.26 when corrected for albumin of 3.9. CBC/CMP wnl. Per history from family, patient had hx of meningitis age 18mo, possible inception of seizures age 2 years (potentially earlier) with eyes rolling back and becoming less participatory. Per  Gian and family/mother, patient may be having (once clarified) predominantly evening time events described as becoming quiet, less engaged, not talking, periodic eye blinking. No delmy tonic/clonic activity noted. EEG overnight 2/25 shows focal polyspikes in left posterior temporal-central region.    Impression: Breakthrough seizures in the setting of borderline VPA level, possibly due to medication nonadherence vs inadequate dosing.    Plan:   [x] EEG discontinued 3/1  [x] MR Brain w/wo contrast with epilepsy protocol - nonspecific subcentimeter T2/FLAIR hyperintensity w/in L posterior temporal lobe  [x] Lacosamide 150mg bid (increased on 3/1 due to unresponsive episode 2/28; previously started lacosamide 100mg BID on 2/25)  [x] Increased zonisamide to 200mg BID  [x] Restart amantadine, family reports it was helping with abnormal facial movements   [x] d/c'd home LEV, VPA  [x] UA neg for infection on 2/18  [ ] Seizure rescue: Ativan 1mg IVP for seizure activity > 3 mins; on discharge: Nayzilam 5 mg intranasal PRN GTC seizure > 3 min, may repeat once in 10 min in other nostril if seizures continue; do not repeat dose if patient excessively sedated or having trouble breathing    [] Psych following for agitation  [x] ativan 0.5mg PO daily PRN agitation  [x] Increased quetiapine to 150mg BID with additional 50mg q12h PRN agitation on 2/28 (previously: on 2/22 decreased to 100 mg bid)  [] 1:1 observation while requiring COVID isolation as pt not adherent with isolation  [] NO Haldol as per Family, patient had an episode after Haldol in the past of unresponsiveness, head tilted back, b/l hands shaking    [x] Seizure, fall and aspiration precautions   [x] Diet: Regular (Kosher)  [x] DVT ppx Lovenox  [x] PT/OT - Home PT/OT  [] Discharge to group home on HOLD due to COVID positive test 2/28 - per , group home requires 10 days isolation. Return to Group Home Monday 3/13   [] Given concern for seizure, advise patient to not drive (for 1 yr per [facility] guidelines), operate heavy machinery, avoid heights, pools, bathtubs, locked doors.  [] Follow up in epilepsy clinic with Dr. Andrews or Dr. Cardenas in 1 month, elective EMU admission to be scheduled in 4-6 weeks  [x] alendronate stopped as pt no longer on VPA, follow up with primary care to assess need for osteoporosis screening/medication    CORE MEASURES:        AED levels [] Sent [] Pending [x] Resulted     LFTs [x] normal [] elevated      Plan and education provided to [x] patient [x]family at bedside [] awaiting for family     Seizure Semiology  [x] Tonic clonic  [] Clonic  [] Tonic  [] Unresponsive  [] Focal with impaired awareness  [] Focal without impaired awareness    Obtain screening lower extremity venous ultrasound in patients who meet 1 or more of the following criteria as patient is high risk for DVT/PE on admission:   [] History of DVT/PE  [] Hypercoagulable states (Factor V Leiden, Cancer, OCP, etc. )  [] Prolonged immobility (hemiplegia/hemiparesis/post operative or any other extended immobilization)  [] Transferred from outside facility (Rehab or Long term care)    Case to be seen with Epilepsy Attending, Dr. Cardenas

## 2023-03-11 NOTE — PROGRESS NOTE ADULT - SUBJECTIVE AND OBJECTIVE BOX
MRN-[id number]  Patient is a 43y old  Female who presents with a chief complaint of breakthrough seizures (03 Mar 2023 07:02)    Subjective: There were no overnight events noted.    Objective:   Home Medications:  amantadine 100 mg oral capsule: 1 cap(s) orally once a day (09 Mar 2023 13:27)  clindamycin phosphate 1 % external lotion: apply to face qd 8PM (20 Feb 2023 13:22)  PreviDent 5000 polus toothpaste: 2 times a day (20 Feb 2023 13:22)  Saline Nasal Mist: 2 spray(s) in each nostril every 2 hours, As Needed (20 Feb 2023 13:22)    MEDICATIONS  (STANDING):  amantadine 100 milliGRAM(s) Oral daily  bismuth subsalicylate Liquid 5 milliLiter(s) Oral once  chlorhexidine 2% Cloths 1 Application(s) Topical <User Schedule>  enoxaparin Injectable 40 milliGRAM(s) SubCutaneous every 24 hours  ferrous    sulfate 325 milliGRAM(s) Oral two times a day  hydrocortisone 1% Cream 1 Application(s) Topical daily  lacosamide 150 milliGRAM(s) Oral two times a day  levothyroxine 50 MICROGram(s) Oral daily  nystatin Powder 1 Application(s) Topical two times a day  polyethylene glycol 3350 17 Gram(s) Oral daily  QUEtiapine 150 milliGRAM(s) Oral every 12 hours  senna 1 Tablet(s) Oral daily  zonisamide 200 milliGRAM(s) Oral two times a day    MEDICATIONS  (PRN):  acetaminophen     Tablet .. 650 milliGRAM(s) Oral every 6 hours PRN Moderate Pain (4 - 6), Severe Pain (7 - 10)  bacitracin   Ointment 1 Application(s) Topical three times a day PRN back itching  benzocaine/menthol Lozenge 1 Lozenge Oral four times a day PRN Sore Throat  guaiFENesin Oral Liquid (Sugar-Free) 200 milliGRAM(s) Oral every 8 hours PRN Cough  LORazepam     Tablet 0.5 milliGRAM(s) Oral daily PRN Agitation  QUEtiapine 50 milliGRAM(s) Oral two times a day PRN agitation    Vital Signs Last 24 Hrs  T(C): 36.6 (11 Mar 2023 05:45), Max: 36.6 (11 Mar 2023 05:45)  T(F): 97.8 (11 Mar 2023 05:45), Max: 97.8 (11 Mar 2023 05:45)  HR: 83 (11 Mar 2023 05:45) (83 - 111)  BP: 136/82 (11 Mar 2023 05:45) (101/66 - 136/82)  BP(mean): --  RR: 18 (11 Mar 2023 05:45) (18 - 18)  SpO2: 96% (11 Mar 2023 05:45) (96% - 97%)    Parameters below as of 11 Mar 2023 05:45  Patient On (Oxygen Delivery Method): room air        GENERAL EXAM:  Constitutional: awake and alert. NAD  HEENT: PERRL, EOMI  Musculoskeletal: no joint swelling/tenderness, no abnormal movements  Skin: no rashes    Neurological:   MS: Alert and awake. Not in distress. Speaking full sentences.   Speech/Language: Speech fluent. Following commands.  CNs: Face appears symmetric. Hearing grossly intact to conversation.  Motor: Muscle bulk normal.   Gait: Ambulating with walker s.      Labs:   cbc                      15.1   9.20  )-----------( 310      ( 09 Mar 2023 13:56 )             46.0     [id number]    139  |  102  |  15  ----------------------------<  124<H>  3.7   |  22  |  0.76    Ca    10.2      09 Mar 2023 13:56        Radiology:  CT Head 2/18/23:  No obvious acute intracranial hemorrhage, large cortical infarct or mass   effect. If clinically indicated, short-term follow-up or MRI may be   obtained for further evaluation.    MRI Brain Epilepsy protocol w/wo contrast 2/25/23:  No acute infarct or hemorrhage.  Nonspecific subcentimeter T2/FLAIR hyperintense white matter foci in the   posterior left temporal lobe.    EEG 3/2/23  EEG Summary:    Normal EEG in the awake, drowsy and asleep states.    Background slowing, generalized, mild  Excess beta

## 2023-03-12 RX ADMIN — QUETIAPINE FUMARATE 200 MILLIGRAM(S): 200 TABLET, FILM COATED ORAL at 17:13

## 2023-03-12 RX ADMIN — NYSTATIN CREAM 1 APPLICATION(S): 100000 CREAM TOPICAL at 17:14

## 2023-03-12 RX ADMIN — LACOSAMIDE 150 MILLIGRAM(S): 50 TABLET ORAL at 17:13

## 2023-03-12 RX ADMIN — Medication 200 MILLIGRAM(S): at 17:13

## 2023-03-12 RX ADMIN — NYSTATIN CREAM 1 APPLICATION(S): 100000 CREAM TOPICAL at 05:40

## 2023-03-12 RX ADMIN — CHLORHEXIDINE GLUCONATE 1 APPLICATION(S): 213 SOLUTION TOPICAL at 05:42

## 2023-03-12 RX ADMIN — Medication 200 MILLIGRAM(S): at 05:40

## 2023-03-12 RX ADMIN — LACOSAMIDE 150 MILLIGRAM(S): 50 TABLET ORAL at 05:43

## 2023-03-12 RX ADMIN — ENOXAPARIN SODIUM 40 MILLIGRAM(S): 100 INJECTION SUBCUTANEOUS at 05:40

## 2023-03-12 RX ADMIN — POLYETHYLENE GLYCOL 3350 17 GRAM(S): 17 POWDER, FOR SOLUTION ORAL at 17:14

## 2023-03-12 RX ADMIN — Medication 100 MILLIGRAM(S): at 12:00

## 2023-03-12 RX ADMIN — SENNA PLUS 1 TABLET(S): 8.6 TABLET ORAL at 11:59

## 2023-03-12 RX ADMIN — Medication 325 MILLIGRAM(S): at 05:41

## 2023-03-12 RX ADMIN — QUETIAPINE FUMARATE 200 MILLIGRAM(S): 200 TABLET, FILM COATED ORAL at 05:45

## 2023-03-12 RX ADMIN — Medication 1 APPLICATION(S): at 12:00

## 2023-03-12 RX ADMIN — Medication 50 MICROGRAM(S): at 05:45

## 2023-03-12 RX ADMIN — Medication 325 MILLIGRAM(S): at 17:13

## 2023-03-12 NOTE — PROGRESS NOTE ADULT - SUBJECTIVE AND OBJECTIVE BOX
Neurology Progress Note    SUBJECTIVE/OBJECTIVE/INTERVAL EVENTS: Patient seen and examined at bedside. No reported events o/n. Patient in good mood with family bedside.      MEDICATIONS  (STANDING):  amantadine 100 milliGRAM(s) Oral daily  chlorhexidine 2% Cloths 1 Application(s) Topical <User Schedule>  enoxaparin Injectable 40 milliGRAM(s) SubCutaneous every 24 hours  ferrous    sulfate 325 milliGRAM(s) Oral two times a day  hydrocortisone 1% Cream 1 Application(s) Topical daily  lacosamide 150 milliGRAM(s) Oral two times a day  levothyroxine 50 MICROGram(s) Oral daily  nystatin Powder 1 Application(s) Topical two times a day  polyethylene glycol 3350 17 Gram(s) Oral daily  QUEtiapine 200 milliGRAM(s) Oral two times a day  senna 1 Tablet(s) Oral daily  zonisamide 200 milliGRAM(s) Oral two times a day    MEDICATIONS  (PRN):  acetaminophen     Tablet .. 650 milliGRAM(s) Oral every 6 hours PRN Moderate Pain (4 - 6), Severe Pain (7 - 10)  bacitracin   Ointment 1 Application(s) Topical three times a day PRN back itching  benzocaine/menthol Lozenge 1 Lozenge Oral four times a day PRN Sore Throat  guaiFENesin Oral Liquid (Sugar-Free) 200 milliGRAM(s) Oral every 8 hours PRN Cough  LORazepam     Tablet 0.5 milliGRAM(s) Oral daily PRN Agitation  QUEtiapine 50 milliGRAM(s) Oral two times a day PRN agitation      VITALS & EXAMINATION:  Vital Signs Last 24 Hrs  T(C): 36.6 (12 Mar 2023 15:30), Max: 36.7 (11 Mar 2023 19:57)  T(F): 97.9 (12 Mar 2023 15:30), Max: 98 (11 Mar 2023 19:57)  HR: 98 (12 Mar 2023 15:30) (80 - 107)  BP: 107/66 (12 Mar 2023 15:30) (107/66 - 128/92)  BP(mean): --  RR: 18 (12 Mar 2023 15:30) (16 - 18)  SpO2: 100% (12 Mar 2023 15:30) (95% - 100%)    Parameters below as of 12 Mar 2023 15:30  Patient On (Oxygen Delivery Method): room air     GENERAL EXAM:  Constitutional: awake and alert. NAD   Eyes clear sclera  Resp breathing regularly    Neurological:   MS: Alert and awake. Not in distress. Speaking full sentences.   Speech/Language: Speech fluent. Following commands.  CNs: Face appears symmetric. Hearing grossly intact to conversation.  Motor: Muscle bulk normal. MAEx4   Gait: Ambulating with walker s.    LABORATORY:  CBC   Chem       LFTs   Coagulopathy   Lipid Panel   A1c   Cardiac enzymes     U/A   CSF  Immunological  Other    STUDIES & IMAGING: (EEG, CT, MR, U/S, TTE/MARISELA):      Radiology:  CT Head 2/18/23:  No obvious acute intracranial hemorrhage, large cortical infarct or mass   effect. If clinically indicated, short-term follow-up or MRI may be   obtained for further evaluation.    MRI Brain Epilepsy protocol w/wo contrast 2/25/23:  No acute infarct or hemorrhage.  Nonspecific subcentimeter T2/FLAIR hyperintense white matter foci in the   posterior left temporal lobe.    EEG 3/2/23  EEG Summary:    Normal EEG in the awake, drowsy and asleep states.    Background slowing, generalized, mild  Excess beta

## 2023-03-12 NOTE — PROGRESS NOTE ADULT - ASSESSMENT
43 yo woman with HTN, HLD, bipolar d/o, hypothyroidism, seizure disorder (on Keppra and VPA), developmentally delayed, who presented to OSH on 2/18 with recurrent breakthrough seizures, transferred to the EMU on 2/18 for further monitoring and management. CTH from OSH unrevealing. VPA level from OSH subtherapeutic at 43, but noted to be 52.26 when corrected for albumin of 3.9. CBC/CMP wnl. Per history from family, patient had hx of meningitis age 18mo, possible inception of seizures age 2 years (potentially earlier) with eyes rolling back and becoming less participatory. Per  Gian and family/mother, patient may be having (once clarified) predominantly evening time events described as becoming quiet, less engaged, not talking, periodic eye blinking. No delmy tonic/clonic activity noted. EEG overnight 2/25 shows focal polyspikes in left posterior temporal-central region.    Impression: Breakthrough seizures in the setting of borderline VPA level, possibly due to medication nonadherence vs inadequate dosing.    Plan:   [x] EEG discontinued 3/1  [x] MR Brain w/wo contrast with epilepsy protocol - nonspecific subcentimeter T2/FLAIR hyperintensity w/in L posterior temporal lobe  [x] Lacosamide 150mg bid (increased on 3/1 due to unresponsive episode 2/28; previously started lacosamide 100mg BID on 2/25)  [x] Increased zonisamide to 200mg BID  [x] Restart amantadine, family reports it was helping with abnormal facial movements   [x] d/c'd home LEV, VPA  [x] UA neg for infection on 2/18  [ ] Seizure rescue: Ativan 1mg IVP for seizure activity > 3 mins; on discharge: Nayzilam 5 mg intranasal PRN GTC seizure > 3 min, may repeat once in 10 min in other nostril if seizures continue; do not repeat dose if patient excessively sedated or having trouble breathing    [] Psych following for agitation  [x] ativan 0.5mg PO daily PRN agitation  [x] Increased quetiapine to 200mg BID with additional 50mg q12h PRN agitation on 2/28 (previously: on 2/22 decreased to 100 mg bid)  [] 1:1 observation while requiring COVID isolation as pt not adherent with isolation  [] NO Haldol as per Family, patient had an episode after Haldol in the past of unresponsiveness, head tilted back, b/l hands shaking    [x] Seizure, fall and aspiration precautions   [x] Diet: Regular (Kosher)  [x] DVT ppx Lovenox  [x] PT/OT - Home PT/OT  [] Discharge to group home on HOLD due to COVID positive test 2/28 - per , group home requires 10 days isolation. Return to Group Home Monday 3/13   [] Given concern for seizure, advise patient to not drive (for 1 yr per [facility] guidelines), operate heavy machinery, avoid heights, pools, bathtubs, locked doors.  [] Follow up in epilepsy clinic with Dr. Andrews or Dr. Cardenas in 1 month, elective EMU admission to be scheduled in 4-6 weeks  [x] alendronate stopped as pt no longer on VPA, follow up with primary care to assess need for osteoporosis screening/medication     Patient was seen on rounds with neuro attending Dr Cardenas. Plan above in agreement with neuro attending at time of note documentation and any resident documentation updates. Delay in patient note entry due to patient care.

## 2023-03-13 VITALS
RESPIRATION RATE: 18 BRPM | OXYGEN SATURATION: 97 % | TEMPERATURE: 98 F | SYSTOLIC BLOOD PRESSURE: 115 MMHG | DIASTOLIC BLOOD PRESSURE: 72 MMHG | HEART RATE: 82 BPM

## 2023-03-13 LAB
ALBUMIN SERPL ELPH-MCNC: 4.6 G/DL — SIGNIFICANT CHANGE UP (ref 3.3–5)
ALP SERPL-CCNC: 59 U/L — SIGNIFICANT CHANGE UP (ref 40–120)
ALT FLD-CCNC: 15 U/L — SIGNIFICANT CHANGE UP (ref 10–45)
ANION GAP SERPL CALC-SCNC: 13 MMOL/L — SIGNIFICANT CHANGE UP (ref 5–17)
AST SERPL-CCNC: 11 U/L — SIGNIFICANT CHANGE UP (ref 10–40)
BILIRUB DIRECT SERPL-MCNC: 0.1 MG/DL — SIGNIFICANT CHANGE UP (ref 0–0.3)
BILIRUB INDIRECT FLD-MCNC: 0.2 MG/DL — SIGNIFICANT CHANGE UP (ref 0.2–1)
BILIRUB SERPL-MCNC: 0.3 MG/DL — SIGNIFICANT CHANGE UP (ref 0.2–1.2)
BUN SERPL-MCNC: 15 MG/DL — SIGNIFICANT CHANGE UP (ref 7–23)
CALCIUM SERPL-MCNC: 9.9 MG/DL — SIGNIFICANT CHANGE UP (ref 8.4–10.5)
CHLORIDE SERPL-SCNC: 105 MMOL/L — SIGNIFICANT CHANGE UP (ref 96–108)
CO2 SERPL-SCNC: 20 MMOL/L — LOW (ref 22–31)
CREAT SERPL-MCNC: 0.67 MG/DL — SIGNIFICANT CHANGE UP (ref 0.5–1.3)
EGFR: 110 ML/MIN/1.73M2 — SIGNIFICANT CHANGE UP
GLUCOSE SERPL-MCNC: 109 MG/DL — HIGH (ref 70–99)
HCT VFR BLD CALC: 45.9 % — HIGH (ref 34.5–45)
HGB BLD-MCNC: 14.5 G/DL — SIGNIFICANT CHANGE UP (ref 11.5–15.5)
MCHC RBC-ENTMCNC: 28.4 PG — SIGNIFICANT CHANGE UP (ref 27–34)
MCHC RBC-ENTMCNC: 31.6 GM/DL — LOW (ref 32–36)
MCV RBC AUTO: 90 FL — SIGNIFICANT CHANGE UP (ref 80–100)
NRBC # BLD: 0 /100 WBCS — SIGNIFICANT CHANGE UP (ref 0–0)
PLATELET # BLD AUTO: 260 K/UL — SIGNIFICANT CHANGE UP (ref 150–400)
POTASSIUM SERPL-MCNC: 4.5 MMOL/L — SIGNIFICANT CHANGE UP (ref 3.5–5.3)
POTASSIUM SERPL-SCNC: 4.5 MMOL/L — SIGNIFICANT CHANGE UP (ref 3.5–5.3)
PROT SERPL-MCNC: 7.9 G/DL — SIGNIFICANT CHANGE UP (ref 6–8.3)
RBC # BLD: 5.1 M/UL — SIGNIFICANT CHANGE UP (ref 3.8–5.2)
RBC # FLD: 14.3 % — SIGNIFICANT CHANGE UP (ref 10.3–14.5)
SODIUM SERPL-SCNC: 138 MMOL/L — SIGNIFICANT CHANGE UP (ref 135–145)
WBC # BLD: 7.41 K/UL — SIGNIFICANT CHANGE UP (ref 3.8–10.5)
WBC # FLD AUTO: 7.41 K/UL — SIGNIFICANT CHANGE UP (ref 3.8–10.5)

## 2023-03-13 PROCEDURE — 84436 ASSAY OF TOTAL THYROXINE: CPT

## 2023-03-13 PROCEDURE — 80177 DRUG SCRN QUAN LEVETIRACETAM: CPT

## 2023-03-13 PROCEDURE — 93005 ELECTROCARDIOGRAM TRACING: CPT

## 2023-03-13 PROCEDURE — 85025 COMPLETE CBC W/AUTO DIFF WBC: CPT

## 2023-03-13 PROCEDURE — 76377 3D RENDER W/INTRP POSTPROCES: CPT

## 2023-03-13 PROCEDURE — 85027 COMPLETE CBC AUTOMATED: CPT

## 2023-03-13 PROCEDURE — 87641 MR-STAPH DNA AMP PROBE: CPT

## 2023-03-13 PROCEDURE — 84443 ASSAY THYROID STIM HORMONE: CPT

## 2023-03-13 PROCEDURE — A9585: CPT

## 2023-03-13 PROCEDURE — 97165 OT EVAL LOW COMPLEX 30 MIN: CPT

## 2023-03-13 PROCEDURE — 70553 MRI BRAIN STEM W/O & W/DYE: CPT

## 2023-03-13 PROCEDURE — 80048 BASIC METABOLIC PNL TOTAL CA: CPT

## 2023-03-13 PROCEDURE — 84480 ASSAY TRIIODOTHYRONINE (T3): CPT

## 2023-03-13 PROCEDURE — C9399: CPT

## 2023-03-13 PROCEDURE — 82550 ASSAY OF CK (CPK): CPT

## 2023-03-13 PROCEDURE — 87640 STAPH A DNA AMP PROBE: CPT

## 2023-03-13 PROCEDURE — 95716 VEEG EA 12-26HR CONT MNTR: CPT

## 2023-03-13 PROCEDURE — 83605 ASSAY OF LACTIC ACID: CPT

## 2023-03-13 PROCEDURE — 95713 VEEG 2-12 HR CONT MNTR: CPT

## 2023-03-13 PROCEDURE — 80164 ASSAY DIPROPYLACETIC ACD TOT: CPT

## 2023-03-13 PROCEDURE — 36415 COLL VENOUS BLD VENIPUNCTURE: CPT

## 2023-03-13 PROCEDURE — 80053 COMPREHEN METABOLIC PANEL: CPT

## 2023-03-13 PROCEDURE — 70450 CT HEAD/BRAIN W/O DYE: CPT | Mod: MA

## 2023-03-13 PROCEDURE — 83735 ASSAY OF MAGNESIUM: CPT

## 2023-03-13 PROCEDURE — 84100 ASSAY OF PHOSPHORUS: CPT

## 2023-03-13 PROCEDURE — 95700 EEG CONT REC W/VID EEG TECH: CPT

## 2023-03-13 PROCEDURE — 81001 URINALYSIS AUTO W/SCOPE: CPT

## 2023-03-13 PROCEDURE — C9254: CPT

## 2023-03-13 PROCEDURE — U0003: CPT

## 2023-03-13 PROCEDURE — U0005: CPT

## 2023-03-13 PROCEDURE — 87635 SARS-COV-2 COVID-19 AMP PRB: CPT

## 2023-03-13 PROCEDURE — 80076 HEPATIC FUNCTION PANEL: CPT

## 2023-03-13 RX ORDER — CHOLECALCIFEROL (VITAMIN D3) 125 MCG
2 CAPSULE ORAL
Qty: 60 | Refills: 0
Start: 2023-03-13 | End: 2023-04-11

## 2023-03-13 RX ORDER — FERROUS SULFATE 325(65) MG
1 TABLET ORAL
Qty: 30 | Refills: 0
Start: 2023-03-13 | End: 2023-04-11

## 2023-03-13 RX ORDER — ZONISAMIDE 100 MG
2 CAPSULE ORAL
Qty: 120 | Refills: 3
Start: 2023-03-13 | End: 2023-07-10

## 2023-03-13 RX ORDER — QUETIAPINE FUMARATE 200 MG/1
1 TABLET, FILM COATED ORAL
Qty: 60 | Refills: 0
Start: 2023-03-13

## 2023-03-13 RX ORDER — LACOSAMIDE 50 MG/1
1 TABLET ORAL
Qty: 60 | Refills: 0
Start: 2023-03-13

## 2023-03-13 RX ORDER — LEVOTHYROXINE SODIUM 125 MCG
1 TABLET ORAL
Qty: 30 | Refills: 0
Start: 2023-03-13 | End: 2023-04-11

## 2023-03-13 RX ORDER — POLYETHYLENE GLYCOL 3350 17 G/17G
17 POWDER, FOR SOLUTION ORAL
Qty: 510 | Refills: 0
Start: 2023-03-13 | End: 2023-04-11

## 2023-03-13 RX ORDER — DOCUSATE SODIUM 100 MG
2 CAPSULE ORAL
Qty: 60 | Refills: 0
Start: 2023-03-13 | End: 2023-04-11

## 2023-03-13 RX ORDER — LACOSAMIDE 50 MG/1
1 TABLET ORAL
Qty: 0 | Refills: 0 | DISCHARGE
Start: 2023-03-13

## 2023-03-13 RX ADMIN — Medication 200 MILLIGRAM(S): at 05:32

## 2023-03-13 RX ADMIN — Medication 100 MILLIGRAM(S): at 11:44

## 2023-03-13 RX ADMIN — NYSTATIN CREAM 1 APPLICATION(S): 100000 CREAM TOPICAL at 05:33

## 2023-03-13 RX ADMIN — Medication 1 APPLICATION(S): at 11:47

## 2023-03-13 RX ADMIN — CHLORHEXIDINE GLUCONATE 1 APPLICATION(S): 213 SOLUTION TOPICAL at 05:30

## 2023-03-13 RX ADMIN — ENOXAPARIN SODIUM 40 MILLIGRAM(S): 100 INJECTION SUBCUTANEOUS at 05:32

## 2023-03-13 RX ADMIN — SENNA PLUS 1 TABLET(S): 8.6 TABLET ORAL at 11:45

## 2023-03-13 RX ADMIN — LACOSAMIDE 150 MILLIGRAM(S): 50 TABLET ORAL at 05:31

## 2023-03-13 RX ADMIN — Medication 1 APPLICATION(S): at 06:34

## 2023-03-13 RX ADMIN — QUETIAPINE FUMARATE 200 MILLIGRAM(S): 200 TABLET, FILM COATED ORAL at 05:32

## 2023-03-13 RX ADMIN — Medication 325 MILLIGRAM(S): at 05:32

## 2023-03-13 RX ADMIN — Medication 50 MICROGRAM(S): at 05:32

## 2023-03-13 NOTE — H&P PST ADULT - NSICDXPASTMEDICALHX_GEN_ALL_CORE_FT
PAST MEDICAL HISTORY:  Bipolar disorder     H/O osteoporosis     H/O sinus tachycardia     History of seizure disorder last seizure 6/4/2018    Hyperlipidemia     Hypothyroidism     Mentally challenged Klisyri Counseling:  I discussed with the patient the risks of Klisyri including but not limited to erythema, scaling, itching, weeping, crusting, and pain.

## 2023-03-13 NOTE — PROGRESS NOTE ADULT - ASSESSMENT
41 YO woman with HTN, HLD, bipolar d/o, hypothyroidism, seizure disorder (on Keppra and VPA), developmentally delayed, who presented to OSH on 2/18 with recurrent breakthrough seizures, transferred to the EMU on 2/18 for further monitoring and management. CTH from OSH unrevealing. VPA level from OSH subtherapeutic at 43, but noted to be 52.26 when corrected for albumin of 3.9. CBC/CMP wnl. Per history from family, patient had hx of meningitis age 18mo, possible inception of seizures age 2 years (potentially earlier) with eyes rolling back and becoming less participatory. Per  Gian and family/mother, patient may be having (once clarified) predominantly evening time events described as becoming quiet, less engaged, not talking, periodic eye blinking. No delmy tonic/clonic activity noted. EEG overnight 2/25 shows focal polyspikes in left posterior temporal-central region.    Impression: Breakthrough seizures in the setting of borderline VPA level, possibly due to medication nonadherence vs inadequate dosing.    Plan:   [x] EEG discontinued 3/1  [x] MR Brain w/wo contrast with epilepsy protocol - nonspecific subcentimeter T2/FLAIR hyperintensity w/in L posterior temporal lobe  [x] Lacosamide 150mg bid (increased on 3/1 due to unresponsive episode 2/28; previously started lacosamide 100mg BID on 2/25)  [x] Increased zonisamide to 200mg BID  [x] Restart amantadine, family reports it was helping with abnormal facial movements   [x] d/c'd home LEV, VPA  [x] UA neg for infection on 2/18  [ ] Seizure rescue: Ativan 1mg IVP for seizure activity > 3 mins; on discharge: Nayzilam 5 mg intranasal PRN GTC seizure > 3 min, may repeat once in 10 min in other nostril if seizures continue; do not repeat dose if patient excessively sedated or having trouble breathing    [] Psych following for agitation  [x] ativan 0.5mg PO daily PRN agitation  [x] Increased quetiapine to 200mg BID with additional 50mg q12h PRN agitation  [] NO Haldol as per Family, patient had an episode after Haldol in the past of unresponsiveness, head tilted back, b/l hands shaking    [x] Seizure, fall and aspiration precautions   [x] Diet: Regular (Kosher)  [x] DVT ppx Lovenox  [x] PT/OT - Home PT/OT  [] Discharge to group home on HOLD due to COVID positive test 2/28 - per SW, group home requires 10 days isolation. Return to Group Home Monday 3/13   [] Given concern for seizure, advise patient to not drive (for 1 yr per [facility] guidelines), operate heavy machinery, avoid heights, pools, bathtubs, locked doors.  [] Follow up in epilepsy clinic with Dr. Andrews or Dr. Cardenas in 1 month, elective EMU admission to be scheduled in 4-6 weeks  [x] alendronate stopped as pt no longer on VPA, follow up with primary care to assess need for osteoporosis screening/medication    CORE MEASURES:        AED levels [] Sent [] Pending [x] Resulted     LFTs [x] normal [] elevated      Plan and education provided to [x] patient [x]family at bedside [] awaiting for family     Seizure Semiology  [x] Tonic clonic  [] Clonic  [] Tonic  [] Unresponsive  [] Focal with impaired awareness  [] Focal without impaired awareness    Obtain screening lower extremity venous ultrasound in patients who meet 1 or more of the following criteria as patient is high risk for DVT/PE on admission:   [] History of DVT/PE  [] Hypercoagulable states (Factor V Leiden, Cancer, OCP, etc. )  [] Prolonged immobility (hemiplegia/hemiparesis/post operative or any other extended immobilization)  [] Transferred from outside facility (Rehab or Long term care)

## 2023-03-13 NOTE — PROGRESS NOTE ADULT - PROVIDER SPECIALTY LIST ADULT
Neurology

## 2023-03-13 NOTE — PROGRESS NOTE ADULT - SUBJECTIVE AND OBJECTIVE BOX
THE PATIENT WAS SEEN AND EXAMINED BY ME WITH THE HOUSESTAFF DURING MORNING ROUNDS.     HPI:  42 year-old woman with a PMH of HTN, HLD, bipolar d/o, hypothyroidism, seizure disorder (on Keppra and VPA), developmentally delayed, who initially presented to OSH on 2/18 for breakthrough seizures, transferred to the EMU for further evaluation and management. Per chart review and discussion with aide at bedside, patient had 2 seizures at her group home, including an episode of generalized "tonic-clonic" shaking while seated in a recliner, lasting 2 minutes, associated with urinary incontinence. She was taken to OSH where she was reported to be postictal and received IV VPA 500mg x1 and later determined to be stable for discharge back to her group home. While awaiting transport, she had a 3rd witnessed event lasting less than 1 minute, for which she was administered ativan 1mg IVP, and decision made for transport to [facility] for continuous EEG. There was no fall or injury associated with any of these events. Her aide is unsure of when her last seizure prior to these events may have occurred.    SUBJECTIVE: No events overnight.    Medications:  acetaminophen     Tablet .. 650 milliGRAM(s) Oral every 6 hours PRN  amantadine 100 milliGRAM(s) Oral daily  bacitracin   Ointment 1 Application(s) Topical three times a day PRN  benzocaine/menthol Lozenge 1 Lozenge Oral four times a day PRN  chlorhexidine 2% Cloths 1 Application(s) Topical <User Schedule>  enoxaparin Injectable 40 milliGRAM(s) SubCutaneous every 24 hours  ferrous    sulfate 325 milliGRAM(s) Oral two times a day  guaiFENesin Oral Liquid (Sugar-Free) 200 milliGRAM(s) Oral every 8 hours PRN  hydrocortisone 1% Cream 1 Application(s) Topical daily  lacosamide 150 milliGRAM(s) Oral two times a day  levothyroxine 50 MICROGram(s) Oral daily  LORazepam     Tablet 0.5 milliGRAM(s) Oral daily PRN  nystatin Powder 1 Application(s) Topical two times a day  polyethylene glycol 3350 17 Gram(s) Oral daily  QUEtiapine 50 milliGRAM(s) Oral two times a day PRN  QUEtiapine 200 milliGRAM(s) Oral two times a day  senna 1 Tablet(s) Oral daily  zonisamide 200 milliGRAM(s) Oral two times a day    PHYSICAL EXAM:  Vital Signs Last 24 Hrs  T(C): 36.7 (13 Mar 2023 04:36), Max: 36.7 (13 Mar 2023 00:55)  T(F): 98 (13 Mar 2023 04:36), Max: 98 (13 Mar 2023 00:55)  HR: 82 (13 Mar 2023 04:36) (81 - 106)  BP: 115/72 (13 Mar 2023 04:36) (107/66 - 127/81)  BP(mean): --  RR: 18 (13 Mar 2023 04:36) (18 - 18)  SpO2: 97% (13 Mar 2023 04:36) (96% - 100%)    Parameters below as of 13 Mar 2023 04:36  Patient On (Oxygen Delivery Method): room air    Physical Exam:  Constitutional: appears stated age, NAD.  Head: Normocephalic & atraumatic.  Respiratory: Breathing comfortably on room air.    Neurological:   MS: Alert and awake. Not in distress. Speaking full sentences.   Speech/Language: Speech fluent. Following commands.  CNs: Face appears symmetric. Hearing grossly intact to conversation.  Motor: Muscle bulk normal.   Gait: Ambulating with walker     IMAGING: Reviewed by me.     CT Head 2/18/23:  No obvious acute intracranial hemorrhage, large cortical infarct or mass   effect. If clinically indicated, short-term follow-up or MRI may be   obtained for further evaluation.    MRI Brain Epilepsy protocol w/wo contrast 2/25/23:  No acute infarct or hemorrhage.  Nonspecific subcentimeter T2/FLAIR hyperintense white matter foci in the   posterior left temporal lobe.    EEG 3/2/23  EEG Summary:    Normal EEG in the awake, drowsy and asleep states.    Background slowing, generalized, mild  Excess beta    Impression/Clinical Correlate:    This is an abnormal VEEG.  Mild diffuse/multifocal cerebral dysfunction, not specific in etiology. No seizures were recorded. The presence of excessive beta activity never evolves and has no clear clinical correlation, is most often seen in setting of sedative medication use but has also been described in rare cases of intellectual impairment.

## 2023-03-13 NOTE — H&P PST ADULT - NSICDXPASTMEDICALHX_GEN_ALL_CORE_FT
PAST MEDICAL HISTORY:  Bipolar disorder     Bipolar illness     H/O osteoporosis     H/O sinus tachycardia     History of seizure disorder last seizure 6/4/2018    Hyperlipidemia     Hypothyroidism     Hypothyroidism     Mentally challenged Moderate intellectual disability    Seizure last seizure 08/2020    
3 = A little assistance

## 2023-03-27 ENCOUNTER — EMERGENCY (EMERGENCY)
Facility: HOSPITAL | Age: 43
LOS: 1 days | Discharge: HOME | End: 2023-03-27
Attending: EMERGENCY MEDICINE | Admitting: EMERGENCY MEDICINE
Payer: MEDICARE

## 2023-03-27 VITALS
RESPIRATION RATE: 14 BRPM | HEART RATE: 65 BPM | OXYGEN SATURATION: 99 % | WEIGHT: 160.06 LBS | SYSTOLIC BLOOD PRESSURE: 131 MMHG | DIASTOLIC BLOOD PRESSURE: 64 MMHG | HEIGHT: 62 IN | TEMPERATURE: 98 F

## 2023-03-27 DIAGNOSIS — Z98.890 OTHER SPECIFIED POSTPROCEDURAL STATES: Chronic | ICD-10-CM

## 2023-03-27 DIAGNOSIS — Z92.89 PERSONAL HISTORY OF OTHER MEDICAL TREATMENT: Chronic | ICD-10-CM

## 2023-03-27 LAB
ALBUMIN SERPL ELPH-MCNC: 4.8 G/DL — SIGNIFICANT CHANGE UP (ref 3.3–5)
ALP SERPL-CCNC: 69 U/L — SIGNIFICANT CHANGE UP (ref 30–120)
ALT FLD-CCNC: 23 U/L DA — SIGNIFICANT CHANGE UP (ref 10–60)
ANION GAP SERPL CALC-SCNC: 12 MMOL/L — SIGNIFICANT CHANGE UP (ref 5–17)
AST SERPL-CCNC: 13 U/L — SIGNIFICANT CHANGE UP (ref 10–40)
BASOPHILS # BLD AUTO: 0.07 K/UL — SIGNIFICANT CHANGE UP (ref 0–0.2)
BASOPHILS NFR BLD AUTO: 0.9 % — SIGNIFICANT CHANGE UP (ref 0–2)
BILIRUB SERPL-MCNC: 0.5 MG/DL — SIGNIFICANT CHANGE UP (ref 0.2–1.2)
BUN SERPL-MCNC: 12 MG/DL — SIGNIFICANT CHANGE UP (ref 7–23)
CALCIUM SERPL-MCNC: 10.1 MG/DL — SIGNIFICANT CHANGE UP (ref 8.4–10.5)
CHLORIDE SERPL-SCNC: 102 MMOL/L — SIGNIFICANT CHANGE UP (ref 96–108)
CO2 SERPL-SCNC: 26 MMOL/L — SIGNIFICANT CHANGE UP (ref 22–31)
CREAT SERPL-MCNC: 0.91 MG/DL — SIGNIFICANT CHANGE UP (ref 0.5–1.3)
EGFR: 80 ML/MIN/1.73M2 — SIGNIFICANT CHANGE UP
EOSINOPHIL # BLD AUTO: 0.04 K/UL — SIGNIFICANT CHANGE UP (ref 0–0.5)
EOSINOPHIL NFR BLD AUTO: 0.5 % — SIGNIFICANT CHANGE UP (ref 0–6)
GLUCOSE SERPL-MCNC: 124 MG/DL — HIGH (ref 70–99)
HCG SERPL-ACNC: 1 MIU/ML — SIGNIFICANT CHANGE UP
HCT VFR BLD CALC: 46.4 % — HIGH (ref 34.5–45)
HGB BLD-MCNC: 15.5 G/DL — SIGNIFICANT CHANGE UP (ref 11.5–15.5)
IMM GRANULOCYTES NFR BLD AUTO: 0.3 % — SIGNIFICANT CHANGE UP (ref 0–0.9)
LYMPHOCYTES # BLD AUTO: 2.29 K/UL — SIGNIFICANT CHANGE UP (ref 1–3.3)
LYMPHOCYTES # BLD AUTO: 28.7 % — SIGNIFICANT CHANGE UP (ref 13–44)
MAGNESIUM SERPL-MCNC: 2 MG/DL — SIGNIFICANT CHANGE UP (ref 1.6–2.6)
MCHC RBC-ENTMCNC: 29 PG — SIGNIFICANT CHANGE UP (ref 27–34)
MCHC RBC-ENTMCNC: 33.4 GM/DL — SIGNIFICANT CHANGE UP (ref 32–36)
MCV RBC AUTO: 86.9 FL — SIGNIFICANT CHANGE UP (ref 80–100)
MONOCYTES # BLD AUTO: 0.52 K/UL — SIGNIFICANT CHANGE UP (ref 0–0.9)
MONOCYTES NFR BLD AUTO: 6.5 % — SIGNIFICANT CHANGE UP (ref 2–14)
NEUTROPHILS # BLD AUTO: 5.05 K/UL — SIGNIFICANT CHANGE UP (ref 1.8–7.4)
NEUTROPHILS NFR BLD AUTO: 63.1 % — SIGNIFICANT CHANGE UP (ref 43–77)
NRBC # BLD: 0 /100 WBCS — SIGNIFICANT CHANGE UP (ref 0–0)
PLATELET # BLD AUTO: 397 K/UL — SIGNIFICANT CHANGE UP (ref 150–400)
POTASSIUM SERPL-MCNC: 3.9 MMOL/L — SIGNIFICANT CHANGE UP (ref 3.5–5.3)
POTASSIUM SERPL-SCNC: 3.9 MMOL/L — SIGNIFICANT CHANGE UP (ref 3.5–5.3)
PROT SERPL-MCNC: 8.6 G/DL — HIGH (ref 6–8.3)
RBC # BLD: 5.34 M/UL — HIGH (ref 3.8–5.2)
RBC # FLD: 14.4 % — SIGNIFICANT CHANGE UP (ref 10.3–14.5)
SODIUM SERPL-SCNC: 140 MMOL/L — SIGNIFICANT CHANGE UP (ref 135–145)
VALPROATE SERPL-MCNC: <3 UG/ML — LOW (ref 50–100)
WBC # BLD: 7.99 K/UL — SIGNIFICANT CHANGE UP (ref 3.8–10.5)
WBC # FLD AUTO: 7.99 K/UL — SIGNIFICANT CHANGE UP (ref 3.8–10.5)

## 2023-03-27 PROCEDURE — 99283 EMERGENCY DEPT VISIT LOW MDM: CPT | Mod: 25

## 2023-03-27 PROCEDURE — 80053 COMPREHEN METABOLIC PANEL: CPT

## 2023-03-27 PROCEDURE — 84702 CHORIONIC GONADOTROPIN TEST: CPT

## 2023-03-27 PROCEDURE — 93005 ELECTROCARDIOGRAM TRACING: CPT

## 2023-03-27 PROCEDURE — 93010 ELECTROCARDIOGRAM REPORT: CPT

## 2023-03-27 PROCEDURE — 99284 EMERGENCY DEPT VISIT MOD MDM: CPT

## 2023-03-27 PROCEDURE — 83735 ASSAY OF MAGNESIUM: CPT

## 2023-03-27 PROCEDURE — 80164 ASSAY DIPROPYLACETIC ACD TOT: CPT

## 2023-03-27 PROCEDURE — 85025 COMPLETE CBC W/AUTO DIFF WBC: CPT

## 2023-03-27 PROCEDURE — 36415 COLL VENOUS BLD VENIPUNCTURE: CPT

## 2023-03-27 NOTE — ED ADULT TRIAGE NOTE - PAIN RATING/NUMBER SCALE (0-10): ACTIVITY
Refer anxiety and grief reaction due to the recent passing of her brother   
  Refers anxiety and grief due to the recent passing of her brother two days ago;  Her brother lived in [place] and she wasn't able to visit him ; anxiety treated with Amytriptiline which she also takes for trigeminal neuralgia.   
  Stable, treated with Carbamazepine  mg bid, and Amytriptiline 150 mg at beditme, the latter medication produces significant dry mouth but also helps with anxiety.  Medications prescribed by Pain Management specialist .   
  Will order new TSH,Free T4 tests, continue Levothyroxine 137 mcg daily   
  Will renew prescription for Valacyclovir 1,000 mg bid x 10 days, also Triamcinolone acetonide 0.1% cream for pruritus.   
 Patient is followed by Pain Management specialist  , treatment consists of lumbar injections, opioid analgesic, Gabapentin.   
 Will order Lipid panel , continue Simvastatin 40 mg daily   
0 (no pain/absence of nonverbal indicators of pain)

## 2023-03-30 ENCOUNTER — APPOINTMENT (OUTPATIENT)
Dept: NEUROLOGY | Facility: CLINIC | Age: 43
End: 2023-03-30
Payer: MEDICARE

## 2023-03-30 VITALS
WEIGHT: 192 LBS | HEART RATE: 109 BPM | HEIGHT: 63 IN | BODY MASS INDEX: 34.02 KG/M2 | DIASTOLIC BLOOD PRESSURE: 76 MMHG | SYSTOLIC BLOOD PRESSURE: 130 MMHG

## 2023-03-30 PROCEDURE — 99213 OFFICE O/P EST LOW 20 MIN: CPT

## 2023-04-07 RX ORDER — LORAZEPAM 1 MG/1
1 TABLET ORAL
Qty: 30 | Refills: 0 | Status: ACTIVE | COMMUNITY
Start: 2023-04-07 | End: 1900-01-01

## 2023-05-05 ENCOUNTER — APPOINTMENT (OUTPATIENT)
Dept: NEUROLOGY | Facility: CLINIC | Age: 43
End: 2023-05-05

## 2023-06-03 NOTE — ED PROVIDER NOTE - SEIZURE DESCRIPTION
[FreeTextEntry1] : 57 yo male with hx of ALEJANDRO with related symptoms. The patient is complaining of mask discomfort. A nasal mask will be ordered. Compliance with treatment stressed. A compliance study will be obtained after. He is to follow up with his PMD as before. shaking, generalized

## 2023-06-05 NOTE — HISTORY OF PRESENT ILLNESS
[FreeTextEntry1] : ***3/30/2023***\par Ms Evelia Su is here today for a HFU and is accompanied by her caretaker\par Her recent Hospitalization is as follows below:\par \par \par - Chief Complaint: The patient is a 42y Female complaining of seizures.\par - HPI Objective Statement: 42 year old female with a history of seizure\par disorder, HLD, HTN, bipolar d/o, MR, hypothyroid presents with witnessed\par seizure.  Patient BIBA, resides at a group home.  According to aide at bedside,\par patient had 2 "petite" seizures yesterday afternoon.  PTA, she had a witnessed\par tonic clonic seizure that lasted 2 minutes.  Patient was sitting in a recliner\par when the seizure occurred, she urinated on herself but did not fall or hit her\par head.  Aide is not sure when patient's last seizure was prior to yesterday.\par She take Depakote 250mg TID and Keppra 750mg BID.  Patient nonverbal and unable\par to provide further history.  Currently post-ictal.\par - Presenting Symptoms: SEIZURE\par - Negative Findings: no blurred vision, no confusion, no fever, no loss of\par consciousness, no numbness, no vomiting\par - Seizure Duration: minute(s)  2\par - Number of Episodes: single\par - Seizure Description: shaking, generalized\par - Post Ictal Symptoms: disorientation\par - Timing: sudden onset\par - Duration: minute(s)  2\par - Context: unknown\par - Witnessed By: aide at group home\par - Aggravated Factors: none\par - Relieving Factors: none\par  \par HIV:\par HIV Test Questions:\par - In accordance with [place] State law, we offer every patient who comes to our ED\par an HIV test. Would you like to be tested today?	Unable to answer due to medical\par condition/unresponsive/etc...\par  \par PAST MEDICAL/SURGICAL/FAMILY/SOCIAL HISTORY:\par Past Medical, Past Surgical, and Family History:\par PAST MEDICAL HISTORY:\par Bipolar disorder\par  \par Bipolar illness\par  \par H/O osteoporosis\par  \par H/O sinus tachycardia\par  \par History of seizure disorder last seizure 6/4/2018\par  \par Hyperlipidemia\par  \par Hypothyroidism\par  \par Hypothyroidism\par  \par Mentally challenged Moderate intellectual disability\par  \par Seizure last seizure 08/2020.\par  \par PAST SURGICAL HISTORY:\par History of dental surgery 2019, 01/2020\par  \par History of hysteroscopy s/p hysteroscopy for thickened endometrium & removal of\par uterine polyp.\par  \par Tobacco Usage:\par - Tobacco Usage	Never smoker\par  \par - Attestation Comment: I have reviewed and confirmed nurses' notes for\par patient's medications, allergies, medical history, and surgical history.\par  \par ALLERGIES AND HOME MEDICATIONS:\par Allergies:\par      Allergies:\par 	Benadryl: Drug, Unknown\par 	Benadryl: Drug, Hives\par 	penicillin: Drug, Rash\par  \par \par \par \par \par 	\par 	\par 	\par 	\par 	\par 	\par 	\par \par  \par  \par PROGRESS NOTE:\par Date: 18-Feb-2023 06:38.\par  \par Progress: Results of work up d/w aide at bedside and copies of all reports\par given.  Aware of sub-therapeutic Depakote level.  Aide will call for transport\par back to group home.\par  \par Date: 18-Feb-2023 08:01.\par  \par Progress: Worsening. Pt had another generalized seizure while in ED. Lasted\par about 1 minute. Now post ictal. Given Ativan 1 mg. I spoke with neuro Marcelo\par who recommended transfer for EEG monitoring and further eval and treatment.\par  \par \par \par  \par \par \par  \par \par \par  \par  \par \par What are the signs or symptoms?\par  \par There are many types of seizures. The symptoms vary depending on the type of\par seizure you have.\par  \par Symptoms during a seizure\par  \par Shaking that you cannot control (convulsions) with fast, jerky movements of\par muscles.\par  \par  \par Stiffness of the body.\par  \par  \par \par \par \par  \par \par  \par \par  \par \par \par  \par  \par \par \par \par \par  \par \par  \par \par  \par  \par \par \par \par \par  \par  \par \par \par \par since patient came back from hospital\par seizures startred 4 months ago\par eyes fluterring doesn’t respond\par Lorazepam Seroquel lowered\par off Keppra\par Depakopte\par

## 2023-06-07 ENCOUNTER — APPOINTMENT (OUTPATIENT)
Dept: NEUROLOGY | Facility: CLINIC | Age: 43
End: 2023-06-07

## 2023-06-18 ENCOUNTER — NON-APPOINTMENT (OUTPATIENT)
Age: 43
End: 2023-06-18

## 2023-06-19 ENCOUNTER — OUTPATIENT (OUTPATIENT)
Dept: OUTPATIENT SERVICES | Facility: HOSPITAL | Age: 43
LOS: 1 days | Discharge: HOME | End: 2023-06-19

## 2023-06-19 DIAGNOSIS — Z98.890 OTHER SPECIFIED POSTPROCEDURAL STATES: Chronic | ICD-10-CM

## 2023-06-19 DIAGNOSIS — D64.9 ANEMIA, UNSPECIFIED: ICD-10-CM

## 2023-06-19 DIAGNOSIS — Z92.89 PERSONAL HISTORY OF OTHER MEDICAL TREATMENT: Chronic | ICD-10-CM

## 2023-06-20 ENCOUNTER — APPOINTMENT (OUTPATIENT)
Dept: NEUROLOGY | Facility: CLINIC | Age: 43
End: 2023-06-20
Payer: MEDICARE

## 2023-06-20 VITALS
SYSTOLIC BLOOD PRESSURE: 108 MMHG | WEIGHT: 192 LBS | BODY MASS INDEX: 34.02 KG/M2 | DIASTOLIC BLOOD PRESSURE: 69 MMHG | HEIGHT: 63 IN | HEART RATE: 102 BPM

## 2023-06-20 PROCEDURE — 99213 OFFICE O/P EST LOW 20 MIN: CPT

## 2023-07-13 ENCOUNTER — NON-APPOINTMENT (OUTPATIENT)
Age: 43
End: 2023-07-13

## 2023-07-13 ENCOUNTER — APPOINTMENT (OUTPATIENT)
Dept: CARDIOLOGY | Facility: CLINIC | Age: 43
End: 2023-07-13
Payer: MEDICARE

## 2023-07-13 VITALS
SYSTOLIC BLOOD PRESSURE: 130 MMHG | DIASTOLIC BLOOD PRESSURE: 76 MMHG | HEIGHT: 63 IN | OXYGEN SATURATION: 97 % | HEART RATE: 101 BPM | WEIGHT: 176 LBS | BODY MASS INDEX: 31.18 KG/M2

## 2023-07-13 DIAGNOSIS — E66.9 OBESITY, UNSPECIFIED: ICD-10-CM

## 2023-07-13 PROCEDURE — 93306 TTE W/DOPPLER COMPLETE: CPT

## 2023-07-13 PROCEDURE — 99214 OFFICE O/P EST MOD 30 MIN: CPT

## 2023-07-13 PROCEDURE — 93000 ELECTROCARDIOGRAM COMPLETE: CPT

## 2023-07-13 NOTE — CARDIOLOGY SUMMARY
[de-identified] : 7/13/23  sinus tachycardiac ICRBBB  [de-identified] : 7/13/23  EF 60-65% normal ventricular function

## 2023-07-13 NOTE — HISTORY OF PRESENT ILLNESS
[FreeTextEntry1] : Patient  intellectual disability , HLD  DM  abnormal EKG tachycardia,  obesity, seizure activity    here today in routine cardiac follow up accompanied VIA formal caregiver  says she did have seizure 2 weeks ago , patient is not cooperative , had echo without changes  , patient denies any chest pain or shortness of breath ,   patient is tachycardic as she is agitated , ekg showed sinus tachycardia \par \par \par Patient blood work showed controlled cholesterol elevated TG    HDL 44 LDL 67    done feb 2023  Hb a1c 5.9 % \par \par \par EKG reviewed no significant changes Mildly tachycardic as she is anxious , \par \par  EF 60-65% trace valvular abnormalities, No segmental wall motion abnormalities\par

## 2023-07-13 NOTE — DISCUSSION/SUMMARY
[FreeTextEntry1] : Mild Tachycardia,  Inc RBBB , possibly  anxiety , Prior holter showed AVR 90s.  normal ventricular function \par \par Cardiac murmur : flow murmur ,  normal ventricular function \par \par HLD: Lipitor previously uptitrated which she is tolerating  improved TC LDL but elevated TG    continue diet restriction , \par \par obesity : encourage calorie intake restriction ,  weight loss diet,  weight reduction\par \par Seizure disorder : continue medication and Neurology management \par \par \par \par follow up after 6 months \par \par  [EKG obtained to assist in diagnosis and management of assessed problem(s)] : EKG obtained to assist in diagnosis and management of assessed problem(s)

## 2023-07-13 NOTE — PHYSICAL EXAM
[Obese] : obese [Normal Conjunctiva] : normal conjunctiva [Normal Venous Pressure] : normal venous pressure [Normal S1, S2] : normal S1, S2 [No Rub] : no rub [No Gallop] : no gallop [Murmur] : murmur [Soft] : abdomen soft [Non Tender] : non-tender [Normal Gait] : normal gait [No Edema] : no edema [No Rash] : no rash [Normal] : moves all extremities, no focal deficits, normal speech [de-identified] : 1/6/SM  [de-identified] : Mentally challenged

## 2023-07-18 ENCOUNTER — LABORATORY RESULT (OUTPATIENT)
Age: 43
End: 2023-07-18

## 2023-07-18 ENCOUNTER — RESULT REVIEW (OUTPATIENT)
Age: 43
End: 2023-07-18

## 2023-07-18 ENCOUNTER — APPOINTMENT (OUTPATIENT)
Dept: HEMATOLOGY ONCOLOGY | Facility: CLINIC | Age: 43
End: 2023-07-18
Payer: MEDICARE

## 2023-07-18 ENCOUNTER — APPOINTMENT (OUTPATIENT)
Dept: HEMATOLOGY ONCOLOGY | Facility: CLINIC | Age: 43
End: 2023-07-18

## 2023-07-18 VITALS
DIASTOLIC BLOOD PRESSURE: 85 MMHG | RESPIRATION RATE: 17 BRPM | SYSTOLIC BLOOD PRESSURE: 145 MMHG | HEART RATE: 80 BPM | OXYGEN SATURATION: 97 % | HEIGHT: 62.99 IN

## 2023-07-18 DIAGNOSIS — D51.8 OTHER VITAMIN B12 DEFICIENCY ANEMIAS: ICD-10-CM

## 2023-07-18 LAB
BASOPHILS # BLD AUTO: 0.08 K/UL — SIGNIFICANT CHANGE UP (ref 0–0.2)
BASOPHILS NFR BLD AUTO: 1.1 % — SIGNIFICANT CHANGE UP (ref 0–2)
EOSINOPHIL # BLD AUTO: 0.48 K/UL — SIGNIFICANT CHANGE UP (ref 0–0.5)
EOSINOPHIL NFR BLD AUTO: 6.8 % — HIGH (ref 0–6)
HCT VFR BLD CALC: 37.2 % — SIGNIFICANT CHANGE UP (ref 34.5–45)
HGB BLD-MCNC: 12.3 G/DL — SIGNIFICANT CHANGE UP (ref 11.5–15.5)
IMM GRANULOCYTES NFR BLD AUTO: 0.4 % — SIGNIFICANT CHANGE UP (ref 0–0.9)
LYMPHOCYTES # BLD AUTO: 2.65 K/UL — SIGNIFICANT CHANGE UP (ref 1–3.3)
LYMPHOCYTES # BLD AUTO: 37.3 % — SIGNIFICANT CHANGE UP (ref 13–44)
MCHC RBC-ENTMCNC: 28.8 PG — SIGNIFICANT CHANGE UP (ref 27–34)
MCHC RBC-ENTMCNC: 33.1 G/DL — SIGNIFICANT CHANGE UP (ref 32–36)
MCV RBC AUTO: 87.1 FL — SIGNIFICANT CHANGE UP (ref 80–100)
MONOCYTES # BLD AUTO: 0.47 K/UL — SIGNIFICANT CHANGE UP (ref 0–0.9)
MONOCYTES NFR BLD AUTO: 6.6 % — SIGNIFICANT CHANGE UP (ref 2–14)
NEUTROPHILS # BLD AUTO: 3.4 K/UL — SIGNIFICANT CHANGE UP (ref 1.8–7.4)
NEUTROPHILS NFR BLD AUTO: 47.8 % — SIGNIFICANT CHANGE UP (ref 43–77)
NRBC # BLD: 0 /100 WBCS — SIGNIFICANT CHANGE UP (ref 0–0)
PLATELET # BLD AUTO: 247 K/UL — SIGNIFICANT CHANGE UP (ref 150–400)
RBC # BLD: 4.27 M/UL — SIGNIFICANT CHANGE UP (ref 3.8–5.2)
RBC # FLD: 13.5 % — SIGNIFICANT CHANGE UP (ref 10.3–14.5)
WBC # BLD: 7.11 K/UL — SIGNIFICANT CHANGE UP (ref 3.8–10.5)
WBC # FLD AUTO: 7.11 K/UL — SIGNIFICANT CHANGE UP (ref 3.8–10.5)

## 2023-07-18 PROCEDURE — 99213 OFFICE O/P EST LOW 20 MIN: CPT

## 2023-07-19 NOTE — ADDENDUM
[FreeTextEntry1] : Labs reviewed; Iron studies do show drop in iron stores with low Ferritin and low iron saturation.\par Patient will need GI evaluation with EGD/Colonoscopy and would resume oral iron supplementation of Slow Release Iron Twice a day.\par She will need to follow - up in office in 6 months; would request for her to be pre-medicated with sedative as per Dr. Ventura in order to reduce her combative behavior in the office.

## 2023-07-19 NOTE — REVIEW OF SYSTEMS
[Negative] : Allergic/Immunologic [FreeTextEntry9] : right foot pain; with abrasion [de-identified] : intellectual disability; had seizure in mid December 2022 [de-identified] : unable to sit still

## 2023-07-19 NOTE — REASON FOR VISIT
[Follow-Up Visit] : a follow-up visit for [Blood Count Assessment] : blood count assessment [Other: _____] : [unfilled] [FreeTextEntry2] : prior history of iron and b12 deficiency

## 2023-07-19 NOTE — PHYSICAL EXAM
[de-identified] : well nourishes;  severely agitated [de-identified] : unable to examine due to behavior [de-identified] : unable to examine due to behavior [de-identified] : unable to examine due to behavior [de-identified] : unable to examine due to behavior [de-identified] : unable to examine due to behavior [de-identified] : unable to examine due to behavior [de-identified] : unable to examine due to behavior [de-identified] : unable to examine due to behavior [de-identified] : right foot full range of motion [de-identified] : unable to examine due to behavior [de-identified] : unable to examine due to behavior [de-identified] : intellectual disability, easily agitated

## 2023-07-19 NOTE — ASSESSMENT
[FreeTextEntry1] : 42yo woman with intellectual disability, noted to have B12 and iron deficiency dating back to 2014 thought to be due to heavy menstrual bleeding at that time; responded well to oral supplementation\par \par - patient not cooperative with exam today\par - CBC stable; await results of iron studies/B12/folate\par - for now to continue oral supplements of iron sulfate BID\par - paperwork for group home completed\par - f/u on as needed basis

## 2023-07-19 NOTE — HISTORY OF PRESENT ILLNESS
[de-identified] : Patient with long standing mental disability, resident of group home, followed since 2014 for multifactorial anemia including iron deficiency and B12 deficiency. She has been on oral supplements with Iron sulfate. She was also previously on oral B12 supplements which have been stopped. \par \par Per aide she was hospitalized after a seizure 2 weeks ago. Her depakote has been held as well as metformin which was started to help control her heavy menstrual periods.\par \par She transferred care from [facility] hematology office to  [facility]) as of 12/2022 [de-identified] : 7/18/23\par Patient returns today to rule out progression of anemia\par Patient had labs most recently in January 2023 and her blood indices were stable\par Repeat CBC today shows normal H/H\par She has stopped multiple psychotropic meds and has wild behavior; unable to sit still; grabs staff and is aggressive at times. Aide reports that medications were stopped per patient's mother's request

## 2023-07-20 RX ORDER — ATORVASTATIN CALCIUM 40 MG/1
40 TABLET, FILM COATED ORAL DAILY
Qty: 90 | Refills: 1 | Status: DISCONTINUED | COMMUNITY
Start: 2021-05-14 | End: 2023-07-20

## 2023-07-21 NOTE — ED PROVIDER NOTE - NS ED MD EM SELECTION
68358 Comprehensive 69660 Detailed Glycopyrrolate Pregnancy And Lactation Text: This medication is Pregnancy Category B and is considered safe during pregnancy. It is unknown if it is excreted breast milk.

## 2023-07-24 NOTE — ED ADULT NURSE NOTE - GENITOURINARY ASSESSMENT
PT calls stating she has Nausea after her surgery last week. The surgeon told her that she would have nausea for a couple of weeks, Because of hormone changes.      They are asking her to get refills of ZOFRAN from her PRIMARY provider.     Pended Rx zofran, per pt, confirmed directions and dosing.   She only has 1 pill left.     She has been taking about 3 tablets daily. one in AM, one in afternoon, and one before bed.              - - -

## 2023-08-14 ENCOUNTER — RX RENEWAL (OUTPATIENT)
Age: 43
End: 2023-08-14

## 2023-08-14 RX ORDER — MIDAZOLAM 5 MG/.1ML
5 SPRAY NASAL
Qty: 1 | Refills: 0 | Status: ACTIVE | COMMUNITY
Start: 2022-06-13 | End: 1900-01-01

## 2023-08-14 RX ORDER — MIDAZOLAM HYDROCHLORIDE 5 MG/ML
5 INJECTION, SOLUTION INTRAMUSCULAR; INTRAVENOUS
Qty: 45 | Refills: 0 | Status: ACTIVE | COMMUNITY
Start: 2023-08-14 | End: 1900-01-01

## 2023-08-18 RX ORDER — FERROUS SULFATE TAB EC 324 MG (65 MG FE EQUIVALENT) 324 (65 FE) MG
324 (65 FE) TABLET DELAYED RESPONSE ORAL
Qty: 60 | Refills: 5 | Status: ACTIVE | COMMUNITY
Start: 2023-08-18 | End: 1900-01-01

## 2023-08-27 ENCOUNTER — EMERGENCY (EMERGENCY)
Facility: HOSPITAL | Age: 43
LOS: 1 days | Discharge: HOME | End: 2023-08-27
Attending: EMERGENCY MEDICINE | Admitting: EMERGENCY MEDICINE
Payer: MEDICARE

## 2023-08-27 VITALS
SYSTOLIC BLOOD PRESSURE: 132 MMHG | WEIGHT: 160.06 LBS | HEIGHT: 62 IN | OXYGEN SATURATION: 97 % | RESPIRATION RATE: 18 BRPM | HEART RATE: 96 BPM | DIASTOLIC BLOOD PRESSURE: 51 MMHG | TEMPERATURE: 95 F

## 2023-08-27 VITALS
DIASTOLIC BLOOD PRESSURE: 85 MMHG | SYSTOLIC BLOOD PRESSURE: 148 MMHG | TEMPERATURE: 98 F | OXYGEN SATURATION: 98 % | RESPIRATION RATE: 16 BRPM | HEART RATE: 78 BPM

## 2023-08-27 DIAGNOSIS — Z98.890 OTHER SPECIFIED POSTPROCEDURAL STATES: Chronic | ICD-10-CM

## 2023-08-27 DIAGNOSIS — Z92.89 PERSONAL HISTORY OF OTHER MEDICAL TREATMENT: Chronic | ICD-10-CM

## 2023-08-27 LAB
ALBUMIN SERPL ELPH-MCNC: 4 G/DL — SIGNIFICANT CHANGE UP (ref 3.3–5)
ALP SERPL-CCNC: 66 U/L — SIGNIFICANT CHANGE UP (ref 30–120)
ALT FLD-CCNC: 19 U/L — SIGNIFICANT CHANGE UP (ref 10–60)
ANION GAP SERPL CALC-SCNC: 13 MMOL/L — SIGNIFICANT CHANGE UP (ref 5–17)
APPEARANCE UR: CLEAR — SIGNIFICANT CHANGE UP
APTT BLD: 36.1 SEC — HIGH (ref 24.5–35.6)
AST SERPL-CCNC: 14 U/L — SIGNIFICANT CHANGE UP (ref 10–40)
BASOPHILS # BLD AUTO: 0.04 K/UL — SIGNIFICANT CHANGE UP (ref 0–0.2)
BASOPHILS NFR BLD AUTO: 0.5 % — SIGNIFICANT CHANGE UP (ref 0–2)
BILIRUB SERPL-MCNC: 0.2 MG/DL — SIGNIFICANT CHANGE UP (ref 0.2–1.2)
BILIRUB UR-MCNC: NEGATIVE — SIGNIFICANT CHANGE UP
BUN SERPL-MCNC: 14 MG/DL — SIGNIFICANT CHANGE UP (ref 7–23)
CALCIUM SERPL-MCNC: 9.8 MG/DL — SIGNIFICANT CHANGE UP (ref 8.4–10.5)
CHLORIDE SERPL-SCNC: 102 MMOL/L — SIGNIFICANT CHANGE UP (ref 96–108)
CK SERPL-CCNC: 97 U/L — SIGNIFICANT CHANGE UP (ref 26–192)
CO2 SERPL-SCNC: 22 MMOL/L — SIGNIFICANT CHANGE UP (ref 22–31)
COLOR SPEC: YELLOW — SIGNIFICANT CHANGE UP
CREAT SERPL-MCNC: 0.92 MG/DL — SIGNIFICANT CHANGE UP (ref 0.5–1.3)
DIFF PNL FLD: ABNORMAL
EGFR: 79 ML/MIN/1.73M2 — SIGNIFICANT CHANGE UP
EOSINOPHIL # BLD AUTO: 0.02 K/UL — SIGNIFICANT CHANGE UP (ref 0–0.5)
EOSINOPHIL NFR BLD AUTO: 0.2 % — SIGNIFICANT CHANGE UP (ref 0–6)
GLUCOSE SERPL-MCNC: 106 MG/DL — HIGH (ref 70–99)
GLUCOSE UR QL: NEGATIVE MG/DL — SIGNIFICANT CHANGE UP
HCT VFR BLD CALC: 44.2 % — SIGNIFICANT CHANGE UP (ref 34.5–45)
HGB BLD-MCNC: 13.6 G/DL — SIGNIFICANT CHANGE UP (ref 11.5–15.5)
IMM GRANULOCYTES NFR BLD AUTO: 0.5 % — SIGNIFICANT CHANGE UP (ref 0–0.9)
INR BLD: 0.99 RATIO — SIGNIFICANT CHANGE UP (ref 0.85–1.18)
KETONES UR-MCNC: ABNORMAL MG/DL
LACTATE SERPL-SCNC: 1.6 MMOL/L — SIGNIFICANT CHANGE UP (ref 0.7–2)
LACTATE SERPL-SCNC: 2.5 MMOL/L — HIGH (ref 0.7–2)
LEUKOCYTE ESTERASE UR-ACNC: ABNORMAL
LIDOCAIN IGE QN: 27 U/L — SIGNIFICANT CHANGE UP (ref 16–77)
LYMPHOCYTES # BLD AUTO: 1.91 K/UL — SIGNIFICANT CHANGE UP (ref 1–3.3)
LYMPHOCYTES # BLD AUTO: 22.7 % — SIGNIFICANT CHANGE UP (ref 13–44)
MCHC RBC-ENTMCNC: 26.3 PG — LOW (ref 27–34)
MCHC RBC-ENTMCNC: 30.8 GM/DL — LOW (ref 32–36)
MCV RBC AUTO: 85.3 FL — SIGNIFICANT CHANGE UP (ref 80–100)
MONOCYTES # BLD AUTO: 0.68 K/UL — SIGNIFICANT CHANGE UP (ref 0–0.9)
MONOCYTES NFR BLD AUTO: 8.1 % — SIGNIFICANT CHANGE UP (ref 2–14)
NEUTROPHILS # BLD AUTO: 5.74 K/UL — SIGNIFICANT CHANGE UP (ref 1.8–7.4)
NEUTROPHILS NFR BLD AUTO: 68 % — SIGNIFICANT CHANGE UP (ref 43–77)
NITRITE UR-MCNC: NEGATIVE — SIGNIFICANT CHANGE UP
NRBC # BLD: 0 /100 WBCS — SIGNIFICANT CHANGE UP (ref 0–0)
NT-PROBNP SERPL-SCNC: 111 PG/ML — SIGNIFICANT CHANGE UP (ref 0–125)
PH UR: 8 — SIGNIFICANT CHANGE UP (ref 5–8)
PLATELET # BLD AUTO: 374 K/UL — SIGNIFICANT CHANGE UP (ref 150–400)
POTASSIUM SERPL-MCNC: 4.4 MMOL/L — SIGNIFICANT CHANGE UP (ref 3.5–5.3)
POTASSIUM SERPL-SCNC: 4.4 MMOL/L — SIGNIFICANT CHANGE UP (ref 3.5–5.3)
PROT SERPL-MCNC: 8 G/DL — SIGNIFICANT CHANGE UP (ref 6–8.3)
PROT UR-MCNC: SIGNIFICANT CHANGE UP MG/DL
PROTHROM AB SERPL-ACNC: 10.8 SEC — SIGNIFICANT CHANGE UP (ref 9.5–13)
RAPID RVP RESULT: SIGNIFICANT CHANGE UP
RBC # BLD: 5.18 M/UL — SIGNIFICANT CHANGE UP (ref 3.8–5.2)
RBC # FLD: 13.4 % — SIGNIFICANT CHANGE UP (ref 10.3–14.5)
SARS-COV-2 RNA SPEC QL NAA+PROBE: SIGNIFICANT CHANGE UP
SODIUM SERPL-SCNC: 137 MMOL/L — SIGNIFICANT CHANGE UP (ref 135–145)
SP GR SPEC: 1.02 — SIGNIFICANT CHANGE UP (ref 1–1.03)
TROPONIN I, HIGH SENSITIVITY RESULT: 8.1 NG/L — SIGNIFICANT CHANGE UP
UROBILINOGEN FLD QL: 1 MG/DL — SIGNIFICANT CHANGE UP (ref 0.2–1)
WBC # BLD: 8.43 K/UL — SIGNIFICANT CHANGE UP (ref 3.8–10.5)
WBC # FLD AUTO: 8.43 K/UL — SIGNIFICANT CHANGE UP (ref 3.8–10.5)

## 2023-08-27 PROCEDURE — 85610 PROTHROMBIN TIME: CPT

## 2023-08-27 PROCEDURE — 83880 ASSAY OF NATRIURETIC PEPTIDE: CPT

## 2023-08-27 PROCEDURE — 93005 ELECTROCARDIOGRAM TRACING: CPT

## 2023-08-27 PROCEDURE — 87040 BLOOD CULTURE FOR BACTERIA: CPT

## 2023-08-27 PROCEDURE — 84484 ASSAY OF TROPONIN QUANT: CPT

## 2023-08-27 PROCEDURE — 85730 THROMBOPLASTIN TIME PARTIAL: CPT

## 2023-08-27 PROCEDURE — 96360 HYDRATION IV INFUSION INIT: CPT

## 2023-08-27 PROCEDURE — 93010 ELECTROCARDIOGRAM REPORT: CPT

## 2023-08-27 PROCEDURE — 99285 EMERGENCY DEPT VISIT HI MDM: CPT

## 2023-08-27 PROCEDURE — 96361 HYDRATE IV INFUSION ADD-ON: CPT

## 2023-08-27 PROCEDURE — 80053 COMPREHEN METABOLIC PANEL: CPT

## 2023-08-27 PROCEDURE — 82550 ASSAY OF CK (CPK): CPT

## 2023-08-27 PROCEDURE — 83690 ASSAY OF LIPASE: CPT

## 2023-08-27 PROCEDURE — 71045 X-RAY EXAM CHEST 1 VIEW: CPT

## 2023-08-27 PROCEDURE — 36415 COLL VENOUS BLD VENIPUNCTURE: CPT

## 2023-08-27 PROCEDURE — 0225U NFCT DS DNA&RNA 21 SARSCOV2: CPT

## 2023-08-27 PROCEDURE — 83605 ASSAY OF LACTIC ACID: CPT

## 2023-08-27 PROCEDURE — 71045 X-RAY EXAM CHEST 1 VIEW: CPT | Mod: 26

## 2023-08-27 PROCEDURE — 99285 EMERGENCY DEPT VISIT HI MDM: CPT | Mod: 25

## 2023-08-27 PROCEDURE — 87086 URINE CULTURE/COLONY COUNT: CPT

## 2023-08-27 PROCEDURE — 70450 CT HEAD/BRAIN W/O DYE: CPT | Mod: MA

## 2023-08-27 PROCEDURE — 70450 CT HEAD/BRAIN W/O DYE: CPT | Mod: 26,MA

## 2023-08-27 PROCEDURE — 81001 URINALYSIS AUTO W/SCOPE: CPT

## 2023-08-27 PROCEDURE — 85025 COMPLETE CBC W/AUTO DIFF WBC: CPT

## 2023-08-27 RX ORDER — SODIUM CHLORIDE 9 MG/ML
1000 INJECTION INTRAMUSCULAR; INTRAVENOUS; SUBCUTANEOUS ONCE
Refills: 0 | Status: COMPLETED | OUTPATIENT
Start: 2023-08-27 | End: 2023-08-27

## 2023-08-27 RX ADMIN — SODIUM CHLORIDE 1000 MILLILITER(S): 9 INJECTION INTRAMUSCULAR; INTRAVENOUS; SUBCUTANEOUS at 14:36

## 2023-08-27 RX ADMIN — SODIUM CHLORIDE 1000 MILLILITER(S): 9 INJECTION INTRAMUSCULAR; INTRAVENOUS; SUBCUTANEOUS at 16:40

## 2023-08-27 NOTE — ED ADULT NURSE NOTE - OBJECTIVE STATEMENT
pt bib ems for period of unresponsiveness, hx of bipolar / MR, pt normally speaks, but is currently nonvebral upon ed assessment, pts mother states she is usually like this after a seziure. no other information obtainable, group home aid at bedside

## 2023-08-27 NOTE — ED PROVIDER NOTE - WR ORDER STATUS 1

## 2023-08-27 NOTE — ED ADULT NURSE NOTE - CHIEF COMPLAINT QUOTE
Sent from [facility], called to EMS for low O2 sat, no sob, change in mental status, EMS got 100% O2 sat at the scene

## 2023-08-27 NOTE — ED PROVIDER NOTE - CLINICAL SUMMARY MEDICAL DECISION MAKING FREE TEXT BOX
Reported altered mental status today, no acute hypoxia noted.  Patient with hypothermia.  Will check labs, x-ray, UA, RVP, IV fluids, reeval

## 2023-08-27 NOTE — ED PROVIDER NOTE - OBJECTIVE STATEMENT
43-year-old female, group home with history of bipolar, MR, hyperlipidemia, hypothyroid, seizure disorder presents with has been more elevated usual today.  No known fall or trauma.  No evidence of pain.  Patient minimally verbal.  EMS reports that the group home had complained that the patient's O2 sat was low, however when they checked the patient O2 sat was 100% on room air.  No known fever.  No other history available.  No known COVID exposure.

## 2023-08-27 NOTE — ED ADULT NURSE NOTE - NSFALLRISKINTERV_ED_ALL_ED
Assistance OOB with selected safe patient handling equipment if applicable/Assistance with ambulation/Communicate fall risk and risk factors to all staff, patient, and family/Monitor gait and stability/Provide visual cue: yellow wristband, yellow gown, etc/Reinforce activity limits and safety measures with patient and family/Call bell, personal items and telephone in reach/Instruct patient to call for assistance before getting out of bed/chair/stretcher/Non-slip footwear applied when patient is off stretcher/[place] to call system/Physically safe environment - no spills, clutter or unnecessary equipment/Purposeful Proactive Rounding/Room/bathroom lighting operational, light cord in reach

## 2023-08-27 NOTE — ED ADULT NURSE REASSESSMENT NOTE - NS ED NURSE REASSESS COMMENT FT1
pt resting comfortably, improvement noted, pt more verbal at this time, in no acute distress. Respirations are even and unlabored. pt voices no complaints at this time. plan of care ongoing, no further concerns as of present, pt repositioned in bed, pt hemodynamically stable. no acute changes noted, needs attended to, safety maintained, call bell within reach.

## 2023-08-27 NOTE — ED ADULT NURSE NOTE - NS ED NURSE LEVEL OF CONSCIOUSNESS SPEECH
Munira Morgan RN  08/27/23 4593 Unable to speak dental pain #s 1, 31. airway intact. . neck supple, no submandibular erythema. no ludwigs. dental eval

## 2023-08-27 NOTE — ED PROVIDER NOTE - DIFFERENTIAL DIAGNOSIS
Rule out acute infection, leukocytosis, electrolyte abnormality, flu/COVID, other acute pathology Differential Diagnosis

## 2023-08-27 NOTE — ED ADULT NURSE NOTE - NS ED NURSE LEVEL OF CONSCIOUSNESS ORIENTATION
Attempt x1 at report, states charge is in another pt's room right now and can't take report   Nonverbal

## 2023-08-27 NOTE — ED PROVIDER NOTE - PATIENT PORTAL LINK FT
You can access the FollowMyHealth Patient Portal offered by [facility] by registering at the following website: [email or web address]. By joining [facility]’s FollowMyHealth portal, you will also be able to view your health information using other applications (apps) compatible with our system.

## 2023-08-27 NOTE — ED PROVIDER NOTE - CARE PROVIDER_API CALL
Fay Ventura.  Family Medicine  [address], [address]  [place] [id number]  Phone: [phone]  Fax: [phone]  Follow Up Time:

## 2023-08-27 NOTE — ED PROVIDER NOTE - NSFOLLOWUPINSTRUCTIONS_ED_ALL_ED_FT
1. Follow-up with your Primary Medical Doctor, Dr. Ventura , call tomorrow for prompt follow-up.  2. Return to Emergency room for any worsening or persistent pain, weakness, fever, dizziness, passing out, difficulty breathing or any other concerning symptoms.  3. See attached instruction sheets for additional information, including information regarding signs and symptoms to look out for, reasons to seek immediate care and other important instructions.  4.  Plenty of fluids

## 2023-08-27 NOTE — ED ADULT TRIAGE NOTE - CHIEF COMPLAINT QUOTE
Sent from [facility], called to EMS for low O2 sat, no sob, change in mental status, EMS got 100% O2 sat at the scene

## 2023-08-27 NOTE — ED PROVIDER NOTE - PROGRESS NOTE DETAILS
Patient's RN spoke with the patient's mother who states that this is how the patient asked after she has a seizure usually. Patient doing well, is at her normal usual baseline mental status, will continue to monitor.  Patient is again talking as usual. Patient doing well, discussed with aide, patient is at her normal baseline.  No acute changes or concerns.  No acute findings on work-up.  Will discharge back to facility. Discussed with Dr. Jarrett Velazquez, agree with discharge home, patient to follow-up with her primary care at the facility.

## 2023-08-28 LAB
CULTURE RESULTS: SIGNIFICANT CHANGE UP
SPECIMEN SOURCE: SIGNIFICANT CHANGE UP

## 2023-08-29 ENCOUNTER — APPOINTMENT (OUTPATIENT)
Dept: NEUROLOGY | Facility: CLINIC | Age: 43
End: 2023-08-29
Payer: MEDICARE

## 2023-08-29 VITALS
SYSTOLIC BLOOD PRESSURE: 113 MMHG | HEIGHT: 62.99 IN | HEART RATE: 106 BPM | DIASTOLIC BLOOD PRESSURE: 78 MMHG | BODY MASS INDEX: 31.18 KG/M2 | WEIGHT: 176 LBS

## 2023-08-29 PROCEDURE — 99214 OFFICE O/P EST MOD 30 MIN: CPT

## 2023-08-29 RX ORDER — DIVALPROEX SODIUM 500 MG/1
500 TABLET, EXTENDED RELEASE ORAL 3 TIMES DAILY
Refills: 0 | Status: DISCONTINUED | COMMUNITY
End: 2023-08-29

## 2023-08-29 RX ORDER — LEVETIRACETAM 500 MG/1
500 TABLET, FILM COATED ORAL
Qty: 120 | Refills: 0 | Status: DISCONTINUED | COMMUNITY
Start: 2022-06-14 | End: 2023-08-29

## 2023-08-30 LAB
ALBUMIN SERPL ELPH-MCNC: 4.7 G/DL
ALP BLD-CCNC: 74 U/L
ALT SERPL-CCNC: 13 U/L
ANION GAP SERPL CALC-SCNC: 13 MMOL/L
AST SERPL-CCNC: 9 U/L
BILIRUB SERPL-MCNC: 0.2 MG/DL
BUN SERPL-MCNC: 14 MG/DL
CALCIUM SERPL-MCNC: 10.2 MG/DL
CHLORIDE SERPL-SCNC: 104 MMOL/L
CO2 SERPL-SCNC: 23 MMOL/L
CREAT SERPL-MCNC: 0.69 MG/DL
EGFR: 110 ML/MIN/1.73M2
GLUCOSE SERPL-MCNC: 97 MG/DL
POTASSIUM SERPL-SCNC: 4.7 MMOL/L
PROT SERPL-MCNC: 7.4 G/DL
SODIUM SERPL-SCNC: 141 MMOL/L

## 2023-08-30 NOTE — HISTORY OF PRESENT ILLNESS
[FreeTextEntry1] : *** 08/29/2023 ***  MARLEY MCKEON is here for follow up. her complex history is as below:  Hospital Course	 43yo woman with HTN, HLD, bipolar d/o, hypothyroidism, seizure disorder (on Keppra and VPA), developmentally delayed, who initially presented to OSH on 2/18 for breakthrough seizures, transferred to the EMU for further evaluation and management. Per chart review and discussion with aide at bedside, patient had 2 seizures at her group home, including an episode of generalized "tonic-clonic" shaking while seated in a recliner, lasting 2 minutes, associated with urinary incontinence. She was taken to OSH where she was reported to be postictal and received IV VPA 500mg x1 and later determined to be stable for discharge back to her group home. While awaiting transport, she had a 3rd witnessed event lasting less than 1 minute, for which she was administered ativan 1mg IVP, and decision made for transport to [facility] for continuous EEG. There was no fall or injury associated with any of these events. Her aide is unsure of when her last seizure prior to these events may have occurred. Pt was admitted to the EMU and monitored on EEG.  The patient was incidentally found to be COVID positive on 2/28/23 when preparing for discharge. She was placed on isolation and remained asymptomatic.   Objective data: EEG REPORTS   2/19 EEG Summary:   Normal EEG in the awake, drowsy and asleep states. Excess beta   2/20-2/23 EEG Summary:   Normal EEG in the awake, drowsy and asleep states. Excess beta   Impression/Clinical Correlate:   This is a normal VEEG. No epileptiform pattern or seizures were recorded. The presence of excessive beta activity is most often seen in setting of sedative medication use.   2/24 EEG Summary:   Normal EEG in the awake, drowsy and asleep states. Excess beta   Impression/Clinical Correlate:   This is a normal VEEG. No epileptiform pattern or seizures were recorded. The presence of excessive beta activity never evolves and has no clear clinical correlation, is most often seen in setting of sedative medication use. Recommend repeat EEG once off sedative medications. 2/25 AEDs: Zonisamide 100mg BID   EEG Summary:   Normal EEG in the awake, drowsy and asleep states. Polyspikes, focal, left posterior temporal-central region Excess beta   Impression/Clinical Correlate:   This is an abnormal VEEG. There is evidence of potentially epileptogenic cortical irritability in the left posterior temporal-cental region. No seizures were recorded. The presence of excessive beta activity never evolves and has no clear clinical correlation, is most often seen in setting of sedative medication use but has also been described in rare cases of intellectual impairment.   3/2/23: EEG Summary:   Normal EEG in the awake, drowsy and asleep states.   Background slowing, generalized, mild Excess beta   Impression/Clinical Correlate:   This is an abnormal VEEG.  Mild diffuse/multifocal cerebral dysfunction, not specific in etiology. No seizures were recorded. The presence of excessive beta activity never evolves and has no clear clinical correlation, is most often seen in setting of sedative medication use but has also been described in rare cases of intellectual impairment.     Radiology: MR Brain-Seizure, Epilepsy w/wo IV Cont (02.25.23 @ 11:07) IMPRESSION: No acute infarct or hemorrhage. Nonspecific subcentimeter T2/FLAIR hyperintense white matter foci in the posterior left temporal lobe.   Impression: Breakthrough seizures in the setting of borderline VPA level possibly due to medication nonadherence vs inadequate dosing.   Plan: Continue following medications - Zonisamide 200mg BID Lacosamide 150mg BID Quetiapine 150mg BID with 50mg BID PRN   according to her mother she continues to have convulsive seizures the last one 2 days prior followed by severe behavior agitation)

## 2023-08-30 NOTE — DISCUSSION/SUMMARY
[FreeTextEntry1] : MARLEY MCKEON is a 43 years old with mild intellectual disability, symptomatic generalized seizures with moderate control only and with some side effect(agitation)  Plan : increase zonisamide to 300 bid continue vimpat 150 bid. blood work today f/u in 6 months. She may benefit from additional AED like Epidiolex (weak AED but good SE profile)

## 2023-08-30 NOTE — PHYSICAL EXAM
[FreeTextEntry1] : MS: alert & oriented to person, limited, tangential otherwise.  CN: VFF to confrontation, EOM full without nystagmus, PERRL, V1-V3 intact to light touch, face symmetrical, hears finger rub bilaterally, palate elevates symmetrically, shoulders elevate symmetrically, tongue midline MOTOR: normal tone x 4 extremities, Power 5/5 proximally and distally bilaterally, no drift, normal rapid alternating movements.  SENSORY: intact LT x 4 extremities REFLEXES: biceps 2+ bilaterally, triceps 2+ bilaterally, brachioradialis 2+ bilaterally, patella 2+ bilaterally, ankle 2+ bilaterally, plantar flexor bilaterally COORD: normal FNF, no tremor or dysmetria GAIT: normal base, Romberg negative, normal gait, able to walk on toes, heels and tandem.

## 2023-09-01 LAB
CULTURE RESULTS: SIGNIFICANT CHANGE UP
CULTURE RESULTS: SIGNIFICANT CHANGE UP
LACOSAMIDE (VIMPAT): 11.81 UG/ML
SPECIMEN SOURCE: SIGNIFICANT CHANGE UP
SPECIMEN SOURCE: SIGNIFICANT CHANGE UP

## 2023-09-05 LAB — ZONISAMIDE SERPL-MCNC: 16.5 UG/ML

## 2023-09-18 NOTE — H&P PST ADULT - RESPIRATORY AND THORAX
Continue your current cardiac medications    Please call the office with any questions or concerns ([phone])   
details…

## 2023-10-06 ENCOUNTER — APPOINTMENT (OUTPATIENT)
Dept: CARDIOLOGY | Facility: CLINIC | Age: 43
End: 2023-10-06

## 2023-10-13 ENCOUNTER — NON-APPOINTMENT (OUTPATIENT)
Age: 43
End: 2023-10-13

## 2023-10-13 ENCOUNTER — APPOINTMENT (OUTPATIENT)
Dept: CARDIOLOGY | Facility: CLINIC | Age: 43
End: 2023-10-13
Payer: MEDICARE

## 2023-10-13 VITALS
BODY MASS INDEX: 27.82 KG/M2 | HEIGHT: 62.99 IN | WEIGHT: 157 LBS | HEART RATE: 113 BPM | DIASTOLIC BLOOD PRESSURE: 56 MMHG | OXYGEN SATURATION: 97 % | SYSTOLIC BLOOD PRESSURE: 120 MMHG

## 2023-10-13 PROCEDURE — 93000 ELECTROCARDIOGRAM COMPLETE: CPT

## 2023-10-13 RX ORDER — LORAZEPAM 1 MG/1
1 TABLET ORAL
Refills: 0 | Status: ACTIVE | COMMUNITY

## 2023-10-13 RX ORDER — LACOSAMIDE 150 MG/1
150 TABLET, FILM COATED ORAL
Refills: 0 | Status: ACTIVE | COMMUNITY

## 2023-10-13 RX ORDER — OMEGA-3/DHA/EPA/FISH OIL 300-1000MG
1000 CAPSULE ORAL
Refills: 0 | Status: ACTIVE | COMMUNITY

## 2023-10-13 RX ORDER — LORATADINE 10 MG
17 TABLET,DISINTEGRATING ORAL
Refills: 0 | Status: ACTIVE | COMMUNITY

## 2023-10-24 ENCOUNTER — RX RENEWAL (OUTPATIENT)
Age: 43
End: 2023-10-24

## 2023-10-29 NOTE — DISCHARGE NOTE PROVIDER - CARE PROVIDERS DIRECT ADDRESSES
DISPLAY PLAN FREE TEXT [email or web address]

## 2023-11-21 NOTE — H&P PST ADULT - NSCAFFEAMTFREQ_GEN_ALL_CORE_SD
Addended by: LUIS GALVIN on: 11/21/2023 03:07 PM     Modules accepted: Orders    
Retinal tear and detachment warning symptoms reviewed and patient instructed to call immediately if increasing floaters, flashes, or decreasing peripheral vision.
occasional use

## 2023-11-29 ENCOUNTER — APPOINTMENT (OUTPATIENT)
Dept: NEUROLOGY | Facility: CLINIC | Age: 43
End: 2023-11-29
Payer: MEDICARE

## 2023-11-29 VITALS
BODY MASS INDEX: 28.89 KG/M2 | WEIGHT: 157 LBS | HEART RATE: 106 BPM | HEIGHT: 62 IN | SYSTOLIC BLOOD PRESSURE: 114 MMHG | DIASTOLIC BLOOD PRESSURE: 70 MMHG

## 2023-11-29 PROCEDURE — 99214 OFFICE O/P EST MOD 30 MIN: CPT

## 2023-12-08 ENCOUNTER — EMERGENCY (EMERGENCY)
Facility: HOSPITAL | Age: 43
LOS: 1 days | Discharge: HOME | End: 2023-12-08
Attending: EMERGENCY MEDICINE | Admitting: EMERGENCY MEDICINE
Payer: MEDICARE

## 2023-12-08 VITALS
HEIGHT: 66 IN | SYSTOLIC BLOOD PRESSURE: 121 MMHG | HEART RATE: 90 BPM | WEIGHT: 160.06 LBS | RESPIRATION RATE: 20 BRPM | DIASTOLIC BLOOD PRESSURE: 70 MMHG | OXYGEN SATURATION: 97 %

## 2023-12-08 VITALS
RESPIRATION RATE: 19 BRPM | OXYGEN SATURATION: 100 % | SYSTOLIC BLOOD PRESSURE: 155 MMHG | HEART RATE: 90 BPM | TEMPERATURE: 99 F | DIASTOLIC BLOOD PRESSURE: 64 MMHG

## 2023-12-08 LAB
ALBUMIN SERPL ELPH-MCNC: 3.8 G/DL — SIGNIFICANT CHANGE UP (ref 3.3–5)
ALBUMIN SERPL ELPH-MCNC: 3.8 G/DL — SIGNIFICANT CHANGE UP (ref 3.3–5)
ALP SERPL-CCNC: 53 U/L — SIGNIFICANT CHANGE UP (ref 30–120)
ALP SERPL-CCNC: 53 U/L — SIGNIFICANT CHANGE UP (ref 30–120)
ALT FLD-CCNC: 19 U/L — SIGNIFICANT CHANGE UP (ref 10–60)
ALT FLD-CCNC: 19 U/L — SIGNIFICANT CHANGE UP (ref 10–60)
ANION GAP SERPL CALC-SCNC: 12 MMOL/L — SIGNIFICANT CHANGE UP (ref 5–17)
ANION GAP SERPL CALC-SCNC: 12 MMOL/L — SIGNIFICANT CHANGE UP (ref 5–17)
APPEARANCE UR: CLEAR — SIGNIFICANT CHANGE UP
APPEARANCE UR: CLEAR — SIGNIFICANT CHANGE UP
AST SERPL-CCNC: 10 U/L — SIGNIFICANT CHANGE UP (ref 10–40)
AST SERPL-CCNC: 10 U/L — SIGNIFICANT CHANGE UP (ref 10–40)
BASOPHILS # BLD AUTO: 0.06 K/UL — SIGNIFICANT CHANGE UP (ref 0–0.2)
BASOPHILS # BLD AUTO: 0.06 K/UL — SIGNIFICANT CHANGE UP (ref 0–0.2)
BASOPHILS NFR BLD AUTO: 1.1 % — SIGNIFICANT CHANGE UP (ref 0–2)
BASOPHILS NFR BLD AUTO: 1.1 % — SIGNIFICANT CHANGE UP (ref 0–2)
BILIRUB SERPL-MCNC: 0.2 MG/DL — SIGNIFICANT CHANGE UP (ref 0.2–1.2)
BILIRUB SERPL-MCNC: 0.2 MG/DL — SIGNIFICANT CHANGE UP (ref 0.2–1.2)
BILIRUB UR-MCNC: NEGATIVE — SIGNIFICANT CHANGE UP
BILIRUB UR-MCNC: NEGATIVE — SIGNIFICANT CHANGE UP
BUN SERPL-MCNC: 14 MG/DL — SIGNIFICANT CHANGE UP (ref 7–23)
BUN SERPL-MCNC: 14 MG/DL — SIGNIFICANT CHANGE UP (ref 7–23)
CALCIUM SERPL-MCNC: 9.3 MG/DL — SIGNIFICANT CHANGE UP (ref 8.4–10.5)
CALCIUM SERPL-MCNC: 9.3 MG/DL — SIGNIFICANT CHANGE UP (ref 8.4–10.5)
CHLORIDE SERPL-SCNC: 106 MMOL/L — SIGNIFICANT CHANGE UP (ref 96–108)
CHLORIDE SERPL-SCNC: 106 MMOL/L — SIGNIFICANT CHANGE UP (ref 96–108)
CO2 SERPL-SCNC: 22 MMOL/L — SIGNIFICANT CHANGE UP (ref 22–31)
CO2 SERPL-SCNC: 22 MMOL/L — SIGNIFICANT CHANGE UP (ref 22–31)
COLOR SPEC: YELLOW — SIGNIFICANT CHANGE UP
COLOR SPEC: YELLOW — SIGNIFICANT CHANGE UP
CREAT SERPL-MCNC: 0.69 MG/DL — SIGNIFICANT CHANGE UP (ref 0.5–1.3)
CREAT SERPL-MCNC: 0.69 MG/DL — SIGNIFICANT CHANGE UP (ref 0.5–1.3)
DIFF PNL FLD: NEGATIVE — SIGNIFICANT CHANGE UP
DIFF PNL FLD: NEGATIVE — SIGNIFICANT CHANGE UP
EGFR: 110 ML/MIN/1.73M2 — SIGNIFICANT CHANGE UP
EGFR: 110 ML/MIN/1.73M2 — SIGNIFICANT CHANGE UP
EOSINOPHIL # BLD AUTO: 0.18 K/UL — SIGNIFICANT CHANGE UP (ref 0–0.5)
EOSINOPHIL # BLD AUTO: 0.18 K/UL — SIGNIFICANT CHANGE UP (ref 0–0.5)
EOSINOPHIL NFR BLD AUTO: 3.2 % — SIGNIFICANT CHANGE UP (ref 0–6)
EOSINOPHIL NFR BLD AUTO: 3.2 % — SIGNIFICANT CHANGE UP (ref 0–6)
GLUCOSE SERPL-MCNC: 106 MG/DL — HIGH (ref 70–99)
GLUCOSE SERPL-MCNC: 106 MG/DL — HIGH (ref 70–99)
GLUCOSE UR QL: NEGATIVE MG/DL — SIGNIFICANT CHANGE UP
GLUCOSE UR QL: NEGATIVE MG/DL — SIGNIFICANT CHANGE UP
HCG UR QL: NEGATIVE — SIGNIFICANT CHANGE UP
HCG UR QL: NEGATIVE — SIGNIFICANT CHANGE UP
HCT VFR BLD CALC: 39 % — SIGNIFICANT CHANGE UP (ref 34.5–45)
HCT VFR BLD CALC: 39 % — SIGNIFICANT CHANGE UP (ref 34.5–45)
HGB BLD-MCNC: 12 G/DL — SIGNIFICANT CHANGE UP (ref 11.5–15.5)
HGB BLD-MCNC: 12 G/DL — SIGNIFICANT CHANGE UP (ref 11.5–15.5)
IMM GRANULOCYTES NFR BLD AUTO: 0.5 % — SIGNIFICANT CHANGE UP (ref 0–0.9)
IMM GRANULOCYTES NFR BLD AUTO: 0.5 % — SIGNIFICANT CHANGE UP (ref 0–0.9)
KETONES UR-MCNC: NEGATIVE MG/DL — SIGNIFICANT CHANGE UP
KETONES UR-MCNC: NEGATIVE MG/DL — SIGNIFICANT CHANGE UP
LEUKOCYTE ESTERASE UR-ACNC: NEGATIVE — SIGNIFICANT CHANGE UP
LEUKOCYTE ESTERASE UR-ACNC: NEGATIVE — SIGNIFICANT CHANGE UP
LYMPHOCYTES # BLD AUTO: 2.19 K/UL — SIGNIFICANT CHANGE UP (ref 1–3.3)
LYMPHOCYTES # BLD AUTO: 2.19 K/UL — SIGNIFICANT CHANGE UP (ref 1–3.3)
LYMPHOCYTES # BLD AUTO: 39.5 % — SIGNIFICANT CHANGE UP (ref 13–44)
LYMPHOCYTES # BLD AUTO: 39.5 % — SIGNIFICANT CHANGE UP (ref 13–44)
MCHC RBC-ENTMCNC: 24.2 PG — LOW (ref 27–34)
MCHC RBC-ENTMCNC: 24.2 PG — LOW (ref 27–34)
MCHC RBC-ENTMCNC: 30.8 GM/DL — LOW (ref 32–36)
MCHC RBC-ENTMCNC: 30.8 GM/DL — LOW (ref 32–36)
MCV RBC AUTO: 78.8 FL — LOW (ref 80–100)
MCV RBC AUTO: 78.8 FL — LOW (ref 80–100)
MONOCYTES # BLD AUTO: 0.45 K/UL — SIGNIFICANT CHANGE UP (ref 0–0.9)
MONOCYTES # BLD AUTO: 0.45 K/UL — SIGNIFICANT CHANGE UP (ref 0–0.9)
MONOCYTES NFR BLD AUTO: 8.1 % — SIGNIFICANT CHANGE UP (ref 2–14)
MONOCYTES NFR BLD AUTO: 8.1 % — SIGNIFICANT CHANGE UP (ref 2–14)
NEUTROPHILS # BLD AUTO: 2.64 K/UL — SIGNIFICANT CHANGE UP (ref 1.8–7.4)
NEUTROPHILS # BLD AUTO: 2.64 K/UL — SIGNIFICANT CHANGE UP (ref 1.8–7.4)
NEUTROPHILS NFR BLD AUTO: 47.6 % — SIGNIFICANT CHANGE UP (ref 43–77)
NEUTROPHILS NFR BLD AUTO: 47.6 % — SIGNIFICANT CHANGE UP (ref 43–77)
NITRITE UR-MCNC: NEGATIVE — SIGNIFICANT CHANGE UP
NITRITE UR-MCNC: NEGATIVE — SIGNIFICANT CHANGE UP
NRBC # BLD: 0 /100 WBCS — SIGNIFICANT CHANGE UP (ref 0–0)
NRBC # BLD: 0 /100 WBCS — SIGNIFICANT CHANGE UP (ref 0–0)
PH UR: 8 — SIGNIFICANT CHANGE UP (ref 5–8)
PH UR: 8 — SIGNIFICANT CHANGE UP (ref 5–8)
PLATELET # BLD AUTO: 332 K/UL — SIGNIFICANT CHANGE UP (ref 150–400)
PLATELET # BLD AUTO: 332 K/UL — SIGNIFICANT CHANGE UP (ref 150–400)
POTASSIUM SERPL-MCNC: 4.3 MMOL/L — SIGNIFICANT CHANGE UP (ref 3.5–5.3)
POTASSIUM SERPL-MCNC: 4.3 MMOL/L — SIGNIFICANT CHANGE UP (ref 3.5–5.3)
POTASSIUM SERPL-SCNC: 4.3 MMOL/L — SIGNIFICANT CHANGE UP (ref 3.5–5.3)
POTASSIUM SERPL-SCNC: 4.3 MMOL/L — SIGNIFICANT CHANGE UP (ref 3.5–5.3)
PROT SERPL-MCNC: 7.5 G/DL — SIGNIFICANT CHANGE UP (ref 6–8.3)
PROT SERPL-MCNC: 7.5 G/DL — SIGNIFICANT CHANGE UP (ref 6–8.3)
PROT UR-MCNC: NEGATIVE MG/DL — SIGNIFICANT CHANGE UP
PROT UR-MCNC: NEGATIVE MG/DL — SIGNIFICANT CHANGE UP
RAPID RVP RESULT: SIGNIFICANT CHANGE UP
RAPID RVP RESULT: SIGNIFICANT CHANGE UP
RBC # BLD: 4.95 M/UL — SIGNIFICANT CHANGE UP (ref 3.8–5.2)
RBC # BLD: 4.95 M/UL — SIGNIFICANT CHANGE UP (ref 3.8–5.2)
RBC # FLD: 16.3 % — HIGH (ref 10.3–14.5)
RBC # FLD: 16.3 % — HIGH (ref 10.3–14.5)
SARS-COV-2 RNA SPEC QL NAA+PROBE: SIGNIFICANT CHANGE UP
SARS-COV-2 RNA SPEC QL NAA+PROBE: SIGNIFICANT CHANGE UP
SODIUM SERPL-SCNC: 140 MMOL/L — SIGNIFICANT CHANGE UP (ref 135–145)
SODIUM SERPL-SCNC: 140 MMOL/L — SIGNIFICANT CHANGE UP (ref 135–145)
SP GR SPEC: 1.01 — SIGNIFICANT CHANGE UP (ref 1–1.03)
SP GR SPEC: 1.01 — SIGNIFICANT CHANGE UP (ref 1–1.03)
UROBILINOGEN FLD QL: 1 MG/DL — SIGNIFICANT CHANGE UP (ref 0.2–1)
UROBILINOGEN FLD QL: 1 MG/DL — SIGNIFICANT CHANGE UP (ref 0.2–1)
WBC # BLD: 5.55 K/UL — SIGNIFICANT CHANGE UP (ref 3.8–10.5)
WBC # BLD: 5.55 K/UL — SIGNIFICANT CHANGE UP (ref 3.8–10.5)
WBC # FLD AUTO: 5.55 K/UL — SIGNIFICANT CHANGE UP (ref 3.8–10.5)
WBC # FLD AUTO: 5.55 K/UL — SIGNIFICANT CHANGE UP (ref 3.8–10.5)

## 2023-12-08 PROCEDURE — 93010 ELECTROCARDIOGRAM REPORT: CPT

## 2023-12-08 PROCEDURE — 70450 CT HEAD/BRAIN W/O DYE: CPT | Mod: MA

## 2023-12-08 PROCEDURE — 81003 URINALYSIS AUTO W/O SCOPE: CPT

## 2023-12-08 PROCEDURE — 93005 ELECTROCARDIOGRAM TRACING: CPT

## 2023-12-08 PROCEDURE — 71045 X-RAY EXAM CHEST 1 VIEW: CPT | Mod: 26

## 2023-12-08 PROCEDURE — 99285 EMERGENCY DEPT VISIT HI MDM: CPT | Mod: FS

## 2023-12-08 PROCEDURE — 36415 COLL VENOUS BLD VENIPUNCTURE: CPT

## 2023-12-08 PROCEDURE — 71045 X-RAY EXAM CHEST 1 VIEW: CPT

## 2023-12-08 PROCEDURE — 81025 URINE PREGNANCY TEST: CPT

## 2023-12-08 PROCEDURE — 0225U NFCT DS DNA&RNA 21 SARSCOV2: CPT

## 2023-12-08 PROCEDURE — 70450 CT HEAD/BRAIN W/O DYE: CPT | Mod: 26,MA

## 2023-12-08 PROCEDURE — 85025 COMPLETE CBC W/AUTO DIFF WBC: CPT

## 2023-12-08 PROCEDURE — 80053 COMPREHEN METABOLIC PANEL: CPT

## 2023-12-08 PROCEDURE — 96374 THER/PROPH/DIAG INJ IV PUSH: CPT

## 2023-12-08 PROCEDURE — 87086 URINE CULTURE/COLONY COUNT: CPT

## 2023-12-08 PROCEDURE — 99285 EMERGENCY DEPT VISIT HI MDM: CPT | Mod: 25

## 2023-12-08 RX ORDER — SODIUM CHLORIDE 9 MG/ML
1000 INJECTION INTRAMUSCULAR; INTRAVENOUS; SUBCUTANEOUS ONCE
Refills: 0 | Status: COMPLETED | OUTPATIENT
Start: 2023-12-08 | End: 2023-12-08

## 2023-12-08 RX ADMIN — Medication 1 MILLIGRAM(S): at 14:33

## 2023-12-08 NOTE — ED PROVIDER NOTE - PROVIDER TOKENS
PROVIDER:[TOKEN:[[id number]],FOLLOWUP:[1-3 Days]] PROVIDER:[TOKEN:[[id number]],FOLLOWUP:[1-3 Days]]

## 2023-12-08 NOTE — ED PROVIDER NOTE - CARE PLAN
Principal Discharge DX:	Seizure   1 Principal Discharge DX:	Seizure  Secondary Diagnosis:	Altered mental status

## 2023-12-08 NOTE — ED PROVIDER NOTE - NSFOLLOWUPINSTRUCTIONS_ED_ALL_ED_FT
drink plenty of fluids as discussed  continue seizure medications as prescribed   follow up with primary care provider and neurology       Seizure, Adult  A seizure is a sudden burst of abnormal electrical and chemical activity in the brain. Seizures usually last from 30 seconds to 2 minutes.    What are the causes?  Common causes of this condition include:  Fever or infection.  Problems that affect the brain. These may include:  A brain or head injury.  Bleeding in the brain.  A brain tumor.  Low levels of blood sugar or salt.  Kidney problems or liver problems.  Conditions that are passed from parent to child (are inherited).  Problems with a substance, such as:  Having a reaction to a drug or a medicine.  Stopping the use of a substance all of a sudden (withdrawal).  A stroke.  Disorders that affect how you develop.  Sometimes, the cause may not be known.    What increases the risk?  Having someone in your family who has epilepsy. In this condition, seizures happen again and again over time. They have no clear cause.  Having had a tonic–clonic seizure before. This type of seizure causes you to:  Tighten the muscles of the whole body.  Lose consciousness.  Having had a head injury or strokes before.  Having had a lack of oxygen at birth.  What are the signs or symptoms?  There are many types of seizures. The symptoms vary depending on the type of seizure you have.    Symptoms during a seizure    Shaking that you cannot control (convulsions) with fast, jerky movements of muscles.  Stiffness of the body.  Breathing problems.  Feeling mixed up (confused).  Staring or not responding to sound or touch.  Head nodding.  Eyes that blink, flutter, or move fast.  Drooling, grunting, or making clicking sounds with your mouth  Losing control of when you pee or poop.  Symptoms before a seizure    Feeling afraid, nervous, or worried.  Feeling like you may vomit.  Feeling like:  You are moving when you are not.  Things around you are moving when they are not.  Feeling like you saw or heard something before (déjà vu).  Odd tastes or smells.  Changes in how you see. You may see flashing lights or spots.  Symptoms after a seizure    Feeling confused.  Feeling sleepy.  Headache.  Sore muscles.  How is this treated?  If your seizure stops on its own, you will not need treatment. If your seizure lasts longer than 5 minutes, you will normally need treatment. Treatment may include:  Medicines given through an IV tube.  Avoiding things, such as medicines, that are known to cause your seizures.  Medicines to prevent seizures.  A device to prevent or control seizures.  Surgery.  A diet low in carbohydrates and high in fat (ketogenic diet).  Follow these instructions at home:  Medicines    Take over-the-counter and prescription medicines only as told by your doctor.  Avoid foods or drinks that may keep your medicine from working, such as alcohol.  Activity    Follow instructions about driving, swimming, or doing things that would be dangerous if you had another seizure. Wait until your doctor says it is safe for you to do these things.  If you live in the [place] ask your local department of [place] when you can drive.  Get a lot of rest.  Teaching others      Teach friends and family what to do when you have a seizure. They should:  Help you get down to the ground.  Protect your head and body.  Loosen any clothing around your neck.  Turn you on your side.  Know whether or not you need emergency care.  Stay with you until you are better.  Also, tell them what not to do if you have a seizure. Tell them:  They should not hold you down.  They should not put anything in your mouth.  General instructions    Avoid anything that gives you seizures.  Keep a seizure diary. Write down:  What you remember about each seizure.  What you think caused each seizure.  Keep all follow-up visits.  Contact a doctor if:  You have another seizure or seizures. Call the doctor each time you have a seizure.  The pattern of your seizures changes.  You keep having seizures with treatment.  You have symptoms of being sick or having an infection.  You are not able to take your medicine.  Get help right away if:  You have any of these problems:  A seizure that lasts longer than 5 minutes.  Many seizures in a row and you do not feel better between seizures.  A seizure that makes it harder to breathe.  A seizure and you can no longer speak or use part of your body.  You do not wake up right after a seizure.  You get hurt during a seizure.  You feel confused or have pain right after a seizure.  These symptoms may be an emergency. Get help right away. Call your local emergency services (911 in the [place]  Do not wait to see if the symptoms will go away.  Do not drive yourself to the hospital.  Summary  A seizure is a sudden burst of abnormal electrical and chemical activity in the brain. Seizures normally last from 30 seconds to 2 minutes.  Causes of seizures include illness, injury to the head, low levels of blood sugar or salt, and certain conditions.  Most seizures will stop on their own in less than 5 minutes. Seizures that last longer than 5 minutes are a medical emergency and need treatment right away.  Many medicines are used to treat seizures. Take over-the-counter and prescription medicines only as told by your doctor.  This information is not intended to replace advice given to you by your health care provider. Make sure you discuss any questions you have with your health care provider. drink plenty of fluids as discussed  continue seizure medications as prescribed   follow up with primary care provider and neurology       Seizure, Adult  A seizure is a sudden burst of abnormal electrical and chemical activity in the brain. Seizures usually last from 30 seconds to 2 minutes.    What are the causes?  Common causes of this condition include:  Fever or infection.  Problems that affect the brain. These may include:  A brain or head injury.  Bleeding in the brain.  A brain tumor.  Low levels of blood sugar or salt.  Kidney problems or liver problems.  Conditions that are passed from parent to child (are inherited).  Problems with a substance, such as:  Having a reaction to a drug or a medicine.  Stopping the use of a substance all of a sudden (withdrawal).  A stroke.  Disorders that affect how you develop.  Sometimes, the cause may not be known.    What increases the risk?  Having someone in your family who has epilepsy. In this condition, seizures happen again and again over time. They have no clear cause.  Having had a tonic–clonic seizure before. This type of seizure causes you to:  Tighten the muscles of the whole body.  Lose consciousness.  Having had a head injury or strokes before.  Having had a lack of oxygen at birth.  What are the signs or symptoms?  There are many types of seizures. The symptoms vary depending on the type of seizure you have.    Symptoms during a seizure    Shaking that you cannot control (convulsions) with fast, jerky movements of muscles.  Stiffness of the body.  Breathing problems.  Feeling mixed up (confused).  Staring or not responding to sound or touch.  Head nodding.  Eyes that blink, flutter, or move fast.  Drooling, grunting, or making clicking sounds with your mouth  Losing control of when you pee or poop.  Symptoms before a seizure    Feeling afraid, nervous, or worried.  Feeling like you may vomit.  Feeling like:  You are moving when you are not.  Things around you are moving when they are not.  Feeling like you saw or heard something before (déjà vu).  Odd tastes or smells.  Changes in how you see. You may see flashing lights or spots.  Symptoms after a seizure    Feeling confused.  Feeling sleepy.  Headache.  Sore muscles.  How is this treated?  If your seizure stops on its own, you will not need treatment. If your seizure lasts longer than 5 minutes, you will normally need treatment. Treatment may include:  Medicines given through an IV tube.  Avoiding things, such as medicines, that are known to cause your seizures.  Medicines to prevent seizures.  A device to prevent or control seizures.  Surgery.  A diet low in carbohydrates and high in fat (ketogenic diet).  Follow these instructions at home:  Medicines    Take over-the-counter and prescription medicines only as told by your doctor.  Avoid foods or drinks that may keep your medicine from working, such as alcohol.  Activity    Follow instructions about driving, swimming, or doing things that would be dangerous if you had another seizure. Wait until your doctor says it is safe for you to do these things.  If you live in the [place] ask your local department of [place] when you can drive.  Get a lot of rest.  Teaching others      Teach friends and family what to do when you have a seizure. They should:  Help you get down to the ground.  Protect your head and body.  Loosen any clothing around your neck.  Turn you on your side.  Know whether or not you need emergency care.  Stay with you until you are better.  Also, tell them what not to do if you have a seizure. Tell them:  They should not hold you down.  They should not put anything in your mouth.  General instructions    Avoid anything that gives you seizures.  Keep a seizure diary. Write down:  What you remember about each seizure.  What you think caused each seizure.  Keep all follow-up visits.  Contact a doctor if:  You have another seizure or seizures. Call the doctor each time you have a seizure.  The pattern of your seizures changes.  You keep having seizures with treatment.  You have symptoms of being sick or having an infection.  You are not able to take your medicine.  Get help right away if:  You have any of these problems:  A seizure that lasts longer than 5 minutes.  Many seizures in a row and you do not feel better between seizures.  A seizure that makes it harder to breathe.  A seizure and you can no longer speak or use part of your body.  You do not wake up right after a seizure.  You get hurt during a seizure.  You feel confused or have pain right after a seizure.  These symptoms may be an emergency. Get help right away. Call your local emergency services (911 in the [place]  Do not wait to see if the symptoms will go away.  Do not drive yourself to the hospital.  Summary  A seizure is a sudden burst of abnormal electrical and chemical activity in the brain. Seizures normally last from 30 seconds to 2 minutes.  Causes of seizures include illness, injury to the head, low levels of blood sugar or salt, and certain conditions.  Most seizures will stop on their own in less than 5 minutes. Seizures that last longer than 5 minutes are a medical emergency and need treatment right away.  Many medicines are used to treat seizures. Take over-the-counter and prescription medicines only as told by your doctor.  This information is not intended to replace advice given to you by your health care provider. Make sure you discuss any questions you have with your health care provider.

## 2023-12-08 NOTE — ED PROVIDER NOTE - CLINICAL SUMMARY MEDICAL DECISION MAKING FREE TEXT BOX
43-year-old female with a history of a seizure disorder from a group home presents with some altered mental status today.  Patient is usually awake and talking, however is less verbal today.  The patient's aide states that this happens usually before the patient has a seizure.  No seizure activity has been witnessed as of yet.  No other change in activity or behavior.  No fall or recent trauma.  No cough/URI.  No aggravating or alleviating factors otherwise noted.  No other acute complaints.  Patient later had a seizure, witnessed in the ED.  This lasted for around 30 seconds and then resolved on its own.  Patient given Ativan.  Will continue to monitor.  Exam: Nontoxic, well-appearing.  Normal respiratory effort.  CTA BL, no W/R/R.  Normal cardiac exam.  Abdomen soft, nontender, nondistended.  Patient awake and alert, nonverbal.  Normal nonfocal neurologic exam.  No other acute findings on exam.  Acute altered mental status today, possibly related to seizure.  Will check labs, UA, x-ray, flu/COVID, IV, possible admission

## 2023-12-08 NOTE — ED PROVIDER NOTE - DIFFERENTIAL DIAGNOSIS
Differential Diagnosis Differentials include but not limited to urinary tract infection, anemia, electrolyte abnormality, infection, ICH, mass, seizure

## 2023-12-08 NOTE — ED PROVIDER NOTE - PATIENT PORTAL LINK FT
You can access the FollowMyHealth Patient Portal offered by [facility] by registering at the following website: [email or web address]. By joining [employer]’s FollowMyHealth portal, you will also be able to view your health information using other applications (apps) compatible with our system. You can access the FollowMyHealth Patient Portal offered by [facility] by registering at the following website: [email or web address]. By joining [employer]’s FollowMyHealth portal, you will also be able to view your health information using other applications (apps) compatible with our system.

## 2023-12-08 NOTE — ED PROVIDER NOTE - OBJECTIVE STATEMENT
43-year-old female with history of MR, bipolar, pica, seizure disorder, obesity, hypothyroidism, hyperlipidemia, osteoporosis, endometrial thickening, and oligomenorrhea brought in by ambulance for change in mental status that was noticed today at day program.  The program states she is usually very talkative and seemed more fatigued and not talking as much as usual.  Spoke with nurse at group home.  States she woke up today and not as talkative like her usual self.  Went to day program.  Was last seen by nurse yesterday around 4 PM and was at her normal baseline.  Nurse also states has similar symptoms in the past in the past before she has had a seizure.  Patient has been taking her seizures medications.  Stop Depakote 3 to 4 months ago and is on Seroque zomisamide, and Vimpat.  Last seizure was about 2 months ago.  Denies any fevers recent illnesses.  Was seen in urgent care for mild redness to finger and put on Keflex for suspected infection around her nail.  Staff states overall improving.  PCP Christina Balladin NP  [facility]

## 2023-12-08 NOTE — ED ADULT NURSE NOTE - OBJECTIVE STATEMENT
patient is from day center c/o increased AMS as per staff, patient is calm at this time, patient is more expressive in her normal status, pending MD's eval, Pt is in no acute distress. plan of care ongoing

## 2023-12-08 NOTE — ED PROVIDER NOTE - PROGRESS NOTE DETAILS
Patient stable.  Patient had a seizure in emergency room lasting for a couple minutes.  Seizure now resolved.  Patient now very alert and talkative.  At normal baseline per caregiver.  Spoke with mother.  Reports similar symptoms in the past for she has a seizure.  Mother states patient has had seizures in the past when she has been mildly dehydrated and not drinking enough water.  Patient drinking water and juice in emergency room.  Refusing IV fluids and uncooperative with IV fluids, however is drinking p.o. fluids without difficulty.  Recommend to continue seizure medications as prescribed and have close follow-up with neurology.  CT unremarkable.  No evidence of significant electrolyte abnormality, anemia, or infection.  Minimal erythema to finger.  No evidence of abscess or red streaking up the finger.  Will continue antibiotics as previously prescribed.  Patient walking up and down the hallways and is very talkative to staff.  Caregiver states at normal baseline.  Appropriate for discharge.  Mother and staff comfortable plan.

## 2023-12-08 NOTE — ED PROVIDER NOTE - CARE PROVIDERS DIRECT ADDRESSES
[email or web address]

## 2023-12-08 NOTE — ED ADULT NURSE NOTE - NSFALLUNIVINTERV_ED_ALL_ED
Bed/Stretcher in lowest position, wheels locked, appropriate side rails in place/Call bell, personal items and telephone in reach/Instruct patient to call for assistance before getting out of bed/chair/stretcher/Non-slip footwear applied when patient is off stretcher/[place] to call system/Physically safe environment - no spills, clutter or unnecessary equipment/Purposeful proactive rounding/Room/bathroom lighting operational, light cord in reach Bed/Stretcher in lowest position, wheels locked, appropriate side rails in place/Call bell, personal items and telephone in reach/Instruct patient to call for assistance before getting out of bed/chair/stretcher/Non-slip footwear applied when patient is off stretcher/[place] to call system/Physically safe environment - no spills, clutter or unnecessary equipment/Purposeful proactive rounding/Room/bathroom lighting operational, light cord in reach

## 2023-12-08 NOTE — ED PROVIDER NOTE - CARE PROVIDER_API CALL
Harleen Husain  Neurology  [address], [address]  [place] [address]  Phone: [phone]  Fax: [phone]  Follow Up Time: 1-3 Days   Harleen Husain  Neurology  [address], [address]  [place] [address]  Phone: [phone]  Fax: [phone]  Follow Up Time: 1-3 Days

## 2023-12-09 LAB
CULTURE RESULTS: SIGNIFICANT CHANGE UP
CULTURE RESULTS: SIGNIFICANT CHANGE UP
SPECIMEN SOURCE: SIGNIFICANT CHANGE UP
SPECIMEN SOURCE: SIGNIFICANT CHANGE UP

## 2023-12-14 ENCOUNTER — OUTPATIENT (OUTPATIENT)
Dept: OUTPATIENT SERVICES | Facility: HOSPITAL | Age: 43
LOS: 1 days | Discharge: HOME | End: 2023-12-14

## 2023-12-14 DIAGNOSIS — D64.9 ANEMIA, UNSPECIFIED: ICD-10-CM

## 2023-12-14 DIAGNOSIS — Z92.89 PERSONAL HISTORY OF OTHER MEDICAL TREATMENT: Chronic | ICD-10-CM

## 2023-12-14 DIAGNOSIS — Z98.890 OTHER SPECIFIED POSTPROCEDURAL STATES: Chronic | ICD-10-CM

## 2023-12-15 ENCOUNTER — APPOINTMENT (OUTPATIENT)
Dept: NEUROLOGY | Facility: CLINIC | Age: 43
End: 2023-12-15

## 2024-01-02 ENCOUNTER — APPOINTMENT (OUTPATIENT)
Dept: HEMATOLOGY ONCOLOGY | Facility: CLINIC | Age: 44
End: 2024-01-02
Payer: MEDICARE

## 2024-01-02 ENCOUNTER — RX RENEWAL (OUTPATIENT)
Age: 44
End: 2024-01-02

## 2024-01-02 VITALS
DIASTOLIC BLOOD PRESSURE: 61 MMHG | WEIGHT: 153.22 LBS | OXYGEN SATURATION: 98 % | SYSTOLIC BLOOD PRESSURE: 110 MMHG | HEART RATE: 101 BPM | BODY MASS INDEX: 28.02 KG/M2 | RESPIRATION RATE: 16 BRPM | TEMPERATURE: 97.2 F

## 2024-01-02 DIAGNOSIS — D50.8 OTHER IRON DEFICIENCY ANEMIAS: ICD-10-CM

## 2024-01-02 PROCEDURE — 99213 OFFICE O/P EST LOW 20 MIN: CPT

## 2024-01-02 RX ORDER — CHLORHEXIDINE GLUCONATE 4 %
325 (65 FE) LIQUID (ML) TOPICAL
Qty: 60 | Refills: 5 | Status: ACTIVE | COMMUNITY
Start: 2024-01-02 | End: 1900-01-01

## 2024-01-02 NOTE — HISTORY OF PRESENT ILLNESS
[80: Normal activity with effort; some signs or symptoms of disease.] : 80: Normal activity with effort; some signs or symptoms of disease.  [de-identified] : Patient with long standing mental disability, seizure disorder, resident of group home, followed since 2014 for multifactorial anemia including iron deficiency and B12 deficiency. She has been on oral supplements with Iron sulfate. She was also previously on oral B12 supplements which have been stopped.   Per aide she was hospitalized after a seizure 2 in 12/2022. Her depakote has been held as well as metformin which was started to help control her heavy menstrual periods.  Transferred care from [facility] hematology office to  [facility] ([facility] in 12/2022 as well [de-identified] : 1/2/24 Patient returns today to rule out progression of anemia assess treatment toxicity.  was resumed on slow release iron BID in 7/2023; noted in 9/2023 on labs to also have low B12 levels ~300 cooperative with exam today

## 2024-01-02 NOTE — REVIEW OF SYSTEMS
[Negative] : Allergic/Immunologic [de-identified] : intellectual disability; recurrent seizures [de-identified] : cooperative today

## 2024-01-02 NOTE — ASSESSMENT
[FreeTextEntry1] : 44yo woman with intellectual disability, noted to have B12 and iron deficiency dating back to 2014 thought to be due to heavy menstrual bleeding at that time; responded well to oral supplementation  - for now to continue oral supplements of iron sulfate BID - will also add B12 supplements (will send to [facility]) - 1000mcg daily dose - continue follow up with psych and neuro - will repeat labs including iron with TIBC, Ferritin, B12/folate and CBC - f/u in 6 months

## 2024-01-02 NOTE — PHYSICAL EXAM
[Normal] : normal appearance, no rash, nodules, vesicles, ulcers, erythema [de-identified] : well nourishes; easily redirected [de-identified] : limited exam [de-identified] : grossly intact; [de-identified] : intellectual disability, easily agitated

## 2024-01-11 ENCOUNTER — APPOINTMENT (OUTPATIENT)
Dept: CARDIOLOGY | Facility: CLINIC | Age: 44
End: 2024-01-11

## 2024-01-15 ENCOUNTER — LABORATORY RESULT (OUTPATIENT)
Age: 44
End: 2024-01-15

## 2024-01-15 LAB
CHOLEST SERPL-MCNC: 124 MG/DL
HDLC SERPL-MCNC: 40 MG/DL
LDLC SERPL CALC-MCNC: 55 MG/DL
NONHDLC SERPL-MCNC: 84 MG/DL
TRIGL SERPL-MCNC: 178 MG/DL

## 2024-01-16 LAB
BASOPHILS # BLD AUTO: 0.13 K/UL
BASOPHILS NFR BLD AUTO: 1.3 %
EOSINOPHIL # BLD AUTO: 0.58 K/UL
EOSINOPHIL NFR BLD AUTO: 5.6 %
HCT VFR BLD CALC: 43 %
HGB BLD-MCNC: 12.6 G/DL
IMM GRANULOCYTES NFR BLD AUTO: 0.2 %
LYMPHOCYTES # BLD AUTO: 3.96 K/UL
LYMPHOCYTES NFR BLD AUTO: 38.5 %
MAN DIFF?: NORMAL
MCHC RBC-ENTMCNC: 23.8 PG
MCHC RBC-ENTMCNC: 29.3 GM/DL
MCV RBC AUTO: 81.3 FL
MONOCYTES # BLD AUTO: 0.79 K/UL
MONOCYTES NFR BLD AUTO: 7.7 %
NEUTROPHILS # BLD AUTO: 4.8 K/UL
NEUTROPHILS NFR BLD AUTO: 46.7 %
PLATELET # BLD AUTO: 384 K/UL
RBC # BLD: 5.29 M/UL
RBC # FLD: 15.5 %
WBC # FLD AUTO: 10.28 K/UL

## 2024-01-19 RX ORDER — CHLORHEXIDINE GLUCONATE 4 %
1000 LIQUID (ML) TOPICAL DAILY
Qty: 30 | Refills: 11 | Status: ACTIVE | COMMUNITY
Start: 2024-01-19 | End: 1900-01-01

## 2024-02-16 NOTE — ED ADULT NURSE NOTE - CAS EDN DISCHARGE INTERVENTIONS
Called to reschedule upcoming appointment due to provider not being available. Left a voicemail offering upcoming day with CB.  
IV discontinued, cath removed intact

## 2024-02-21 ENCOUNTER — RX RENEWAL (OUTPATIENT)
Age: 44
End: 2024-02-21

## 2024-02-22 NOTE — ED ADULT NURSE NOTE - NS ED NOTE  TALK SOMEONE YN
[facility] [place]  [facility]  [address]  [place] [address]  [phone]      Jian Montalvo :1978 MRN:[id number]    2024 Time Session Began: 9:20 AM  Time Session Ended: 10:00 AM    This visit was performed via live interactive two-way Video visit with patient's verbal consent.   Clinician Location:Home  Patient Location: Home.  Verified patient identity:  [x] Yes    Session Type: Therapy 38-52 minutes (67881)  Others Present:     Suicide/Homicide/Violence Ideation: No  If Yes, explain:     Need for Community Resources Assessed: Yes  Resources Needed: N/A  If Yes, what resources:     Current Outpatient Medications   Medication Sig    lisdexamfetamine (Vyvanse) 40 MG capsule Take 1 capsule by mouth every morning.    EPINEPHrine 0.3 MG/0.3ML auto-injector Inject 0.3 mLs into the muscle 1 time as needed for Anaphylaxis.    pantoprazole (PROTONIX) 20 MG tablet Take 1 tablet by mouth daily. 30 minutes Before eating    tiotropium-olodaterol (Stiolto Respimat) 2.5-2.5 MCG/ACT inhaler Inhale 2 puffs into the lungs daily.    ibuprofen (MOTRIN) 600 MG tablet Take 1 (one) tablet by mouth every 6 hours as needed for Pain    metoCLOPramide (REGLAN) 10 MG tablet Take 1 (one) tablet by mouth 3 times daily as needed for Nausea/Vomiting (headache)    predniSONE (DELTASONE) 20 MG tablet Take 2 tablets by mouth daily (before breakfast).    ondansetron (ZOFRAN) 4 MG tablet Take 1 tablet by mouth every 8 hours as needed for Nausea.    SUMAtriptan (IMITREX) 25 MG tablet Take 1 tablet by mouth daily as needed for Migraine. Take 1 tablet by mouth at onset of migraine. May repeat after 2 hours if needed.    cetirizine (ZyrTEC) 10 MG tablet Take 1 tablet by mouth in the morning and 1 tablet in the evening.    albuterol (Proventil HFA) 108 (90 Base) MCG/ACT inhaler Inhale 2 puffs into the lungs every 4 hours as needed for Shortness of Breath or Wheezing.     cyanocobalamin (Vitamin B-12) 500 MCG tablet Take 500 mcg by mouth daily. UNSURE DOSE    tadalafil (CIALIS) 5 MG tablet Take 1 tablet (5 mg total) by mouth every day.    alfuzosin (UROXATRAL) 10 MG 24 hr tablet Take 1 tablet by mouth daily.    fluticasone (FLONASE) 50 MCG/ACT nasal spray Spray 2 sprays in each nostril daily.    hydrOXYzine (ATARAX) 25 MG tablet Take 1-4 tabs every 8 hours as needed for anxiety/panic.     No current facility-administered medications for this visit.       Change in Medication(s) Reported: No    If Yes, explain:     Patient/Family Education Provided: Yes  Patient/Family Displays Understanding: Yes  If No, explain:     Chief complaint in patient's own words: \"depression and anxiety.\"    Progress Note containing chief complaint and symptoms/problems related to the complaint:    Data: Jian was called as he did not present for his appointment this morning.  He stated that he had fallen back to sleep as he was not able to take his medications as he has a procedure this morning and he was able to log in for this session.  Today we focused on reviewing his Treatment Plan.  He stated that he is utilizing the strategies identified on his plan.  We did modify goal 2 (improve self-confidence) and added to work on developing healthy relationships, including identifying his values and what this means.  He talked about ending toxic, negative relationships.  His goals will remain active.  Jian reported that he went to a speed dating event and has been talking to a couple of the women he met there.  He was allowed to vent; his thoughts and feelings were acknowledged and validated.      Action of the provider: Treatment Plan was reviewed and modified with Jian.  Supportive listening and feedback were provided.  Cognitions shared by patient were acknowledged and exploration was encouraged.  Intervention: Cognitive Behavioral, Insight, Supportive     Response of patient: Jian was alert and oriented x4.  Allyson Clements PSYD   No

## 2024-03-12 NOTE — H&P PST ADULT - NSANTHGENDERRD_ENT_A_CORE
Called PT per provider request. PT stated she had not taken any of the 80 mg ingrezza samples given to her in our clinic. PT stated she had another episode Monday 3/11/2024. Informed PT I'm states she plans to taper her down on ingrezza. Informed PT if needed Nayana will send in another Rx for 40 mg ingrezza or PT will be able to  samples in clinic. PT stated she did not need Depokate yet but was trying to stay ahead on her medication refills. Informed PT Lunesta 3 mg was sent in to the Pts pharmacy. PT verbalized understanding and had no further questions.    No

## 2024-03-13 NOTE — ED ADULT TRIAGE NOTE - GLASGOW COMA SCALE: SCORE, MLM
[de-identified] : FEVER, REEVALUATION  No 14

## 2024-03-19 ENCOUNTER — EMERGENCY (EMERGENCY)
Facility: HOSPITAL | Age: 44
LOS: 1 days | Discharge: NURSING, REHAB OR LONG-TERM CARE | End: 2024-03-19
Attending: EMERGENCY MEDICINE | Admitting: EMERGENCY MEDICINE
Payer: MEDICARE

## 2024-03-19 VITALS
OXYGEN SATURATION: 95 % | WEIGHT: 188.05 LBS | HEART RATE: 102 BPM | RESPIRATION RATE: 18 BRPM | DIASTOLIC BLOOD PRESSURE: 77 MMHG | SYSTOLIC BLOOD PRESSURE: 125 MMHG | HEIGHT: 65 IN

## 2024-03-19 VITALS
TEMPERATURE: 97 F | SYSTOLIC BLOOD PRESSURE: 116 MMHG | RESPIRATION RATE: 22 BRPM | HEART RATE: 89 BPM | DIASTOLIC BLOOD PRESSURE: 89 MMHG | OXYGEN SATURATION: 94 %

## 2024-03-19 DIAGNOSIS — Z92.89 PERSONAL HISTORY OF OTHER MEDICAL TREATMENT: Chronic | ICD-10-CM

## 2024-03-19 DIAGNOSIS — Z98.890 OTHER SPECIFIED POSTPROCEDURAL STATES: Chronic | ICD-10-CM

## 2024-03-19 LAB
ALBUMIN SERPL ELPH-MCNC: 4.1 G/DL — SIGNIFICANT CHANGE UP (ref 3.3–5)
ALP SERPL-CCNC: 58 U/L — SIGNIFICANT CHANGE UP (ref 30–120)
ALT FLD-CCNC: 13 U/L — SIGNIFICANT CHANGE UP (ref 10–60)
ANION GAP SERPL CALC-SCNC: 13 MMOL/L — SIGNIFICANT CHANGE UP (ref 5–17)
AST SERPL-CCNC: 11 U/L — SIGNIFICANT CHANGE UP (ref 10–40)
BASOPHILS # BLD AUTO: 0.11 K/UL — SIGNIFICANT CHANGE UP (ref 0–0.2)
BASOPHILS NFR BLD AUTO: 1.1 % — SIGNIFICANT CHANGE UP (ref 0–2)
BILIRUB SERPL-MCNC: 0.2 MG/DL — SIGNIFICANT CHANGE UP (ref 0.2–1.2)
BUN SERPL-MCNC: 27 MG/DL — HIGH (ref 7–23)
CALCIUM SERPL-MCNC: 10.1 MG/DL — SIGNIFICANT CHANGE UP (ref 8.4–10.5)
CHLORIDE SERPL-SCNC: 103 MMOL/L — SIGNIFICANT CHANGE UP (ref 96–108)
CO2 SERPL-SCNC: 23 MMOL/L — SIGNIFICANT CHANGE UP (ref 22–31)
CREAT SERPL-MCNC: 0.88 MG/DL — SIGNIFICANT CHANGE UP (ref 0.5–1.3)
EGFR: 83 ML/MIN/1.73M2 — SIGNIFICANT CHANGE UP
EOSINOPHIL # BLD AUTO: 0.16 K/UL — SIGNIFICANT CHANGE UP (ref 0–0.5)
EOSINOPHIL NFR BLD AUTO: 1.6 % — SIGNIFICANT CHANGE UP (ref 0–6)
FLUAV AG NPH QL: SIGNIFICANT CHANGE UP
FLUBV AG NPH QL: SIGNIFICANT CHANGE UP
GLUCOSE SERPL-MCNC: 102 MG/DL — HIGH (ref 70–99)
HCG SERPL-ACNC: <1 MIU/ML — SIGNIFICANT CHANGE UP
HCT VFR BLD CALC: 38 % — SIGNIFICANT CHANGE UP (ref 34.5–45)
HGB BLD-MCNC: 11.8 G/DL — SIGNIFICANT CHANGE UP (ref 11.5–15.5)
IMM GRANULOCYTES NFR BLD AUTO: 0.4 % — SIGNIFICANT CHANGE UP (ref 0–0.9)
LYMPHOCYTES # BLD AUTO: 3.5 K/UL — HIGH (ref 1–3.3)
LYMPHOCYTES # BLD AUTO: 34.9 % — SIGNIFICANT CHANGE UP (ref 13–44)
MCHC RBC-ENTMCNC: 23.8 PG — LOW (ref 27–34)
MCHC RBC-ENTMCNC: 31.1 GM/DL — LOW (ref 32–36)
MCV RBC AUTO: 76.8 FL — LOW (ref 80–100)
MONOCYTES # BLD AUTO: 0.8 K/UL — SIGNIFICANT CHANGE UP (ref 0–0.9)
MONOCYTES NFR BLD AUTO: 8 % — SIGNIFICANT CHANGE UP (ref 2–14)
NEUTROPHILS # BLD AUTO: 5.41 K/UL — SIGNIFICANT CHANGE UP (ref 1.8–7.4)
NEUTROPHILS NFR BLD AUTO: 54 % — SIGNIFICANT CHANGE UP (ref 43–77)
NRBC # BLD: 0 /100 WBCS — SIGNIFICANT CHANGE UP (ref 0–0)
PLATELET # BLD AUTO: 444 K/UL — HIGH (ref 150–400)
POTASSIUM SERPL-MCNC: 4.3 MMOL/L — SIGNIFICANT CHANGE UP (ref 3.5–5.3)
POTASSIUM SERPL-SCNC: 4.3 MMOL/L — SIGNIFICANT CHANGE UP (ref 3.5–5.3)
PROT SERPL-MCNC: 8 G/DL — SIGNIFICANT CHANGE UP (ref 6–8.3)
RBC # BLD: 4.95 M/UL — SIGNIFICANT CHANGE UP (ref 3.8–5.2)
RBC # FLD: 15.2 % — HIGH (ref 10.3–14.5)
RSV RNA NPH QL NAA+NON-PROBE: SIGNIFICANT CHANGE UP
SARS-COV-2 RNA SPEC QL NAA+PROBE: SIGNIFICANT CHANGE UP
SODIUM SERPL-SCNC: 139 MMOL/L — SIGNIFICANT CHANGE UP (ref 135–145)
WBC # BLD: 10.02 K/UL — SIGNIFICANT CHANGE UP (ref 3.8–10.5)
WBC # FLD AUTO: 10.02 K/UL — SIGNIFICANT CHANGE UP (ref 3.8–10.5)

## 2024-03-19 PROCEDURE — 87637 SARSCOV2&INF A&B&RSV AMP PRB: CPT

## 2024-03-19 PROCEDURE — 80203 DRUG SCREEN QUANT ZONISAMIDE: CPT

## 2024-03-19 PROCEDURE — 80053 COMPREHEN METABOLIC PANEL: CPT

## 2024-03-19 PROCEDURE — 70450 CT HEAD/BRAIN W/O DYE: CPT | Mod: 26,MC

## 2024-03-19 PROCEDURE — 70450 CT HEAD/BRAIN W/O DYE: CPT | Mod: MC

## 2024-03-19 PROCEDURE — 99284 EMERGENCY DEPT VISIT MOD MDM: CPT | Mod: 25

## 2024-03-19 PROCEDURE — 84702 CHORIONIC GONADOTROPIN TEST: CPT

## 2024-03-19 PROCEDURE — 71045 X-RAY EXAM CHEST 1 VIEW: CPT

## 2024-03-19 PROCEDURE — 96360 HYDRATION IV INFUSION INIT: CPT

## 2024-03-19 PROCEDURE — 99285 EMERGENCY DEPT VISIT HI MDM: CPT | Mod: FS

## 2024-03-19 PROCEDURE — 85025 COMPLETE CBC W/AUTO DIFF WBC: CPT

## 2024-03-19 PROCEDURE — 71045 X-RAY EXAM CHEST 1 VIEW: CPT | Mod: 26

## 2024-03-19 PROCEDURE — 36415 COLL VENOUS BLD VENIPUNCTURE: CPT

## 2024-03-19 RX ORDER — LACOSAMIDE 50 MG/1
50 TABLET ORAL ONCE
Refills: 0 | Status: DISCONTINUED | OUTPATIENT
Start: 2024-03-19 | End: 2024-03-19

## 2024-03-19 RX ORDER — LACOSAMIDE 50 MG/1
1 TABLET ORAL
Qty: 60 | Refills: 0
Start: 2024-03-19 | End: 2024-04-17

## 2024-03-19 RX ORDER — SODIUM CHLORIDE 9 MG/ML
1000 INJECTION INTRAMUSCULAR; INTRAVENOUS; SUBCUTANEOUS ONCE
Refills: 0 | Status: COMPLETED | OUTPATIENT
Start: 2024-03-19 | End: 2024-03-19

## 2024-03-19 RX ADMIN — SODIUM CHLORIDE 1000 MILLILITER(S): 9 INJECTION INTRAMUSCULAR; INTRAVENOUS; SUBCUTANEOUS at 21:00

## 2024-03-19 RX ADMIN — SODIUM CHLORIDE 2000 MILLILITER(S): 9 INJECTION INTRAMUSCULAR; INTRAVENOUS; SUBCUTANEOUS at 21:00

## 2024-03-19 RX ADMIN — SODIUM CHLORIDE 1000 MILLILITER(S): 9 INJECTION INTRAMUSCULAR; INTRAVENOUS; SUBCUTANEOUS at 22:00

## 2024-03-19 RX ADMIN — LACOSAMIDE 50 MILLIGRAM(S): 50 TABLET ORAL at 21:37

## 2024-03-19 NOTE — ED PROVIDER NOTE - PATIENT PORTAL LINK FT
You can access the FollowMyHealth Patient Portal offered by [facility] by registering at the following website: [email or web address]. By joining [facility]’s FollowMyHealth portal, you will also be able to view your health information using other applications (apps) compatible with our system.

## 2024-03-19 NOTE — ED PROVIDER NOTE - NSFOLLOWUPINSTRUCTIONS_ED_ALL_ED_FT
1. FOLLOW UP WITH YOUR PRIMARY DOCTOR IN 24-48 HOURS.   2. FOLLOW UP WITH ALL SPECIALIST DISCUSSED DURING YOUR VISIT.   3. TAKE ALL MEDICATIONS PRESCRIBED IN THE ER IF ANY ARE PRESCRIBED. CONTINUE YOUR HOME MEDICATIONS UNLESS OTHERWISE ADVISED DIFFERENTLY.   4. RETURN FOR WORSENING SYMPTOMS OR CONCERNS INCLUDING BUT NOT LIMITED TO FEVER, CHEST PAIN, OR TROUBLE BREATHING OR ANY OTHER CONCERNS  YOU WILL NOW TAKE VIMPAT 200MG TWICE DAILY INSTEAD OF 150MG TWICE DAILY    Seizure, Adult  A seizure is a sudden burst of abnormal electrical and chemical activity in the brain. Seizures usually last from 30 seconds to 2 minutes. The abnormal activity temporarily interrupts normal brain function.    Many types of seizures can affect adults. A seizure can cause many different symptoms depending on where in the brain it starts.    What are the causes?  Common causes of this condition include:  Fever or infection.  Brain injury, head trauma, bleeding in the brain, or a brain tumor.  Low levels of blood sugar or salt (sodium).  Kidney problems or liver problems.  Metabolic disorders or other conditions that are passed from parent to child (are inherited).  Reaction to a substance, such as a drug or a medicine, or suddenly stopping the use of a substance (withdrawal).  A stroke.  Developmental disorders such as autism spectrum disorder or cerebral palsy.  In some cases, the cause of a seizure may not be known. Some people who have a seizure never have another one. A person who has repeated seizures over time without a clear cause has a condition called epilepsy.    What increases the risk?  You are more likely to develop this condition if:  You have a family history of epilepsy.  You have had a tonic–clonic seizure before. This type of seizure causes tightening (contraction) of the muscles of the whole body and loss of consciousness.  You have a history of head trauma, lack of oxygen at birth, or strokes.  What are the signs or symptoms?  There are many different types of seizures. The symptoms vary depending on the type of seizure you have. Symptoms occur during the seizure. They may also occur before a seizure (aura) and after a seizure (postictal). Symptoms may include the following:    Symptoms during a seizure    Uncontrollable shaking (convulsions) with fast, jerky movements of muscles.  Stiffening of the body.  Breathing problems.  Confusion, staring, or unresponsiveness.  Head nodding, eye blinking or fluttering, or rapid eye movements.  Drooling, grunting, or making clicking sounds with your mouth.  Loss of bladder control and bowel control.  Symptoms before a seizure    Fear or anxiety.  Nausea.  Vertigo. This is a feeling like:  You are moving when you are not.  Your surroundings are moving when they are not.  Déjà vu. This is a feeling of having seen or heard something before.  Odd tastes or smells.  Changes in vision, such as seeing flashing lights or spots.  Symptoms after a seizure    Confusion.  Sleepiness.  Headache.  Sore muscles.  How is this diagnosed?  This condition may be diagnosed based on:  A description of your symptoms. Video of your seizures can be helpful.  Your medical history.  A physical exam.  You may also have tests, including:  Blood tests.  CT scan.  MRI.  Electroencephalogram (EEG). This test measures electrical activity in the brain. An EEG can predict whether seizures will return.  A spinal tap, also called a lumbar puncture. This is the removal and testing of fluid that surrounds the brain and spinal cord.  How is this treated?  Most seizures will stop on their own in less than 5 minutes, and no treatment is needed. Seizures that last longer than 5 minutes will usually need treatment.    Seizures may be treated with:  Medicines given through an IV.  Avoiding known triggers, such as medicines that you take for another condition.  Medicines to control seizures or prevent future seizures (antiepileptics), if epilepsy caused your seizures.  Medical devices to prevent and control seizures.  Surgery to stop seizures or to reduce how often seizures happen, if you have epilepsy that does not respond to medicines.  A diet low in carbohydrates and high in fat (ketogenic diet).  Follow these instructions at home:  Medicines    Take over-the-counter and prescription medicines only as told by your health care provider.  Avoid any substances that may prevent your medicine from working properly, such as alcohol.  Activity    Follow instructions about activities, such as driving or swimming, that would be dangerous if you had another seizure. Wait until your health care provider says it is safe to do them.  If you live in the [place] check with your local department of motor vehicles (DMV) to find out about local driving laws. Each state has specific rules about when you can legally drive again.  Get enough rest. Lack of sleep can make seizures more likely to occur.  Educating others      Teach friends and family what to do if you have a seizure. They should:  Help you get down to the ground, to prevent a fall.  Cushion your head and move items away from your body.  Loosen any tight clothing around your neck.  Turn you on your side. If you vomit, this helps keep your airway clear.  Know whether or not you need emergency care.  Stay with you until you recover.  Also, tell them what not to do if you have a seizure. Tell them:  They should not hold you down. Holding you down will not stop the seizure.  They should not put anything in your mouth.  General instructions    Avoid anything that has ever triggered a seizure for you.  Keep a seizure diary. Record what you remember about each seizure, especially anything that might have triggered it.  Keep all follow-up visits. This is important.  Contact a health care provider if:  You have another seizure or seizures. Call each time you have a seizure.  Your seizure pattern changes.  You continue to have seizures with treatment.  You have symptoms of an infection or illness. Either of these might increase your risk of having a seizure.  You are unable to take your medicine.  Get help right away if:  You have:  A seizure that does not stop after 5 minutes.  Several seizures in a row without a complete recovery between seizures.  A seizure that makes it harder to breathe.  A seizure that leaves you unable to speak or use a part of your body.  You do not wake up right away after a seizure.  You injure yourself during a seizure.  You have confusion or pain right after a seizure.  These symptoms may represent a serious problem that is an emergency. Do not wait to see if the symptoms will go away. Get medical help right away. Call your local emergency services (911 in the [place] Do not drive yourself to the hospital.    Summary  Seizures are caused by abnormal electrical and chemical activity in the brain. The activity disrupts normal brain function and can cause various symptoms.  Seizures have many causes, including illness, head injuries, low levels of blood sugar or salt, and certain conditions.  Most seizures will stop on their own in less than 5 minutes. Seizures that last longer than 5 minutes are a medical emergency and need treatment right away.  Many medicines are used to treat seizures. Take over-the-counter and prescription medicines only as told by your health care provider.  This information is not intended to replace advice given to you by your health care provider. Make sure you discuss any questions you have with your health care provider.    Document Revised: 06/25/2021 Document Reviewed: 06/25/2021  Elsevier Patient Education © 2023 Elsevier Inc.  Elsevier logo  Terms and Conditions  Privacy Policy  Editorial Policy  All content on this site: Copyright © 2024 Elsevier, its licensors, and contributors. All rights are reserved, including those for text and data mining, AI training, and similar technologies. For all open access content, the Creative Commons licensing terms apply.  Cookies are used by this site. To decline or learn more, visit our Cookies page.

## 2024-03-19 NOTE — ED PROVIDER NOTE - CARE PROVIDER_API CALL
Liliam Cardenas  Neurology  [address], [address]  [place] [address]  Phone: [phone]  Fax: [phone]  Follow Up Time: 7-10 Days

## 2024-03-19 NOTE — ED ADULT NURSE NOTE - NSFALLHARMRISKINTERV_ED_ALL_ED
Assistance OOB with selected safe patient handling equipment if applicable/Communicate risk of Fall with Harm to all staff, patient, and family/Provide visual cue: red socks, yellow wristband, yellow gown, etc/Reinforce activity limits and safety measures with patient and family/Bed in lowest position, wheels locked, appropriate side rails in place/Call bell, personal items and telephone in reach/Instruct patient to call for assistance before getting out of bed/chair/stretcher/Non-slip footwear applied when patient is off stretcher/[place] to call system/Physically safe environment - no spills, clutter or unnecessary equipment/Purposeful Proactive Rounding/Room/bathroom lighting operational, light cord in reach

## 2024-03-19 NOTE — ED PROVIDER NOTE - PROGRESS NOTE DETAILS
attempted to call mother no answer discussed pt with mother advised pt possibly dehydrated will reeval pt improved. consulted pt neurologist through transfer center dr abdullahi advised increase dose of vimpat from 150mg bid to 200mg bid. will send rx and advised follow up in office. discussed with group home aide. sent results with pt. attempted to call mother no answer Checked with group home advised patient received all her 6:00 medications including her Vimpat and Zonisamide might already.discussed plan with mother who agrees

## 2024-03-19 NOTE — ED PROVIDER NOTE - OBJECTIVE STATEMENT
Patient is a 44-year-old female with past medical history of diabetes, MI, seizure disorder, bipolar disorder, hypothyroidism, osteoporosis, hyperlipidemia, right bundle branch block presents status post seizure.  Per group home facility patient had multiple seizures today described as eye rolling and clenching her fist.  Patient recently had her zonisamide by increase to 300 mg in August 2023.  Patient unable to give information due to MRI.

## 2024-03-19 NOTE — ED PROVIDER NOTE - CLINICAL SUMMARY MEDICAL DECISION MAKING FREE TEXT BOX
44-year-old female history of diabetes MI seizure disorder bipolar hypothyroid brought in by EMS status post 3 episodes of seizure in the morning and 2 episodes tonight was given Versed by EMS.  Patient has a history of MR raymundo historian.  As per facility patient had seizure that was described as eye rolling and was clenching her face.  Here with aide at bedside that states she is at baseline.    Physical exam: No acute distress moderate MR minimally verbal clear to auscultation bilaterally abdomen soft nontender nondistended regular rate and rhythm    CT head negative white blood cell count within normal limits flu COVID RSV negative.  Discussed case with mother who states patient normally gets dehydrated given 2 L normal saline.  Had a discussion with patient epilepsy doctor Dr. Fall recommend to increase her Vimpat.  Will discharge back to facility understands to return if any worsening symptoms.

## 2024-03-19 NOTE — ED ADULT NURSE NOTE - OBJECTIVE STATEMENT
patient BIBA from group home with group home aide at bedside for multiple seizures today. patient arrives awake and agitated, not speaking, which is not her baseline.

## 2024-03-20 ENCOUNTER — RX RENEWAL (OUTPATIENT)
Age: 44
End: 2024-03-20

## 2024-03-22 LAB — ZONISAMIDE SERPL-MCNC: 21.4 UG/ML — SIGNIFICANT CHANGE UP (ref 10–40)

## 2024-04-01 ENCOUNTER — APPOINTMENT (OUTPATIENT)
Dept: NEUROLOGY | Facility: CLINIC | Age: 44
End: 2024-04-01
Payer: MEDICARE

## 2024-04-01 VITALS
HEIGHT: 62 IN | DIASTOLIC BLOOD PRESSURE: 71 MMHG | HEART RATE: 107 BPM | BODY MASS INDEX: 28.16 KG/M2 | WEIGHT: 153 LBS | SYSTOLIC BLOOD PRESSURE: 120 MMHG

## 2024-04-01 DIAGNOSIS — F31.9 BIPOLAR DISORDER, UNSPECIFIED: ICD-10-CM

## 2024-04-01 PROCEDURE — 99214 OFFICE O/P EST MOD 30 MIN: CPT

## 2024-04-08 LAB
ALBUMIN SERPL ELPH-MCNC: 4.5 G/DL
ALP BLD-CCNC: 71 U/L
ALT SERPL-CCNC: 18 U/L
ANION GAP SERPL CALC-SCNC: 12 MMOL/L
AST SERPL-CCNC: 12 U/L
BILIRUB SERPL-MCNC: <0.2 MG/DL
BUN SERPL-MCNC: 15 MG/DL
CALCIUM SERPL-MCNC: 10.5 MG/DL
CHLORIDE SERPL-SCNC: 104 MMOL/L
CO2 SERPL-SCNC: 23 MMOL/L
CREAT SERPL-MCNC: 0.66 MG/DL
EGFR: 110 ML/MIN/1.73M2
GLUCOSE SERPL-MCNC: 76 MG/DL
HCT VFR BLD CALC: 36.5 %
HGB BLD-MCNC: 11 G/DL
LACOSAMIDE (VIMPAT): 9.12 UG/ML
MCHC RBC-ENTMCNC: 24.2 PG
MCHC RBC-ENTMCNC: 30.1 GM/DL
MCV RBC AUTO: 80.2 FL
PLATELET # BLD AUTO: 370 K/UL
POTASSIUM SERPL-SCNC: 4.1 MMOL/L
PROT SERPL-MCNC: 7.2 G/DL
RBC # BLD: 4.55 M/UL
RBC # FLD: 16 %
SODIUM SERPL-SCNC: 139 MMOL/L
WBC # FLD AUTO: 7.29 K/UL
ZONISAMIDE SERPL-MCNC: 17.3 UG/ML

## 2024-04-11 PROBLEM — F31.9 BIPOLAR DEPRESSION: Status: ACTIVE | Noted: 2021-05-12

## 2024-04-11 NOTE — ASSESSMENT
[FreeTextEntry1] : MARLEY MCKEON is stable. continue to have seizures but rare will benefit from genetic testing

## 2024-04-11 NOTE — HISTORY OF PRESENT ILLNESS
[FreeTextEntry1] : *** 04/01/2024 ***  MARLEY MCKEON had 3 seizures and her zonisamide and vimapt were increased at my recommendation   *** 08/29/2023 ***  MARLEY MCKEON is here for follow up. her complex history is as below:  Hospital Course	 41yo woman with HTN, HLD, bipolar d/o, hypothyroidism, seizure disorder (on Keppra and VPA), developmentally delayed, who initially presented to OSH on 2/18 for breakthrough seizures, transferred to the EMU for further evaluation and management. Per chart review and discussion with aide at bedside, patient had 2 seizures at her group home, including an episode of generalized "tonic-clonic" shaking while seated in a recliner, lasting 2 minutes, associated with urinary incontinence. She was taken to OSH where she was reported to be postictal and received IV VPA 500mg x1 and later determined to be stable for discharge back to her group home. While awaiting transport, she had a 3rd witnessed event lasting less than 1 minute, for which she was administered ativan 1mg IVP, and decision made for transport to [facility] for continuous EEG. There was no fall or injury associated with any of these events. Her aide is unsure of when her last seizure prior to these events may have occurred. Pt was admitted to the EMU and monitored on EEG.  The patient was incidentally found to be COVID positive on 2/28/23 when preparing for discharge. She was placed on isolation and remained asymptomatic.   Objective data: EEG REPORTS   2/19 EEG Summary:   Normal EEG in the awake, drowsy and asleep states. Excess beta   2/20-2/23 EEG Summary:   Normal EEG in the awake, drowsy and asleep states. Excess beta   Impression/Clinical Correlate:   This is a normal VEEG. No epileptiform pattern or seizures were recorded. The presence of excessive beta activity is most often seen in setting of sedative medication use.   2/24 EEG Summary:   Normal EEG in the awake, drowsy and asleep states. Excess beta   Impression/Clinical Correlate:   This is a normal VEEG. No epileptiform pattern or seizures were recorded. The presence of excessive beta activity never evolves and has no clear clinical correlation, is most often seen in setting of sedative medication use. Recommend repeat EEG once off sedative medications. 2/25 AEDs: Zonisamide 100mg BID   EEG Summary:   Normal EEG in the awake, drowsy and asleep states. Polyspikes, focal, left posterior temporal-central region Excess beta   Impression/Clinical Correlate:   This is an abnormal VEEG. There is evidence of potentially epileptogenic cortical irritability in the left posterior temporal-cental region. No seizures were recorded. The presence of excessive beta activity never evolves and has no clear clinical correlation, is most often seen in setting of sedative medication use but has also been described in rare cases of intellectual impairment.   3/2/23: EEG Summary:   Normal EEG in the awake, drowsy and asleep states.   Background slowing, generalized, mild Excess beta   Impression/Clinical Correlate:   This is an abnormal VEEG.  Mild diffuse/multifocal cerebral dysfunction, not specific in etiology. No seizures were recorded. The presence of excessive beta activity never evolves and has no clear clinical correlation, is most often seen in setting of sedative medication use but has also been described in rare cases of intellectual impairment.     Radiology: MR Brain-Seizure, Epilepsy w/wo IV Cont (02.25.23 @ 11:07) IMPRESSION: No acute infarct or hemorrhage. Nonspecific subcentimeter T2/FLAIR hyperintense white matter foci in the posterior left temporal lobe.   Impression: Breakthrough seizures in the setting of borderline VPA level possibly due to medication nonadherence vs inadequate dosing.   Plan: Continue following medications - Zonisamide 200mg BID Lacosamide 150mg BID Quetiapine 150mg BID with 50mg BID PRN   according to her mother she continues to have convulsive seizures the last one 2 days prior followed by severe behavior agitation)

## 2024-04-12 ENCOUNTER — NON-APPOINTMENT (OUTPATIENT)
Age: 44
End: 2024-04-12

## 2024-04-12 ENCOUNTER — APPOINTMENT (OUTPATIENT)
Dept: CARDIOLOGY | Facility: CLINIC | Age: 44
End: 2024-04-12
Payer: MEDICARE

## 2024-04-12 VITALS
WEIGHT: 148 LBS | BODY MASS INDEX: 27.23 KG/M2 | HEART RATE: 115 BPM | HEIGHT: 62 IN | OXYGEN SATURATION: 98 % | SYSTOLIC BLOOD PRESSURE: 118 MMHG | DIASTOLIC BLOOD PRESSURE: 50 MMHG

## 2024-04-12 DIAGNOSIS — E11.9 TYPE 2 DIABETES MELLITUS W/OUT COMPLICATIONS: ICD-10-CM

## 2024-04-12 DIAGNOSIS — I45.10 UNSPECIFIED RIGHT BUNDLE-BRANCH BLOCK: ICD-10-CM

## 2024-04-12 DIAGNOSIS — F79 UNSPECIFIED INTELLECTUAL DISABILITIES: ICD-10-CM

## 2024-04-12 DIAGNOSIS — R01.1 CARDIAC MURMUR, UNSPECIFIED: ICD-10-CM

## 2024-04-12 PROCEDURE — 99214 OFFICE O/P EST MOD 30 MIN: CPT

## 2024-04-12 PROCEDURE — 93000 ELECTROCARDIOGRAM COMPLETE: CPT

## 2024-04-12 PROCEDURE — G2211 COMPLEX E/M VISIT ADD ON: CPT

## 2024-04-12 RX ORDER — ATORVASTATIN CALCIUM 20 MG/1
20 TABLET, FILM COATED ORAL
Qty: 30 | Refills: 1 | Status: ACTIVE | COMMUNITY

## 2024-04-12 NOTE — CARDIOLOGY SUMMARY
[de-identified] : 4/12/24 sinus tachycardiac [id number]  [de-identified] : 7/13/23  EF 60-65% normal ventricular function

## 2024-04-12 NOTE — DISCUSSION/SUMMARY
[FreeTextEntry1] : Mild Tachycardia,  Inc RBBB , possibly  anxiety , Prior holter showed AVR 90s.  normal ventricular function   Cardiac murmur : flow murmur ,  normal ventricular function   HLD:  improved  lipids , tolerating  medication ,  improved TC LDL but elevated TG    continue diet restriction ,  w   obesity : encourage calorie intake restriction ,  weight loss diet,  weight reduction  DM type II : continue metformin    Seizure disorder : continue medication and Neurology management   follow up after 6 months    [EKG obtained to assist in diagnosis and management of assessed problem(s)] : EKG obtained to assist in diagnosis and management of assessed problem(s)

## 2024-04-12 NOTE — HISTORY OF PRESENT ILLNESS
[FreeTextEntry1] : Patient  intellectual disability , HLD  DM  abnormal EKG tachycardia,  obesity, seizure activity here today in routine cardiac follow up accompanied VIA formal caregiver  says she is feeling fine , no recurrence of seizure recently , patient is not cooperative, had echo without changes  , patient denies any chest pain or shortness of breath ,   patient is tachycardic as she is agitated , ekg showed sinus tachycardia likely anxiety related   Patient blood work showed controlled cholesterol elevated     HDL 40  LDL 55  jan 2024    Hb a1c 5.9 %    EKG reviewed no significant changes Mildly tachycardic as she is anxious ,    EF 60-65% trace valvular abnormalities, No segmental wall motion abnormalities

## 2024-04-12 NOTE — PHYSICAL EXAM
[Obese] : obese [Normal Conjunctiva] : normal conjunctiva [Normal Venous Pressure] : normal venous pressure [Normal S1, S2] : normal S1, S2 [No Rub] : no rub [No Gallop] : no gallop [Murmur] : murmur [Soft] : abdomen soft [Non Tender] : non-tender [Normal Gait] : normal gait [No Edema] : no edema [No Rash] : no rash [Normal] : moves all extremities, no focal deficits, normal speech [de-identified] : 1/6/SM  [de-identified] : Mentally challenged

## 2024-04-20 RX ORDER — LACOSAMIDE 150 MG/1
150 TABLET ORAL
Qty: 60 | Refills: 5 | Status: DISCONTINUED | COMMUNITY
Start: 2022-06-13 | End: 2024-04-20

## 2024-04-22 LAB — COMPREHENSIVE EPILEPSY PANEL: POSITIVE

## 2024-05-02 NOTE — ED ADULT TRIAGE NOTE - HEIGHT IN CM
Patient presented with acute on chronic urinary retention, Sumner catheter in place  -History of bladder neck contracture, status post cystoscopy transurethral resection bladder neck contracture bladder tumor evacuation of clots on 4/11/2024.   -Was discharged with Sumner in place on previous admission.  -Further management as noted above.   149.86

## 2024-05-10 NOTE — ED ADULT NURSE NOTE - ED CARDIAC RATE
Consent Type: Consent 1 (Standard) Eye Shield Used: No Initial Size Of Lesion: 0.7 X Size Of Lesion In Cm (Optional): 0.6 Number Of Stages: 1 Primary Defect Length In Cm (Final Defect Size - Required For Flaps/Grafts): 0.9 Primary Defect Depth In Cm (Optional But Required For Some Insurers): 0 Repair Type: Referred to ASC for closure Which Instrument Did You Use For Dermabrasion?: Wire Brush Which Eyelid Repair Cpt Are You Using?: 17491 Oculoplastic Surgeon Procedure Text (A): After obtaining clear surgical margins the patient was sent to oculoplastics for surgical repair.  The patient understands they will receive post-surgical care and follow-up from the referring physician's office. Otolaryngologist Procedure Text (A): After obtaining clear surgical margins the patient was sent to otolaryngology for surgical repair.  The patient understands they will receive post-surgical care and follow-up from the referring physician's office. Plastic Surgeon Procedure Text (A): After obtaining clear surgical margins the patient was sent to plastics for surgical repair.  The patient understands they will receive post-surgical care and follow-up from the referring physician's office. Mid-Level Procedure Text (A): After obtaining clear surgical margins the patient was sent to a mid-level provider for surgical repair.  The patient understands they will receive post-surgical care and follow-up from the mid-level provider. Provider Procedure Text (A): After obtaining clear surgical margins the defect was repaired by another provider. Asc Procedure Text (A): After obtaining clear surgical margins the patient was sent to an ASC for surgical repair.  The patient understands they will receive post-surgical care and follow-up from the ASC physician. Suturegard Retention Suture: 2-0 Nylon Retention Suture Bite Size: 3 mm Length To Time In Minutes Device Was In Place: 10 Undermining Type: Entire Wound Debridement Text: The wound edges were debrided prior to proceeding with the closure to facilitate wound healing. Helical Rim Text: The closure involved the helical rim. Vermilion Border Text: The closure involved the vermilion border. Nostril Rim Text: The closure involved the nostril rim. Retention Suture Text: Retention sutures were placed to support the closure and prevent dehiscence. Include Size Of Lesion In Location Indication Statement: Yes Area H Indication Text: Tumors in this location are included in Area H (eyelids, eyebrows, nose, lips, chin, ear, pre-auricular, post-auricular, temple, genitalia, hands, feet, ankles and areola).  Tissue conservation is critical in these anatomic locations. Area M Indication Text: Tumors in this location are included in Area M (cheek, forehead, scalp, neck, jawline and pretibial skin).  Mohs surgery is indicated for tumors in these anatomic locations. Area L Indication Text: Tumors in this location are included in Area L (trunk and extremities).  Mohs surgery is indicated for larger tumors, or tumors with aggressive histologic features, in these anatomic locations. Tumor Debulked?: curette Depth Of Tumor Invasion (For Histology): adipose tissue Perineural Invasion (For Histology - Be Specific If Possible): absent Special Stains Stage 1 - Results: Base On Clearance Noted Above Stage 2: Additional Anesthesia Type: 1% lidocaine with 1:100,000 epinephrine and 408mcg clindamycin/ml and a 1:10 solution of 8.4% sodium bicarbonate Include Tumor Staging In Mohs Note?: Please Select the Appropriate Response Staging Info: By selecting yes to the question above you will include information on AJCC 8 tumor staging in your Mohs note. Information on tumor staging will be automatically added for SCCs on the head and neck. AJCC 8 includes tumor size, tumor depth, perineural involvement and bone invasion. Tumor Depth: Less than 6mm from granular layer and no invasion beyond the subcutaneous fat Medical Necessity Statement: Based on my medical judgement, Mohs surgery is the most appropriate treatment for this cancer compared to other treatments. Alternatives Discussed Intro (Do Not Add Period): I discussed alternative treatments to Mohs surgery and specifically discussed the risks and benefits of Consent 1/Introductory Paragraph: The rationale for Mohs was explained to the patient and consent was obtained. The risks, benefits and alternatives to therapy were discussed in detail. Specifically, the risks of infection, scarring, bleeding, prolonged wound healing, incomplete removal, allergy to anesthesia, nerve injury and recurrence were addressed. Prior to the procedure, the treatment site was clearly identified and confirmed by the patient. All components of Universal Protocol/PAUSE Rule completed. Consent 2/Introductory Paragraph: Mohs surgery was explained to the patient and consent was obtained. The risks, benefits and alternatives to therapy were discussed in detail. Specifically, the risks of infection, scarring, bleeding, prolonged wound healing, incomplete removal, allergy to anesthesia, nerve injury and recurrence were addressed. Prior to the procedure, the treatment site was clearly identified and confirmed by the patient. All components of Universal Protocol/PAUSE Rule completed. Consent 3/Introductory Paragraph: I gave the patient a chance to ask questions they had about the procedure.  Following this I explained the Mohs procedure and consent was obtained. The risks, benefits and alternatives to therapy were discussed in detail. Specifically, the risks of infection, scarring, bleeding, prolonged wound healing, incomplete removal, allergy to anesthesia, nerve injury and recurrence were addressed. Prior to the procedure, the treatment site was clearly identified and confirmed by the patient. All components of Universal Protocol/PAUSE Rule completed. Consent (Temporal Branch)/Introductory Paragraph: The rationale for Mohs was explained to the patient and consent was obtained. The risks, benefits and alternatives to therapy were discussed in detail. Specifically, the risks of damage to the temporal branch of the facial nerve, infection, scarring, bleeding, prolonged wound healing, incomplete removal, allergy to anesthesia, and recurrence were addressed. Prior to the procedure, the treatment site was clearly identified and confirmed by the patient. All components of Universal Protocol/PAUSE Rule completed. Consent (Marginal Mandibular)/Introductory Paragraph: The rationale for Mohs was explained to the patient and consent was obtained. The risks, benefits and alternatives to therapy were discussed in detail. Specifically, the risks of damage to the marginal mandibular branch of the facial nerve, infection, scarring, bleeding, prolonged wound healing, incomplete removal, allergy to anesthesia, and recurrence were addressed. Prior to the procedure, the treatment site was clearly identified and confirmed by the patient. All components of Universal Protocol/PAUSE Rule completed. Consent (Spinal Accessory)/Introductory Paragraph: The rationale for Mohs was explained to the patient and consent was obtained. The risks, benefits and alternatives to therapy were discussed in detail. Specifically, the risks of damage to the spinal accessory nerve, infection, scarring, bleeding, prolonged wound healing, incomplete removal, allergy to anesthesia, and recurrence were addressed. Prior to the procedure, the treatment site was clearly identified and confirmed by the patient. All components of Universal Protocol/PAUSE Rule completed. Consent (Near Eyelid Margin)/Introductory Paragraph: The rationale for Mohs was explained to the patient and consent was obtained. The risks, benefits and alternatives to therapy were discussed in detail. Specifically, the risks of ectropion or eyelid deformity, infection, scarring, bleeding, prolonged wound healing, incomplete removal, allergy to anesthesia, nerve injury and recurrence were addressed. Prior to the procedure, the treatment site was clearly identified and confirmed by the patient. All components of Universal Protocol/PAUSE Rule completed. Consent (Ear)/Introductory Paragraph: The rationale for Mohs was explained to the patient and consent was obtained. The risks, benefits and alternatives to therapy were discussed in detail. Specifically, the risks of ear deformity, infection, scarring, bleeding, prolonged wound healing, incomplete removal, allergy to anesthesia, nerve injury and recurrence were addressed. Prior to the procedure, the treatment site was clearly identified and confirmed by the patient. All components of Universal Protocol/PAUSE Rule completed. Consent (Nose)/Introductory Paragraph: The rationale for Mohs was explained to the patient and consent was obtained. The risks, benefits and alternatives to therapy were discussed in detail. Specifically, the risks of nasal deformity, changes in the flow of air through the nose, infection, scarring, bleeding, prolonged wound healing, incomplete removal, allergy to anesthesia, nerve injury and recurrence were addressed. Prior to the procedure, the treatment site was clearly identified and confirmed by the patient. All components of Universal Protocol/PAUSE Rule completed. Consent (Lip)/Introductory Paragraph: The rationale for Mohs was explained to the patient and consent was obtained. The risks, benefits and alternatives to therapy were discussed in detail. Specifically, the risks of lip deformity, changes in the oral aperture, infection, scarring, bleeding, prolonged wound healing, incomplete removal, allergy to anesthesia, nerve injury and recurrence were addressed. Prior to the procedure, the treatment site was clearly identified and confirmed by the patient. All components of Universal Protocol/PAUSE Rule completed. Consent (Scalp)/Introductory Paragraph: The rationale for Mohs was explained to the patient and consent was obtained. The risks, benefits and alternatives to therapy were discussed in detail. Specifically, the risks of changes in hair growth pattern secondary to repair, infection, scarring, bleeding, prolonged wound healing, incomplete removal, allergy to anesthesia, nerve injury and recurrence were addressed. Prior to the procedure, the treatment site was clearly identified and confirmed by the patient. All components of Universal Protocol/PAUSE Rule completed. normal Detail Level: Detailed Postop Diagnosis: same Surgeon: Dr. Arthur Melendez Anesthesia Volume In Cc: 3 Hemostasis: Electrodesiccation Estimated Blood Loss (Cc): minimal Stage 5: Additional Anesthesia Type: 1% lidocaine with epinephrine Brow Lift Text: A midfrontal incision was made medially to the defect to allow access to the tissues just superior to the left eyebrow. Following careful dissection inferiorly in a supraperiosteal plane to the level of the left eyebrow, several 3-0 monocryl sutures were used to resuspend the eyebrow orbicularis oculi muscular unit to the superior frontal bone periosteum. This resulted in an appropriate reapproximation of static eyebrow symmetry and correction of the left brow ptosis. Suturegard Intro: Intraoperative tissue expansion was performed, utilizing the SUTUREGARD device, in order to reduce wound tension. Suturegard Body: The suture ends were repeatedly re-tightened and re-clamped to achieve the desired tissue expansion. Hemigard Intro: Due to skin fragility and wound tension, it was decided to use HEMIGARD adhesive retention suture devices to permit a linear closure. The skin was cleaned and dried for a 6cm distance away from the wound. Excessive hair, if present, was removed to allow for adhesion. Hemigard Postcare Instructions: The HEMIGARD strips are to remain completely dry for at least 5-7 days. Donor Site Anesthesia Type: same as repair anesthesia Epidermal Closure Graft Donor Site (Optional): simple interrupted Graft Donor Site Bandage (Optional-Leave Blank If You Don't Want In Note): Steri-strips and a pressure bandage were applied to the donor site. Closure 2 Information: This tab is for additional flaps and grafts, including complex repair and grafts and complex repair and flaps. You can also specify a different location for the additional defect, if the location is the same you do not need to select a new one. We will insert the automated text for the repair you select below just as we do for solitary flaps and grafts. Please note that at this time if you select a location with a different insurance zone you will need to override the ICD10 and CPT if appropriate. Closure 3 Information: This tab is for additional flaps and grafts above and beyond our usual structured repairs.  Please note if you enter information here it will not currently bill and you will need to add the billing information manually. Dressing: pressure dressing Wound Care (No Sutures): Petrolatum Dressing (No Sutures): dry sterile dressing Unna Boot Text: An Unna boot was placed to help immobilize the limb and facilitate more rapid healing. Home Suture Removal Text: Patient was provided instructions on removing sutures and will remove their sutures at home.  If they have any questions or difficulties they will call the office. Post-Care Instructions: I reviewed with the patient in detail post-care instructions. Patient is not to engage in any heavy lifting, exercise, or swimming for the next 14 days. Should the patient develop any fevers, chills, bleeding, severe pain patient will contact the office immediately. Pain Refusal Text: I offered to prescribe pain medication but the patient refused to take this medication. Mauc Instructions: By selecting yes to the question below the MAUC number will be added into the note.  This will be calculated automatically based on the diagnosis chosen, the size entered, the body zone selected (H,M,L) and the specific indications you chose. You will also have the option to override the Mohs AUC if you disagree with the automatically calculated number and this option is found in the Case Summary tab. Where Do You Want The Question To Include Opioid Counseling Located?: Case Summary Tab Eye Protection Verbiage: Before proceeding with the stage, a plastic scleral shield was inserted. The globe was anesthetized with a few drops of 1% lidocaine with 1:100,000 epinephrine. Then, an appropriate sized scleral shield was chosen and coated with lacrilube ointment. The shield was gently inserted and left in place for the duration of each stage. After the stage was completed, the shield was gently removed. Mohs Method Verbiage: An incision at a 45 degree angle following the standard Mohs approach was done and the specimen was harvested as a microscopic controlled layer. Surgeon/Pathologist Verbiage (Will Incorporate Name Of Surgeon From Intro If Not Blank): operated in two distinct and integrated capacities as the surgeon and pathologist. Mohs Histo Method Verbiage: Each section was then chromacoded and processed in the Mohs lab using the Mohs protocol and submitted for frozen section, utilizing hematoxylin and eosin histochemical stains. Subsequent Stages Histo Method Verbiage: Using a similar technique to that described above, a thin layer of tissue was removed from all areas where tumor was visible on the previous stage.  The tissue was again oriented, mapped, dyed, and processed as above. Mohs Rapid Report Verbiage: The area of clinically evident tumor was marked with skin marking ink and appropriately hatched.  The initial incision was made following the Mohs approach through the skin.  The specimen was taken to the lab, divided into the necessary number of pieces, chromacoded and processed according to the Mohs protocol.  This was repeated in successive stages until a tumor free defect was achieved. Complex Repair Preamble Text (Leave Blank If You Do Not Want): Extensive wide undermining was performed. Intermediate Repair Preamble Text (Leave Blank If You Do Not Want): Undermining was performed with blunt dissection. Non-Graft Cartilage Fenestration Text: The cartilage was fenestrated with a 2mm punch biopsy to help facilitate healing. Graft Cartilage Fenestration Text: The cartilage was fenestrated with a 2mm punch biopsy to help facilitate graft survival and healing. Secondary Intention Text (Leave Blank If You Do Not Want): The defect will heal with secondary intention. No Repair - Repaired With Adjacent Surgical Defect Text (Leave Blank If You Do Not Want): After obtaining clear surgical margins the defect was repaired concurrently with another surgical defect which was in close approximation. Adjacent Tissue Transfer Text: The defect edges were debeveled with a #15 scalpel blade. Given the location of the defect and the proximity to free margins an adjacent tissue transfer was deemed most appropriate. Using a sterile surgical marker, an appropriate flap was drawn incorporating the defect and placing the expected incisions within the relaxed skin tension lines where possible. The area thus outlined was incised deep to adipose tissue with a #15 scalpel blade. The skin margins were undermined to an appropriate distance in all directions utilizing iris scissors and carried over to close the primary defect. Advancement Flap (Single) Text: The defect edges were debeveled with a #15 scalpel blade. Given the location of the defect and the proximity to free margins a single advancement flap was deemed most appropriate. Using a sterile surgical marker, an appropriate advancement flap was drawn incorporating the defect and placing the expected incisions within the relaxed skin tension lines where possible. The area thus outlined was incised deep to adipose tissue with a #15 scalpel blade. The skin margins were undermined to an appropriate distance in all directions utilizing iris scissors. Following this, the designed flap was advanced and carried over into the primary defect and sutured into place. Advancement Flap (Double) Text: The defect edges were debeveled with a #15 scalpel blade. Given the location of the defect and the proximity to free margins a double advancement flap was deemed most appropriate. Using a sterile surgical marker, the appropriate advancement flaps were drawn incorporating the defect and placing the expected incisions within the relaxed skin tension lines where possible. The area thus outlined was incised deep to adipose tissue with a #15 scalpel blade. The skin margins were undermined to an appropriate distance in all directions utilizing iris scissors. Following this, the designed flaps were advanced and carried over into the primary defect and sutured into place. Burow's Advancement Flap Text: The defect edges were debeveled with a #15 scalpel blade. Given the location of the defect and the proximity to free margins a Burow's advancement flap was deemed most appropriate. Using a sterile surgical marker, the appropriate advancement flap was drawn incorporating the defect and placing the expected incisions within the relaxed skin tension lines where possible. The area thus outlined was incised deep to adipose tissue with a #15 scalpel blade. The skin margins were undermined to an appropriate distance in all directions utilizing iris scissors. Following this, the designed flap was advanced and carried over into the primary defect and sutured into place. Chonodrocutaneous Helical Advancement Flap Text: The defect edges were debeveled with a #15 scalpel blade. Given the location of the defect and the proximity to free margins a chondrocutaneous helical advancement flap was deemed most appropriate. Using a sterile surgical marker, the appropriate advancement flap was drawn incorporating the defect and placing the expected incisions within the relaxed skin tension lines where possible. The area thus outlined was incised deep to adipose tissue with a #15 scalpel blade. The skin margins were undermined to an appropriate distance in all directions utilizing iris scissors. Following this, the designed flap was advanced and carried over into the primary defect and sutured into place. Crescentic Advancement Flap Text: The defect edges were debeveled with a #15 scalpel blade. Given the location of the defect and the proximity to free margins a crescentic advancement flap was deemed most appropriate. Using a sterile surgical marker, the appropriate advancement flap was drawn incorporating the defect and placing the expected incisions within the relaxed skin tension lines where possible. The area thus outlined was incised deep to adipose tissue with a #15 scalpel blade. The skin margins were undermined to an appropriate distance in all directions utilizing iris scissors. Following this, the designed flap was advanced and carried over into the primary defect and sutured into place. A-T Advancement Flap Text: The defect edges were debeveled with a #15 scalpel blade. Given the location of the defect, shape of the defect and the proximity to free margins an A-T advancement flap was deemed most appropriate. Using a sterile surgical marker, an appropriate advancement flap was drawn incorporating the defect and placing the expected incisions within the relaxed skin tension lines where possible. The area thus outlined was incised deep to adipose tissue with a #15 scalpel blade. The skin margins were undermined to an appropriate distance in all directions utilizing iris scissors. Following this, the designed flap was advanced and carried over into the primary defect and sutured into place. O-T Advancement Flap Text: The defect edges were debeveled with a #15 scalpel blade. Given the location of the defect, shape of the defect and the proximity to free margins an O-T advancement flap was deemed most appropriate. Using a sterile surgical marker, an appropriate advancement flap was drawn incorporating the defect and placing the expected incisions within the relaxed skin tension lines where possible. The area thus outlined was incised deep to adipose tissue with a #15 scalpel blade. The skin margins were undermined to an appropriate distance in all directions utilizing iris scissors. Following this, the designed flap was advanced and carried over into the primary defect and sutured into place. O-L Flap Text: The defect edges were debeveled with a #15 scalpel blade. Given the location of the defect, shape of the defect and the proximity to free margins an O-L flap was deemed most appropriate. Using a sterile surgical marker, an appropriate advancement flap was drawn incorporating the defect and placing the expected incisions within the relaxed skin tension lines where possible. The area thus outlined was incised deep to adipose tissue with a #15 scalpel blade. The skin margins were undermined to an appropriate distance in all directions utilizing iris scissors. Following this, the designed flap was advanced and carried over into the primary defect and sutured into place. O-Z Flap Text: The defect edges were debeveled with a #15 scalpel blade. Given the location of the defect, shape of the defect and the proximity to free margins an O-Z flap was deemed most appropriate. Using a sterile surgical marker, an appropriate transposition flap was drawn incorporating the defect and placing the expected incisions within the relaxed skin tension lines where possible. The area thus outlined was incised deep to adipose tissue with a #15 scalpel blade. The skin margins were undermined to an appropriate distance in all directions utilizing iris scissors. Following this, the designed flap was carried over into the primary defect and sutured into place. Double O-Z Flap Text: The defect edges were debeveled with a #15 scalpel blade. Given the location of the defect, shape of the defect and the proximity to free margins a Double O-Z flap was deemed most appropriate. Using a sterile surgical marker, an appropriate transposition flap was drawn incorporating the defect and placing the expected incisions within the relaxed skin tension lines where possible. The area thus outlined was incised deep to adipose tissue with a #15 scalpel blade. The skin margins were undermined to an appropriate distance in all directions utilizing iris scissors. Following this, the designed flap was carried over into the primary defect and sutured into place. V-Y Flap Text: The defect edges were debeveled with a #15 scalpel blade. Given the location of the defect, shape of the defect and the proximity to free margins a V-Y flap was deemed most appropriate. Using a sterile surgical marker, an appropriate advancement flap was drawn incorporating the defect and placing the expected incisions within the relaxed skin tension lines where possible. The area thus outlined was incised deep to adipose tissue with a #15 scalpel blade. The skin margins were undermined to an appropriate distance in all directions utilizing iris scissors. Following this, the designed flap was advanced and carried over into the primary defect and sutured into place. Advancement-Rotation Flap Text: The defect edges were debeveled with a #15 scalpel blade. Given the location of the defect, shape of the defect and the proximity to free margins an advancement-rotation flap was deemed most appropriate. Using a sterile surgical marker, an appropriate flap was drawn incorporating the defect and placing the expected incisions within the relaxed skin tension lines where possible. The area thus outlined was incised deep to adipose tissue with a #15 scalpel blade. The skin margins were undermined to an appropriate distance in all directions utilizing iris scissors. Following this, the designed flap was carried over into the primary defect and sutured into place. Mercedes Flap Text: The defect edges were debeveled with a #15 scalpel blade. Given the location of the defect, shape of the defect and the proximity to free margins a Mercedes flap was deemed most appropriate. Using a sterile surgical marker, an appropriate advancement flap was drawn incorporating the defect and placing the expected incisions within the relaxed skin tension lines where possible. The area thus outlined was incised deep to adipose tissue with a #15 scalpel blade. The skin margins were undermined to an appropriate distance in all directions utilizing iris scissors. Following this, the designed flap was advanced and carried over into the primary defect and sutured into place. Modified Advancement Flap Text: The defect edges were debeveled with a #15 scalpel blade. Given the location of the defect, shape of the defect and the proximity to free margins a modified advancement flap was deemed most appropriate. Using a sterile surgical marker, an appropriate advancement flap was drawn incorporating the defect and placing the expected incisions within the relaxed skin tension lines where possible. The area thus outlined was incised deep to adipose tissue with a #15 scalpel blade. The skin margins were undermined to an appropriate distance in all directions utilizing iris scissors. Following this, the designed flap was advanced and carried over into the primary defect and sutured into place. Mucosal Advancement Flap Text: Given the location of the defect, shape of the defect and the proximity to free margins a mucosal advancement flap was deemed most appropriate. Incisions were made with a 15 blade scalpel in the appropriate fashion along the cutaneous vermilion border and the mucosal lip. The remaining actinically damaged mucosal tissue was excised.  The mucosal advancement flap was then elevated to the gingival sulcus with care taken to preserve the neurovascular structures and advanced into the primary defect. Care was taken to ensure that precise realignment of the vermilion border was achieved. Peng Advancement Flap Text: The defect edges were debeveled with a #15 scalpel blade. Given the location of the defect, shape of the defect and the proximity to free margins a Peng advancement flap was deemed most appropriate. Using a sterile surgical marker, an appropriate advancement flap was drawn incorporating the defect and placing the expected incisions within the relaxed skin tension lines where possible. The area thus outlined was incised deep to adipose tissue with a #15 scalpel blade. The skin margins were undermined to an appropriate distance in all directions utilizing iris scissors. Following this, the designed flap was advanced and carried over into the primary defect and sutured into place. Hatchet Flap Text: The defect edges were debeveled with a #15 scalpel blade. Given the location of the defect, shape of the defect and the proximity to free margins a hatchet flap was deemed most appropriate. Using a sterile surgical marker, an appropriate hatchet flap was drawn incorporating the defect and placing the expected incisions within the relaxed skin tension lines where possible. The area thus outlined was incised deep to adipose tissue with a #15 scalpel blade. The skin margins were undermined to an appropriate distance in all directions utilizing iris scissors. Following this, the designed flap was carried over into the primary defect and sutured into place. Rotation Flap Text: The defect edges were debeveled with a #15 scalpel blade. Given the location of the defect, shape of the defect and the proximity to free margins a rotation flap was deemed most appropriate. Using a sterile surgical marker, an appropriate rotation flap was drawn incorporating the defect and placing the expected incisions within the relaxed skin tension lines where possible. The area thus outlined was incised deep to adipose tissue with a #15 scalpel blade. The skin margins were undermined to an appropriate distance in all directions utilizing iris scissors. Following this, the designed flap was carried over into the primary defect and sutured into place. Bilateral Rotation Flap Text: The defect edges were debeveled with a #15 scalpel blade. Given the location of the defect, shape of the defect and the proximity to free margins a bilateral rotation flap was deemed most appropriate. Using a sterile surgical marker, an appropriate rotation flap was drawn incorporating the defect and placing the expected incisions within the relaxed skin tension lines where possible. The area thus outlined was incised deep to adipose tissue with a #15 scalpel blade. The skin margins were undermined to an appropriate distance in all directions utilizing iris scissors. Following this, the designed flap was carried over into the primary defect and sutured into place. Spiral Flap Text: The defect edges were debeveled with a #15 scalpel blade. Given the location of the defect, shape of the defect and the proximity to free margins a spiral flap was deemed most appropriate. Using a sterile surgical marker, an appropriate rotation flap was drawn incorporating the defect and placing the expected incisions within the relaxed skin tension lines where possible. The area thus outlined was incised deep to adipose tissue with a #15 scalpel blade. The skin margins were undermined to an appropriate distance in all directions utilizing iris scissors. Following this, the designed flap was carried over into the primary defect and sutured into place. Staged Advancement Flap Text: The defect edges were debeveled with a #15 scalpel blade. Given the location of the defect, shape of the defect and the proximity to free margins a staged advancement flap was deemed most appropriate. Using a sterile surgical marker, an appropriate advancement flap was drawn incorporating the defect and placing the expected incisions within the relaxed skin tension lines where possible. The area thus outlined was incised deep to adipose tissue with a #15 scalpel blade. The skin margins were undermined to an appropriate distance in all directions utilizing iris scissors. Following this, the designed flap was carried over into the primary defect and sutured into place. Star Wedge Flap Text: The defect edges were debeveled with a #15 scalpel blade. Given the location of the defect, shape of the defect and the proximity to free margins a star wedge flap was deemed most appropriate. Using a sterile surgical marker, an appropriate rotation flap was drawn incorporating the defect and placing the expected incisions within the relaxed skin tension lines where possible. The area thus outlined was incised deep to adipose tissue with a #15 scalpel blade. The skin margins were undermined to an appropriate distance in all directions utilizing iris scissors. Following this, the designed flap was carried over into the primary defect and sutured into place. Transposition Flap Text: The defect edges were debeveled with a #15 scalpel blade. Given the location of the defect and the proximity to free margins a transposition flap was deemed most appropriate. Using a sterile surgical marker, an appropriate transposition flap was drawn incorporating the defect. The area thus outlined was incised deep to adipose tissue with a #15 scalpel blade. The skin margins were undermined to an appropriate distance in all directions utilizing iris scissors. Following this, the designed flap was carried over into the primary defect and sutured into place. Muscle Hinge Flap Text: The defect edges were debeveled with a #15 scalpel blade.  Given the size, depth and location of the defect and the proximity to free margins a muscle hinge flap was deemed most appropriate. Using a sterile surgical marker, an appropriate hinge flap was drawn incorporating the defect. The area thus outlined was incised with a #15 scalpel blade. The skin margins were undermined to an appropriate distance in all directions utilizing iris scissors. Following this, the designed flap was carried into the primary defect and sutured into place. Mustarde Flap Text: The defect edges were debeveled with a #15 scalpel blade.  Given the size, depth and location of the defect and the proximity to free margins a Mustarde flap was deemed most appropriate. Using a sterile surgical marker, an appropriate flap was drawn incorporating the defect. The area thus outlined was incised with a #15 scalpel blade. The skin margins were undermined to an appropriate distance in all directions utilizing iris scissors. Following this, the designed flap was carried into the primary defect and sutured into place. Nasal Turnover Hinge Flap Text: The defect edges were debeveled with a #15 scalpel blade.  Given the size, depth, location of the defect and the defect being full thickness a nasal turnover hinge flap was deemed most appropriate. Using a sterile surgical marker, an appropriate hinge flap was drawn incorporating the defect. The area thus outlined was incised with a #15 scalpel blade. The flap was designed to recreate the nasal mucosal lining and the alar rim. The skin margins were undermined to an appropriate distance in all directions utilizing iris scissors. Following this, the designed flap was carried over into the primary defect and sutured into place Nasalis-Muscle-Based Myocutaneous Island Pedicle Flap Text: Using a #15 blade, an incision was made around the donor flap to the level of the nasalis muscle. Wide lateral undermining was then performed in both the subcutaneous plane above the nasalis muscle, and in a submuscular plane just above periosteum. This allowed the formation of a free nasalis muscle axial pedicle (based on the angular artery) which was still attached to the actual cutaneous flap, increasing its mobility and vascular viability. Hemostasis was obtained with pinpoint electrocoagulation. The flap was mobilized into position and the pivotal anchor points positioned and stabilized with buried interrupted sutures. Subcutaneous and dermal tissues were closed in a multilayered fashion with sutures. Tissue redundancies were excised, and the epidermal edges were apposed without significant tension and sutured with sutures. Nasalis Myocutaneous Flap Text: Using a #15 blade, an incision was made around the donor flap to the level of the nasalis muscle. Wide lateral undermining was then performed in both the subcutaneous plane above the nasalis muscle, and in a submuscular plane just above periosteum. This allowed the formation of a free nasalis muscle axial pedicle which was still attached to the actual cutaneous flap, increasing its mobility and vascular viability. Hemostasis was obtained with pinpoint electrocoagulation. The flap was mobilized into position and the pivotal anchor points positioned and stabilized with buried interrupted sutures. Subcutaneous and dermal tissues were closed in a multilayered fashion with sutures. Tissue redundancies were excised, and the epidermal edges were apposed without significant tension and sutured with sutures. Orbicularis Oris Muscle Flap Text: The defect edges were debeveled with a #15 scalpel blade.  Given that the defect affected the competency of the oral sphincter an orbicularis oris muscle flap was deemed most appropriate to restore this competency and normal muscle function.  Using a sterile surgical marker, an appropriate flap was drawn incorporating the defect. The area thus outlined was incised with a #15 scalpel blade. Following this, the designed flap was carried over into the primary defect and sutured into place. Melolabial Transposition Flap Text: The defect edges were debeveled with a #15 scalpel blade. Given the location of the defect and the proximity to free margins a melolabial flap was deemed most appropriate. Using a sterile surgical marker, an appropriate melolabial transposition flap was drawn incorporating the defect. The area thus outlined was incised deep to adipose tissue with a #15 scalpel blade. The skin margins were undermined to an appropriate distance in all directions utilizing iris scissors. Following this, the designed flap was carried over into the primary defect and sutured into place. Rectangular Flap Text: The defect edges were debeveled with a #15 scalpel blade. Given the location of the defect and the proximity to free margins a rectangular flap was deemed most appropriate. Using a sterile surgical marker, an appropriate rectangular flap was drawn incorporating the defect. The area thus outlined was incised deep to adipose tissue with a #15 scalpel blade. The skin margins were undermined to an appropriate distance in all directions utilizing iris scissors. Following this, the designed flap was carried over into the primary defect and sutured into place. Rhombic Flap Text: The defect edges were debeveled with a #15 scalpel blade. Given the location of the defect and the proximity to free margins a rhombic flap was deemed most appropriate. Using a sterile surgical marker, an appropriate rhombic flap was drawn incorporating the defect. The area thus outlined was incised deep to adipose tissue with a #15 scalpel blade. The skin margins were undermined to an appropriate distance in all directions utilizing iris scissors. Following this, the designed flap was carried over into the primary defect and sutured into place. Rhomboid Transposition Flap Text: The defect edges were debeveled with a #15 scalpel blade. Given the location of the defect and the proximity to free margins a rhomboid transposition flap was deemed most appropriate. Using a sterile surgical marker, an appropriate rhomboid flap was drawn incorporating the defect. The area thus outlined was incised deep to adipose tissue with a #15 scalpel blade. The skin margins were undermined to an appropriate distance in all directions utilizing iris scissors. Following this, the designed flap was carried over into the primary defect and sutured into place. Bi-Rhombic Flap Text: The defect edges were debeveled with a #15 scalpel blade. Given the location of the defect and the proximity to free margins a bi-rhombic flap was deemed most appropriate. Using a sterile surgical marker, an appropriate rhombic flap was drawn incorporating the defect. The area thus outlined was incised deep to adipose tissue with a #15 scalpel blade. The skin margins were undermined to an appropriate distance in all directions utilizing iris scissors. Following this, the designed flap was carried over into the primary defect and sutured into place. Helical Rim Advancement Flap Text: The defect edges were debeveled with a #15 blade scalpel.  Given the location of the defect and the proximity to free margins (helical rim) a double helical rim advancement flap was deemed most appropriate. Using a sterile surgical marker, the appropriate advancement flaps were drawn incorporating the defect and placing the expected incisions between the helical rim and antihelix where possible.  The area thus outlined was incised through and through with a #15 scalpel blade.  With a skin hook and iris scissors, the flaps were gently and sharply undermined and freed up. Folllowing this, the designed flaps were carried over into the primary defect and sutured into place. Bilateral Helical Rim Advancement Flap Text: The defect edges were debeveled with a #15 blade scalpel.  Given the location of the defect and the proximity to free margins (helical rim) a bilateral helical rim advancement flap was deemed most appropriate. Using a sterile surgical marker, the appropriate advancement flaps were drawn incorporating the defect and placing the expected incisions between the helical rim and antihelix where possible.  The area thus outlined was incised through and through with a #15 scalpel blade.  With a skin hook and iris scissors, the flaps were gently and sharply undermined and freed up. Following this, the designed flaps were placed into the primary defect and sutured into place. Ear Star Wedge Flap Text: The defect edges were debeveled with a #15 blade scalpel.  Given the location of the defect and the proximity to free margins (helical rim) an ear star wedge flap was deemed most appropriate. Using a sterile surgical marker, the appropriate flap was drawn incorporating the defect and placing the expected incisions between the helical rim and antihelix where possible.  The area thus outlined was incised through and through with a #15 scalpel blade. Following this, the designed flap was carried over into the primary defect and sutured into place. Banner Transposition Flap Text: The defect edges were debeveled with a #15 scalpel blade. Given the location of the defect and the proximity to free margins a Banner transposition flap was deemed most appropriate. Using a sterile surgical marker, an appropriate flap was drawn around the defect. The area thus outlined was incised deep to adipose tissue with a #15 scalpel blade. The skin margins were undermined to an appropriate distance in all directions utilizing iris scissors. Following this, the designed flap was carried into the primary defect and sutured into place. Bilobed Flap Text: The defect edges were debeveled with a #15 scalpel blade. Given the location of the defect and the proximity to free margins a bilobe flap was deemed most appropriate. Using a sterile surgical marker, an appropriate bilobe flap drawn around the defect. The area thus outlined was incised deep to adipose tissue with a #15 scalpel blade. The skin margins were undermined to an appropriate distance in all directions utilizing iris scissors. Following this, the designed flap was carried over into the primary defect and sutured into place. Bilobed Transposition Flap Text: The defect edges were debeveled with a #15 scalpel blade. Given the location of the defect and the proximity to free margins a bilobed transposition flap was deemed most appropriate. Using a sterile surgical marker, an appropriate bilobe flap drawn around the defect. The area thus outlined was incised deep to adipose tissue with a #15 scalpel blade. The skin margins were undermined to an appropriate distance in all directions utilizing iris scissors. Following this, the designed flap was carried over into the primary defect and sutured into place. Trilobed Flap Text: The defect edges were debeveled with a #15 scalpel blade. Given the location of the defect and the proximity to free margins a trilobed flap was deemed most appropriate. Using a sterile surgical marker, an appropriate trilobed flap was drawn around the defect. The area thus outlined was incised deep to adipose tissue with a #15 scalpel blade. The skin margins were undermined to an appropriate distance in all directions utilizing iris scissors. Following this, the designed flap was carried into the primary defect and sutured into place. Dorsal Nasal Flap Text: The defect edges were debeveled with a #15 scalpel blade. Given the location of the defect and the proximity to free margins a dorsal nasal flap was deemed most appropriate. Using a sterile surgical marker, an appropriate dorsal nasal flap was drawn around the defect. The area thus outlined was incised deep to adipose tissue with a #15 scalpel blade. The skin margins were undermined to an appropriate distance in all directions utilizing iris scissors. Following this, the designed flap was carried into the primary defect and sutured into place. Island Pedicle Flap Text: The defect edges were debeveled with a #15 scalpel blade. Given the location of the defect, shape of the defect and the proximity to free margins an island pedicle advancement flap was deemed most appropriate. Using a sterile surgical marker, an appropriate advancement flap was drawn incorporating the defect, outlining the appropriate donor tissue and placing the expected incisions within the relaxed skin tension lines where possible. The area thus outlined was incised deep to adipose tissue with a #15 scalpel blade. The skin margins were undermined to an appropriate distance in all directions around the primary defect and laterally outward around the island pedicle utilizing iris scissors.  There was minimal undermining beneath the pedicle flap. Following this, the flap was carried over into the primary defect and sutured into place. Island Pedicle Flap With Canthal Suspension Text: The defect edges were debeveled with a #15 scalpel blade. Given the location of the defect, shape of the defect and the proximity to free margins an island pedicle advancement flap was deemed most appropriate. Using a sterile surgical marker, an appropriate advancement flap was drawn incorporating the defect, outlining the appropriate donor tissue and placing the expected incisions within the relaxed skin tension lines where possible. The area thus outlined was incised deep to adipose tissue with a #15 scalpel blade. The skin margins were undermined to an appropriate distance in all directions around the primary defect and laterally outward around the island pedicle utilizing iris scissors.  There was minimal undermining beneath the pedicle flap. A suspension suture was placed in the canthal tendon to prevent tension and prevent ectropion. Following this, the designed flap was placed into the primary defect and sutured into place. Alar Island Pedicle Flap Text: The defect edges were debeveled with a #15 scalpel blade. Given the location of the defect, shape of the defect and the proximity to the alar rim an island pedicle advancement flap was deemed most appropriate. Using a sterile surgical marker, an appropriate advancement flap was drawn incorporating the defect, outlining the appropriate donor tissue and placing the expected incisions within the nasal ala running parallel to the alar rim. The area thus outlined was incised with a #15 scalpel blade. The skin margins were undermined minimally to an appropriate distance in all directions around the primary defect and laterally outward around the island pedicle utilizing iris scissors.  There was minimal undermining beneath the pedicle flap. Following this, the designed flap was carried over into the primary defect and sutured into place. Double Island Pedicle Flap Text: The defect edges were debeveled with a #15 scalpel blade. Given the location of the defect, shape of the defect and the proximity to free margins a double island pedicle advancement flap was deemed most appropriate. Using a sterile surgical marker, an appropriate advancement flap was drawn incorporating the defect, outlining the appropriate donor tissue and placing the expected incisions within the relaxed skin tension lines where possible. The area thus outlined was incised deep to adipose tissue with a #15 scalpel blade. The skin margins were undermined to an appropriate distance in all directions around the primary defect and laterally outward around the island pedicle utilizing iris scissors.  There was minimal undermining beneath the pedicle flap. Following this, the flap was carried over into the primary defect and sutured into place. Island Pedicle Flap-Requiring Vessel Identification Text: The defect edges were debeveled with a #15 scalpel blade. Given the location of the defect, shape of the defect and the proximity to free margins an island pedicle advancement flap was deemed most appropriate. Using a sterile surgical marker, an appropriate advancement flap was drawn, based on the axial vessel mentioned above, incorporating the defect, outlining the appropriate donor tissue and placing the expected incisions within the relaxed skin tension lines where possible. The area thus outlined was incised deep to adipose tissue with a #15 scalpel blade. The skin margins were undermined to an appropriate distance in all directions around the primary defect and laterally outward around the island pedicle utilizing iris scissors.  There was minimal undermining beneath the pedicle flap. Following this, the designed flap was carried over into the primary defect and sutured into place. Keystone Flap Text: The defect edges were debeveled with a #15 scalpel blade. Given the location of the defect, shape of the defect a keystone flap was deemed most appropriate. Using a sterile surgical marker, an appropriate keystone flap was drawn incorporating the defect, outlining the appropriate donor tissue and placing the expected incisions within the relaxed skin tension lines where possible. The area thus outlined was incised deep to adipose tissue with a #15 scalpel blade. The skin margins were undermined to an appropriate distance in all directions around the primary defect and laterally outward around the flap utilizing iris scissors. Following this, the designed flap was carried into the primary defect and sutured into place. O-T Plasty Text: The defect edges were debeveled with a #15 scalpel blade. Given the location of the defect, shape of the defect and the proximity to free margins an O-T plasty was deemed most appropriate. Using a sterile surgical marker, an appropriate O-T plasty was drawn incorporating the defect and placing the expected incisions within the relaxed skin tension lines where possible. The area thus outlined was incised deep to adipose tissue with a #15 scalpel blade. The skin margins were undermined to an appropriate distance in all directions utilizing iris scissors. Following this, the designed flap was carried over into the primary defect and sutured into place. O-Z Plasty Text: The defect edges were debeveled with a #15 scalpel blade. Given the location of the defect, shape of the defect and the proximity to free margins an O-Z plasty (double transposition flap) was deemed most appropriate. Using a sterile surgical marker, the appropriate transposition flaps were drawn incorporating the defect and placing the expected incisions within the relaxed skin tension lines where possible. The area thus outlined was incised deep to adipose tissue with a #15 scalpel blade. The skin margins were undermined to an appropriate distance in all directions utilizing iris scissors. Hemostasis was achieved with electrocautery. The flaps were then transposed and carried over into place, one clockwise and the other counterclockwise, and anchored with interrupted buried subcutaneous sutures. Double O-Z Plasty Text: The defect edges were debeveled with a #15 scalpel blade. Given the location of the defect, shape of the defect and the proximity to free margins a Double O-Z plasty (double transposition flap) was deemed most appropriate. Using a sterile surgical marker, the appropriate transposition flaps were drawn incorporating the defect and placing the expected incisions within the relaxed skin tension lines where possible. The area thus outlined was incised deep to adipose tissue with a #15 scalpel blade. The skin margins were undermined to an appropriate distance in all directions utilizing iris scissors. Hemostasis was achieved with electrocautery. The flaps were then transposed and carried over into place, one clockwise and the other counterclockwise, and anchored with interrupted buried subcutaneous sutures. V-Y Plasty Text: The defect edges were debeveled with a #15 scalpel blade. Given the location of the defect, shape of the defect and the proximity to free margins an V-Y advancement flap was deemed most appropriate. Using a sterile surgical marker, an appropriate advancement flap was drawn incorporating the defect and placing the expected incisions within the relaxed skin tension lines where possible. The area thus outlined was incised deep to adipose tissue with a #15 scalpel blade. The skin margins were undermined to an appropriate distance in all directions utilizing iris scissors. Following this, the designed flap was advanced and carried over into the primary defect and sutured into place. H Plasty Text: Given the location of the defect, shape of the defect and the proximity to free margins a H-plasty was deemed most appropriate for repair. Using a sterile surgical marker, the appropriate advancement arms of the H-plasty were drawn incorporating the defect and placing the expected incisions within the relaxed skin tension lines where possible. The area thus outlined was incised deep to adipose tissue with a #15 scalpel blade. The skin margins were undermined to an appropriate distance in all directions utilizing iris scissors.  The opposing advancement arms were then advanced and carried over into place in opposite direction and anchored with interrupted buried subcutaneous sutures. W Plasty Text: The lesion was extirpated to the level of the fat with a #15 scalpel blade. Given the location of the defect, shape of the defect and the proximity to free margins a W-plasty was deemed most appropriate for repair. Using a sterile surgical marker, the appropriate transposition arms of the W-plasty were drawn incorporating the defect and placing the expected incisions within the relaxed skin tension lines where possible. The area thus outlined was incised deep to adipose tissue with a #15 scalpel blade. The skin margins were undermined to an appropriate distance in all directions utilizing iris scissors. The opposing transposition arms were then transposed and carried over into place in opposite direction and anchored with interrupted buried subcutaneous sutures. Z Plasty Text: The lesion was extirpated to the level of the fat with a #15 scalpel blade. Given the location of the defect, shape of the defect and the proximity to free margins a Z-plasty was deemed most appropriate for repair. Using a sterile surgical marker, the appropriate transposition arms of the Z-plasty were drawn incorporating the defect and placing the expected incisions within the relaxed skin tension lines where possible. The area thus outlined was incised deep to adipose tissue with a #15 scalpel blade. The skin margins were undermined to an appropriate distance in all directions utilizing iris scissors. The opposing transposition arms were then transposed and carried over into place in opposite direction and anchored with interrupted buried subcutaneous sutures. Double Z Plasty Text: The lesion was extirpated to the level of the fat with a #15 scalpel blade. Given the location of the defect, shape of the defect and the proximity to free margins a double Z-plasty was deemed most appropriate for repair. Using a sterile surgical marker, the appropriate transposition arms of the double Z-plasty were drawn incorporating the defect and placing the expected incisions within the relaxed skin tension lines where possible. The area thus outlined was incised deep to adipose tissue with a #15 scalpel blade. The skin margins were undermined to an appropriate distance in all directions utilizing iris scissors. The opposing transposition arms were then transposed and carried over into place in opposite direction and anchored with interrupted buried subcutaneous sutures. Zygomaticofacial Flap Text: Given the location of the defect, shape of the defect and the proximity to free margins a zygomaticofacial flap was deemed most appropriate for repair. Using a sterile surgical marker, the appropriate flap was drawn incorporating the defect and placing the expected incisions within the relaxed skin tension lines where possible. The area thus outlined was incised deep to adipose tissue with a #15 scalpel blade with preservation of a vascular pedicle.  The skin margins were undermined to an appropriate distance in all directions utilizing iris scissors. The flap was then carried over into the defect and anchored with interrupted buried subcutaneous sutures. Cheek Interpolation Flap Text: A decision was made to reconstruct the defect utilizing an interpolation axial flap and a staged reconstruction.  A telfa template was made of the defect.  This telfa template was then used to outline the Cheek Interpolation flap.  The donor area for the pedicle flap was then injected with anesthesia.  The flap was excised through the skin and subcutaneous tissue down to the layer of the underlying musculature.  The interpolation flap was carefully excised within this deep plane to maintain its blood supply.  The edges of the donor site were undermined.   The donor site was closed in a primary fashion.  The pedicle was then rotated into position and sutured.  Once the tube was sutured into place, adequate blood supply was confirmed with blanching and refill.  The pedicle was then wrapped with xeroform gauze and dressed appropriately with a telfa and gauze bandage to ensure continued blood supply and protect the attached pedicle. Cheek-To-Nose Interpolation Flap Text: A decision was made to reconstruct the defect utilizing an interpolation axial flap and a staged reconstruction.  A telfa template was made of the defect.  This telfa template was then used to outline the Cheek-To-Nose Interpolation flap.  The donor area for the pedicle flap was then injected with anesthesia.  The flap was excised through the skin and subcutaneous tissue down to the layer of the underlying musculature.  The interpolation flap was carefully excised within this deep plane to maintain its blood supply.  The edges of the donor site were undermined.   The donor site was closed in a primary fashion.  The pedicle was then rotated into position and sutured.  Once the tube was sutured into place, adequate blood supply was confirmed with blanching and refill.  The pedicle was then wrapped with xeroform gauze and dressed appropriately with a telfa and gauze bandage to ensure continued blood supply and protect the attached pedicle. Interpolation Flap Text: A decision was made to reconstruct the defect utilizing an interpolation axial flap and a staged reconstruction.  A telfa template was made of the defect.  This telfa template was then used to outline the interpolation flap.  The donor area for the pedicle flap was then injected with anesthesia.  The flap was excised through the skin and subcutaneous tissue down to the layer of the underlying musculature.  The interpolation flap was carefully excised within this deep plane to maintain its blood supply.  The edges of the donor site were undermined.   The donor site was closed in a primary fashion.  The pedicle was then rotated into position and sutured.  Once the tube was sutured into place, adequate blood supply was confirmed with blanching and refill.  The pedicle was then wrapped with xeroform gauze and dressed appropriately with a telfa and gauze bandage to ensure continued blood supply and protect the attached pedicle. Melolabial Interpolation Flap Text: A decision was made to reconstruct the defect utilizing an interpolation axial flap and a staged reconstruction.  A telfa template was made of the defect.  This telfa template was then used to outline the melolabial interpolation flap.  The donor area for the pedicle flap was then injected with anesthesia.  The flap was excised through the skin and subcutaneous tissue down to the layer of the underlying musculature.  The pedicle flap was carefully excised within this deep plane to maintain its blood supply.  The edges of the donor site were undermined.   The donor site was closed in a primary fashion.  The pedicle was then rotated into position and sutured.  Once the tube was sutured into place, adequate blood supply was confirmed with blanching and refill.  The pedicle was then wrapped with xeroform gauze and dressed appropriately with a telfa and gauze bandage to ensure continued blood supply and protect the attached pedicle. Mastoid Interpolation Flap Text: A decision was made to reconstruct the defect utilizing an interpolation axial flap and a staged reconstruction.  A telfa template was made of the defect.  This telfa template was then used to outline the mastoid interpolation flap.  The donor area for the pedicle flap was then injected with anesthesia.  The flap was excised through the skin and subcutaneous tissue down to the layer of the underlying musculature.  The pedicle flap was carefully excised within this deep plane to maintain its blood supply.  The edges of the donor site were undermined.   The donor site was closed in a primary fashion.  The pedicle was then rotated into position and sutured.  Once the tube was sutured into place, adequate blood supply was confirmed with blanching and refill.  The pedicle was then wrapped with xeroform gauze and dressed appropriately with a telfa and gauze bandage to ensure continued blood supply and protect the attached pedicle. Posterior Auricular Interpolation Flap Text: A decision was made to reconstruct the defect utilizing an interpolation axial flap and a staged reconstruction.  A telfa template was made of the defect.  This telfa template was then used to outline the posterior auricular interpolation flap.  The donor area for the pedicle flap was then injected with anesthesia.  The flap was excised through the skin and subcutaneous tissue down to the layer of the underlying musculature.  The pedicle flap was carefully excised within this deep plane to maintain its blood supply.  The edges of the donor site were undermined.   The donor site was closed in a primary fashion.  The pedicle was then rotated into position and sutured.  Once the tube was sutured into place, adequate blood supply was confirmed with blanching and refill.  The pedicle was then wrapped with xeroform gauze and dressed appropriately with a telfa and gauze bandage to ensure continued blood supply and protect the attached pedicle. Paramedian Forehead Flap Text: A decision was made to reconstruct the defect utilizing an interpolation axial flap and a staged reconstruction.  A telfa template was made of the defect.  This telfa template was then used to outline the paramedian forehead pedicle flap.  The donor area for the pedicle flap was then injected with anesthesia.  The flap was excised through the skin and subcutaneous tissue down to the layer of the underlying musculature.  The pedicle flap was carefully excised within this deep plane to maintain its blood supply.  The edges of the donor site were undermined.   The donor site was closed in a primary fashion.  The pedicle was then rotated into position and sutured.  Once the tube was sutured into place, adequate blood supply was confirmed with blanching and refill.  The pedicle was then wrapped with xeroform gauze and dressed appropriately with a telfa and gauze bandage to ensure continued blood supply and protect the attached pedicle. Abbe Flap (Upper To Lower Lip) Text: The defect of the lower lip was assessed and measured.  Given the location and size of the defect, an Abbe flap was deemed most appropriate. Using a sterile surgical marker, an appropriate Abbe flap was measured and drawn on the upper lip. Local anesthesia was then infiltrated.  A scalpel was then used to incise the upper lip through and through the skin, vermilion, muscle and mucosa, leaving the flap pedicled on the opposite side.  The flap was then rotated and transferred to the lower lip defect.  The flap was then sutured into place with a three layer technique, closing the orbicularis oris muscle layer with subcutaneous buried sutures, followed by a mucosal layer and an epidermal layer. Abbe Flap (Lower To Upper Lip) Text: The defect of the upper lip was assessed and measured.  Given the location and size of the defect, an Abbe flap was deemed most appropriate. Using a sterile surgical marker, an appropriate Abbe flap was measured and drawn on the lower lip. Local anesthesia was then infiltrated. A scalpel was then used to incise the upper lip through and through the skin, vermilion, muscle and mucosa, leaving the flap pedicled on the opposite side.  The flap was then rotated and transferred to the lower lip defect.  The flap was then sutured into place with a three layer technique, closing the orbicularis oris muscle layer with subcutaneous buried sutures, followed by a mucosal layer and an epidermal layer. Estlander Flap (Upper To Lower Lip) Text: The defect of the lower lip was assessed and measured.  Given the location and size of the defect, an Estlander flap was deemed most appropriate. Using a sterile surgical marker, an appropriate Estlander flap was measured and drawn on the upper lip. Local anesthesia was then infiltrated. A scalpel was then used to incise the lateral aspect of the flap, through skin, muscle and mucosa, leaving the flap pedicled medially.  The flap was then rotated and positioned to fill the lower lip defect.  The flap was then sutured into place with a three layer technique, closing the orbicularis oris muscle layer with subcutaneous buried sutures, followed by a mucosal layer and an epidermal layer. Cheiloplasty (Less Than 50%) Text: A decision was made to reconstruct the defect with a  cheiloplasty.  The defect was undermined extensively.  Additional orbicularis oris muscle was excised with a 15 blade scalpel.  The defect was converted into a full thickness wedge, of less than 50% of the vertical height of the lip, to facilite a better cosmetic result.  Small vessels were then tied off with 5-0 monocyrl. The orbicularis oris, superficial fascia, adipose and dermis were then reapproximated.  After the deeper layers were approximated the epidermis was reapproximated with particular care given to realign the vermilion border. Cheiloplasty (Complex) Text: A decision was made to reconstruct the defect with a  cheiloplasty.  The defect was undermined extensively.  Additional orbicularis oris muscle was excised with a 15 blade scalpel.  The defect was converted into a full thickness wedge to facilite a better cosmetic result.  Small vessels were then tied off with 5-0 monocyrl. The orbicularis oris, superficial fascia, adipose and dermis were then reapproximated.  After the deeper layers were approximated the epidermis was reapproximated with particular care given to realign the vermilion border. Ear Wedge Repair Text: A wedge excision was completed by carrying down an excision through the full thickness of the ear and cartilage with an inward facing Burow's triangle. The wound was then closed in a layered fashion. Full Thickness Lip Wedge Repair (Flap) Text: Given the location of the defect and the proximity to free margins a full thickness wedge repair was deemed most appropriate. Using a sterile surgical marker, the appropriate repair was drawn incorporating the defect and placing the expected incisions perpendicular to the vermilion border.  The vermilion border was also meticulously outlined to ensure appropriate reapproximation during the repair.  The area thus outlined was incised through and through with a #15 scalpel blade.  The muscularis and dermis were reaproximated with deep sutures following hemostasis. Care was taken to realign the vermilion border before proceeding with the superficial closure.  Once the vermilion was realigned the superfical and mucosal closure was finished. Ftsg Text: The defect edges were debeveled with a #15 scalpel blade. Given the location of the defect, shape of the defect and the proximity to free margins a full thickness skin graft was deemed most appropriate. Using a sterile surgical marker, the primary defect shape was transferred to the donor site. The area thus outlined was incised deep to adipose tissue with a #15 scalpel blade.  The harvested graft was then trimmed of adipose tissue until only dermis and epidermis was left.  The skin margins of the secondary defect were undermined to an appropriate distance in all directions utilizing iris scissors.  The secondary defect was closed with interrupted buried subcutaneous sutures.  The skin edges were then re-apposed with running  sutures.  The skin graft was then placed in the primary defect and oriented appropriately. Split-Thickness Skin Graft Text: The defect edges were debeveled with a #15 scalpel blade. Given the location of the defect, shape of the defect and the proximity to free margins a split thickness skin graft was deemed most appropriate. Using a sterile surgical marker, the primary defect shape was transferred to the donor site. The split thickness graft was then harvested.  The skin graft was then placed in the primary defect and oriented appropriately. Pinch Graft Text: The defect edges were debeveled with a #15 scalpel blade. Given the location of the defect, shape of the defect and the proximity to free margins a pinch graft was deemed most appropriate. Using a sterile surgical marker, the primary defect shape was transferred to the donor site. The area thus outlined was incised deep to adipose tissue with a #15 scalpel blade.  The harvested graft was then trimmed of adipose tissue until only dermis and epidermis was left. The skin margins of the secondary defect were undermined to an appropriate distance in all directions utilizing iris scissors.  The secondary defect was closed with interrupted buried subcutaneous sutures.  The skin edges were then re-apposed with running  sutures.  The skin graft was then placed in the primary defect and oriented appropriately. Burow's Graft Text: The defect edges were debeveled with a #15 scalpel blade. Given the location of the defect, shape of the defect, the proximity to free margins and the presence of a standing cone deformity a Burow's skin graft was deemed most appropriate. The standing cone was removed and this tissue was then trimmed to the shape of the primary defect. The adipose tissue was also removed until only dermis and epidermis were left.  The skin margins of the secondary defect were undermined to an appropriate distance in all directions utilizing iris scissors.  The secondary defect was closed with interrupted buried subcutaneous sutures.  The skin edges were then re-apposed with running  sutures.  The skin graft was then placed in the primary defect and oriented appropriately. Cartilage Graft Text: The defect edges were debeveled with a #15 scalpel blade. Given the location of the defect, shape of the defect, the fact the defect involved a full thickness cartilage defect a cartilage graft was deemed most appropriate.  An appropriate donor site was identified, cleansed, and anesthetized. The cartilage graft was then harvested and transferred to the recipient site, oriented appropriately and then sutured into place.  The secondary defect was then repaired using a primary closure. Composite Graft Text: The defect edges were debeveled with a #15 scalpel blade. Given the location of the defect, shape of the defect, the proximity to free margins and the fact the defect was full thickness a composite graft was deemed most appropriate.  The defect was outline and then transferred to the donor site.  A full thickness graft was then excised from the donor site. The graft was then placed in the primary defect, oriented appropriately and then sutured into place.  The secondary defect was then repaired using a primary closure. Epidermal Autograft Text: The defect edges were debeveled with a #15 scalpel blade. Given the location of the defect, shape of the defect and the proximity to free margins an epidermal autograft was deemed most appropriate. Using a sterile surgical marker, the primary defect shape was transferred to the donor site. The epidermal graft was then harvested.  The skin graft was then placed in the primary defect and oriented appropriately. Dermal Autograft Text: The defect edges were debeveled with a #15 scalpel blade. Given the location of the defect, shape of the defect and the proximity to free margins a dermal autograft was deemed most appropriate. Using a sterile surgical marker, the primary defect shape was transferred to the donor site. The area thus outlined was incised deep to adipose tissue with a #15 scalpel blade.  The harvested graft was then trimmed of adipose and epidermal tissue until only dermis was left.  The skin graft was then placed in the primary defect and oriented appropriately. Skin Substitute Text: The defect edges were debeveled with a #15 scalpel blade. Given the location of the defect, shape of the defect and the proximity to free margins a skin substitute graft was deemed most appropriate. The graft was then placed in the primary defect and oriented appropriately. Tissue Cultured Epidermal Autograft Text: The defect edges were debeveled with a #15 scalpel blade. Given the location of the defect, shape of the defect and the proximity to free margins a tissue cultured epidermal autograft was deemed most appropriate.  The graft was then trimmed to fit the size of the defect.  The graft was then placed in the primary defect and oriented appropriately. Xenograft Text: The defect edges were debeveled with a #15 scalpel blade. Given the location of the defect, shape of the defect and the proximity to free margins a xenograft was deemed most appropriate.  The graft was then trimmed to fit the size of the defect.  The graft was then placed in the primary defect and oriented appropriately. Purse String (Simple) Text: Given the location of the defect and the characteristics of the surrounding skin a purse string closure was deemed most appropriate.  Undermining was performed circumferentially around the surgical defect.  A purse string suture was then placed and tightened. Purse String (Intermediate) Text: Given the location of the defect and the characteristics of the surrounding skin a purse string intermediate closure was deemed most appropriate.  Undermining was performed circumferentially around the surgical defect.  A purse string suture was then placed and tightened. Partial Purse String (Simple) Text: Given the location of the defect and the characteristics of the surrounding skin a simple purse string closure was deemed most appropriate.  Undermining was performed circumferentially around the surgical defect.  A purse string suture was then placed and tightened. Wound tension only allowed a partial closure of the circular defect. Partial Purse String (Intermediate) Text: Given the location of the defect and the characteristics of the surrounding skin an intermediate purse string closure was deemed most appropriate.  Undermining was performed circumferentially around the surgical defect.  A purse string suture was then placed and tightened. Wound tension only allowed a partial closure of the circular defect. Localized Dermabrasion With Wire Brush Text: The patient was draped in routine manner.  Localized dermabrasion using 3 x 17 mm wire brush was performed in routine manner to papillary dermis. This spot dermabrasion is being performed to complete skin cancer reconstruction. It also will eliminate the other sun damaged precancerous cells that are known to be part of the regional effect of a lifetime's worth of sun exposure. This localized dermabrasion is therapeutic and should not be considered cosmetic in any regard. Localized Dermabrasion With Sand Papertext: The patient was draped in routine manner.  Localized dermabrasion using sterile sand paper was performed in routine manner to papillary dermis. This spot dermabrasion is being performed to complete skin cancer reconstruction. It also will eliminate the other sun damaged precancerous cells that are known to be part of the regional effect of a lifetime's worth of sun exposure. This localized dermabrasion is therapeutic and should not be considered cosmetic in any regard. Localized Dermabrasion With 15 Blade Text: The patient was draped in routine manner.  Localized dermabrasion using a 15 blade was performed in routine manner to papillary dermis. This spot dermabrasion is being performed to complete skin cancer reconstruction. It also will eliminate the other sun damaged precancerous cells that are known to be part of the regional effect of a lifetime's worth of sun exposure. This localized dermabrasion is therapeutic and should not be considered cosmetic in any regard. Tarsorrhaphy Text: A tarsorrhaphy was performed using Frost sutures. Intermediate Repair And Flap Additional Text (Will Appearing After The Standard Complex Repair Text): The intermediate repair was not sufficient to completely close the primary defect. The remaining additional defect was repaired with the flap mentioned below. Intermediate Repair And Graft Additional Text (Will Appearing After The Standard Complex Repair Text): The intermediate repair was not sufficient to completely close the primary defect. The remaining additional defect was repaired with the graft mentioned below. Complex Repair And Flap Additional Text (Will Appearing After The Standard Complex Repair Text): The complex repair was not sufficient to completely close the primary defect. The remaining additional defect was repaired with the flap mentioned below. Complex Repair And Graft Additional Text (Will Appearing After The Standard Complex Repair Text): The complex repair was not sufficient to completely close the primary defect. The remaining additional defect was repaired with the graft mentioned below. Eyelid Full Thickness Repair - [id number]: The eyelid defect was full thickness which required a wedge repair of the eyelid. Special care was taken to ensure that the eyelid margin was realligned when placing sutures. Eyelid Partial Thickness Repair - 47896: The eyelid defect was partial thickness which required a wedge repair of the eyelid. Special care was taken to ensure that the eyelid margin was realligned when placing sutures. Manual Repair Warning Statement: We plan on removing the manually selected variable below in favor of our much easier automatic structured text blocks found in the previous tab. We decided to do this to help make the flow better and give you the full power of structured data. Manual selection is never going to be ideal in our platform and I would encourage you to avoid using manual selection from this point on, especially since I will be sunsetting this feature. It is important that you do one of two things with the customized text below. First, you can save all of the text in a word file so you can have it for future reference. Second, transfer the text to the appropriate area in the Library tab. Lastly, if there is a flap or graft type which we do not have you need to let us know right away so I can add it in before the variable is hidden. No need to panic, we plan to give you roughly 6 months to make the change. Same Histology In Subsequent Stages Text: The pattern and morphology of the tumor is as described in the first stage. No Residual Tumor Seen Histology Text: There were no malignant cells seen in the sections examined. Inflammation Suggestive Of Cancer Camouflage Histology Text: There was a dense lymphocytic infiltrate which prevented adequate histologic evaluation of adjacent structures. Bcc Histology Text: There were numerous aggregates of basaloid cells. Bcc Infiltrative Histology Text: There were numerous aggregates of basaloid cells demonstrating an infiltrative pattern. Mart-1 - Positive Histology Text: MART-1 staining demonstrates areas of higher density and clustering of melanocytes with Pagetoid spread upwards within the epidermis. The surgical margins are positive for tumor cells. Mart-1 - Negative Histology Text: MART-1 staining demonstrates a normal density and pattern of melanocytes along the dermal-epidermal junction. The surgical margins are negative for tumor cells. Information: Selecting Yes will display possible errors in your note based on the variables you have selected. This validation is only offered as a suggestion for you. PLEASE NOTE THAT THE VALIDATION TEXT WILL BE REMOVED WHEN YOU FINALIZE YOUR NOTE. IF YOU WANT TO FAX A PRELIMINARY NOTE YOU WILL NEED TO TOGGLE THIS TO 'NO' IF YOU DO NOT WANT IT IN YOUR FAXED NOTE. Bill 59 Modifier?: No - Continue to Bill 79 Modifier

## 2024-05-14 ENCOUNTER — OUTPATIENT (OUTPATIENT)
Dept: OUTPATIENT SERVICES | Facility: HOSPITAL | Age: 44
LOS: 1 days | End: 2024-05-14
Payer: MEDICARE

## 2024-05-14 VITALS
HEART RATE: 103 BPM | HEIGHT: 63 IN | TEMPERATURE: 98 F | WEIGHT: 145.06 LBS | OXYGEN SATURATION: 98 % | DIASTOLIC BLOOD PRESSURE: 77 MMHG | RESPIRATION RATE: 18 BRPM | SYSTOLIC BLOOD PRESSURE: 118 MMHG

## 2024-05-14 DIAGNOSIS — Z98.890 OTHER SPECIFIED POSTPROCEDURAL STATES: Chronic | ICD-10-CM

## 2024-05-14 DIAGNOSIS — K02.62 DENTAL CARIES ON SMOOTH SURFACE PENETRATING INTO DENTIN: ICD-10-CM

## 2024-05-14 DIAGNOSIS — K05.6 PERIODONTAL DISEASE, UNSPECIFIED: ICD-10-CM

## 2024-05-14 DIAGNOSIS — Z92.89 PERSONAL HISTORY OF OTHER MEDICAL TREATMENT: Chronic | ICD-10-CM

## 2024-05-14 DIAGNOSIS — K02.9 DENTAL CARIES, UNSPECIFIED: ICD-10-CM

## 2024-05-14 DIAGNOSIS — Z01.818 ENCOUNTER FOR OTHER PREPROCEDURAL EXAMINATION: ICD-10-CM

## 2024-05-14 PROCEDURE — G0463: CPT

## 2024-05-14 NOTE — H&P PST ADULT - MUSCULOSKELETAL
06/01/23      William Ariza  [address]  [facility] [address]      Thank you for talking with me on the phone on 6/1/2023. My name is Anna Palumbo RN and I’m a care manager with [facility].     Care Management is designed to help you get the best health care there is for your health issues. Taking part in Care Management is a free, confidential service that helps you and/or your family with your healthcare needs. When we spoke, I told you that I called you because your illness or healthcare needs may be complex or challenging.     As your care manager, I work with you and Deanna Irizarry APNP to get the most helpful care for your health. My job is to:     · Help you understand the type of care you need.   · Educate you about your health care and/or choices.  · Make sure you know where you can find the health care you need.  · Help you/your family find support to meet your healthcare needs.   · Keep your doctor apprised of your treatment and your needs.   · Help you with maximizing your health care benefits.     I will call you on [date] to see how you are doing. If you have any questions or if I can help you in any way, please call me at   [phone]  8:00 AM to 4:00 PM CST  (Monday-Friday)     If I’m not able to take your call, you can leave a private message and I will call you back within one business day. When I’m working to arrange your health care and benefits, I may talk to your doctors and your health plan about your benefits. But don’t worry, I take your privacy very seriously. That means I won’t ever share any personal information with your family, friends or anyone else unless you say it’s okay. You do have the right not to take part in care management. Simply inform me at any time that you do not want my help.     I look forward to working with you.      Sincerely,    Anna Palumbo RN  Outpatient?Care?Manager?   [facility] Attached : access to care COPD &  HTN stoplights low sodium diet          negative normal gait

## 2024-05-14 NOTE — H&P PST ADULT - HISTORY OF PRESENT ILLNESS
44 year old female with a PMH of intellectual disability, HLD (no mediations), DM, RBBB, seizure disorder (last seizure 4/2024) who has dental caries. She presents for evaluation prior to Comprehensive Dental Treatment Under Anesthesia on 5/22/24.     Patient agitated in PST. Able to obtain vitals, but no labs.     Patient's mother Lizbeth Su ([phone]) provides consent. 44 year old female with a PMH of intellectual disability, HLD (no mediations), hypothyroidism, DM, RBBB, seizure disorder (last seizure 3/2024) who has dental caries. She presents for evaluation prior to Comprehensive Dental Treatment Under Anesthesia on 5/22/24.     Patient agitated in PST. Able to obtain vitals, but no labs.     Patient's mother Lizbeth Su ([phone]) provides consent. 44 year old female with a PMH of intellectual disability, HLD (no mediations), hypothyroidism, DM, RBBB, seizure disorder (last seizure 3/2024, evaluated by neurologist 4/1/24 and had med adjustments with no subsequent seizures) who has dental caries. She presents for evaluation prior to Comprehensive Dental Treatment Under Anesthesia on 5/22/24.     Patient agitated in PST. Able to obtain vitals, but no labs.     Patient's mother Lizbeth Su ([phone]) provides consent.

## 2024-05-14 NOTE — H&P PST ADULT - ASSESSMENT
DASI score: 8.97   DASI activity: Patient can walk >4 blocks and climb stairs, no limitations.   Loose teeth or denture: Pt and caregiver denies loose teeth or dental work.   MP: unable to assess. patient agitated.

## 2024-05-14 NOTE — H&P PST ADULT - NSICDXPASTMEDICALHX_GEN_ALL_CORE_FT
PAST MEDICAL HISTORY:  Bipolar disorder     H/O osteoporosis     H/O sinus tachycardia     History of seizure disorder last seizure 6/4/2018    Hyperlipidemia     Hypothyroidism     Hypothyroidism     Intellectual disability     Right bundle branch block     Seizure last seizure 08/2020     PAST MEDICAL HISTORY:  Bipolar disorder     H/O osteoporosis     H/O sinus tachycardia     Hyperlipidemia     Hypothyroidism     Hypothyroidism     Intellectual disability     Right bundle branch block     Seizure disorder

## 2024-05-14 NOTE — H&P PST ADULT - PROBLEM SELECTOR PLAN 1
Comprehensive Dental Treatment Under Anesthesia 5/22/24  -Patient agitated, labs no completed in PST. Last CBC and CMP 4/1/24 in [facility].  -PCP clearance on 5/15/24 w/ Dr. Ventura.   -DOS: lorazepam, levothyroxine, lacosamide, quetiapine and zonisamide with sips of water.   -Hold metformin DOS.

## 2024-05-20 ENCOUNTER — APPOINTMENT (OUTPATIENT)
Dept: CARDIOLOGY | Facility: CLINIC | Age: 44
End: 2024-05-20
Payer: MEDICARE

## 2024-05-20 ENCOUNTER — NON-APPOINTMENT (OUTPATIENT)
Age: 44
End: 2024-05-20

## 2024-05-20 VITALS
BODY MASS INDEX: 27.05 KG/M2 | HEART RATE: 101 BPM | SYSTOLIC BLOOD PRESSURE: 122 MMHG | DIASTOLIC BLOOD PRESSURE: 68 MMHG | HEIGHT: 62 IN | WEIGHT: 147 LBS | OXYGEN SATURATION: 95 %

## 2024-05-20 DIAGNOSIS — Z01.818 ENCOUNTER FOR OTHER PREPROCEDURAL EXAMINATION: ICD-10-CM

## 2024-05-20 DIAGNOSIS — R94.31 ABNORMAL ELECTROCARDIOGRAM [ECG] [EKG]: ICD-10-CM

## 2024-05-20 DIAGNOSIS — E78.5 HYPERLIPIDEMIA, UNSPECIFIED: ICD-10-CM

## 2024-05-20 PROCEDURE — 99214 OFFICE O/P EST MOD 30 MIN: CPT

## 2024-05-20 PROCEDURE — G2211 COMPLEX E/M VISIT ADD ON: CPT

## 2024-05-20 PROCEDURE — 93000 ELECTROCARDIOGRAM COMPLETE: CPT

## 2024-05-20 NOTE — HISTORY OF PRESENT ILLNESS
[FreeTextEntry1] : 43 y/o female PMH: intellectual disability, HLD,  DM  abnormal EKG tachycardia (currently SR),  obesity, seizure activity here today for pre op cardiac evaluation prior to dental cleaning and X rays per formal caregiver present on exam, who also reports no concerns or symptoms at this time, patient states she feels well No cardiovascular complaints    EF 60-65% trace valvular abnormalities, No segmental wall motion abnormalities

## 2024-05-20 NOTE — PHYSICAL EXAM
[Normal S1, S2] : normal S1, S2 [Non Tender] : non-tender [No Edema] : no edema [Normal] : moves all extremities, no focal deficits, normal speech [Alert and Oriented] : alert and oriented [de-identified] : with assistance

## 2024-05-20 NOTE — CARDIOLOGY SUMMARY
[de-identified] : 4/12/24 sinus tachycardiac ICRBBB 106   5/20/24 NSR HR 89 BPM with INCRBBB as previously seen  [de-identified] : 7/13/23  EF 60-65% normal ventricular function

## 2024-05-20 NOTE — DISCUSSION/SUMMARY
[EKG obtained to assist in diagnosis and management of assessed problem(s)] : EKG obtained to assist in diagnosis and management of assessed problem(s) [FreeTextEntry1] : here today for cardiac pre op evaluation prior to dental cleaning and x ray per HPI No cardiac complaints Patient may proceed with Dental procedure    Mild Tachycardia currently SR, possibly  anxiety , Prior holter showed AVR 90s  Echo NL LV FX  obesity : encourage calorie intake restriction ,  weight loss diet,  weight reduction  DM type: medical management   Seizure disorder : continue medication and Neurology management   follow up after 6 months

## 2024-05-22 RX ORDER — METFORMIN HYDROCHLORIDE 850 MG/1
2 TABLET ORAL
Refills: 0 | DISCHARGE

## 2024-05-22 RX ORDER — SODIUM CHLORIDE 0.65 %
2 AEROSOL, SPRAY (ML) NASAL
Qty: 0 | Refills: 0 | DISCHARGE

## 2024-05-22 RX ORDER — QUETIAPINE FUMARATE 200 MG/1
1 TABLET, FILM COATED ORAL
Refills: 0 | DISCHARGE

## 2024-05-29 NOTE — ED ADULT NURSE NOTE - NSFALLDEVICES_ED_ALL_ED
Outpatient Care Management   - Patient Assessment    Patient: Eboni Nicholas  MRN:  [id number]  Date of Service:  5/29/2024  Completed by:  Deborah Mack LCSW  Referral Date: 05/03/2024    Reason for Visit   Patient presents with    Other     5/17/2024  1st attempt to complete Initial Assessment for Outpatient Care Management, left message.    5/21/2024  2nd attempt to complete Initial Assessment  for Outpatient Care Management, left message.  Will mail unable to assess letter.    5/29/2024  3rd attempt to complete Initial Assessment  for Outpatient Care Management, left message.  Close case    Case Closure     5/29/24       Brief Summary:  received a referral from outpatient provider for the following Low/Mod SW psychosocial needs Pt needs assistance as caregiver to her  with community resources. Attempted to contact Pt via phone and mail but was unable to do so. Closed case.     Future Appointments   Date Time Provider Department Center   7/1/2024  1:30 PM Alejandra Wilkins AU.D [facility]   7/2/2024 11:00 AM Osman Campbell OD [facility] OPTOMTY [place]   7/5/2024 12:00 PM Sailaja Todd MD [facility] NEURO [facility]   7/30/2024  1:20 PM RACHELE Robles MD [facility]   10/18/2024 10:00 AM [facility]   10/25/2024 10:40 AM Maricruz Lloyd MD [facility]     Deborah Mack LCSW  Neuro Therapy   [facility]  [phone]    
None

## 2024-06-04 ENCOUNTER — NON-APPOINTMENT (OUTPATIENT)
Age: 44
End: 2024-06-04

## 2024-06-05 ENCOUNTER — APPOINTMENT (OUTPATIENT)
Dept: NEUROLOGY | Facility: CLINIC | Age: 44
End: 2024-06-05
Payer: MEDICARE

## 2024-06-05 VITALS
WEIGHT: 147 LBS | HEIGHT: 62 IN | HEART RATE: 108 BPM | DIASTOLIC BLOOD PRESSURE: 70 MMHG | BODY MASS INDEX: 27.05 KG/M2 | SYSTOLIC BLOOD PRESSURE: 110 MMHG

## 2024-06-05 DIAGNOSIS — G40.909 EPILEPSY, UNSPECIFIED, NOT INTRACTABLE, W/OUT STATUS EPILEPTICUS: ICD-10-CM

## 2024-06-05 DIAGNOSIS — F81.89 OTHER DEVELOPMENTAL DISORDERS OF SCHOLASTIC SKILLS: ICD-10-CM

## 2024-06-05 PROBLEM — F79 UNSPECIFIED INTELLECTUAL DISABILITIES: Chronic | Status: ACTIVE | Noted: 2024-05-14

## 2024-06-05 PROBLEM — I45.10 UNSPECIFIED RIGHT BUNDLE-BRANCH BLOCK: Chronic | Status: ACTIVE | Noted: 2024-05-14

## 2024-06-05 PROCEDURE — 99214 OFFICE O/P EST MOD 30 MIN: CPT

## 2024-06-05 RX ORDER — QUETIAPINE FUMARATE 50 MG/1
50 TABLET ORAL
Qty: 60 | Refills: 5 | Status: ACTIVE | COMMUNITY
Start: 2023-03-30 | End: 1900-01-01

## 2024-06-05 RX ORDER — ZONISAMIDE 100 MG/1
100 CAPSULE ORAL TWICE DAILY
Qty: 120 | Refills: 3 | Status: ACTIVE | COMMUNITY
Start: 2023-08-29 | End: 1900-01-01

## 2024-06-05 RX ORDER — LACOSAMIDE 200 MG/1
200 TABLET ORAL
Qty: 60 | Refills: 5 | Status: ACTIVE | COMMUNITY
Start: 2024-04-20 | End: 1900-01-01

## 2024-06-07 RX ORDER — CLOBAZAM 10 MG/1
10 TABLET ORAL
Qty: 60 | Refills: 4 | Status: ACTIVE | COMMUNITY
Start: 2024-06-05 | End: 1900-01-01

## 2024-06-14 PROBLEM — G40.909 EPILEPSY UNDETERMINED AS TO FOCAL OR GENERALIZED: Status: ACTIVE | Noted: 2023-05-12

## 2024-06-14 PROBLEM — F81.89 DEVELOPMENTAL NON-VERBAL DISORDER: Status: ACTIVE | Noted: 2024-06-14

## 2024-06-14 NOTE — ASSESSMENT
[FreeTextEntry1] : MARLEY MCKEON is stable. continue to have seizures but rare her genetic test is positive she is losing weight due to zonisamide plan: continue zonisamide 200 bid ( from 300 bid)  start onfi 10 bid and consider tapering zonisamide and increasing onfi in the future.

## 2024-06-14 NOTE — HISTORY OF PRESENT ILLNESS
[FreeTextEntry1] : *** 06/05/2024 ***  MARLEY MCKEON is here for follow up. continue to have decreased appetite and small seizures. 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 she has a sister with well controlled epilepsy( just delivered her 5th child) her mothe ris aware   *** 04/01/2024 ***  MARLEY MCKEON had 3 seizures and her zonisamide and vimapt were increased at my recommendation   *** 08/29/2023 ***  MARLEY MCKEON is here for follow up. her complex history is as below:  Hospital Course	 41yo woman with HTN, HLD, bipolar d/o, hypothyroidism, seizure disorder (on Keppra and VPA), developmentally delayed, who initially presented to OSH on 2/18 for breakthrough seizures, transferred to the EMU for further evaluation and management. Per chart review and discussion with aide at bedside, patient had 2 seizures at her group home, including an episode of generalized "tonic-clonic" shaking while seated in a recliner, lasting 2 minutes, associated with urinary incontinence. She was taken to OSH where she was reported to be postictal and received IV VPA 500mg x1 and later determined to be stable for discharge back to her group home. While awaiting transport, she had a 3rd witnessed event lasting less than 1 minute, for which she was administered ativan 1mg IVP, and decision made for transport to [facility] for continuous EEG. There was no fall or injury associated with any of these events. Her aide is unsure of when her last seizure prior to these events may have occurred. Pt was admitted to the EMU and monitored on EEG.  The patient was incidentally found to be COVID positive on 2/28/23 when preparing for discharge. She was placed on isolation and remained asymptomatic.   Objective data: EEG REPORTS   2/19 EEG Summary:   Normal EEG in the awake, drowsy and asleep states. Excess beta   2/20-2/23 EEG Summary:   Normal EEG in the awake, drowsy and asleep states. Excess beta   Impression/Clinical Correlate:   This is a normal VEEG. No epileptiform pattern or seizures were recorded. The presence of excessive beta activity is most often seen in setting of sedative medication use.   2/24 EEG Summary:   Normal EEG in the awake, drowsy and asleep states. Excess beta   Impression/Clinical Correlate:   This is a normal VEEG. No epileptiform pattern or seizures were recorded. The presence of excessive beta activity never evolves and has no clear clinical correlation, is most often seen in setting of sedative medication use. Recommend repeat EEG once off sedative medications. 2/25 AEDs: Zonisamide 100mg BID   EEG Summary:   Normal EEG in the awake, drowsy and asleep states. Polyspikes, focal, left posterior temporal-central region Excess beta   Impression/Clinical Correlate:   This is an abnormal VEEG. There is evidence of potentially epileptogenic cortical irritability in the left posterior temporal-cental region. No seizures were recorded. The presence of excessive beta activity never evolves and has no clear clinical correlation, is most often seen in setting of sedative medication use but has also been described in rare cases of intellectual impairment.   3/2/23: EEG Summary:   Normal EEG in the awake, drowsy and asleep states.   Background slowing, generalized, mild Excess beta   Impression/Clinical Correlate:   This is an abnormal VEEG.  Mild diffuse/multifocal cerebral dysfunction, not specific in etiology. No seizures were recorded. The presence of excessive beta activity never evolves and has no clear clinical correlation, is most often seen in setting of sedative medication use but has also been described in rare cases of intellectual impairment.     Radiology: MR Brain-Seizure, Epilepsy w/wo IV Cont (02.25.23 @ 11:07) IMPRESSION: No acute infarct or hemorrhage. Nonspecific subcentimeter T2/FLAIR hyperintense white matter foci in the posterior left temporal lobe.   Impression: Breakthrough seizures in the setting of borderline VPA level possibly due to medication nonadherence vs inadequate dosing.   Plan: Continue following medications - Zonisamide 200mg BID Lacosamide 150mg BID Quetiapine 150mg BID with 50mg BID PRN   according to her mother she continues to have convulsive seizures the last one 2 days prior followed by severe behavior agitation)

## 2024-07-02 NOTE — OCCUPATIONAL THERAPY INITIAL EVALUATION ADULT - PERSONAL SAFETY AND JUDGMENT, REHAB EVAL
Pt stable and afebrile following iron infusion.  Pt tolerated both infusions without s/s of reaction.    PIV D/C'd with catheter tip intact.  Mom verbalized RTC in 6 weeks for next infusion.    impaired

## 2024-07-03 ENCOUNTER — APPOINTMENT (OUTPATIENT)
Dept: NEUROLOGY | Facility: CLINIC | Age: 44
End: 2024-07-03

## 2024-07-05 ENCOUNTER — OUTPATIENT (OUTPATIENT)
Dept: OUTPATIENT SERVICES | Facility: HOSPITAL | Age: 44
LOS: 1 days | Discharge: HOME | End: 2024-07-05

## 2024-07-05 DIAGNOSIS — D64.9 ANEMIA, UNSPECIFIED: ICD-10-CM

## 2024-07-05 DIAGNOSIS — Z98.890 OTHER SPECIFIED POSTPROCEDURAL STATES: Chronic | ICD-10-CM

## 2024-07-05 DIAGNOSIS — Z92.89 PERSONAL HISTORY OF OTHER MEDICAL TREATMENT: Chronic | ICD-10-CM

## 2024-07-09 ENCOUNTER — APPOINTMENT (OUTPATIENT)
Dept: HEMATOLOGY ONCOLOGY | Facility: CLINIC | Age: 44
End: 2024-07-09
Payer: MEDICARE

## 2024-07-09 VITALS
HEART RATE: 97 BPM | RESPIRATION RATE: 16 BRPM | OXYGEN SATURATION: 99 % | BODY MASS INDEX: 27.42 KG/M2 | WEIGHT: 149.91 LBS | TEMPERATURE: 97.2 F | SYSTOLIC BLOOD PRESSURE: 105 MMHG | DIASTOLIC BLOOD PRESSURE: 60 MMHG

## 2024-07-09 DIAGNOSIS — D50.8 OTHER IRON DEFICIENCY ANEMIAS: ICD-10-CM

## 2024-07-09 DIAGNOSIS — D51.8 OTHER VITAMIN B12 DEFICIENCY ANEMIAS: ICD-10-CM

## 2024-07-09 PROCEDURE — 99213 OFFICE O/P EST LOW 20 MIN: CPT

## 2024-07-09 PROCEDURE — G2211 COMPLEX E/M VISIT ADD ON: CPT

## 2024-08-14 PROBLEM — E03.9 HYPOTHYROIDISM, UNSPECIFIED: Chronic | Status: INACTIVE | Noted: 2019-05-15 | Resolved: 2024-08-14

## 2024-08-16 ENCOUNTER — APPOINTMENT (OUTPATIENT)
Dept: OBGYN | Facility: CLINIC | Age: 44
End: 2024-08-16
Payer: MEDICARE

## 2024-08-16 DIAGNOSIS — N92.0 EXCESSIVE AND FREQUENT MENSTRUATION WITH REGULAR CYCLE: ICD-10-CM

## 2024-08-16 DIAGNOSIS — R92.8 OTHER ABNORMAL AND INCONCLUSIVE FINDINGS ON DIAGNOSTIC IMAGING OF BREAST: ICD-10-CM

## 2024-08-16 PROCEDURE — 99213 OFFICE O/P EST LOW 20 MIN: CPT

## 2024-08-16 NOTE — PHYSICAL EXAM
[Alert] : alert [Examination Of The Breasts] : a normal appearance [Normal] : normal [No Masses] : no breast masses were palpable [TextEntry] : PT REFUSED VS PT REFUSED TO LAY BACK ON TABLE FOR EXAM.  LIMITED BREAST EXAM W PT IN UPRIGHT POSITION-  NO PALPABLE MASSES PT REFUSED PELVIC EXAM

## 2024-08-16 NOTE — REASON FOR VISIT
[Annual] : an annual visit. [TextEntry] : HX OBTAINED FROM KAT LANDAVERDE AT GROUP HOME PT W HX IRREG MENSES-  LAST SEEN IN 2022 AND PLANNED TO CYCLE W PROVERA MONTHLY.  AS PER RN AT GROUP HOME SHE IS NOT CURRENTLY RECEIVING PROVERA.  MENSES ARE HEAVY AT BASELINE.  LMP 7/20-24, PRIOR MENSES 6/13-18, 5/12- 14, 4/6-10.  ACCORDING TO STAFF NO INC FLOW BUT PT PASSES CLOTS

## 2024-08-17 PROBLEM — Z86.39 PERSONAL HISTORY OF OTHER ENDOCRINE, NUTRITIONAL AND METABOLIC DISEASE: Chronic | Status: ACTIVE | Noted: 2024-08-14

## 2024-08-22 ENCOUNTER — APPOINTMENT (OUTPATIENT)
Dept: OBGYN | Facility: CLINIC | Age: 44
End: 2024-08-22

## 2024-08-27 ENCOUNTER — APPOINTMENT (OUTPATIENT)
Dept: NEUROLOGY | Facility: CLINIC | Age: 44
End: 2024-08-27
Payer: MEDICARE

## 2024-08-27 VITALS
DIASTOLIC BLOOD PRESSURE: 72 MMHG | BODY MASS INDEX: 27.42 KG/M2 | SYSTOLIC BLOOD PRESSURE: 126 MMHG | HEIGHT: 62 IN | WEIGHT: 149 LBS | HEART RATE: 101 BPM

## 2024-08-27 DIAGNOSIS — G40.909 EPILEPSY, UNSPECIFIED, NOT INTRACTABLE, W/OUT STATUS EPILEPTICUS: ICD-10-CM

## 2024-08-27 DIAGNOSIS — F31.9 BIPOLAR DISORDER, UNSPECIFIED: ICD-10-CM

## 2024-08-27 DIAGNOSIS — F79 UNSPECIFIED INTELLECTUAL DISABILITIES: ICD-10-CM

## 2024-08-27 PROCEDURE — 99215 OFFICE O/P EST HI 40 MIN: CPT

## 2024-08-27 NOTE — REASON FOR VISIT
[Follow-Up: _____] : a [unfilled] follow-up visit
Statement Selected

## 2024-08-28 ENCOUNTER — NON-APPOINTMENT (OUTPATIENT)
Age: 44
End: 2024-08-28

## 2024-08-28 ENCOUNTER — APPOINTMENT (OUTPATIENT)
Dept: CARDIOLOGY | Facility: CLINIC | Age: 44
End: 2024-08-28
Payer: MEDICARE

## 2024-08-28 ENCOUNTER — APPOINTMENT (OUTPATIENT)
Dept: CARDIOLOGY | Facility: CLINIC | Age: 44
End: 2024-08-28

## 2024-08-28 VITALS
HEIGHT: 62 IN | DIASTOLIC BLOOD PRESSURE: 66 MMHG | SYSTOLIC BLOOD PRESSURE: 102 MMHG | HEART RATE: 86 BPM | BODY MASS INDEX: 29.08 KG/M2 | OXYGEN SATURATION: 98 % | WEIGHT: 158 LBS

## 2024-08-28 PROCEDURE — G2211 COMPLEX E/M VISIT ADD ON: CPT

## 2024-08-28 PROCEDURE — 93000 ELECTROCARDIOGRAM COMPLETE: CPT | Mod: NC

## 2024-08-28 PROCEDURE — 99214 OFFICE O/P EST MOD 30 MIN: CPT

## 2024-08-28 NOTE — HISTORY OF PRESENT ILLNESS
[FreeTextEntry1] : [id number]  MARLEY MCKEON  Mar  3 1980 [phone]   45 y/o female PMH: intellectual disability, HLD, DM abnormal EKG tachycardia (currently SR), obesity, seizure activity here today for pre op cardiac evaluation needs cleaning & extraction of teeth dental procedure. outpatient procedure.  no chest pain, dyspnea palpitaitons, syncope  ECG Aug '24 NSR no ischemic changes ICRBBB HR 80s  EC24 sinus tachycardiac ICRBBB 106  24 NSR HR 89 BPM with INCRBBB as previously seen   Echo: 23 EF 60-65% normal ventricular function

## 2024-08-28 NOTE — ASSESSMENT
[FreeTextEntry1] : here today for cardiac pre op evaluation prior to dental cleaning and x ray per HPI No cardiac complaints Patient may proceed with Dental procedure  follow up w/ Dr. Palla as previously scheduled.

## 2024-08-29 ENCOUNTER — NON-APPOINTMENT (OUTPATIENT)
Age: 44
End: 2024-08-29

## 2024-08-30 ENCOUNTER — RX RENEWAL (OUTPATIENT)
Age: 44
End: 2024-08-30

## 2024-08-30 NOTE — END OF VISIT
[Time Spent: ___ minutes] : I have spent [unfilled] minutes of time on the encounter which excludes teaching and separately reported services.
Sonia Roe(Attending)

## 2024-08-30 NOTE — PHYSICAL EXAM
[FreeTextEntry1] : General: limited exam due to patient mentation Constitutional: appears stated age, in no apparent distress including pain. Neurological (>12): MS: Eyes open, alert, interacting with staff and examiner. Speech/Language: Clear CNs: EOMI, no overt facial asymmetry. Motor: Muscle bulk and tone are normal. Moves all extremities spontaneously. Coordination:RAJANI Gait: Normal gait

## 2024-08-30 NOTE — HISTORY OF PRESENT ILLNESS
[FreeTextEntry1] : ***08/27/2024*** No new interim medical diagnosis or hospitalizations. Group home aide reports appears lethargic whenever ?possible seizures appears to come on, occurs once every few months. Most significant seizure a couple of years ago.  Next week being seen under general anesthesia for dental exam/cleaning, may need ?extraction, requesting neurology clearance.  Mom was concerned about medications for seizures, lethargy concern and in/out of consciousness, thinks all the medications.  Medications: clobazam 10mg BID, zonisamide 200 mg BID, lacosamide 200  mg BID , ativan 1 mg qAM, seroquel 2hr separate from other medications (300 mg BID), miralax, vitamin B12, colace vitamin D atorvastatin, fish oil,amantadine 100 mg, metfomrin ER 1000 BID, iron TID, rescue nayzilam. Last overt seizure in 5/2024.  *** 06/05/2024 *** MARLEY MCKEON is here for follow up. continue to have decreased appetite and small seizures. 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 she has a sister with well controlled epilepsy( just delivered her 5th child) her mothe ris aware  *** 04/01/2024 *** MARLEY MCKEON had 3 seizures and her zonisamide and vimapt were increased at my recommendation  *** 08/29/2023 *** MARLEY MCKEON is here for follow up. her complex history is as below:  Hospital Course  41yo woman with HTN, HLD, bipolar d/o, hypothyroidism, seizure disorder (on Keppra and VPA), developmentally delayed, who initially presented to OSH on 2/18 for breakthrough seizures, transferred to the EMU for further evaluation and management. Per chart review and discussion with aide at bedside, patient had 2 seizures at her group home, including an episode of generalized "tonic-clonic" shaking while seated in a recliner, lasting 2 minutes, associated with urinary incontinence. She was taken to OSH where she was reported to be postictal and received IV VPA 500mg x1 and later determined to be stable for discharge back to her group home. While awaiting transport, she had a 3rd witnessed event lasting less than 1 minute, for which she was administered ativan 1mg IVP, and decision made for transport to [facility] for continuous EEG. There was no fall or injury associated with any of these events. Her aide is unsure of when her last seizure prior to these events may have occurred. Pt was admitted to the EMU and monitored on EEG. The patient was incidentally found to be COVID positive on 2/28/23 when preparing for discharge. She was placed on isolation and remained asymptomatic.  Objective data: EEG REPORTS  2/19 EEG Summary:  Normal EEG in the awake, drowsy and asleep states. Excess beta  2/20-2/23 EEG Summary:  Normal EEG in the awake, drowsy and asleep states. Excess beta  Impression/Clinical Correlate:  This is a normal VEEG. No epileptiform pattern or seizures were recorded. The presence of excessive beta activity is most often seen in setting of sedative medication use.  2/24 EEG Summary:  Normal EEG in the awake, drowsy and asleep states. Excess beta  Impression/Clinical Correlate:  This is a normal VEEG. No epileptiform pattern or seizures were recorded. The presence of excessive beta activity never evolves and has no clear clinical correlation, is most often seen in setting of sedative medication use. Recommend repeat EEG once off sedative medications. 2/25 AEDs: Zonisamide 100mg BID  EEG Summary:  Normal EEG in the awake, drowsy and asleep states. Polyspikes, focal, left posterior temporal-central region Excess beta  Impression/Clinical Correlate:  This is an abnormal VEEG. There is evidence of potentially epileptogenic cortical irritability in the left posterior temporal-cental region. No seizures were recorded. The presence of excessive beta activity never evolves and has no clear clinical correlation, is most often seen in setting of sedative medication use but has also been described in rare cases of intellectual impairment.  3/2/23: EEG Summary:  Normal EEG in the awake, drowsy and asleep states.  Background slowing, generalized, mild Excess beta  Impression/Clinical Correlate:  This is an abnormal VEEG. Mild diffuse/multifocal cerebral dysfunction, not specific in etiology. No seizures were recorded. The presence of excessive beta activity never evolves and has no clear clinical correlation, is most often seen in setting of sedative medication use but has also been described in rare cases of intellectual impairment.   Radiology: MR Brain-Seizure, Epilepsy w/wo IV Cont (02.25.23 @ 11:07) IMPRESSION: No acute infarct or hemorrhage. Nonspecific subcentimeter T2/FLAIR hyperintense white matter foci in the posterior left temporal lobe.  Impression: Breakthrough seizures in the setting of borderline VPA level possibly due to medication nonadherence vs inadequate dosing.  Plan: Continue following medications - Zonisamide 200mg BID Lacosamide 150mg BID Quetiapine 150mg BID with 50mg BID PRN   according to her mother she continues to have convulsive seizures the last one 2 days prior followed by severe behavior agitation)

## 2024-08-30 NOTE — HISTORY OF PRESENT ILLNESS
[FreeTextEntry1] : ***08/27/2024*** No new interim medical diagnosis or hospitalizations. Group home aide reports appears lethargic whenever ?possible seizures appears to come on, occurs once every few months. Most significant seizure a couple of years ago.  Next week being seen under general anesthesia for dental exam/cleaning, may need ?extraction, requesting neurology clearance.  Mom was concerned about medications for seizures, lethargy concern and in/out of consciousness, thinks all the medications.  Medications: clobazam 10mg BID, zonisamide 200 mg BID, lacosamide 200  mg BID , ativan 1 mg qAM, seroquel 2hr separate from other medications (300 mg BID), miralax, vitamin B12, colace vitamin D atorvastatin, fish oil,amantadine 100 mg, metfomrin ER 1000 BID, iron TID, rescue nayzilam. Last overt seizure in 5/2024.  *** 06/05/2024 *** MARLEY MCKEON is here for follow up. continue to have decreased appetite and small seizures. 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 she has a sister with well controlled epilepsy( just delivered her 5th child) her mothe ris aware  *** 04/01/2024 *** MARLEY MCKEON had 3 seizures and her zonisamide and vimapt were increased at my recommendation  *** 08/29/2023 *** MARLEY MCKEON is here for follow up. her complex history is as below:  Hospital Course  43yo woman with HTN, HLD, bipolar d/o, hypothyroidism, seizure disorder (on Keppra and VPA), developmentally delayed, who initially presented to OSH on 2/18 for breakthrough seizures, transferred to the EMU for further evaluation and management. Per chart review and discussion with aide at bedside, patient had 2 seizures at her group home, including an episode of generalized "tonic-clonic" shaking while seated in a recliner, lasting 2 minutes, associated with urinary incontinence. She was taken to OSH where she was reported to be postictal and received IV VPA 500mg x1 and later determined to be stable for discharge back to her group home. While awaiting transport, she had a 3rd witnessed event lasting less than 1 minute, for which she was administered ativan 1mg IVP, and decision made for transport to [facility] for continuous EEG. There was no fall or injury associated with any of these events. Her aide is unsure of when her last seizure prior to these events may have occurred. Pt was admitted to the EMU and monitored on EEG. The patient was incidentally found to be COVID positive on 2/28/23 when preparing for discharge. She was placed on isolation and remained asymptomatic.  Objective data: EEG REPORTS  2/19 EEG Summary:  Normal EEG in the awake, drowsy and asleep states. Excess beta  2/20-2/23 EEG Summary:  Normal EEG in the awake, drowsy and asleep states. Excess beta  Impression/Clinical Correlate:  This is a normal VEEG. No epileptiform pattern or seizures were recorded. The presence of excessive beta activity is most often seen in setting of sedative medication use.  2/24 EEG Summary:  Normal EEG in the awake, drowsy and asleep states. Excess beta  Impression/Clinical Correlate:  This is a normal VEEG. No epileptiform pattern or seizures were recorded. The presence of excessive beta activity never evolves and has no clear clinical correlation, is most often seen in setting of sedative medication use. Recommend repeat EEG once off sedative medications. 2/25 AEDs: Zonisamide 100mg BID  EEG Summary:  Normal EEG in the awake, drowsy and asleep states. Polyspikes, focal, left posterior temporal-central region Excess beta  Impression/Clinical Correlate:  This is an abnormal VEEG. There is evidence of potentially epileptogenic cortical irritability in the left posterior temporal-cental region. No seizures were recorded. The presence of excessive beta activity never evolves and has no clear clinical correlation, is most often seen in setting of sedative medication use but has also been described in rare cases of intellectual impairment.  3/2/23: EEG Summary:  Normal EEG in the awake, drowsy and asleep states.  Background slowing, generalized, mild Excess beta  Impression/Clinical Correlate:  This is an abnormal VEEG. Mild diffuse/multifocal cerebral dysfunction, not specific in etiology. No seizures were recorded. The presence of excessive beta activity never evolves and has no clear clinical correlation, is most often seen in setting of sedative medication use but has also been described in rare cases of intellectual impairment.   Radiology: MR Brain-Seizure, Epilepsy w/wo IV Cont (02.25.23 @ 11:07) IMPRESSION: No acute infarct or hemorrhage. Nonspecific subcentimeter T2/FLAIR hyperintense white matter foci in the posterior left temporal lobe.  Impression: Breakthrough seizures in the setting of borderline VPA level possibly due to medication nonadherence vs inadequate dosing.  Plan: Continue following medications - Zonisamide 200mg BID Lacosamide 150mg BID Quetiapine 150mg BID with 50mg BID PRN   according to her mother she continues to have convulsive seizures the last one 2 days prior followed by severe behavior agitation)

## 2024-08-30 NOTE — HISTORY OF PRESENT ILLNESS
[FreeTextEntry1] : ***08/27/2024*** No new interim medical diagnosis or hospitalizations. Group home aide reports appears lethargic whenever ?possible seizures appears to come on, occurs once every few months. Most significant seizure a couple of years ago.  Next week being seen under general anesthesia for dental exam/cleaning, may need ?extraction, requesting neurology clearance.  Mom was concerned about medications for seizures, lethargy concern and in/out of consciousness, thinks all the medications.  Medications: clobazam 10mg BID, zonisamide 200 mg BID, lacosamide 200  mg BID , ativan 1 mg qAM, seroquel 2hr separate from other medications (300 mg BID), miralax, vitamin B12, colace vitamin D atorvastatin, fish oil,amantadine 100 mg, metfomrin ER 1000 BID, iron TID, rescue nayzilam. Last overt seizure in 5/2024.  *** 06/05/2024 *** MARLEY MCKEON is here for follow up. continue to have decreased appetite and small seizures. 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 she has a sister with well controlled epilepsy( just delivered her 5th child) her mothe ris aware  *** 04/01/2024 *** MARLEY MCKEON had 3 seizures and her zonisamide and vimapt were increased at my recommendation  *** 08/29/2023 *** MARLEY MCKEON is here for follow up. her complex history is as below:  Hospital Course  41yo woman with HTN, HLD, bipolar d/o, hypothyroidism, seizure disorder (on Keppra and VPA), developmentally delayed, who initially presented to OSH on 2/18 for breakthrough seizures, transferred to the EMU for further evaluation and management. Per chart review and discussion with aide at bedside, patient had 2 seizures at her group home, including an episode of generalized "tonic-clonic" shaking while seated in a recliner, lasting 2 minutes, associated with urinary incontinence. She was taken to OSH where she was reported to be postictal and received IV VPA 500mg x1 and later determined to be stable for discharge back to her group home. While awaiting transport, she had a 3rd witnessed event lasting less than 1 minute, for which she was administered ativan 1mg IVP, and decision made for transport to [facility] for continuous EEG. There was no fall or injury associated with any of these events. Her aide is unsure of when her last seizure prior to these events may have occurred. Pt was admitted to the EMU and monitored on EEG. The patient was incidentally found to be COVID positive on 2/28/23 when preparing for discharge. She was placed on isolation and remained asymptomatic.  Objective data: EEG REPORTS  2/19 EEG Summary:  Normal EEG in the awake, drowsy and asleep states. Excess beta  2/20-2/23 EEG Summary:  Normal EEG in the awake, drowsy and asleep states. Excess beta  Impression/Clinical Correlate:  This is a normal VEEG. No epileptiform pattern or seizures were recorded. The presence of excessive beta activity is most often seen in setting of sedative medication use.  2/24 EEG Summary:  Normal EEG in the awake, drowsy and asleep states. Excess beta  Impression/Clinical Correlate:  This is a normal VEEG. No epileptiform pattern or seizures were recorded. The presence of excessive beta activity never evolves and has no clear clinical correlation, is most often seen in setting of sedative medication use. Recommend repeat EEG once off sedative medications. 2/25 AEDs: Zonisamide 100mg BID  EEG Summary:  Normal EEG in the awake, drowsy and asleep states. Polyspikes, focal, left posterior temporal-central region Excess beta  Impression/Clinical Correlate:  This is an abnormal VEEG. There is evidence of potentially epileptogenic cortical irritability in the left posterior temporal-cental region. No seizures were recorded. The presence of excessive beta activity never evolves and has no clear clinical correlation, is most often seen in setting of sedative medication use but has also been described in rare cases of intellectual impairment.  3/2/23: EEG Summary:  Normal EEG in the awake, drowsy and asleep states.  Background slowing, generalized, mild Excess beta  Impression/Clinical Correlate:  This is an abnormal VEEG. Mild diffuse/multifocal cerebral dysfunction, not specific in etiology. No seizures were recorded. The presence of excessive beta activity never evolves and has no clear clinical correlation, is most often seen in setting of sedative medication use but has also been described in rare cases of intellectual impairment.   Radiology: MR Brain-Seizure, Epilepsy w/wo IV Cont (02.25.23 @ 11:07) IMPRESSION: No acute infarct or hemorrhage. Nonspecific subcentimeter T2/FLAIR hyperintense white matter foci in the posterior left temporal lobe.  Impression: Breakthrough seizures in the setting of borderline VPA level possibly due to medication nonadherence vs inadequate dosing.  Plan: Continue following medications - Zonisamide 200mg BID Lacosamide 150mg BID Quetiapine 150mg BID with 50mg BID PRN   according to her mother she continues to have convulsive seizures the last one 2 days prior followed by severe behavior agitation)

## 2024-08-30 NOTE — DISCUSSION/SUMMARY
[FreeTextEntry1] : MARLEY MCKEON is a 43 years old with mild intellectual disability, symptomatic generalized seizures with moderate control only and with some side effect(agitation). Has been having intermittent periods of increased lethargy, plan for evaluation of further seizures  [] continue ASMs as is -currently on clobazam 10mg BID, zonisamide 200 BID, lacosamide 200 BID, nayzilam PRN -appears per discussion with staff at facility to also bee on ativan 1mg QD (?managed by psychiatry) [] ambulatory EEG at facility

## 2024-08-30 NOTE — ASSESSMENT
[FreeTextEntry1] : MARLEY MCKEON is a 43 years old with mild intellectual disability, symptomatic generalized seizures with moderate control only and with some side effect(agitation). Has been having intermittent periods of increased lethargy, plan for evaluation of further seizures  [] continue ASMs as is -currently on clobazam 10mg BID, zonisamide 200 BID, lacosamide 200 BID, nayzilam PRN -appears per discussion with staff at facility to also bee on ativan 1mg QD (?managed by psychiatry) [] ambulatory EEG at facility  [] she is clear for procedures with general or local anesthesia.

## 2024-09-03 ENCOUNTER — TRANSCRIPTION ENCOUNTER (OUTPATIENT)
Age: 44
End: 2024-09-03

## 2024-09-04 ENCOUNTER — TRANSCRIPTION ENCOUNTER (OUTPATIENT)
Age: 44
End: 2024-09-04

## 2024-09-04 ENCOUNTER — OUTPATIENT (OUTPATIENT)
Dept: OUTPATIENT SERVICES | Facility: HOSPITAL | Age: 44
LOS: 1 days | End: 2024-09-04
Payer: MEDICARE

## 2024-09-04 VITALS
TEMPERATURE: 97 F | SYSTOLIC BLOOD PRESSURE: 131 MMHG | RESPIRATION RATE: 16 BRPM | OXYGEN SATURATION: 100 % | HEART RATE: 83 BPM | DIASTOLIC BLOOD PRESSURE: 72 MMHG

## 2024-09-04 VITALS
TEMPERATURE: 97 F | HEIGHT: 61.5 IN | WEIGHT: 151.9 LBS | RESPIRATION RATE: 16 BRPM | DIASTOLIC BLOOD PRESSURE: 60 MMHG | SYSTOLIC BLOOD PRESSURE: 110 MMHG | HEART RATE: 100 BPM | OXYGEN SATURATION: 99 %

## 2024-09-04 DIAGNOSIS — Z92.89 PERSONAL HISTORY OF OTHER MEDICAL TREATMENT: Chronic | ICD-10-CM

## 2024-09-04 DIAGNOSIS — Z98.890 OTHER SPECIFIED POSTPROCEDURAL STATES: Chronic | ICD-10-CM

## 2024-09-04 DIAGNOSIS — K02.62 DENTAL CARIES ON SMOOTH SURFACE PENETRATING INTO DENTIN: ICD-10-CM

## 2024-09-04 DIAGNOSIS — K05.6 PERIODONTAL DISEASE, UNSPECIFIED: ICD-10-CM

## 2024-09-04 LAB
GLUCOSE BLDC GLUCOMTR-MCNC: 110 MG/DL — HIGH (ref 70–99)
GLUCOSE BLDC GLUCOMTR-MCNC: 115 MG/DL — HIGH (ref 70–99)
HCG SERPL-ACNC: <2 MIU/ML — SIGNIFICANT CHANGE UP

## 2024-09-04 PROCEDURE — D2394: CPT

## 2024-09-04 PROCEDURE — D0210: CPT

## 2024-09-04 PROCEDURE — D4210: CPT

## 2024-09-04 PROCEDURE — C9399: CPT

## 2024-09-04 PROCEDURE — D2393: CPT

## 2024-09-04 PROCEDURE — 84702 CHORIONIC GONADOTROPIN TEST: CPT

## 2024-09-04 PROCEDURE — C1889: CPT

## 2024-09-04 PROCEDURE — 82962 GLUCOSE BLOOD TEST: CPT

## 2024-09-04 PROCEDURE — 36415 COLL VENOUS BLD VENIPUNCTURE: CPT

## 2024-09-04 PROCEDURE — D4341: CPT

## 2024-09-04 PROCEDURE — D1208: CPT

## 2024-09-04 PROCEDURE — D2391: CPT

## 2024-09-04 DEVICE — SURGICEL 2 X 14": Type: IMPLANTABLE DEVICE | Status: FUNCTIONAL

## 2024-09-04 RX ORDER — SODIUM CHLORIDE 9 MG/ML
3 INJECTION INTRAMUSCULAR; INTRAVENOUS; SUBCUTANEOUS EVERY 8 HOURS
Refills: 0 | Status: DISCONTINUED | OUTPATIENT
Start: 2024-09-04 | End: 2024-09-04

## 2024-09-04 RX ORDER — FENTANYL CITRATE 50 UG/ML
50 INJECTION INTRAMUSCULAR; INTRAVENOUS
Refills: 0 | Status: DISCONTINUED | OUTPATIENT
Start: 2024-09-04 | End: 2024-09-04

## 2024-09-04 RX ORDER — OXYCODONE HYDROCHLORIDE 5 MG/1
5 TABLET ORAL ONCE
Refills: 0 | Status: DISCONTINUED | OUTPATIENT
Start: 2024-09-04 | End: 2024-09-04

## 2024-09-04 RX ORDER — LIDOCAINE HCL 20 MG/ML
0.2 VIAL (ML) INJECTION ONCE
Refills: 0 | Status: DISCONTINUED | OUTPATIENT
Start: 2024-09-04 | End: 2024-09-04

## 2024-09-04 RX ORDER — OXYCODONE HYDROCHLORIDE 5 MG/1
10 TABLET ORAL ONCE
Refills: 0 | Status: DISCONTINUED | OUTPATIENT
Start: 2024-09-04 | End: 2024-09-04

## 2024-09-04 RX ORDER — ACETAMINOPHEN 325 MG/1
1000 TABLET ORAL ONCE
Refills: 0 | Status: DISCONTINUED | OUTPATIENT
Start: 2024-09-04 | End: 2024-09-04

## 2024-09-04 RX ORDER — ONDANSETRON 2 MG/ML
4 INJECTION, SOLUTION INTRAMUSCULAR; INTRAVENOUS ONCE
Refills: 0 | Status: DISCONTINUED | OUTPATIENT
Start: 2024-09-04 | End: 2024-09-04

## 2024-09-04 NOTE — ASU PATIENT PROFILE, ADULT - PRO INTERPRETER NEED 2
Discharge Summary - Donell Males 75 y o  male MRN: [id number]    Unit/Bed#:  Encounter: [id number]    Admission Date: 1/10/2023    Admitting Diagnosis[facility] [B37 81]  Gee's esophagus without dysplasia [K22 70]    HPI: History of gastroesophageal reflux disease and Gee's esophagus  Procedures Performed: No orders of the defined types were placed in this encounter  Summary of Hospital Course: Tolerated procedure well    Significant Findings, Care, Treatment and Services Provided: EGD and biopsies done for evaluation of Gee's esophagus    Complications: None    Discharge Diagnosis: See above    Medical Problems     Resolved Problems  Date Reviewed: 12/1/2022   None         Condition at Discharge: good         Discharge instructions/Information to patient and family:   See after visit summary for information provided to patient and family  Provisions for Follow-Up Care:  See after visit summary for information related to follow-up care and any pertinent home health orders        PCP: Sharri Coreas DO    Disposition: Home English

## 2024-09-04 NOTE — ASU DISCHARGE PLAN (ADULT/PEDIATRIC) - ASU DC SPECIAL INSTRUCTIONSFT
comprehensive dental treatment under general anesthesia, exam, xrays, cleaning, restorations and fluoride     no extractions were performed    tylenol prn pain    see DR De Los Santos in one week, appointment will be at [facility] [phone] on 9/9 at 2 pm for a follow up, appt is set     resume all medications    return to routine activities tomorrow if patient feels well

## 2024-09-04 NOTE — ASU PATIENT PROFILE, ADULT - NSICDXPASTMEDICALHX_GEN_ALL_CORE_FT
PAST MEDICAL HISTORY:  Bipolar disorder     H/O iron deficiency     H/O osteoporosis     H/O sinus tachycardia     Hyperlipidemia     Hypothyroidism     Intellectual disability     Right bundle branch block     Seizure disorder

## 2024-09-05 ENCOUNTER — RX RENEWAL (OUTPATIENT)
Age: 44
End: 2024-09-05

## 2024-09-12 ENCOUNTER — APPOINTMENT (OUTPATIENT)
Dept: OBGYN | Facility: CLINIC | Age: 44
End: 2024-09-12
Payer: MEDICARE

## 2024-09-12 DIAGNOSIS — N92.0 EXCESSIVE AND FREQUENT MENSTRUATION WITH REGULAR CYCLE: ICD-10-CM

## 2024-09-12 PROCEDURE — 99213 OFFICE O/P EST LOW 20 MIN: CPT

## 2024-09-12 NOTE — REASON FOR VISIT
[Medical Office: ([facility])___] : at the medical office located in  [Mother] : mother [Formal Caregiver] : formal caregiver [FreeTextEntry2] : PTS MOTHER [TextEntry] : LONG HX MENORRHAGIA,  PT HAS BEEN ON OCPS IN PAST BUT STOPPED.  HAD BEEN CYCLING W PROVERA BUT NO LONGER USING IT.  MENSES HAVE BEEN MONTHLY W CHRONICALLY HEAVY FLOW.  PT HAD RECEIVED TRANSFUSION IN PAST DUE TO MENORRHAGIA.  CURRENTLY TAKING FESO4

## 2024-09-12 NOTE — PLAN
[FreeTextEntry1] : SPOKE W PTS MOTHER REGARDING MGT OPTIONS.  DISCUSSED OCPS, LYSTEDA, DEPOPROVERA,.  PTS MOTHER W PERSONAL HX BREAST CA AND SISTER W BREAST CA.  PTS MOTHER VERY HESITANT TO START OCP DUE TO FAMILY HX.  EXPLAINED THAT OCPS ARE NOT CONTRAINDICATED.  MOTHER PREFERS TO MINIMIZE MEDICATION USE.  HER CBC HAS BEEN WNL W SLIGHTLY LOW IRON LEVELS.  WOULD CONT W OBSERVATION AS LONG AS ANEMIA DOES NOT DEVELOP.  ALL QUESTIONS ANSWERED.

## 2024-10-09 ENCOUNTER — RX RENEWAL (OUTPATIENT)
Age: 44
End: 2024-10-09

## 2024-10-18 ENCOUNTER — RX RENEWAL (OUTPATIENT)
Age: 44
End: 2024-10-18

## 2024-10-22 ENCOUNTER — RX RENEWAL (OUTPATIENT)
Age: 44
End: 2024-10-22

## 2024-10-24 ENCOUNTER — APPOINTMENT (OUTPATIENT)
Dept: NEUROLOGY | Facility: CLINIC | Age: 44
End: 2024-10-24

## 2024-10-24 PROCEDURE — 95816 EEG AWAKE AND DROWSY: CPT

## 2024-10-25 ENCOUNTER — APPOINTMENT (OUTPATIENT)
Dept: CARDIOLOGY | Facility: CLINIC | Age: 44
End: 2024-10-25
Payer: MEDICARE

## 2024-10-25 ENCOUNTER — NON-APPOINTMENT (OUTPATIENT)
Age: 44
End: 2024-10-25

## 2024-10-25 VITALS
SYSTOLIC BLOOD PRESSURE: 112 MMHG | WEIGHT: 150 LBS | BODY MASS INDEX: 27.6 KG/M2 | OXYGEN SATURATION: 96 % | HEIGHT: 62 IN | RESPIRATION RATE: 16 BRPM | DIASTOLIC BLOOD PRESSURE: 72 MMHG | HEART RATE: 100 BPM

## 2024-10-25 VITALS
BODY MASS INDEX: 27.6 KG/M2 | HEART RATE: 114 BPM | HEIGHT: 62 IN | WEIGHT: 150 LBS | DIASTOLIC BLOOD PRESSURE: 72 MMHG | SYSTOLIC BLOOD PRESSURE: 112 MMHG | OXYGEN SATURATION: 96 %

## 2024-10-25 DIAGNOSIS — E78.5 HYPERLIPIDEMIA, UNSPECIFIED: ICD-10-CM

## 2024-10-25 DIAGNOSIS — R94.31 ABNORMAL ELECTROCARDIOGRAM [ECG] [EKG]: ICD-10-CM

## 2024-10-25 DIAGNOSIS — F79 UNSPECIFIED INTELLECTUAL DISABILITIES: ICD-10-CM

## 2024-10-25 DIAGNOSIS — R01.1 CARDIAC MURMUR, UNSPECIFIED: ICD-10-CM

## 2024-10-25 PROCEDURE — G2211 COMPLEX E/M VISIT ADD ON: CPT

## 2024-10-25 PROCEDURE — 95708 EEG WO VID EA 12-26HR UNMNTR: CPT

## 2024-10-25 PROCEDURE — 95719 EEG PHYS/QHP EA INCR W/O VID: CPT

## 2024-10-25 PROCEDURE — 99214 OFFICE O/P EST MOD 30 MIN: CPT

## 2024-10-25 PROCEDURE — 95700 EEG CONT REC W/VID EEG TECH: CPT

## 2024-10-25 PROCEDURE — 93000 ELECTROCARDIOGRAM COMPLETE: CPT

## 2024-10-31 ENCOUNTER — RX RENEWAL (OUTPATIENT)
Age: 44
End: 2024-10-31

## 2024-10-31 ENCOUNTER — NON-APPOINTMENT (OUTPATIENT)
Age: 44
End: 2024-10-31

## 2024-11-06 ENCOUNTER — NON-APPOINTMENT (OUTPATIENT)
Age: 44
End: 2024-11-06

## 2024-12-11 ENCOUNTER — RX RENEWAL (OUTPATIENT)
Age: 44
End: 2024-12-11

## 2025-01-07 ENCOUNTER — OUTPATIENT (OUTPATIENT)
Dept: OUTPATIENT SERVICES | Facility: HOSPITAL | Age: 45
LOS: 1 days | Discharge: HOME | End: 2025-01-07

## 2025-01-07 DIAGNOSIS — Z92.89 PERSONAL HISTORY OF OTHER MEDICAL TREATMENT: Chronic | ICD-10-CM

## 2025-01-07 DIAGNOSIS — D64.9 ANEMIA, UNSPECIFIED: ICD-10-CM

## 2025-01-07 DIAGNOSIS — Z98.890 OTHER SPECIFIED POSTPROCEDURAL STATES: Chronic | ICD-10-CM

## 2025-01-10 ENCOUNTER — APPOINTMENT (OUTPATIENT)
Dept: HEMATOLOGY ONCOLOGY | Facility: CLINIC | Age: 45
End: 2025-01-10

## 2025-01-16 ENCOUNTER — NON-APPOINTMENT (OUTPATIENT)
Age: 45
End: 2025-01-16

## 2025-01-17 ENCOUNTER — NON-APPOINTMENT (OUTPATIENT)
Age: 45
End: 2025-01-17

## 2025-01-24 ENCOUNTER — APPOINTMENT (OUTPATIENT)
Dept: HEMATOLOGY ONCOLOGY | Facility: CLINIC | Age: 45
End: 2025-01-24

## 2025-01-24 ENCOUNTER — APPOINTMENT (OUTPATIENT)
Dept: HEMATOLOGY ONCOLOGY | Facility: CLINIC | Age: 45
End: 2025-01-24
Payer: MEDICARE

## 2025-01-24 DIAGNOSIS — D51.8 OTHER VITAMIN B12 DEFICIENCY ANEMIAS: ICD-10-CM

## 2025-01-24 DIAGNOSIS — D50.8 OTHER IRON DEFICIENCY ANEMIAS: ICD-10-CM

## 2025-01-24 PROCEDURE — 99212 OFFICE O/P EST SF 10 MIN: CPT

## 2025-02-24 ENCOUNTER — RX RENEWAL (OUTPATIENT)
Age: 45
End: 2025-02-24

## 2025-03-03 ENCOUNTER — APPOINTMENT (OUTPATIENT)
Dept: NEUROLOGY | Facility: CLINIC | Age: 45
End: 2025-03-03

## 2025-03-11 ENCOUNTER — NON-APPOINTMENT (OUTPATIENT)
Age: 45
End: 2025-03-11

## 2025-03-11 ENCOUNTER — APPOINTMENT (OUTPATIENT)
Dept: NEUROLOGY | Facility: CLINIC | Age: 45
End: 2025-03-11

## 2025-03-12 ENCOUNTER — APPOINTMENT (OUTPATIENT)
Dept: NEUROLOGY | Facility: CLINIC | Age: 45
End: 2025-03-12
Payer: MEDICARE

## 2025-03-12 VITALS
HEART RATE: 97 BPM | DIASTOLIC BLOOD PRESSURE: 74 MMHG | BODY MASS INDEX: 29.08 KG/M2 | HEIGHT: 62 IN | SYSTOLIC BLOOD PRESSURE: 108 MMHG | WEIGHT: 158 LBS

## 2025-03-12 DIAGNOSIS — G40.909 EPILEPSY, UNSPECIFIED, NOT INTRACTABLE, W/OUT STATUS EPILEPTICUS: ICD-10-CM

## 2025-03-12 PROCEDURE — G2211 COMPLEX E/M VISIT ADD ON: CPT

## 2025-03-12 PROCEDURE — 99213 OFFICE O/P EST LOW 20 MIN: CPT

## 2025-03-12 RX ORDER — LACOSAMIDE 50 MG/1
50 TABLET ORAL
Qty: 180 | Refills: 1 | Status: ACTIVE | COMMUNITY
Start: 2025-03-12 | End: 1900-01-01

## 2025-03-13 ENCOUNTER — LABORATORY RESULT (OUTPATIENT)
Age: 45
End: 2025-03-13

## 2025-03-20 NOTE — PHYSICAL EXAM
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  If the medicine we gave him at his appointment does not help, and the pain is still bothering him, we could consider orthopedic consultation.   [FreeTextEntry1] : General: limited exam due to patient mentation Constitutional: appears stated age, in no apparent distress including pain. Neurological (>12): MS: Eyes open, alert, interacting with staff and examiner. Speech/Language: Clear CNs: EOMI, no overt facial asymmetry. Motor: Muscle bulk and tone are normal. Moves all extremities spontaneously. Coordination:RAJANI Gait: Normal gait

## 2025-04-01 NOTE — H&P PST ADULT - [facility]
The following labs were labeled with appropriate pt sticker and tubed to lab:     [] Blue     [x] Lavender   [] on ice  [x] Green/yellow  [] Green/black [] on ice  [] Grey  [] on ice  [] Yellow  [] Red  [] Pink  [] Type/ Screen  [] ABG  [] VBG    [x] COVID-19 swab    [] Rapid  [] PCR  [x] Flu swab  [] Peds Viral Panel     [] Urine Sample  [] Fecal Sample  [] Pelvic Cultures  [] Blood Cultures  [] X 2  [] STREP Cultures  [] Wound Cultures    
regular rate and rhythm

## 2025-04-10 ENCOUNTER — APPOINTMENT (OUTPATIENT)
Dept: NEUROLOGY | Facility: CLINIC | Age: 45
End: 2025-04-10
Payer: MEDICARE

## 2025-04-10 VITALS
DIASTOLIC BLOOD PRESSURE: 75 MMHG | SYSTOLIC BLOOD PRESSURE: 123 MMHG | WEIGHT: 157 LBS | BODY MASS INDEX: 28.89 KG/M2 | HEART RATE: 99 BPM | HEIGHT: 62 IN

## 2025-04-10 DIAGNOSIS — G40.909 EPILEPSY, UNSPECIFIED, NOT INTRACTABLE, W/OUT STATUS EPILEPTICUS: ICD-10-CM

## 2025-04-10 PROCEDURE — G2211 COMPLEX E/M VISIT ADD ON: CPT

## 2025-04-10 PROCEDURE — 99213 OFFICE O/P EST LOW 20 MIN: CPT

## 2025-04-21 NOTE — ED PROVIDER NOTE - CHRONIC CONDITION AFFECTING CARE
"[facility] tech left a voicemail regarding pt's camzyos    \"We received a referral on 3/28 but it didn't have a valid prescription information. It says \"for informational purposes only\" across the top. We wanted to touch base on this as we are showing that patient will run out of the free trial offer in about 10 days.\"    Phone [phone]   Fax [phone]  " Hypertension/Other

## 2025-05-09 ENCOUNTER — RX RENEWAL (OUTPATIENT)
Age: 45
End: 2025-05-09

## 2025-05-16 ENCOUNTER — APPOINTMENT (OUTPATIENT)
Dept: CARDIOLOGY | Facility: CLINIC | Age: 45
End: 2025-05-16
Payer: MEDICARE

## 2025-05-16 VITALS
SYSTOLIC BLOOD PRESSURE: 122 MMHG | DIASTOLIC BLOOD PRESSURE: 80 MMHG | HEIGHT: 62 IN | HEART RATE: 98 BPM | WEIGHT: 169 LBS | OXYGEN SATURATION: 98 % | BODY MASS INDEX: 31.1 KG/M2

## 2025-05-16 VITALS
BODY MASS INDEX: 31.1 KG/M2 | RESPIRATION RATE: 14 BRPM | TEMPERATURE: 97.2 F | HEART RATE: 98 BPM | DIASTOLIC BLOOD PRESSURE: 80 MMHG | HEIGHT: 62 IN | OXYGEN SATURATION: 98 % | WEIGHT: 169 LBS | SYSTOLIC BLOOD PRESSURE: 122 MMHG

## 2025-05-16 DIAGNOSIS — R94.31 ABNORMAL ELECTROCARDIOGRAM [ECG] [EKG]: ICD-10-CM

## 2025-05-16 DIAGNOSIS — F79 UNSPECIFIED INTELLECTUAL DISABILITIES: ICD-10-CM

## 2025-05-16 DIAGNOSIS — E11.9 TYPE 2 DIABETES MELLITUS W/OUT COMPLICATIONS: ICD-10-CM

## 2025-05-16 DIAGNOSIS — R01.1 CARDIAC MURMUR, UNSPECIFIED: ICD-10-CM

## 2025-05-16 DIAGNOSIS — E78.5 HYPERLIPIDEMIA, UNSPECIFIED: ICD-10-CM

## 2025-05-16 PROCEDURE — 99214 OFFICE O/P EST MOD 30 MIN: CPT

## 2025-05-16 PROCEDURE — G2211 COMPLEX E/M VISIT ADD ON: CPT

## 2025-05-19 ENCOUNTER — EMERGENCY (EMERGENCY)
Facility: HOSPITAL | Age: 45
LOS: 1 days | End: 2025-05-19
Attending: EMERGENCY MEDICINE | Admitting: EMERGENCY MEDICINE
Payer: MEDICARE

## 2025-05-19 VITALS
RESPIRATION RATE: 15 BRPM | HEIGHT: 62 IN | HEART RATE: 88 BPM | DIASTOLIC BLOOD PRESSURE: 71 MMHG | WEIGHT: 145.06 LBS | SYSTOLIC BLOOD PRESSURE: 109 MMHG | OXYGEN SATURATION: 99 %

## 2025-05-19 VITALS
DIASTOLIC BLOOD PRESSURE: 76 MMHG | RESPIRATION RATE: 16 BRPM | TEMPERATURE: 98 F | SYSTOLIC BLOOD PRESSURE: 125 MMHG | OXYGEN SATURATION: 97 % | HEART RATE: 75 BPM

## 2025-05-19 DIAGNOSIS — Z98.890 OTHER SPECIFIED POSTPROCEDURAL STATES: Chronic | ICD-10-CM

## 2025-05-19 DIAGNOSIS — Z92.89 PERSONAL HISTORY OF OTHER MEDICAL TREATMENT: Chronic | ICD-10-CM

## 2025-05-19 LAB
ALBUMIN SERPL ELPH-MCNC: 4 G/DL — SIGNIFICANT CHANGE UP (ref 3.3–5)
ALP SERPL-CCNC: 78 U/L — SIGNIFICANT CHANGE UP (ref 30–120)
ALT FLD-CCNC: 13 U/L — SIGNIFICANT CHANGE UP (ref 10–60)
ANION GAP SERPL CALC-SCNC: 4 MMOL/L — LOW (ref 5–17)
APPEARANCE UR: CLEAR — SIGNIFICANT CHANGE UP
AST SERPL-CCNC: 10 U/L — SIGNIFICANT CHANGE UP (ref 10–40)
BASOPHILS # BLD AUTO: 0.1 K/UL — SIGNIFICANT CHANGE UP (ref 0–0.2)
BASOPHILS NFR BLD AUTO: 1.3 % — SIGNIFICANT CHANGE UP (ref 0–2)
BILIRUB SERPL-MCNC: 0.2 MG/DL — SIGNIFICANT CHANGE UP (ref 0.2–1.2)
BILIRUB UR-MCNC: NEGATIVE — SIGNIFICANT CHANGE UP
BUN SERPL-MCNC: 12 MG/DL — SIGNIFICANT CHANGE UP (ref 7–23)
CALCIUM SERPL-MCNC: 9.8 MG/DL — SIGNIFICANT CHANGE UP (ref 8.4–10.5)
CHLORIDE SERPL-SCNC: 104 MMOL/L — SIGNIFICANT CHANGE UP (ref 96–108)
CO2 SERPL-SCNC: 30 MMOL/L — SIGNIFICANT CHANGE UP (ref 22–31)
COLOR SPEC: YELLOW — SIGNIFICANT CHANGE UP
CREAT SERPL-MCNC: 0.73 MG/DL — SIGNIFICANT CHANGE UP (ref 0.5–1.3)
DIFF PNL FLD: NEGATIVE — SIGNIFICANT CHANGE UP
EGFR: 103 ML/MIN/1.73M2 — SIGNIFICANT CHANGE UP
EGFR: 103 ML/MIN/1.73M2 — SIGNIFICANT CHANGE UP
EOSINOPHIL # BLD AUTO: 0.32 K/UL — SIGNIFICANT CHANGE UP (ref 0–0.5)
EOSINOPHIL NFR BLD AUTO: 4.1 % — SIGNIFICANT CHANGE UP (ref 0–6)
FLUAV AG NPH QL: SIGNIFICANT CHANGE UP
FLUBV AG NPH QL: SIGNIFICANT CHANGE UP
GLUCOSE SERPL-MCNC: 75 MG/DL — SIGNIFICANT CHANGE UP (ref 70–99)
GLUCOSE UR QL: NEGATIVE MG/DL — SIGNIFICANT CHANGE UP
HCG SERPL-ACNC: <1 MIU/ML — SIGNIFICANT CHANGE UP
HCT VFR BLD CALC: 38.8 % — SIGNIFICANT CHANGE UP (ref 34.5–45)
HGB BLD-MCNC: 12 G/DL — SIGNIFICANT CHANGE UP (ref 11.5–15.5)
IMM GRANULOCYTES NFR BLD AUTO: 0.3 % — SIGNIFICANT CHANGE UP (ref 0–0.9)
KETONES UR QL: NEGATIVE MG/DL — SIGNIFICANT CHANGE UP
LEUKOCYTE ESTERASE UR-ACNC: NEGATIVE — SIGNIFICANT CHANGE UP
LYMPHOCYTES # BLD AUTO: 2.72 K/UL — SIGNIFICANT CHANGE UP (ref 1–3.3)
LYMPHOCYTES # BLD AUTO: 34.9 % — SIGNIFICANT CHANGE UP (ref 13–44)
MAGNESIUM SERPL-MCNC: 1.8 MG/DL — SIGNIFICANT CHANGE UP (ref 1.6–2.6)
MCHC RBC-ENTMCNC: 23.4 PG — LOW (ref 27–34)
MCHC RBC-ENTMCNC: 30.9 G/DL — LOW (ref 32–36)
MCV RBC AUTO: 75.8 FL — LOW (ref 80–100)
MONOCYTES # BLD AUTO: 0.65 K/UL — SIGNIFICANT CHANGE UP (ref 0–0.9)
MONOCYTES NFR BLD AUTO: 8.3 % — SIGNIFICANT CHANGE UP (ref 2–14)
NEUTROPHILS # BLD AUTO: 3.99 K/UL — SIGNIFICANT CHANGE UP (ref 1.8–7.4)
NEUTROPHILS NFR BLD AUTO: 51.1 % — SIGNIFICANT CHANGE UP (ref 43–77)
NITRITE UR-MCNC: NEGATIVE — SIGNIFICANT CHANGE UP
NRBC BLD AUTO-RTO: 0 /100 WBCS — SIGNIFICANT CHANGE UP (ref 0–0)
PH UR: 7 — SIGNIFICANT CHANGE UP (ref 5–8)
PLATELET # BLD AUTO: 378 K/UL — SIGNIFICANT CHANGE UP (ref 150–400)
POTASSIUM SERPL-MCNC: 4.4 MMOL/L — SIGNIFICANT CHANGE UP (ref 3.5–5.3)
POTASSIUM SERPL-SCNC: 4.4 MMOL/L — SIGNIFICANT CHANGE UP (ref 3.5–5.3)
PROT SERPL-MCNC: 7.7 G/DL — SIGNIFICANT CHANGE UP (ref 6–8.3)
PROT UR-MCNC: NEGATIVE MG/DL — SIGNIFICANT CHANGE UP
RBC # BLD: 5.12 M/UL — SIGNIFICANT CHANGE UP (ref 3.8–5.2)
RBC # FLD: 16.2 % — HIGH (ref 10.3–14.5)
RSV RNA NPH QL NAA+NON-PROBE: SIGNIFICANT CHANGE UP
SARS-COV-2 RNA SPEC QL NAA+PROBE: SIGNIFICANT CHANGE UP
SODIUM SERPL-SCNC: 138 MMOL/L — SIGNIFICANT CHANGE UP (ref 135–145)
SOURCE RESPIRATORY: SIGNIFICANT CHANGE UP
SP GR SPEC: 1 — SIGNIFICANT CHANGE UP (ref 1–1.03)
UROBILINOGEN FLD QL: 0.2 MG/DL — SIGNIFICANT CHANGE UP (ref 0.2–1)
WBC # BLD: 7.8 K/UL — SIGNIFICANT CHANGE UP (ref 3.8–10.5)
WBC # FLD AUTO: 7.8 K/UL — SIGNIFICANT CHANGE UP (ref 3.8–10.5)

## 2025-05-19 PROCEDURE — 71045 X-RAY EXAM CHEST 1 VIEW: CPT | Mod: 26

## 2025-05-19 PROCEDURE — 93010 ELECTROCARDIOGRAM REPORT: CPT

## 2025-05-19 PROCEDURE — 36415 COLL VENOUS BLD VENIPUNCTURE: CPT

## 2025-05-19 PROCEDURE — 87637 SARSCOV2&INF A&B&RSV AMP PRB: CPT

## 2025-05-19 PROCEDURE — 71045 X-RAY EXAM CHEST 1 VIEW: CPT

## 2025-05-19 PROCEDURE — 99284 EMERGENCY DEPT VISIT MOD MDM: CPT | Mod: FS

## 2025-05-19 PROCEDURE — 93005 ELECTROCARDIOGRAM TRACING: CPT

## 2025-05-19 PROCEDURE — 84702 CHORIONIC GONADOTROPIN TEST: CPT

## 2025-05-19 PROCEDURE — 85025 COMPLETE CBC W/AUTO DIFF WBC: CPT

## 2025-05-19 PROCEDURE — 81003 URINALYSIS AUTO W/O SCOPE: CPT

## 2025-05-19 PROCEDURE — 83735 ASSAY OF MAGNESIUM: CPT

## 2025-05-19 PROCEDURE — 80053 COMPREHEN METABOLIC PANEL: CPT

## 2025-05-19 PROCEDURE — 99285 EMERGENCY DEPT VISIT HI MDM: CPT | Mod: 25

## 2025-05-19 RX ADMIN — Medication 1000 MILLILITER(S): at 13:02

## 2025-05-19 NOTE — ED ADULT NURSE NOTE - NSFALLUNIVINTERV_ED_ALL_ED
Bed/Stretcher in lowest position, wheels locked, appropriate side rails in place/Call bell, personal items and telephone in reach/Instruct patient to call for assistance before getting out of bed/chair/stretcher/Non-slip footwear applied when patient is off stretcher/[place] to call system/Physically safe environment - no spills, clutter or unnecessary equipment/Purposeful proactive rounding/Room/bathroom lighting operational, light cord in reach

## 2025-05-19 NOTE — ED PROVIDER NOTE - OBJECTIVE STATEMENT
45-year-old female with history of MR, bipolar d/o, seizure, hypothyroidism, hyperlipidemia, osteoporosis, anemia, RBBB, hyperprolactermia brought in by staff member from assisted for evaluation of lethargy today.  As per staff member at bedside, patient is usually verbal however states that patient has been lethargic and less verbal today.  States that she has improved and less lethargic while in the ER.  States that patient has had more frequent episodes of lethargy over the past month since her seroquel was decreased from 600mg to 300mg and ativan discontinued, started on tegretol 1-2 weeks ago. Pt had outpatient labs on 5/16 and awaiting results. Denies any acute changes in symptoms today from previous lethargy episodes as per staff member however wanted to get pt evaluated and hence came to the ER. Denies fever, difficulty breathing, vomiting, diarrhea, recent fall or other symptoms. Pt has been ambulatory as per staff. 45-year-old female with history of MR, bipolar d/o, seizure, hypothyroidism, hyperlipidemia, osteoporosis, anemia, RBBB, hyperprolactinemia brought in by staff member from [facility] for evaluation of lethargy today.  As per staff member at bedside, patient is usually verbal however states that patient has been lethargic and less verbal today.  States that she has improved and less lethargic while in the ER.  States that patient has had more frequent episodes of lethargy over the past month since her seroquel was decreased from 600mg to 300mg and ativan discontinued, started on tegretol 1-2 weeks ago. Pt had outpatient labs on 5/16 and awaiting results. Denies any acute changes in symptoms today from previous lethargy episodes as per staff member however wanted to get pt evaluated and hence came to the ER. Denies fever, difficulty breathing, vomiting, diarrhea, recent fall or other symptoms. Pt has been ambulatory as per staff.

## 2025-05-19 NOTE — ED PROVIDER NOTE - NSFOLLOWUPINSTRUCTIONS_ED_ALL_ED_FT
Follow-up with your PCP for reevaluation, ongoing care and treatment.  Stay hydrated.  If having worsening of symptoms, fever or other related symptoms, return to the ER immediately.    Weakness    WHAT YOU NEED TO KNOW:    What is weakness? Weakness is a loss of muscle strength. You may have weakness in a single muscle or in a group of muscles. Weakness can come and go or be constant. Weakness can get worse over time. You may have weakness for a short time, or it may be permanent.    What causes or increases my risk for weakness?    Older age    A problem in your brain, nerves, or muscles    A condition such as dehydration, a heart problem, infection, or pregnancy    Anxiety or depression    Steroid or heart medicine, or muscle relaxers    Alcohol or illegal drugs    Lack of movement, such as from wearing a cast or splint, or being on bed rest  How is the cause of weakness diagnosed? Your healthcare provider will ask when your signs and symptoms started and what makes your weakness worse. Tell your provider about any medical conditions you have. He or she will test your muscle strength, reflexes, and sense of touch. He or she will also check how far you can move or lift your weakened area. You may also need any of the following:    Blood tests may be used to check for infection or another condition that can cause weakness.    A muscle biopsy is a procedure used to take a muscle sample. This may happen if your blood tests do not show the cause of your weakness.    X-ray pictures may show what is causing your weakness.  How can I manage weakness?    Use assistive devices as directed. These help protect you from injury. Examples include a walker or cane. Have someone install handrails in your home. These will help you get out of a bathtub or stand up from a toilet. Use a shower chair so you can sit while you shower. Sit down on the toilet or another chair to dry off and put on your clothes. Get help going up and down stairs if your legs are weak.    Go to physical or occupational therapy if directed. A physical therapist can teach you exercises to help strengthen weak muscles. An occupational therapist can show you ways to do your daily activities more easily. For example, light forks and spoons can be easier to use if you have hand weakness. You may also learn ways to organize your household items so you are not moving heavy items.    Balance rest with exercise. Exercise can help increase your muscle strength and energy. Do not exercise for long periods at a time. Take breaks often to rest. Too much exercise can cause muscle strain or make you more tired. Ask your healthcare provider how much exercise is right for you.    Eat a variety of healthy foods. Too much or too little food may cause weakness or tiredness. Ask your healthcare provider what a healthy amount of food is for you. Healthy foods include fruits, vegetables, whole-grain breads, low-fat dairy products, lean meats and fish, nuts, and cooked beans.    Do not smoke. Nicotine and other chemicals in cigarettes and cigars can make your symptoms worse, and can cause lung damage. Ask your healthcare provider for information if you currently smoke and need help to quit. E-cigarettes or smokeless tobacco still contain nicotine. Talk to your healthcare provider before you use these products.    Do not use caffeine, alcohol, or illegal drugs. These may cause muscle twitching, which could lead to worsened weakness.  Call 911 for any of the following:    You have any of the following signs of a stroke:  Numbness or drooping on one side of your face    Weakness in an arm or leg    Confusion or difficulty speaking    Dizziness, a severe headache, or vision loss    You lose feeling in your weakened body area.    You have electric shock-like feelings down your arms and legs when you flex or move your neck.    You have sudden or increased trouble speaking, swallowing, or breathing.  When should I seek immediate care?    You have severe pain in your back, arms, or legs that worsens.    You have sudden or worsened muscle weakness or loss of movement.    You are not able to control when you urinate or have a bowel movement.  When should I contact my healthcare provider?    You feel depressed or anxious.    You have questions or concerns about your condition or care. Follow-up with your PCP for reevaluation, ongoing care and treatment.  Stay hydrated.  If having worsening of symptoms, fever or other related symptoms, return to the ER immediately.    Patient can return to day program tomorrow.     Weakness    WHAT YOU NEED TO KNOW:    What is weakness? Weakness is a loss of muscle strength. You may have weakness in a single muscle or in a group of muscles. Weakness can come and go or be constant. Weakness can get worse over time. You may have weakness for a short time, or it may be permanent.    What causes or increases my risk for weakness?    Older age    A problem in your brain, nerves, or muscles    A condition such as dehydration, a heart problem, infection, or pregnancy    Anxiety or depression    Steroid or heart medicine, or muscle relaxers    Alcohol or illegal drugs    Lack of movement, such as from wearing a cast or splint, or being on bed rest  How is the cause of weakness diagnosed? Your healthcare provider will ask when your signs and symptoms started and what makes your weakness worse. Tell your provider about any medical conditions you have. He or she will test your muscle strength, reflexes, and sense of touch. He or she will also check how far you can move or lift your weakened area. You may also need any of the following:    Blood tests may be used to check for infection or another condition that can cause weakness.    A muscle biopsy is a procedure used to take a muscle sample. This may happen if your blood tests do not show the cause of your weakness.    X-ray pictures may show what is causing your weakness.  How can I manage weakness?    Use assistive devices as directed. These help protect you from injury. Examples include a walker or cane. Have someone install handrails in your home. These will help you get out of a bathtub or stand up from a toilet. Use a shower chair so you can sit while you shower. Sit down on the toilet or another chair to dry off and put on your clothes. Get help going up and down stairs if your legs are weak.    Go to physical or occupational therapy if directed. A physical therapist can teach you exercises to help strengthen weak muscles. An occupational therapist can show you ways to do your daily activities more easily. For example, light forks and spoons can be easier to use if you have hand weakness. You may also learn ways to organize your household items so you are not moving heavy items.    Balance rest with exercise. Exercise can help increase your muscle strength and energy. Do not exercise for long periods at a time. Take breaks often to rest. Too much exercise can cause muscle strain or make you more tired. Ask your healthcare provider how much exercise is right for you.    Eat a variety of healthy foods. Too much or too little food may cause weakness or tiredness. Ask your healthcare provider what a healthy amount of food is for you. Healthy foods include fruits, vegetables, whole-grain breads, low-fat dairy products, lean meats and fish, nuts, and cooked beans.    Do not smoke. Nicotine and other chemicals in cigarettes and cigars can make your symptoms worse, and can cause lung damage. Ask your healthcare provider for information if you currently smoke and need help to quit. E-cigarettes or smokeless tobacco still contain nicotine. Talk to your healthcare provider before you use these products.    Do not use caffeine, alcohol, or illegal drugs. These may cause muscle twitching, which could lead to worsened weakness.  Call 911 for any of the following:    You have any of the following signs of a stroke:  Numbness or drooping on one side of your face    Weakness in an arm or leg    Confusion or difficulty speaking    Dizziness, a severe headache, or vision loss    You lose feeling in your weakened body area.    You have electric shock-like feelings down your arms and legs when you flex or move your neck.    You have sudden or increased trouble speaking, swallowing, or breathing.  When should I seek immediate care?    You have severe pain in your back, arms, or legs that worsens.    You have sudden or worsened muscle weakness or loss of movement.    You are not able to control when you urinate or have a bowel movement.  When should I contact my healthcare provider?    You feel depressed or anxious.    You have questions or concerns about your condition or care.

## 2025-05-19 NOTE — ED ADULT TRIAGE NOTE - CHIEF COMPLAINT QUOTE
46 y/o female presents awake, alert, ambulatory from [facility] for lethargy eval. pt accompanied by staff members who state that she doesn't appear to be her usual self today. pt has moderate ID and is currently non verbal during triage.

## 2025-05-19 NOTE — ED PROVIDER NOTE - CLINICAL SUMMARY MEDICAL DECISION MAKING FREE TEXT BOX
Patient is a 45-year-old female who presents to the emergency room with increased lethargy.  Past medical history of MR bipolar disorder seizure disorder hypothyroidism hyperlipidemia osteoporosis anemia history of right bundle branch block hyperprolactinemia.  Patient presents from group home with waxing and waning lethargy.  Patient is typically verbal and interactive but today she has been lethargic and less verbal.  Her symptoms have improved while in the emergency room and she is speaking to staff.  Patient has had increased frequency of episodes of lethargy over the last month since her Seroquel was decreased Ativan was discontinued and she was started on Tegretol.  She had outpatient labs on the 16th they are waiting results.  There is no acute changes in symptoms.  Patient did eat her breakfast and was ambulatory into the emergency room.  Was but no episodes of fever vomiting diarrhea chest pain or shortness of breath.  On exam patient is lying in bed no acute distress normocephalic atraumatic pupils are equal round and reactive patient will not speak to me but did speak to staff and will shake her head yes and no to questions.  Tolerating p.o. in the emergency room heart is regular rate lungs clear to auscultation abdomen soft nontender nondistended neuro exam non focal.  Patient is presenting to the emergency room with waxing and waning mentation.  Likely medication induced versus possible behavioral changes.  Will obtain screening labs check UA urine culture to rule out organic cause.  Ultimate clinical disposition will be pending results.  Independent review of EKG reveals a normal sinus rhythm at 81 bpm.  Independent review of chest x-ray reveals no acute infiltrate.

## 2025-05-19 NOTE — ED ADULT NURSE NOTE - CHIEF COMPLAINT QUOTE
44 y/o female presents awake, alert, ambulatory from [facility] for lethargy eval. pt accompanied by staff members who state that she doesn't appear to be her usual self today. pt has moderate ID and is currently non verbal during triage.

## 2025-05-19 NOTE — ED PROVIDER NOTE - CARE PROVIDER_API CALL
Fay Ventura.  Family Medicine  [address], [address]  [place] [id number]  Phone: [phone]  Fax: [phone]  Follow Up Time: 1-3 Days

## 2025-05-19 NOTE — ED PROVIDER NOTE - DIFFERENTIAL DIAGNOSIS
Differential Diagnosis Patient is presenting to the emergency room with waxing and waning mentation.  Likely medication induced versus possible behavioral changes.  Will obtain screening labs check UA urine culture to rule out organic cause.  Ultimate clinical disposition will be pending results

## 2025-05-19 NOTE — ED ADULT NURSE REASSESSMENT NOTE - NS ED NURSE REASSESS COMMENT FT1
As per conversation with MD Stark, pt to be PO challenged.  Pt received chocolate ice cream as per request.

## 2025-05-19 NOTE — ED ADULT NURSE NOTE - OBJECTIVE STATEMENT
44 yo F pmh of bipolar disorder, iron deficiency, osteoporosis, sinus tachycardia, HLD, hypothyroidism, intellectual disability, right bundle branch block, seizure disorder brought into the ED c/o lethargy as witnessed by group home staff.  Pt unable to make her own needs aware, and poor historian.

## 2025-05-19 NOTE — ED PROVIDER NOTE - PATIENT PORTAL LINK FT
You can access the FollowMyHealth Patient Portal offered by [facility] by registering at the following website: [email or web address]. By joining [employer]’s FollowMyHealth portal, you will also be able to view your health information using other applications (apps) compatible with our system.

## 2025-05-19 NOTE — ED PROVIDER NOTE - PROGRESS NOTE DETAILS
Reevaluated patient at bedside.  Patient feeling much improved. Pt talk to staff at bedside, ate food and ambulated in ED without assistance. as per staff at bedside, pt is back at her baseline now. will d/c and f/u with pcp. Discussed the results of all diagnostic testing in ED and copies of all reports given.   An opportunity to ask questions was given.  Discussed the importance of prompt, close medical follow-up.  Patient will return with any changes, concerns or persistent / worsening symptoms.  Understanding of all instructions verbalized.

## 2025-06-17 ENCOUNTER — APPOINTMENT (OUTPATIENT)
Dept: NEUROLOGY | Facility: CLINIC | Age: 45
End: 2025-06-17
Payer: MEDICARE

## 2025-06-17 VITALS
HEART RATE: 105 BPM | DIASTOLIC BLOOD PRESSURE: 80 MMHG | HEIGHT: 62 IN | BODY MASS INDEX: 31.1 KG/M2 | SYSTOLIC BLOOD PRESSURE: 132 MMHG | WEIGHT: 169 LBS

## 2025-06-17 PROCEDURE — 99214 OFFICE O/P EST MOD 30 MIN: CPT

## 2025-06-23 RX ORDER — QUETIAPINE FUMARATE 150 MG/1
150 TABLET, FILM COATED ORAL
Qty: 60 | Refills: 5 | Status: ACTIVE | COMMUNITY
Start: 2025-06-23 | End: 1900-01-01

## 2025-07-05 ENCOUNTER — NON-APPOINTMENT (OUTPATIENT)
Age: 45
End: 2025-07-05

## 2025-07-10 ENCOUNTER — RX RENEWAL (OUTPATIENT)
Age: 45
End: 2025-07-10

## 2025-07-15 ENCOUNTER — APPOINTMENT (OUTPATIENT)
Dept: NEUROLOGY | Facility: CLINIC | Age: 45
End: 2025-07-15

## 2025-08-06 ENCOUNTER — APPOINTMENT (OUTPATIENT)
Dept: NEUROLOGY | Facility: CLINIC | Age: 45
End: 2025-08-06

## 2025-08-06 PROCEDURE — 95816 EEG AWAKE AND DROWSY: CPT

## 2025-08-07 PROCEDURE — 95708 EEG WO VID EA 12-26HR UNMNTR: CPT

## 2025-08-07 PROCEDURE — 95700 EEG CONT REC W/VID EEG TECH: CPT

## 2025-08-07 PROCEDURE — 95719 EEG PHYS/QHP EA INCR W/O VID: CPT

## 2025-08-29 ENCOUNTER — RX RENEWAL (OUTPATIENT)
Age: 45
End: 2025-08-29

## 2025-08-29 ENCOUNTER — EMERGENCY (EMERGENCY)
Facility: HOSPITAL | Age: 45
LOS: 1 days | End: 2025-08-29
Attending: EMERGENCY MEDICINE | Admitting: EMERGENCY MEDICINE
Payer: MEDICARE

## 2025-08-29 ENCOUNTER — NON-APPOINTMENT (OUTPATIENT)
Age: 45
End: 2025-08-29

## 2025-08-29 VITALS
OXYGEN SATURATION: 100 % | DIASTOLIC BLOOD PRESSURE: 68 MMHG | SYSTOLIC BLOOD PRESSURE: 125 MMHG | HEART RATE: 95 BPM | TEMPERATURE: 98 F | RESPIRATION RATE: 18 BRPM

## 2025-08-29 VITALS
OXYGEN SATURATION: 99 % | WEIGHT: 160.94 LBS | SYSTOLIC BLOOD PRESSURE: 135 MMHG | DIASTOLIC BLOOD PRESSURE: 56 MMHG | HEART RATE: 106 BPM | HEIGHT: 64 IN | TEMPERATURE: 99 F | RESPIRATION RATE: 15 BRPM

## 2025-08-29 DIAGNOSIS — Z92.89 PERSONAL HISTORY OF OTHER MEDICAL TREATMENT: Chronic | ICD-10-CM

## 2025-08-29 DIAGNOSIS — Z98.890 OTHER SPECIFIED POSTPROCEDURAL STATES: Chronic | ICD-10-CM

## 2025-08-29 LAB
ALBUMIN SERPL ELPH-MCNC: 3.9 G/DL — SIGNIFICANT CHANGE UP (ref 3.3–5)
ALP SERPL-CCNC: 91 U/L — SIGNIFICANT CHANGE UP (ref 30–120)
ALT FLD-CCNC: 21 U/L — SIGNIFICANT CHANGE UP (ref 10–60)
ANION GAP SERPL CALC-SCNC: 8 MMOL/L — SIGNIFICANT CHANGE UP (ref 5–17)
APPEARANCE UR: CLEAR — SIGNIFICANT CHANGE UP
AST SERPL-CCNC: 30 U/L — SIGNIFICANT CHANGE UP (ref 10–40)
BASOPHILS # BLD AUTO: 0.06 K/UL — SIGNIFICANT CHANGE UP (ref 0–0.2)
BASOPHILS NFR BLD AUTO: 0.5 % — SIGNIFICANT CHANGE UP (ref 0–2)
BILIRUB SERPL-MCNC: 0.3 MG/DL — SIGNIFICANT CHANGE UP (ref 0.2–1.2)
BILIRUB UR-MCNC: NEGATIVE — SIGNIFICANT CHANGE UP
BUN SERPL-MCNC: 17 MG/DL — SIGNIFICANT CHANGE UP (ref 7–23)
CALCIUM SERPL-MCNC: 9.5 MG/DL — SIGNIFICANT CHANGE UP (ref 8.4–10.5)
CHLORIDE SERPL-SCNC: 100 MMOL/L — SIGNIFICANT CHANGE UP (ref 96–108)
CO2 SERPL-SCNC: 28 MMOL/L — SIGNIFICANT CHANGE UP (ref 22–31)
COLOR SPEC: YELLOW — SIGNIFICANT CHANGE UP
CREAT SERPL-MCNC: 0.67 MG/DL — SIGNIFICANT CHANGE UP (ref 0.5–1.3)
DIFF PNL FLD: NEGATIVE — SIGNIFICANT CHANGE UP
EGFR: 109 ML/MIN/1.73M2 — SIGNIFICANT CHANGE UP
EGFR: 109 ML/MIN/1.73M2 — SIGNIFICANT CHANGE UP
EOSINOPHIL # BLD AUTO: 1.36 K/UL — HIGH (ref 0–0.5)
EOSINOPHIL NFR BLD AUTO: 11.7 % — HIGH (ref 0–6)
GLUCOSE SERPL-MCNC: 82 MG/DL — SIGNIFICANT CHANGE UP (ref 70–99)
GLUCOSE UR QL: NEGATIVE MG/DL — SIGNIFICANT CHANGE UP
HCG UR QL: NEGATIVE — SIGNIFICANT CHANGE UP
HCT VFR BLD CALC: 33 % — LOW (ref 34.5–45)
HGB BLD-MCNC: 9.9 G/DL — LOW (ref 11.5–15.5)
IMM GRANULOCYTES # BLD AUTO: 0.05 K/UL — SIGNIFICANT CHANGE UP (ref 0–0.07)
IMM GRANULOCYTES NFR BLD AUTO: 0.4 % — SIGNIFICANT CHANGE UP (ref 0–0.9)
KETONES UR QL: NEGATIVE MG/DL — SIGNIFICANT CHANGE UP
LACTATE SERPL-SCNC: 0.7 MMOL/L — SIGNIFICANT CHANGE UP (ref 0.7–2)
LACTATE SERPL-SCNC: 3.4 MMOL/L — HIGH (ref 0.7–2)
LEUKOCYTE ESTERASE UR-ACNC: NEGATIVE — SIGNIFICANT CHANGE UP
LIDOCAIN IGE QN: 38 U/L — SIGNIFICANT CHANGE UP (ref 16–77)
LYMPHOCYTES # BLD AUTO: 3.21 K/UL — SIGNIFICANT CHANGE UP (ref 1–3.3)
LYMPHOCYTES NFR BLD AUTO: 27.7 % — SIGNIFICANT CHANGE UP (ref 13–44)
MCHC RBC-ENTMCNC: 22.8 PG — LOW (ref 27–34)
MCHC RBC-ENTMCNC: 30 G/DL — LOW (ref 32–36)
MCV RBC AUTO: 75.9 FL — LOW (ref 80–100)
MONOCYTES # BLD AUTO: 1.1 K/UL — HIGH (ref 0–0.9)
MONOCYTES NFR BLD AUTO: 9.5 % — SIGNIFICANT CHANGE UP (ref 2–14)
NEUTROPHILS # BLD AUTO: 5.82 K/UL — SIGNIFICANT CHANGE UP (ref 1.8–7.4)
NEUTROPHILS NFR BLD AUTO: 50.2 % — SIGNIFICANT CHANGE UP (ref 43–77)
NITRITE UR-MCNC: NEGATIVE — SIGNIFICANT CHANGE UP
NRBC # BLD AUTO: 0 K/UL — SIGNIFICANT CHANGE UP (ref 0–0)
NRBC # FLD: 0 K/UL — SIGNIFICANT CHANGE UP (ref 0–0)
NRBC BLD AUTO-RTO: 0 /100 WBCS — SIGNIFICANT CHANGE UP (ref 0–0)
PH UR: 6.5 — SIGNIFICANT CHANGE UP (ref 5–8)
PLATELET # BLD AUTO: 425 K/UL — HIGH (ref 150–400)
PMV BLD: 10.1 FL — SIGNIFICANT CHANGE UP (ref 7–13)
POTASSIUM SERPL-MCNC: 4.6 MMOL/L — SIGNIFICANT CHANGE UP (ref 3.5–5.3)
POTASSIUM SERPL-SCNC: 4.6 MMOL/L — SIGNIFICANT CHANGE UP (ref 3.5–5.3)
PROT SERPL-MCNC: 7.6 G/DL — SIGNIFICANT CHANGE UP (ref 6–8.3)
PROT UR-MCNC: SIGNIFICANT CHANGE UP MG/DL
RBC # BLD: 4.35 M/UL — SIGNIFICANT CHANGE UP (ref 3.8–5.2)
RBC # FLD: 16.6 % — HIGH (ref 10.3–14.5)
SODIUM SERPL-SCNC: 136 MMOL/L — SIGNIFICANT CHANGE UP (ref 135–145)
SP GR SPEC: 1.02 — SIGNIFICANT CHANGE UP (ref 1–1.03)
UROBILINOGEN FLD QL: 1 MG/DL — SIGNIFICANT CHANGE UP (ref 0.2–1)
WBC # BLD: 11.6 K/UL — HIGH (ref 3.8–10.5)
WBC # FLD AUTO: 11.6 K/UL — HIGH (ref 3.8–10.5)

## 2025-08-29 PROCEDURE — 99284 EMERGENCY DEPT VISIT MOD MDM: CPT | Mod: 25

## 2025-08-29 PROCEDURE — 81025 URINE PREGNANCY TEST: CPT

## 2025-08-29 PROCEDURE — 81003 URINALYSIS AUTO W/O SCOPE: CPT

## 2025-08-29 PROCEDURE — 85025 COMPLETE CBC W/AUTO DIFF WBC: CPT

## 2025-08-29 PROCEDURE — 80053 COMPREHEN METABOLIC PANEL: CPT

## 2025-08-29 PROCEDURE — 74177 CT ABD & PELVIS W/CONTRAST: CPT

## 2025-08-29 PROCEDURE — 96374 THER/PROPH/DIAG INJ IV PUSH: CPT | Mod: XU

## 2025-08-29 PROCEDURE — 36415 COLL VENOUS BLD VENIPUNCTURE: CPT

## 2025-08-29 PROCEDURE — 83605 ASSAY OF LACTIC ACID: CPT

## 2025-08-29 PROCEDURE — 74177 CT ABD & PELVIS W/CONTRAST: CPT | Mod: 26

## 2025-08-29 PROCEDURE — 99285 EMERGENCY DEPT VISIT HI MDM: CPT | Mod: FS

## 2025-08-29 PROCEDURE — 83690 ASSAY OF LIPASE: CPT

## 2025-08-29 RX ORDER — OMEPRAZOLE 20 MG/1
1 CAPSULE, DELAYED RELEASE ORAL
Qty: 60 | Refills: 0
Start: 2025-08-29 | End: 2025-09-27

## 2025-08-29 RX ADMIN — Medication 1000 MILLILITER(S): at 13:13

## 2025-08-29 RX ADMIN — Medication 40 MILLIGRAM(S): at 13:14

## 2025-09-05 ENCOUNTER — RX RENEWAL (OUTPATIENT)
Age: 45
End: 2025-09-05

## 2025-09-18 ENCOUNTER — APPOINTMENT (OUTPATIENT)
Dept: OBGYN | Facility: CLINIC | Age: 45
End: 2025-09-18

## 2025-09-18 PROCEDURE — 99213 OFFICE O/P EST LOW 20 MIN: CPT | Mod: 93

## (undated) DEVICE — GLV 7.5 PROTEXIS (WHITE)

## (undated) DEVICE — POSITIONER FOAM EGG CRATE ULNAR 2PCS (PINK)

## (undated) DEVICE — SUCTION YANKAUER OPEN TIP NO VENT CURVE

## (undated) DEVICE — DRAPE MAYO STAND 30"

## (undated) DEVICE — MEDICATION LABELS W MARKER

## (undated) DEVICE — GLV 7 PROTEXIS (WHITE)

## (undated) DEVICE — VISITEC 4X4

## (undated) DEVICE — SPECIMEN CONTAINER 100ML

## (undated) DEVICE — SOL IRR POUR H2O 250ML

## (undated) DEVICE — WARMING BLANKET LOWER ADULT

## (undated) DEVICE — PACK DENTAL

## (undated) DEVICE — GLV 6.5 PROTEXIS (WHITE)

## (undated) DEVICE — VENODYNE/SCD SLEEVE CALF MEDIUM

## (undated) DEVICE — DRAPE 3/4 SHEET W REINFORCEMENT 56X77"

## (undated) DEVICE — TUBING SUCTION 20FT

## (undated) DEVICE — SOL IRR POUR NS 0.9% 500ML

## (undated) DEVICE — TONSIL ROLLS

## (undated) DEVICE — DRAPE TOWEL BLUE STICKY

## (undated) DEVICE — LAP PAD 18 X 18"

## (undated) DEVICE — DRAPE LIGHT HANDLE COVER (BLUE)

## (undated) DEVICE — DRAPE INSTRUMENT POUCH 6.75" X 11"

## (undated) DEVICE — GOWN TRIMAX LG

## (undated) DEVICE — ELCTR BOVIE PENCIL SMOKE EVACUATION

## (undated) DEVICE — VAGINAL PACKING 2 X 6"